# Patient Record
Sex: MALE | Race: WHITE | Employment: UNEMPLOYED | URBAN - METROPOLITAN AREA
[De-identification: names, ages, dates, MRNs, and addresses within clinical notes are randomized per-mention and may not be internally consistent; named-entity substitution may affect disease eponyms.]

---

## 2017-02-06 ENCOUNTER — HOSPITAL ENCOUNTER (OUTPATIENT)
Dept: MRI IMAGING | Age: 47
Discharge: HOME | End: 2017-02-06
Payer: COMMERCIAL

## 2017-02-06 DIAGNOSIS — M54.9 BACK PAIN: ICD-10-CM

## 2017-02-06 PROCEDURE — 72148 MRI LUMBAR SPINE W/O DYE: CPT

## 2017-06-22 ENCOUNTER — APPOINTMENT (OUTPATIENT)
Dept: GENERAL RADIOLOGY | Age: 47
DRG: 308 | End: 2017-06-22
Attending: EMERGENCY MEDICINE
Payer: COMMERCIAL

## 2017-06-22 ENCOUNTER — HOSPITAL ENCOUNTER (INPATIENT)
Age: 47
LOS: 6 days | Discharge: HOME | DRG: 308 | End: 2017-06-28
Attending: EMERGENCY MEDICINE | Admitting: INTERNAL MEDICINE
Payer: COMMERCIAL

## 2017-06-22 DIAGNOSIS — R06.02 SOB (SHORTNESS OF BREATH): ICD-10-CM

## 2017-06-22 DIAGNOSIS — I48.91 NEW ONSET ATRIAL FIBRILLATION (HCC): Primary | ICD-10-CM

## 2017-06-22 PROBLEM — Z72.0 TOBACCO ABUSE: Status: ACTIVE | Noted: 2017-06-22

## 2017-06-22 PROBLEM — E66.9 OBESITY: Status: ACTIVE | Noted: 2017-06-22

## 2017-06-22 PROBLEM — M54.9 CHRONIC BACK PAIN: Status: ACTIVE | Noted: 2017-06-22

## 2017-06-22 PROBLEM — G47.33 OSA (OBSTRUCTIVE SLEEP APNEA): Status: ACTIVE | Noted: 2017-06-22

## 2017-06-22 PROBLEM — R06.09 EXERTIONAL DYSPNEA: Status: ACTIVE | Noted: 2017-06-22

## 2017-06-22 PROBLEM — R03.0 ELEVATED BP WITHOUT DIAGNOSIS OF HYPERTENSION: Status: ACTIVE | Noted: 2017-06-22

## 2017-06-22 PROBLEM — G89.29 CHRONIC BACK PAIN: Status: ACTIVE | Noted: 2017-06-22

## 2017-06-22 PROBLEM — N28.9 RENAL INSUFFICIENCY: Status: ACTIVE | Noted: 2017-06-22

## 2017-06-22 PROBLEM — J45.909 ASTHMA: Status: ACTIVE | Noted: 2017-06-22

## 2017-06-22 LAB
ALBUMIN SERPL BCP-MCNC: 3.8 G/DL (ref 3.5–5)
ALBUMIN/GLOB SERPL: 1.3 {RATIO} (ref 1.1–2.2)
ALP SERPL-CCNC: 76 U/L (ref 45–117)
ALT SERPL-CCNC: 39 U/L (ref 12–78)
AMPHET UR QL SCN: NEGATIVE
ANION GAP BLD CALC-SCNC: 8 MMOL/L (ref 5–15)
APTT PPP: 28 SEC (ref 22.1–32.5)
AST SERPL W P-5'-P-CCNC: 21 U/L (ref 15–37)
ATRIAL RATE: 197 BPM
BARBITURATES UR QL SCN: NEGATIVE
BASOPHILS # BLD AUTO: 0 K/UL (ref 0–0.1)
BASOPHILS # BLD: 0 % (ref 0–1)
BENZODIAZ UR QL: NEGATIVE
BILIRUB SERPL-MCNC: 0.7 MG/DL (ref 0.2–1)
BNP SERPL-MCNC: 1731 PG/ML (ref 0–125)
BUN SERPL-MCNC: 11 MG/DL (ref 6–20)
BUN/CREAT SERPL: 8 (ref 12–20)
CALCIUM SERPL-MCNC: 9 MG/DL (ref 8.5–10.1)
CALCULATED R AXIS, ECG10: 87 DEGREES
CALCULATED T AXIS, ECG11: -23 DEGREES
CANNABINOIDS UR QL SCN: POSITIVE
CHLORIDE SERPL-SCNC: 105 MMOL/L (ref 97–108)
CO2 SERPL-SCNC: 31 MMOL/L (ref 21–32)
COCAINE UR QL SCN: NEGATIVE
CREAT SERPL-MCNC: 1.31 MG/DL (ref 0.7–1.3)
DIAGNOSIS, 93000: NORMAL
DRUG SCRN COMMENT,DRGCM: ABNORMAL
EOSINOPHIL # BLD: 0.2 K/UL (ref 0–0.4)
EOSINOPHIL NFR BLD: 2 % (ref 0–7)
ERYTHROCYTE [DISTWIDTH] IN BLOOD BY AUTOMATED COUNT: 14.7 % (ref 11.5–14.5)
GLOBULIN SER CALC-MCNC: 3 G/DL (ref 2–4)
GLUCOSE SERPL-MCNC: 105 MG/DL (ref 65–100)
HCT VFR BLD AUTO: 43.3 % (ref 36.6–50.3)
HGB BLD-MCNC: 14 G/DL (ref 12.1–17)
INR PPP: 1.1 (ref 0.9–1.1)
LYMPHOCYTES # BLD AUTO: 18 % (ref 12–49)
LYMPHOCYTES # BLD: 1.7 K/UL (ref 0.8–3.5)
MAGNESIUM SERPL-MCNC: 2.1 MG/DL (ref 1.6–2.4)
MCH RBC QN AUTO: 30.5 PG (ref 26–34)
MCHC RBC AUTO-ENTMCNC: 32.3 G/DL (ref 30–36.5)
MCV RBC AUTO: 94.3 FL (ref 80–99)
METHADONE UR QL: NEGATIVE
MONOCYTES # BLD: 1.1 K/UL (ref 0–1)
MONOCYTES NFR BLD AUTO: 12 % (ref 5–13)
NEUTS SEG # BLD: 6.8 K/UL (ref 1.8–8)
NEUTS SEG NFR BLD AUTO: 68 % (ref 32–75)
OPIATES UR QL: NEGATIVE
PCP UR QL: NEGATIVE
PLATELET # BLD AUTO: 234 K/UL (ref 150–400)
POTASSIUM SERPL-SCNC: 4.3 MMOL/L (ref 3.5–5.1)
PROT SERPL-MCNC: 6.8 G/DL (ref 6.4–8.2)
PROTHROMBIN TIME: 11.4 SEC (ref 9–11.1)
Q-T INTERVAL, ECG07: 292 MS
QRS DURATION, ECG06: 98 MS
QTC CALCULATION (BEZET), ECG08: 416 MS
RBC # BLD AUTO: 4.59 M/UL (ref 4.1–5.7)
SODIUM SERPL-SCNC: 144 MMOL/L (ref 136–145)
THERAPEUTIC RANGE,PTTT: NORMAL SECS (ref 58–77)
TROPONIN I SERPL-MCNC: <0.04 NG/ML
TSH SERPL DL<=0.05 MIU/L-ACNC: 3.32 UIU/ML (ref 0.36–3.74)
VENTRICULAR RATE, ECG03: 122 BPM
WBC # BLD AUTO: 9.8 K/UL (ref 4.1–11.1)

## 2017-06-22 PROCEDURE — 74011000258 HC RX REV CODE- 258: Performed by: EMERGENCY MEDICINE

## 2017-06-22 PROCEDURE — 71010 XR CHEST PORT: CPT

## 2017-06-22 PROCEDURE — 93005 ELECTROCARDIOGRAM TRACING: CPT

## 2017-06-22 PROCEDURE — 96366 THER/PROPH/DIAG IV INF ADDON: CPT

## 2017-06-22 PROCEDURE — 85730 THROMBOPLASTIN TIME PARTIAL: CPT | Performed by: EMERGENCY MEDICINE

## 2017-06-22 PROCEDURE — 99285 EMERGENCY DEPT VISIT HI MDM: CPT

## 2017-06-22 PROCEDURE — 80307 DRUG TEST PRSMV CHEM ANLYZR: CPT | Performed by: EMERGENCY MEDICINE

## 2017-06-22 PROCEDURE — 74011250636 HC RX REV CODE- 250/636: Performed by: INTERNAL MEDICINE

## 2017-06-22 PROCEDURE — 96365 THER/PROPH/DIAG IV INF INIT: CPT

## 2017-06-22 PROCEDURE — 83880 ASSAY OF NATRIURETIC PEPTIDE: CPT | Performed by: EMERGENCY MEDICINE

## 2017-06-22 PROCEDURE — 65660000000 HC RM CCU STEPDOWN

## 2017-06-22 PROCEDURE — 74011000250 HC RX REV CODE- 250: Performed by: EMERGENCY MEDICINE

## 2017-06-22 PROCEDURE — 74011250637 HC RX REV CODE- 250/637: Performed by: INTERNAL MEDICINE

## 2017-06-22 PROCEDURE — 74011000250 HC RX REV CODE- 250: Performed by: INTERNAL MEDICINE

## 2017-06-22 PROCEDURE — 36415 COLL VENOUS BLD VENIPUNCTURE: CPT | Performed by: EMERGENCY MEDICINE

## 2017-06-22 PROCEDURE — 83735 ASSAY OF MAGNESIUM: CPT | Performed by: EMERGENCY MEDICINE

## 2017-06-22 PROCEDURE — 93306 TTE W/DOPPLER COMPLETE: CPT

## 2017-06-22 PROCEDURE — 74011250636 HC RX REV CODE- 250/636: Performed by: EMERGENCY MEDICINE

## 2017-06-22 PROCEDURE — 85025 COMPLETE CBC W/AUTO DIFF WBC: CPT | Performed by: EMERGENCY MEDICINE

## 2017-06-22 PROCEDURE — 80053 COMPREHEN METABOLIC PANEL: CPT | Performed by: EMERGENCY MEDICINE

## 2017-06-22 PROCEDURE — 85610 PROTHROMBIN TIME: CPT | Performed by: EMERGENCY MEDICINE

## 2017-06-22 PROCEDURE — 84443 ASSAY THYROID STIM HORMONE: CPT | Performed by: INTERNAL MEDICINE

## 2017-06-22 PROCEDURE — 84484 ASSAY OF TROPONIN QUANT: CPT | Performed by: EMERGENCY MEDICINE

## 2017-06-22 PROCEDURE — 96372 THER/PROPH/DIAG INJ SC/IM: CPT

## 2017-06-22 RX ORDER — GUAIFENESIN 600 MG/1
600 TABLET, EXTENDED RELEASE ORAL 2 TIMES DAILY
Status: DISCONTINUED | OUTPATIENT
Start: 2017-06-22 | End: 2017-06-28 | Stop reason: HOSPADM

## 2017-06-22 RX ORDER — ENOXAPARIN SODIUM 100 MG/ML
1 INJECTION SUBCUTANEOUS
Status: COMPLETED | OUTPATIENT
Start: 2017-06-22 | End: 2017-06-22

## 2017-06-22 RX ORDER — LISINOPRIL 5 MG/1
10 TABLET ORAL DAILY
Status: DISCONTINUED | OUTPATIENT
Start: 2017-06-23 | End: 2017-06-23

## 2017-06-22 RX ORDER — SODIUM CHLORIDE 0.9 % (FLUSH) 0.9 %
5-10 SYRINGE (ML) INJECTION EVERY 8 HOURS
Status: DISCONTINUED | OUTPATIENT
Start: 2017-06-22 | End: 2017-06-28 | Stop reason: HOSPADM

## 2017-06-22 RX ORDER — SODIUM CHLORIDE 0.9 % (FLUSH) 0.9 %
5-10 SYRINGE (ML) INJECTION EVERY 8 HOURS
Status: DISCONTINUED | OUTPATIENT
Start: 2017-06-22 | End: 2017-06-24 | Stop reason: SDUPTHER

## 2017-06-22 RX ORDER — DIPHENHYDRAMINE HCL 25 MG
25 TABLET ORAL
COMMUNITY
End: 2017-06-28

## 2017-06-22 RX ORDER — IBUPROFEN 200 MG
400 TABLET ORAL
COMMUNITY
End: 2017-06-28

## 2017-06-22 RX ORDER — CEFAZOLIN SODIUM IN 0.9 % NACL 2 G/50 ML
2 INTRAVENOUS SOLUTION, PIGGYBACK (ML) INTRAVENOUS EVERY 8 HOURS
Status: DISCONTINUED | OUTPATIENT
Start: 2017-06-22 | End: 2017-06-27

## 2017-06-22 RX ORDER — DIPHENHYDRAMINE HCL 25 MG
25 CAPSULE ORAL
Status: DISCONTINUED | OUTPATIENT
Start: 2017-06-22 | End: 2017-06-28 | Stop reason: HOSPADM

## 2017-06-22 RX ORDER — SODIUM CHLORIDE 0.9 % (FLUSH) 0.9 %
5-10 SYRINGE (ML) INJECTION AS NEEDED
Status: DISCONTINUED | OUTPATIENT
Start: 2017-06-22 | End: 2017-06-28 | Stop reason: HOSPADM

## 2017-06-22 RX ORDER — POLYETHYLENE GLYCOL 3350 17 G/17G
17 POWDER, FOR SOLUTION ORAL DAILY
COMMUNITY
End: 2018-04-23

## 2017-06-22 RX ORDER — HEPARIN SODIUM 5000 [USP'U]/ML
5000 INJECTION, SOLUTION INTRAVENOUS; SUBCUTANEOUS EVERY 8 HOURS
Status: DISCONTINUED | OUTPATIENT
Start: 2017-06-22 | End: 2017-06-23

## 2017-06-22 RX ORDER — GUAIFENESIN 600 MG/1
600 TABLET, EXTENDED RELEASE ORAL 2 TIMES DAILY
COMMUNITY
End: 2017-07-18

## 2017-06-22 RX ORDER — SODIUM CHLORIDE 0.9 % (FLUSH) 0.9 %
5-10 SYRINGE (ML) INJECTION AS NEEDED
Status: DISCONTINUED | OUTPATIENT
Start: 2017-06-22 | End: 2017-06-24

## 2017-06-22 RX ORDER — POLYETHYLENE GLYCOL 3350 17 G/17G
17 POWDER, FOR SOLUTION ORAL DAILY
Status: DISCONTINUED | OUTPATIENT
Start: 2017-06-23 | End: 2017-06-25

## 2017-06-22 RX ORDER — BUMETANIDE 0.25 MG/ML
1 INJECTION INTRAMUSCULAR; INTRAVENOUS EVERY 12 HOURS
Status: DISCONTINUED | OUTPATIENT
Start: 2017-06-22 | End: 2017-06-23

## 2017-06-22 RX ORDER — CARVEDILOL 6.25 MG/1
6.25 TABLET ORAL 2 TIMES DAILY WITH MEALS
Status: DISCONTINUED | OUTPATIENT
Start: 2017-06-22 | End: 2017-06-23

## 2017-06-22 RX ADMIN — ENOXAPARIN SODIUM 160 MG: 80 INJECTION SUBCUTANEOUS at 13:38

## 2017-06-22 RX ADMIN — CEFAZOLIN 2 G: 1 INJECTION, POWDER, FOR SOLUTION INTRAMUSCULAR; INTRAVENOUS; PARENTERAL at 16:09

## 2017-06-22 RX ADMIN — DILTIAZEM HYDROCHLORIDE 10 MG/HR: 5 INJECTION INTRAVENOUS at 16:42

## 2017-06-22 RX ADMIN — BUMETANIDE 1 MG: 0.25 INJECTION INTRAMUSCULAR; INTRAVENOUS at 16:08

## 2017-06-22 RX ADMIN — Medication 10 ML: at 23:14

## 2017-06-22 RX ADMIN — CARVEDILOL 6.25 MG: 6.25 TABLET, FILM COATED ORAL at 16:08

## 2017-06-22 RX ADMIN — DILTIAZEM HYDROCHLORIDE 2.5 MG/HR: 5 INJECTION INTRAVENOUS at 13:08

## 2017-06-22 RX ADMIN — HEPARIN SODIUM 5000 UNITS: 5000 INJECTION, SOLUTION INTRAVENOUS; SUBCUTANEOUS at 23:12

## 2017-06-22 RX ADMIN — GUAIFENESIN 600 MG: 600 TABLET, EXTENDED RELEASE ORAL at 19:55

## 2017-06-22 RX ADMIN — HEPARIN SODIUM 5000 UNITS: 5000 INJECTION, SOLUTION INTRAVENOUS; SUBCUTANEOUS at 16:14

## 2017-06-22 RX ADMIN — CEFAZOLIN 2 G: 1 INJECTION, POWDER, FOR SOLUTION INTRAMUSCULAR; INTRAVENOUS; PARENTERAL at 23:18

## 2017-06-22 NOTE — IP AVS SNAPSHOT
Layton Loredo 
 
 
 [address] [facility] 
[phone] Patient: Bonny Deleon MRN: [id number] :1970 You are allergic to the following Allergen Reactions Penicillins Hives Recent Documentation Height Weight BMI Smoking Status 1.676 m 156.4 kg 55.65 kg/m2 Current Every Day Smoker Emergency Contacts Name Discharge Info Relation Home Work [facility] DISCHARGE CAREGIVER [3] Mother [14] [phone] Jose Ramon [place]  Father [15] [phone] About your hospitalization You were admitted on:  2017 You last received care in the:  [facility] 3 PRO CARE TELE 2 You were discharged on:  2017 Unit phone number:  [phone] Why you were hospitalized Your primary diagnosis was:  Not on File Your diagnoses also included:  Atrial Fibrillation With Rvr (Hcc), Renal Insufficiency, Adalberto (Obstructive Sleep Apnea), Obesity, Tobacco Use, Chronic Back Pain, Asthma, Elevated Bp Without Diagnosis Of Hypertension, Exertional Dyspnea Providers Seen During Your Hospitalizations Provider Role Specialty Primary office phone Milton Soliman MD Attending Provider Emergency Medicine [phone] Mindy Brady MD Attending Provider [facility] [phone] Your Primary Care Physician (PCP) Primary Care Physician Office Phone Office Fax Cris Caputo [phone] Follow-up Information Follow up With Details Comments Contact Info Ban Mcdonald MD On 2017 9 am  [address] [facility] [address] [facility] 
[phone] Vamshi Landrum, [address] [place] [address] 
[phone] Your Appointments 2017  9:00 AM EDT HOSPITAL DISCHARGE with Ban Mcdonald MD  
[facility] (Josh Nayak) [address]  
[phone] Current Discharge Medication List  
  
START taking these medications Dose & Instructions Dispensing Information Comments Morning Noon Evening Bedtime  
 bumetanide 1 mg tablet Commonly known as:  Arun Haq Your last dose was: Your next dose is:    
   
   
 Dose:  1 mg Take 1 Tab by mouth daily. Quantity:  30 Tab Refills:  0  
     
   
   
   
  
 cephALEXin 500 mg capsule Commonly known as:  Rony Bowling Your last dose was: Your next dose is:    
   
   
 Dose:  500 mg Take 1 Cap by mouth four (4) times daily. Quantity:  28 Cap Refills:  0  
     
   
   
   
  
 dilTIAZem  mg ER capsule Commonly known as:  CARDIZEM CD Your last dose was: Your next dose is:    
   
   
 Dose:  300 mg Take 1 Cap by mouth daily. Quantity:  30 Cap Refills:  0  
     
   
   
   
  
 nicotine 21 mg/24 hr  
Commonly known as:  Curt Oiler Your last dose was: Your next dose is:    
   
   
 Dose:  1 Patch 1 Patch by TransDERmal route daily for 30 days. Quantity:  30 Patch Refills:  0  
     
   
   
   
  
 warfarin 5 mg tablet Commonly known as:  COUMADIN Your last dose was: Your next dose is:    
   
   
 Dose:  5 mg Take 1 Tab by mouth daily. Indications: PREVENT THROMBOEMBOLISM IN CHRONIC ATRIAL FIBRILLATION Quantity:  30 Tab Refills:  0 CONTINUE these medications which have NOT CHANGED Dose & Instructions Dispensing Information Comments Morning Noon Evening Bedtime MIRALAX 17 gram/dose powder Generic drug:  polyethylene glycol Your last dose was: Your next dose is:    
   
   
 Dose:  17 g Take 17 g by mouth daily. Refills:  0 MUCINEX 600 mg ER tablet Generic drug:  guaiFENesin ER Your last dose was: Your next dose is:    
   
   
 Dose:  600 mg Take 600 mg by mouth two (2) times a day. Refills:  0 STOP taking these medications ADVIL 200 mg tablet Generic drug:  ibuprofen diphenhydrAMINE 25 mg tablet Commonly known as:  BENADRYL Where to Get Your Medications Information on where to get these meds will be given to you by the nurse or doctor. ! Ask your nurse or doctor about these medications  
  bumetanide 1 mg tablet  
 cephALEXin 500 mg capsule  
 dilTIAZem  mg ER capsule  
 nicotine 21 mg/24 hr  
 warfarin 5 mg tablet Discharge Instructions Heart Failure: Care Instructions Your Care Instructions Heart failure occurs when your heart does not pump as much blood as the body needs. Failure does not mean that the heart has stopped pumping but rather that it is not pumping as well as it should. Over time, this causes fluid buildup in your lungs and other parts of your body. Fluid buildup can cause shortness of breath, fatigue, swollen ankles, and other problems. By taking medicines regularly, reducing sodium (salt) in your diet, checking your weight every day, and making lifestyle changes, you can feel better and live longer. Follow-up care is a key part of your treatment and safety. Be sure to make and go to all appointments, and call your doctor if you are having problems. It's also a good idea to know your test results and keep a list of the medicines you take. How can you care for yourself at home? Medicines · Be safe with medicines. Take your medicines exactly as prescribed. Call your doctor if you think you are having a problem with your medicine. · Do not take any vitamins, over-the-counter medicine, or herbal products without talking to your doctor first. Susie Camiloas not take ibuprofen (Advil or Motrin) and naproxen (Aleve) without talking to your doctor first. They could make your heart failure worse. · You may be taking some of the following medicine. ¨ Beta-blockers can slow heart rate, decrease blood pressure, and improve your condition. Taking a beta-blocker may lower your chance of needing to be hospitalized. ¨ Angiotensin-converting enzyme inhibitors (ACEIs) reduce the heart's workload, lower blood pressure, and reduce swelling. Taking an ACEI may lower your chance of needing to be hospitalized again. ¨ Angiotensin II receptor blockers (ARBs) work like ACEIs. Your doctor may prescribe them instead of ACEIs. ¨ Diuretics, also called water pills, reduce swelling. ¨ Potassium supplements replace this important mineral, which is sometimes lost with diuretics. ¨ Aspirin and other blood thinners prevent blood clots, which can cause a stroke or heart attack. You will get more details on the specific medicines your doctor prescribes. Diet · Your doctor may suggest that you limit sodium to 1,500 milligrams (mg) a day. That is less than 1 teaspoon of salt a day, including all the salt you eat in cooking or in packaged foods. People get most of their sodium from processed foods. Fast food and restaurant meals also tend to be very high in sodium. · Ask your doctor how much liquid you can drink each day. You may have to limit liquids. Weight · Weigh yourself without clothing at the same time each day. Record your weight. Call your doctor if you gain more than 3 pounds in 24 hours or 5 pounds in one week. A sudden weight gain may mean that your heart failure is getting worse. Activity level · Start light exercise (if your doctor says it is okay). Even if you can only do a small amount, exercise will help you get stronger, have more energy, and manage your weight and your stress. Walking is an easy way to get exercise. Start out by walking a little more than you did before. Bit by bit, increase the amount you walk. · When you exercise, watch for signs that your heart is working too hard. You are pushing yourself too hard if you cannot talk while you are exercising. If you become short of breath or dizzy or have chest pain, stop, sit down, and rest. 
· If you feel \"wiped out\" the day after you exercise, walk slower or for a shorter distance until you can work up to a better pace. · Get enough rest at night. Sleeping with 1 or 2 pillows under your upper body and head may help you breathe easier. Lifestyle changes · Do not smoke. Smoking can make a heart condition worse. If you need help quitting, talk to your doctor about stop-smoking programs and medicines. These can increase your chances of quitting for good. Quitting smoking may be the most important step you can take to protect your heart. · Limit alcohol to 2 drinks a day for men and 1 drink a day for women. Too much alcohol can cause health problems. · Avoid getting sick from colds and the flu. Get a pneumococcal vaccine shot. If you have had one before, ask your doctor whether you need another dose. Get a flu shot each year. If you must be around people with colds or the flu, wash your hands often. When should you call for help? Call 911 if you have symptoms of sudden heart failure such as: 
· You have severe trouble breathing. · You cough up pink, foamy mucus. · You have a new irregular or rapid heartbeat. Call your doctor now or seek immediate medical care if: 
· You have new or increased shortness of breath. · You are dizzy or lightheaded, or you feel like you may faint. · You have sudden weight gain, such as 3 pounds in 24 hours, or 5 pounds in one week. · You have increased swelling in your legs, ankles, or feet. · You are suddenly so tired or weak that you cannot do your usual activities. Watch closely for changes in your health, and be sure to contact your doctor if: 
· You develop new symptoms. Where can you learn more? Go to DealExplorer.be Enter G871 in the search box to learn more about \"Heart Failure: Care Instructions. \"  
© 8010-5076 Healthwise, Incorporated. Care instructions adapted under license by [employer]ok [employer] (which disclaims liability or warranty for this information). This care instruction is for use with your licensed healthcare professional. If you have questions about a medical condition or this instruction, always ask your healthcare professional. [address] any warranty or liability for your use of this information. Content Version: 52.4.587651; Current as of: February 20, 2015 (modified 9/26/16). Atrial Fibrillation: Care Instructions Your Care Instructions Atrial fibrillation is an irregular and often fast heartbeat. Treating this condition is important for several reasons. It can cause blood clots, which can travel from your heart to your brain and cause a stroke. If you have a fast heartbeat, you may feel lightheaded, dizzy, and weak. An irregular heartbeat can also increase your risk for heart failure. Atrial fibrillation is often the result of another heart condition, such as high blood pressure or coronary artery disease. Making changes to improve your heart condition will help you stay healthy and active. Follow-up care is a key part of your treatment and safety. Be sure to make and go to all appointments, and call your doctor if you are having problems. It's also a good idea to know your test results and keep a list of the medicines you take. How can you care for yourself at home? Medicines · Take your medicines exactly as prescribed. Call your doctor if you think you are having a problem with your medicine. You will get more details on the specific medicines your doctor prescribes. · If your doctor has given you a blood thinner to prevent a stroke, be sure you get instructions about how to take your medicine safely. Blood thinners can cause serious bleeding problems. · Do not take any vitamins, over-the-counter drugs, or herbal products without talking to your doctor first. 
Lifestyle changes · Do not smoke. Smoking can increase your chance of a stroke and heart attack. If you need help quitting, talk to your doctor about stop-smoking programs and medicines. These can increase your chances of quitting for good. · Eat a heart-healthy diet. · Stay at a healthy weight. Lose weight if you need to. · Limit alcohol to 2 drinks a day for men and 1 drink a day for women. Too much alcohol can cause health problems. · Avoid colds and flu. Get a pneumococcal vaccine shot. If you have had one before, ask your doctor whether you need another dose. Get a flu shot every year. If you must be around people with colds or flu, wash your hands often. Activity · If your doctor recommends it, get more exercise. Walking is a good choice. Bit by bit, increase the amount you walk every day. Try for at least 30 minutes on most days of the week. You also may want to swim, bike, or do other activities. Your doctor may suggest that you join a cardiac rehabilitation program so that you can have help increasing your physical activity safely. · Start light exercise if your doctor says it is okay. Even a small amount will help you get stronger, have more energy, and manage stress. Walking is an easy way to get exercise. Start out by walking a little more than you did in the hospital. Gradually increase the amount you walk. · When you exercise, watch for signs that your heart is working too hard. You are pushing too hard if you cannot talk while you are exercising. If you become short of breath or dizzy or have chest pain, sit down and rest immediately. · Check your pulse regularly. Place two fingers on the artery at the palm side of your wrist, in line with your thumb. If your heartbeat seems uneven or fast, talk to your doctor. When should you call for help? Call 911 anytime you think you may need emergency care. For example, call if: 
· You have symptoms of a heart attack. These may include: ¨ Chest pain or pressure, or a strange feeling in the chest. 
¨ Sweating. ¨ Shortness of breath. ¨ Nausea or vomiting. ¨ Pain, pressure, or a strange feeling in the back, neck, jaw, or upper belly or in one or both shoulders or arms. ¨ Lightheadedness or sudden weakness. ¨ A fast or irregular heartbeat. After you call 911, the  may tell you to chew 1 adult-strength or 2 to 4 low-dose aspirin. Wait for an ambulance. Do not try to drive yourself. · You have symptoms of a stroke. These may include: 
¨ Sudden numbness, tingling, weakness, or loss of movement in your face, arm, or leg, especially on only one side of your body. ¨ Sudden vision changes. ¨ Sudden trouble speaking. ¨ Sudden confusion or trouble understanding simple statements. ¨ Sudden problems with walking or balance. ¨ A sudden, severe headache that is different from past headaches. · You passed out (lost consciousness). Call your doctor now or seek immediate medical care if: 
· You have new or increased shortness of breath. · You feel dizzy or lightheaded, or you feel like you may faint. · Your heart rate becomes irregular. · You can feel your heart flutter in your chest or skip heartbeats. Tell your doctor if these symptoms are new or worse. Watch closely for changes in your health, and be sure to contact your doctor if you have any problems. Where can you learn more? Go to http://cleo-elton.info/. Enter U020 in the search box to learn more about \"Atrial Fibrillation: Care Instructions. \" Current as of: September 21, 2016 Content Version: 11.3 © 7043-8521 [employer]. Care instructions adapted under license by [employer] (which disclaims liability or warranty for this information).  If you have questions about a medical condition or this instruction, always ask your healthcare professional. [address] any warranty or liability for your use of this information. Stopping Smoking: Care Instructions Your Care Instructions Cigarette smokers crave the nicotine in cigarettes. Giving it up is much harder than simply changing a habit. Your body has to stop craving the nicotine. It is hard to quit, but you can do it. There are many tools that people use to quit smoking. You may find that combining tools works best for you. There are several steps to quitting. First you get ready to quit. Then you get support to help you. After that, you learn new skills and behaviors to become a nonsmoker. For many people, a necessary step is getting and using medicine. Your doctor will help you set up the plan that best meets your needs. You may want to attend a smoking cessation program to help you quit smoking. When you choose a program, look for one that has proven success. Ask your doctor for ideas. You will greatly increase your chances of success if you take medicine as well as get counseling or join a cessation program. 
Some of the changes you feel when you first quit tobacco are uncomfortable. Your body will miss the nicotine at first, and you may feel short-tempered and grumpy. You may have trouble sleeping or concentrating. Medicine can help you deal with these symptoms. You may struggle with changing your smoking habits and rituals. The last step is the tricky one: Be prepared for the smoking urge to continue for a time. This is a lot to deal with, but keep at it. You will feel better. Follow-up care is a key part of your treatment and safety. Be sure to make and go to all appointments, and call your doctor if you are having problems. Its also a good idea to know your test results and keep a list of the medicines you take. How can you care for yourself at home? · Ask your family, friends, and coworkers for support. You have a better chance of quitting if you have help and support. · Join a support group, such as Nicotine Anonymous, for people who are trying to quit smoking. · Consider signing up for a smoking cessation program, such as the American Lung Association's Freedom from Smoking program. 
· Set a quit date. Pick your date carefully so that it is not right in the middle of a big deadline or stressful time. Once you quit, do not even take a puff. Get rid of all ashtrays and lighters after your last cigarette. Clean your house and your clothes so that they do not smell of smoke. · Learn how to be a nonsmoker. Think about ways you can avoid those things that make you reach for a cigarette. ¨ Avoid situations that put you at greatest risk for smoking. For some people, it is hard to have a drink with friends without smoking. For others, they might skip a coffee break with coworkers who smoke. ¨ Change your daily routine. Take a different route to work or eat a meal in a different place. · Cut down on stress. Calm yourself or release tension by doing an activity you enjoy, such as reading a book, taking a hot bath, or gardening. · Talk to your doctor or pharmacist about nicotine replacement therapy, which replaces the nicotine in your body. You still get nicotine but you do not use tobacco. Nicotine replacement products help you slowly reduce the amount of nicotine you need. These products come in several forms, many of them available over-the-counter: ¨ Nicotine patches ¨ Nicotine gum and lozenges ¨ Nicotine inhaler · Ask your doctor about bupropion (Wellbutrin) or varenicline (Chantix), which are prescription medicines. They do not contain nicotine. They help you by reducing withdrawal symptoms, such as stress and anxiety. · Some people find hypnosis, acupuncture, and massage helpful for ending the smoking habit. · Eat a healthy diet and get regular exercise. Having healthy habits will help your body move past its craving for nicotine. · Be prepared to keep trying. Most people are not successful the first few times they try to quit. Do not get mad at yourself if you smoke again. Make a list of things you learned and think about when you want to try again, such as next week, next month, or next year. Where can you learn more? Go to http://cleo-elton.info/. Enter C041 in the search box to learn more about \"Stopping Smoking: Care Instructions. \" Current as of: 2017 Content Version: 11.3 © 0411-7761 [employer]. Care instructions adapted under license by [employer] (which disclaims liability or warranty for this information). If you have questions about a medical condition or this instruction, always ask your healthcare professional. [place] [address] any warranty or liability for your use of this information. Patient Discharge Instructions Katelyn Castillo / [id number] : 1970 Admitted 2017 Discharged: 2017 8:27 AM  
 
ACUTE DIAGNOSES: 
Atrial fibrillation with RVR ([facility] [address] Atrial fibrillation with RVR ([facility] [address] CHRONIC MEDICAL DIAGNOSES: 
Problem List as of 2017  Date Reviewed: 2017 Codes Class Noted - Resolved Atrial fibrillation with RVR (HCC) ICD-10-CM: I48.91 
ICD-9-CM: 427.31  2017 - Present Renal insufficiency ICD-10-CM: N28.9 ICD-9-CM: 593.9  2017 - Present PEPE (obstructive sleep apnea) ICD-10-CM: U15.87 
ICD-9-CM: 327.23  2017 - Present Obesity ICD-10-CM: E66.9 ICD-9-CM: 278.00  2017 - Present Tobacco use ICD-10-CM: Z72.0 ICD-9-CM: 305.1  2017 - Present Chronic back pain ICD-10-CM: M54.9, G89.29 ICD-9-CM: 724.5, 338.29  2017 - Present Asthma ICD-10-CM: U56.712 ICD-9-CM: 493.90  2017 - Present Elevated BP without diagnosis of hypertension ICD-10-CM: R03.0 ICD-9-CM: 796.2  6/22/2017 - Present Exertional dyspnea ICD-10-CM: R06.09 
ICD-9-CM: 786.09  6/22/2017 - Present DISCHARGE MEDICATIONS:  
 
 
 
· It is important that you take the medication exactly as they are prescribed. · Keep your medication in the bottles provided by the pharmacist and keep a list of the medication names, dosages, and times to be taken in your wallet. · Do not take other medications without consulting your doctor. DIET:  Low fat, Low cholesterol ACTIVITY: Activity as tolerated ADDITIONAL INFORMATION: If you experience any of the following symptoms then please call your primary care physician or return to the emergency room if you cannot get hold of your doctor: Fever, chills, nausea, vomiting, diarrhea, change in mentation, falling, bleeding, shortness of breath. FOLLOW UP CARE: 
Dr. Tamika Myrick, PA  you are to call and set up an appointment to see them with in 1 week. Follow-up with specialists at directed by them Information obtained by : 
I understand that if any problems occur once I am at home I am to contact my physician. I understand and acknowledge receipt of the instructions indicated above. Physician's or R.N.'s Signature                                                                  Date/Time Patient or Representative Signature                                                          Date/Time Discharge Instructions Attachments/References MEFS - BUMETANIDE (BUMEX) - (BY MOUTH) (ENGLISH) MEFS - WARFARIN (COUMADIN, JANTOVEN) - (BY MOUTH) (ENGLISH) MEFS - DILTIAZEM (CARDIZEM, CARDIZEM CD, CARDIZEM LA, CARDIZEM SR) - (BY MOUTH) (ENGLISH) MEFS - NICOTINE (NICODERM CQ, NICODERM CQ CLEAR, LEADER NICOTINE TRANSDERMAL SYSTEM, NICOTROL) - (ABSORBED THROUGH THE SKIN) (ENGLISH) Discharge Orders None [email or web address] Announcement We are excited to announce that we are making your provider's discharge notes available to you in Xero. You will see these notes when they are completed and signed by the physician that discharged you from your recent hospital stay. If you have any questions or concerns about any information you see in Xero, please call the Health Information Department where you were seen or reach out to your Primary Care Provider for more information about your plan of care. Introducing [facility] SERVICES! Gracie Wylie introduces Xero patient portal. Now you can access parts of your medical record, email your doctor's office, and request medication refills online. 1. In your internet browser, go to [email or web address] 2. Click on the First Time User? Click Here link in the Sign In box. You will see the New Member Sign Up page. 3. Enter your Xero Access Code exactly as it appears below. You will not need to use this code after youve completed the sign-up process. If you do not sign up before the expiration date, you must request a new code. · Xero Access Code: [email or web address] Expires: 9/24/2017  9:21 AM 
 
4. Enter the last four digits of your Social Security Number (xxxx) and Date of Birth (mm/dd/yyyy) as indicated and click Submit. You will be taken to the next sign-up page. 5. Create a Xero ID. This will be your Xero login ID and cannot be changed, so think of one that is secure and easy to remember. 6. Create a Xero password. You can change your password at any time. 7. Enter your Password Reset Question and Answer. This can be used at a later time if you forget your password. 8. Enter your e-mail address. You will receive e-mail notification when new information is available in [address] 9. Click Sign Up. You can now view and download portions of your medical record. 10. Click the Download Summary menu link to download a portable copy of your medical information. If you have questions, please visit the Frequently Asked Questions section of the [facility] website. Remember, [facility] is NOT to be used for urgent needs. For medical emergencies, dial 911. Now available from your iPhone and Android! General Information Please provide this summary of care documentation to your next provider. Patient Signature:  ____________________________________________________________ Date:  ____________________________________________________________  
  
Grace Medley Provider Signature:  ____________________________________________________________ Date:  ____________________________________________________________ More Information Bumetanide (Bumex) - (By mouth) Why this medicine is used:  
Treats edema (fluid retention) and high blood pressure. Contact a nurse or doctor right away if you have: · Blistering, peeling, red skin rash · Lightheadedness, fainting · Dry mouth, increased thirst, problems urinating · Nausea or vomiting · Hearing loss, ringing in the ears Common side effects: · Muscle cramps © 2017 [address] Information is for End User's use only and may not be sold, redistributed or otherwise used for commercial purposes. Warfarin (Coumadin, Jantoven) - (By mouth) Why this medicine is used:  
Prevents and treats blood clots. Contact a nurse or doctor right away if you have: 
· Sudden or severe headache · Red or dark brown urine, or red or black, tarry stools · Bloody vomit or vomit that looks like coffee grounds · Bleeding that does not stop or bruises that do not heal 
 · Purplish red, net-like, blotchy spots on the skin Common side effects: · Minor bleeding or bruising © 2017 [address] [facility] Information is for End User's use only and may not be sold, redistributed or otherwise used for commercial purposes. Diltiazem (Cardizem, Cardizem CD, Cardizem LA, Cardizem SR) - (By mouth) Why this medicine is used:  
Treats high blood pressure and angina (chest pain). Contact a nurse or doctor right away if you have: 
· Fast, slow, uneven, or pounding heartbeat · Rapid weight gain; swelling in your hands, ankles, or feet · Dark urine or pale stools · Nausea, vomiting, loss of appetite, stomach pain, · Yellow skin or eyes Common side effects: 
· Dizziness · Tiredness © 2017 [address] [facility] Information is for End User's use only and may not be sold, redistributed or otherwise used for commercial purposes. Nicotine (Nicoderm CQ, Nicoderm CQ Clear, Leader Nicotine Transdermal System, Nicotrol) - (Absorbed through the skin) Why this medicine is used:  
Helps you quit smoking. Contact a nurse or doctor right away if you have: 
· Fast, slow, pounding, or uneven heartbeat Common side effects: · Vivid dreams, trouble sleeping · Mild skin redness, itching, burning, tingling where you wear the patch 
· Headache © 2017 [address] [facility] Information is for End User's use only and may not be sold, redistributed or otherwise used for commercial purposes.

## 2017-06-22 NOTE — ED NOTES
TRANSFER - OUT REPORT:    Verbal report given to Augustin Cartwright RN(name) on Sixto Baltazar  being transferred to CCU(unit) for routine progression of care       Report consisted of patients Situation, Background, Assessment and   Recommendations(SBAR). Information from the following report(s) SBAR, Kardex, ED Summary, Procedure Summary, Intake/Output, Recent Results and Cardiac Rhythm AFIB was reviewed with the receiving nurse. Lines:   Peripheral IV 06/22/17 Left Antecubital (Active)   Site Assessment Clean, dry, & intact 6/22/2017  5:48 PM   Phlebitis Assessment 0 6/22/2017  5:48 PM   Infiltration Assessment 0 6/22/2017  5:48 PM   Dressing Status Clean, dry, & intact 6/22/2017  5:48 PM   Dressing Type Transparent 6/22/2017  5:48 PM   Hub Color/Line Status Green 6/22/2017  5:48 PM       Peripheral IV 06/22/17 Right Forearm (Active)   Site Assessment Clean, dry, & intact 6/22/2017  5:48 PM   Phlebitis Assessment 0 6/22/2017  5:48 PM   Infiltration Assessment 0 6/22/2017  5:48 PM   Dressing Status Clean, dry, & intact 6/22/2017  5:48 PM   Dressing Type Transparent 6/22/2017  5:48 PM   Hub Color/Line Status Pink 6/22/2017  5:48 PM        Opportunity for questions and clarification was provided.       Patient transported with:   Monitor  Registered Nurse

## 2017-06-22 NOTE — ED PROVIDER NOTES
HPI Comments: 49 y.o. male with no significant past medical history who presents with chief complaint of SOB. For the past 2-3 weeks, patient has developed worsening SOB that is exacerbated upon exertion -- \"I can hardly move now. \"  Patient has been using his CPAP, but without marked improvement. Patient was initially seen at [facility]" earlier today, whereupon he was then referred to the ED regarding new onset of a-fib. Patient denies any cardiac hx or prior dx of arrhythmia. Patient denies being on any ASA or other anticoagulants. Patient mentions h/o chronic leg swelling, which has recently increased -- \"it was dormant for a while, but started back up again. \"  Patient additionally mentions chronic back pain, and has been seeing Physical Therapy for the past 15-16 weeks -- \"I had an MRI and surgery done. \"  Patient notes that back pain today remains unchanged, which he currently manages with Advil BID. Patient otherwise denies being on any medications regularly. Patient also complains of feeling \"constipated,\" and has been taking Miralax for the past 6 days. Patient reports passing multiple \"small\" bowel movements this morning. Patient denies any CP, palpitations, vomiting, or fever. There are no other acute medical concerns at this time. Social hx: +tobacco smoker (1 pack/day), +marijuana (occasional)    Note written by Kike Barrios, as dictated by Milton Soliman MD 12:25 PM            The history is provided by the patient. No  was used. No past medical history on file. No past surgical history on file. No family history on file. Social History     Social History    Marital status: UNKNOWN     Spouse name: N/A    Number of children: N/A    Years of education: N/A     Occupational History    Not on file.      Social History Main Topics    Smoking status: Not on file    Smokeless tobacco: Not on file    Alcohol use Not on file    Drug use: Not on file    Sexual activity: Not on file     Other Topics Concern    Not on file     Social History Narrative         ALLERGIES: Penicillins    Review of Systems   Constitutional: Negative. Negative for appetite change, fever and unexpected weight change. HENT: Negative. Negative for ear pain, hearing loss, nosebleeds, rhinorrhea, sore throat and trouble swallowing. Respiratory: Positive for shortness of breath. Negative for cough and chest tightness. Cardiovascular: Positive for palpitations and leg swelling. Negative for chest pain. Gastrointestinal: Positive for constipation. Negative for abdominal distention, abdominal pain, blood in stool and vomiting. Endocrine: Negative. Genitourinary: Negative for dysuria and hematuria. Musculoskeletal: Positive for back pain. Negative for myalgias. Skin: Negative. Negative for rash. Allergic/Immunologic: Negative. Neurological: Negative. Negative for dizziness, syncope, weakness and numbness. Hematological: Negative. Psychiatric/Behavioral: Negative. All other systems reviewed and are negative. Vitals:    06/22/17 1345 06/22/17 1400 06/22/17 1415 06/22/17 1430   BP: (!) 137/104 (!) 139/101 (!) 158/122 (!) 154/133   Pulse: (!) 111 93 (!) 115 (!) 115   Resp:       Temp:       SpO2: 97% 96% 96% 97%   Weight:       Height:                Physical Exam   Constitutional: He is oriented to person, place, and time. Non-toxic appearance. No distress. Obese. HENT:   Head: Normocephalic and atraumatic. Right Ear: External ear normal.   Left Ear: External ear normal.   Nose: Nose normal.   Mouth/Throat: Oropharynx is clear and moist.   Eyes: Conjunctivae and EOM are normal. Pupils are equal, round, and reactive to light. Neck: Normal range of motion. Neck supple. No JVD present. No thyromegaly present. Cardiovascular: Normal heart sounds and intact distal pulses. An irregular rhythm present. Tachycardia present.     No murmur heard. Pulmonary/Chest: No accessory muscle usage. He is in respiratory distress (mild). He has no wheezes. He has rales (faint, bibasilar, R > L). No significant accessory muscle usage. Abdominal: Soft. Bowel sounds are normal. He exhibits no distension. There is no tenderness. Musculoskeletal: Normal range of motion. He exhibits edema. No focal tenderness. 2+ bipedal edema. Neurological: He is alert and oriented to person, place, and time. No cranial nerve deficit. Skin: Skin is warm and dry. No rash noted. Psychiatric: He has a normal mood and affect. His behavior is normal. Thought content normal.   Nursing note and vitals reviewed. Note written by Kike Bailey, as dictated by Ted Pearson MD 12:25 PM    MDM  Number of Diagnoses or Management Options  New onset atrial fibrillation [facility]):   SOB (shortness of breath):   Critical Care  Total time providing critical care: 30-74 minutes    Total critical care time spend exclusive of procedures:  35 minutes. ED Course       Procedures      ED EKG interpretation:  Rhythm: atrial fib with RVR and occasional PVC's. Rate (approx.): 122; Axis: normal; ST/T wave: non-specific changes. Note written by Kike Bailey, as dictated by Ted Pearson MD 12:28 PM      2:30 PM  Patient on Cardizem drip, HR is in low 100's. Chemistry unremarkable within the exception of a slightly elevated creatinine at 1.3./  CBC and coags normal.  Pro-BNP 1731. Negative troponin. CXR unremarkable. In light of new onset of A-Fib, with increasing leg edema and SOB, will admit to Hospitalist service for further management and evaluation of possible new onset of CHF. CONSULT NOTE:  2:35 PM Ted Pearson MD spoke with Dr. Naresh Collins, Consult for Hospitalist.  Discussed available diagnostic tests and clinical findings. Dr. Naresh Collins will admit the patient.

## 2017-06-22 NOTE — H&P
[place] [address]  [phone]    Admission History and Physical      NAME:  Katelyn Castillo   :   1970   MRN:  [id number]     PCP:  MENA Contreras     Date/Time:  2017         Subjective:     CHIEF COMPLAINT: exertional dyspnea for 2 weeks     HISTORY OF PRESENT ILLNESS:     Mr. Isidro Dockery is a 49 y.o.  male who is admitted with atrial fibrillation with RVR. Mr. Isidro Dockery with PMH of chronic back pain, tobacco abuse, obesity presented to ER c/o exertional dyspnea and leg edema for 2 weeks. Dyspnea got worse progressively. Now has difficulty getting upstairs without taking a rest. Has orthopnea and PND. Denies chest pain or cough. Has PEPE and recently starts using his CPAP. In ER, since his stay his BP has been high. Has abdominal wall hyperemia, which is getting progressively worse. Past Medical History:   Diagnosis Date    Asthma     Back pain     Sleep apnea     Spinal stenosis     Staph infection         Past Surgical History:   Procedure Laterality Date    HX TYMPANOSTOMY         Social History   Substance Use Topics    Smoking status: Current Every Day Smoker     Packs/day: 1.00    Smokeless tobacco: Not on file    Alcohol use Yes      Comment: \"occasional drink, drank heavy 5-6 years ago. \"        Family History   Problem Relation Age of Onset   [facility] COPD Mother     Hypertension Mother     Diabetes Mother         Allergies   Allergen Reactions    Penicillins Hives        Prior to Admission medications    Not on File         Review of Systems:  (bold if positive, if negative)    Gen:  Eyes:  ENT:  CVS:  Pulm:   dyspnea,GI:    :    MS:  Skin:  Psych:  Endo:    Hem:  Renal:    Neuro:            Objective:      VITALS:    Vital signs reviewed; most recent are:    Visit Vitals    BP (!) 154/133    Pulse (!) 115    Temp 97.8 °F (36.6 °C)    Resp 22    Ht 5' 6\" (1.676 m)    Wt 156.5 kg (345 lb)    SpO2 97%  BMI 55.68 kg/m2     SpO2 Readings from Last 6 Encounters:   06/22/17 97%    O2 Flow Rate (L/min): 4 l/min   No intake or output data in the 24 hours ending 06/22/17 1506         Exam:     Physical Exam:    Gen:  obese, in no acute distress  HEENT:  Pink conjunctivae, PERRL, hearing intact to voice, moist mucous membranes  Neck:  Supple, without masses, thyroid non-tender  Resp:  No accessory muscle use, BL rales at the bases. Card:  No murmurs, normal S1, S2 without thrills, bruits. ++ peripheral edema  Abd:  Soft, non-tender, non-distended, normoactive bowel sounds are present, no palpable organomegaly and no detectable hernias  Lymph:  No cervical or inguinal adenopathy  Musc:  No cyanosis or clubbing  Skin:  Hyperemia on the abdominal wall. Neuro:  Cranial nerves are grossly intact, no focal motor weakness, follows commands appropriately  Psych:  Good insight, oriented to person, place and time, alert       Labs:    Recent Labs      06/22/17   1254   WBC  9.8   HGB  14.0   HCT  43.3   PLT  234     Recent Labs      06/22/17   1254   NA  144   K  4.3   CL  105   CO2  31   GLU  105*   BUN  11   CREA  1.31*   CA  9.0   MG  2.1   ALB  3.8   TBILI  0.7   SGOT  21   ALT  39     No results found for: GLUCPOC  No results for input(s): PH, PCO2, PO2, HCO3, FIO2 in the last 72 hours. Recent Labs      06/22/17   1254   INR  1.1       Telemetry reviewed:   AFIB       Assessment/Plan:    1. Atrial fibrillation with RVR ([address] (6/22/2017). Admit to stepdown. Continue cardizem drip, which is already started in ER. Check echocardiogram, TSH and consult cardiology. 2.  Exertional dyspnea (6/22/2017)/ BL leg edema/ elevated pro BNP. Most likely CHF. Check echocardiogram. Started on bumex. Check I/O, daily weight. Low salt diet. 3.  PEPE (obstructive sleep apnea) (6/22/2017). Has CPAP at home and will use hospital's until family brings his own. 4.  Elevated BP without diagnosis of hypertension (6/22/2017).  Since come in ER his BP has been high. I am wondering if he has HTN and not on meds. Will start lisinopril and coreg. 5.  Renal insufficiency (6/22/2017). Watch renal function in diuretics. 6.  Obesity (6/22/2017). Advised on weight loss. Check lipid panel. 7.  Tobacco use (6/22/2017). Advised on quitting. 8.  Chronic back pain (6/22/2017). Continue home pain meds     9. Asthma (6/22/2017). Stable. Not wheezing. 10.  Abdominal wall cellulitis. Start ancef and monitor clinically. Check MRSA screening. Hx of staph skin infection.        Signed out to Dr Jamarcus Newman at 15:30       Previous medical records reviewed     Risk of deterioration: high      Total time spent with patient: 79 [address] [facility] discussed with: Patient, Family, Nursing Staff and >50% of time spent in counseling and coordination of care    Discussed:  Care Plan    Prophylaxis:  Hep SQ    Probable Disposition:  Home w/Family           ___________________________________________________    Attending Physician: Lalito Gastelum MD

## 2017-06-22 NOTE — PROGRESS NOTES
[facility]: MED RECONCILIATION    Comments/Recommendations:      Patient was alert and oriented   Verified allergies with patient   Patient has been using Advil for back pain, denies use of other OTC pain meds   Patient was using Mucinex because of chest discomfort, which he thought may be alleviated by Mucinex, but saw no improvement   Patient has been having difficulty sleeping the past week so he has been using Diphenhydramine    Medications added:     · Advil 200 mg  · Miralax Powder OTC  · ZZZquil (Diphenhydramine)  · Mucinex 600 mg    Medications removed:    · none    Medications adjusted:    · none    Information obtained from: Patient    Allergies: Penicillins    Prior to Admission Medications:     Medication Documentation Review Audit       Reviewed by Lissette Read (Pharmacy Student) on 06/22/17 at 1547         Medication Sig Documenting Provider Last Dose Status Taking? diphenhydrAMINE (BENADRYL) 25 mg tablet Take 25 mg by mouth nightly. Indications: INSOMNIA Historical Provider 6/21/2017 PM Active Yes    guaiFENesin ER (MUCINEX) 600 mg ER tablet Take 600 mg by mouth two (2) times a day. Historical Provider 6/21/2017 PM Active Yes    ibuprofen (ADVIL) 200 mg tablet Take 400 mg by mouth every six (6) hours as needed for Pain. Historical Provider 6/21/2017 PM Active Yes    polyethylene glycol (MIRALAX) 17 gram/dose powder Take 17 g by mouth daily.  Historical Provider 6/21/2017 AM Active Yes                        [address]: [phone]

## 2017-06-22 NOTE — ROUTINE PROCESS
TRANSFER - IN REPORT:    Verbal report received from [address] (name) on Sixto Baltazar  being received from ER(unit) for routine progression of care      Report consisted of patients Situation, Background, Assessment and   Recommendations(SBAR). Information from the following report(s) SBAR, Kardex, ED Summary, Intake/Output, MAR, Recent Results and Cardiac Rhythm afib was reviewed with the receiving nurse. Opportunity for questions and clarification was provided. Assessment completed upon patients arrival to unit and care assumed.

## 2017-06-23 LAB
BACTERIA SPEC CULT: NORMAL
BACTERIA SPEC CULT: NORMAL
CHOLEST SERPL-MCNC: 119 MG/DL
HDLC SERPL-MCNC: 23 MG/DL
HDLC SERPL: 5.2 {RATIO} (ref 0–5)
LDLC SERPL CALC-MCNC: 75 MG/DL (ref 0–100)
LIPID PROFILE,FLP: ABNORMAL
SERVICE CMNT-IMP: NORMAL
TRIGL SERPL-MCNC: 105 MG/DL (ref ?–150)
VLDLC SERPL CALC-MCNC: 21 MG/DL

## 2017-06-23 PROCEDURE — 36415 COLL VENOUS BLD VENIPUNCTURE: CPT | Performed by: INTERNAL MEDICINE

## 2017-06-23 PROCEDURE — 74011000250 HC RX REV CODE- 250: Performed by: EMERGENCY MEDICINE

## 2017-06-23 PROCEDURE — 74011250636 HC RX REV CODE- 250/636: Performed by: FAMILY MEDICINE

## 2017-06-23 PROCEDURE — 74011250637 HC RX REV CODE- 250/637: Performed by: INTERNAL MEDICINE

## 2017-06-23 PROCEDURE — 74011250636 HC RX REV CODE- 250/636: Performed by: INTERNAL MEDICINE

## 2017-06-23 PROCEDURE — 74011000258 HC RX REV CODE- 258: Performed by: EMERGENCY MEDICINE

## 2017-06-23 PROCEDURE — 94660 CPAP INITIATION&MGMT: CPT

## 2017-06-23 PROCEDURE — 80061 LIPID PANEL: CPT | Performed by: INTERNAL MEDICINE

## 2017-06-23 PROCEDURE — 74011000250 HC RX REV CODE- 250: Performed by: INTERNAL MEDICINE

## 2017-06-23 PROCEDURE — 65660000000 HC RM CCU STEPDOWN

## 2017-06-23 PROCEDURE — 74011250637 HC RX REV CODE- 250/637: Performed by: FAMILY MEDICINE

## 2017-06-23 PROCEDURE — 77010033678 HC OXYGEN DAILY

## 2017-06-23 RX ORDER — DILTIAZEM HYDROCHLORIDE 30 MG/1
60 TABLET, FILM COATED ORAL
Status: DISCONTINUED | OUTPATIENT
Start: 2017-06-23 | End: 2017-06-25

## 2017-06-23 RX ORDER — IBUPROFEN 200 MG
1 TABLET ORAL DAILY
Status: DISCONTINUED | OUTPATIENT
Start: 2017-06-24 | End: 2017-06-23

## 2017-06-23 RX ORDER — ENOXAPARIN SODIUM 100 MG/ML
1 INJECTION SUBCUTANEOUS EVERY 12 HOURS
Status: DISCONTINUED | OUTPATIENT
Start: 2017-06-23 | End: 2017-06-28 | Stop reason: HOSPADM

## 2017-06-23 RX ORDER — IBUPROFEN 200 MG
1 TABLET ORAL DAILY
Status: DISCONTINUED | OUTPATIENT
Start: 2017-06-23 | End: 2017-06-28 | Stop reason: HOSPADM

## 2017-06-23 RX ORDER — BUMETANIDE 0.25 MG/ML
1 INJECTION INTRAMUSCULAR; INTRAVENOUS DAILY
Status: DISCONTINUED | OUTPATIENT
Start: 2017-06-24 | End: 2017-06-24

## 2017-06-23 RX ADMIN — DILTIAZEM HYDROCHLORIDE 60 MG: 60 TABLET, FILM COATED ORAL at 17:24

## 2017-06-23 RX ADMIN — DILTIAZEM HYDROCHLORIDE 60 MG: 60 TABLET, FILM COATED ORAL at 14:14

## 2017-06-23 RX ADMIN — CARVEDILOL 6.25 MG: 6.25 TABLET, FILM COATED ORAL at 07:38

## 2017-06-23 RX ADMIN — Medication 10 ML: at 14:14

## 2017-06-23 RX ADMIN — GUAIFENESIN 600 MG: 600 TABLET, EXTENDED RELEASE ORAL at 08:32

## 2017-06-23 RX ADMIN — HEPARIN SODIUM 5000 UNITS: 5000 INJECTION, SOLUTION INTRAVENOUS; SUBCUTANEOUS at 07:37

## 2017-06-23 RX ADMIN — Medication 10 ML: at 21:28

## 2017-06-23 RX ADMIN — CEFAZOLIN 2 G: 1 INJECTION, POWDER, FOR SOLUTION INTRAMUSCULAR; INTRAVENOUS; PARENTERAL at 16:25

## 2017-06-23 RX ADMIN — DILTIAZEM HYDROCHLORIDE 5 MG/HR: 5 INJECTION INTRAVENOUS at 04:43

## 2017-06-23 RX ADMIN — Medication 10 ML: at 21:29

## 2017-06-23 RX ADMIN — SODIUM CHLORIDE 250 ML: 900 INJECTION, SOLUTION INTRAVENOUS at 22:34

## 2017-06-23 RX ADMIN — CEFAZOLIN 2 G: 1 INJECTION, POWDER, FOR SOLUTION INTRAMUSCULAR; INTRAVENOUS; PARENTERAL at 08:31

## 2017-06-23 RX ADMIN — POLYETHYLENE GLYCOL (3350) 17 G: 17 POWDER, FOR SOLUTION ORAL at 08:33

## 2017-06-23 RX ADMIN — GUAIFENESIN 600 MG: 600 TABLET, EXTENDED RELEASE ORAL at 17:24

## 2017-06-23 RX ADMIN — DIPHENHYDRAMINE HYDROCHLORIDE 25 MG: 25 CAPSULE ORAL at 21:28

## 2017-06-23 RX ADMIN — Medication 10 ML: at 04:44

## 2017-06-23 RX ADMIN — BUMETANIDE 1 MG: 0.25 INJECTION INTRAMUSCULAR; INTRAVENOUS at 08:32

## 2017-06-23 NOTE — PROGRESS NOTES
80 - patient on unit from ED. Alert, oriented, ambulating. FENTON. 4L NC. 2 piv. cardizem gtt at 10. No complaints at this time. 0700 - Bedside and Verbal shift change report given to karen posey (oncoming nurse) by Yarelis Mckeon (offgoing nurse). Report included the following information SBAR, Kardex, Intake/Output, MAR, Recent Results and Cardiac Rhythm afib.

## 2017-06-23 NOTE — CONSULTS
Name: COLE GARCIA [facility] CENTER: [address]   : 1970 Admit Date: 2017   Phone: [phone]  Room: 3003/01   PCP: MENA Jennings  MRN: [id number]   Date: 2017  Code: Full Code        HPI:    Chart and notes reviewed. Data reviewed. I review the patient's current medications in the medical record at each encounter. I have evaluated and examined the patient. 9:04 AM       History was obtained from patient. I was asked by Ke Means MD to see Bonny Deleon in consultation for a chief complaint of sleep apnea. Mr. Rossy Chery is a pleasant 49year old male who presented to the [address] [place] [address] ED and was admitted with atrial fibrillation with RVR. He is morbidly obese and had history of sleep apnea with noncompliance with CPAP. States he developed increasing SOB when lying flat to sleep at night and would wake up gasping. He recently tried to start using his PAP again, but his SOB persisted. Notes he had nightly O2 along with his CPAP as some point, but when he lost his job and insurance several years ago, his O2 was taken back by the DME company. He is a 1PPD smoker. Has history of chronic back pain and spinal stenosis which can limit his mobility. SOB improved this morning. Denies CP. Denies fever, chills, or cough. Reports history of asthma but does not use home inhalers at baseline. CXR is personally visualized. The lungs and pleural spaces are clear.     WBC 9.8  Hgb 14  Bicarb 31  Creat 1.31 - up after diuresis  Trop < 0.04  proBNP 1731  UDS + for THC  TSH 3.32      Past Medical History:   Diagnosis Date    Asthma     Back pain     Sleep apnea     Spinal stenosis     Staph infection        Past Surgical History:   Procedure Laterality Date    HX TYMPANOSTOMY         Family History   Problem Relation Age of Onset    COPD Mother     Hypertension Mother     Diabetes Mother        Social History   Substance Use Topics    Smoking status: Current Every Day Smoker     Packs/day: 1.00    Smokeless tobacco: Not on file    Alcohol use Yes      Comment: \"occasional drink, drank heavy 5-6 years ago. \"       Allergies   Allergen Reactions    Penicillins Hives       Current Facility-Administered Medications   Medication Dose Route Frequency    sodium chloride (NS) flush 5-10 mL  5-10 mL IntraVENous Q8H    sodium chloride (NS) flush 5-10 mL  5-10 mL IntraVENous PRN    dilTIAZem (CARDIZEM) 125 mg in dextrose 5% 125 mL infusion  0-15 mg/hr IntraVENous TITRATE    sodium chloride (NS) flush 5-10 mL  5-10 mL IntraVENous Q8H    sodium chloride (NS) flush 5-10 mL  5-10 mL IntraVENous PRN    heparin (porcine) injection 5,000 Units  5,000 Units SubCUTAneous Q8H    bumetanide (BUMEX) injection 1 mg  1 mg IntraVENous Q12H    ceFAZolin in 0.9% NS (ANCEF) IVPB soln 2 g  2 g IntraVENous Q8H    diphenhydrAMINE (BENADRYL) capsule 25 mg  25 mg Oral QHS    guaiFENesin ER (MUCINEX) tablet 600 mg  600 mg Oral BID    polyethylene glycol (MIRALAX) packet 17 g  17 g Oral DAILY         REVIEW OF SYSTEMS   Negative except as stated in the HPI. Physical Exam:   Visit Vitals    BP (!) 111/97 (BP 1 Location: Right arm, BP Patient Position: At rest)    Pulse 86    Temp 97.8 °F (36.6 °C)    Resp 15    Ht 5' 6\" (1.676 m)    Wt 156.3 kg (344 lb 9.6 oz)    SpO2 92%    BMI 55.62 kg/m2       General:  Alert, cooperative, no distress, appears stated age. Head:  Normocephalic, without obvious abnormality, atraumatic. Eyes:  Conjunctivae/corneas clear. Nose: Nares normal. Septum midline. Mucosa normal.    Throat: Lips, mucosa, and tongue normal. Class 4 airway. Neck: Thick, supple, symmetrical, trachea midline, no adenopathy. Lungs:   Clear to auscultation bilaterally. Chest wall:  No tenderness or deformity. Heart:  Regular rate and rhythm, S1, S2 normal, no murmur, click, rub or gallop. Abdomen: Morbidly obese, soft, non-tender.  Bowel sounds normal. No masses,  No organomegaly. Skin with erythema/hyperemia. Extremities: Extremities normal, atraumatic, no cyanosis, 1+ LE edema. Pulses: 2+ and symmetric all extremities. Skin: Skin color, texture, turgor normal. No rashes or lesions   Lymph nodes: Cervical, supraclavicular nodes normal.   Neurologic: Grossly nonfocal       Lab Results   Component Value Date/Time    Sodium 144 06/22/2017 12:54 PM    Potassium 4.3 06/22/2017 12:54 PM    Chloride 105 06/22/2017 12:54 PM    CO2 31 06/22/2017 12:54 PM    BUN 11 06/22/2017 12:54 PM    Creatinine 1.31 06/22/2017 12:54 PM    Glucose 105 06/22/2017 12:54 PM    Calcium 9.0 06/22/2017 12:54 PM    Magnesium 2.1 06/22/2017 12:54 PM       Lab Results   Component Value Date/Time    WBC 9.8 06/22/2017 12:54 PM    HGB 14.0 06/22/2017 12:54 PM    PLATELET 584 68/70/6165 12:54 PM    MCV 94.3 06/22/2017 12:54 PM       Lab Results   Component Value Date/Time    INR 1.1 06/22/2017 12:54 PM    aPTT 28.0 06/22/2017 12:54 PM    AST (SGOT) 21 06/22/2017 12:54 PM    Alk.  phosphatase 76 06/22/2017 12:54 PM    Protein, total 6.8 06/22/2017 12:54 PM    Albumin 3.8 06/22/2017 12:54 PM    Globulin 3.0 06/22/2017 12:54 PM       No results found for: IRON, FE, TIBC, IBCT, PSAT, FERR    Lab Results   Component Value Date/Time    TSH 3.32 06/22/2017 12:54 PM        No results found for: PH, PHI, PCO2, PCO2I, PO2, PO2I, HCO3, HCO3I, FIO2, FIO2I    Lab Results   Component Value Date/Time    Troponin-I, Qt. <0.04 06/22/2017 12:54 PM        No results found for: CULT    No results found for: TOXA1, RPR, HBCM, HBSAG, HAAB, HCAB1, HAAT, G6PD, CRYAC, HIVGT, HIVR, HIV1, HIV12, HIVPC, HIVRPI    No results found for: VANCT, CPK    No results found for: COLOR, APPRN, SPGRU, AIDA, PROTU, GLUCU, KETU, BILU, BLDU, UROU, TRIXIE, LEUKU, WBCU, RBCU, UEPI, BACTU, CASTS, UCRY    IMPRESSION  · Atrial fibrillation with RVR  · Dyspnea  · PEPE: not compliant with CPAP at baseline  · Renal insufficiency  · Chronic back pain  · Hx of asthma: not wheezing  · Tobacco use/THC use     PLAN  · Rate control with IV diltiazem; cardiology consult pending  · F/U ECHO  · Empiric Ancef per primary for team for potential abdominal wall cellulitis; f/u cultures  · Continue diuresis with Bumex; watch creat  · Continue CPAP nightly; patient is now agreeable to reinitiating PAP therapy. He will need outpatient sleep eval and likely repeat sleep study. · Discussed the importance of compete tobacco and THC cessation. Patient declines nicotine patch. · Patient would benefit from outpatient pulmonary follow up with complete PFTs  · GI prophylaxis: not indicated  · DVT prophylaxis: Lovenox    Thank you for allowing us to participate in the care of this patient. We will be happy to follow along in his progress with you.     Dakota Gonzalez

## 2017-06-23 NOTE — PROGRESS NOTES
7117 Bedside and Verbal shift change report received from PM, RN. Report was done using SBAR, Kardex, Procedure Summary, Intake/Output, MAR and Recent Results. 0800 Pt assessed, repositioned for comfort. No S/S of pain at this moment. Pt is on a NC at 2 L, alert x 4, A-fib on the monitor. Cardizem drip running per Dr's orders. Bam NETTLES at bedside assessing patient. Doctor was updated on patients condition, MD aware of pt's latest VS, labs  and overall condition. 1200 MD ordered to wean Caredizem drip and start PO.    1330 Cardiac RN at bedside. 1400 Pt clean and dry. 1500 Cardizem drip stopped per Md's orders. 1730 Pt clean and dry. Room organized and pt alert x 4.    1830 Patient is clean, dry and room was organized. Patient alert and no s/s of pain noted. Urinal emptied. 1900 Bedside and Verbal shift change report given to Ramakrishna Dillon RN (oncoming nurse) by Giovanna Peguero RN (offgoing nurse).  Report included the following information SBAR, Kardex, OR Summary, Procedure Summary, Intake/Output, MAR, Recent Results, Med Rec Status and Cardiac Rhythm A-fib

## 2017-06-23 NOTE — CONSULTS
Alban Lennon MD    [address] [place], [address] [facility]    Office [phone],LVI [phone]  Pager [phone]    Date of  Admission: 6/22/2017 12:17 PM  PCP- MENA Deras    Geraldine Covarrubias is a 49 y.o. male admitted for Atrial fibrillation with RVR ([address] Atrial fibrillation with*. Consult requested by Daysi Menendez MD    Assessment/Plan    Atrial fibrillation with RVR-rate controlled on Cardizem GTT. Obstructive sleep apnea   Morbid obesity   Smoker   Congestive heart failure-acute        Plan:  Rate controlled on Cardizem GTT. Transition to p.o. Cardizem. May not tolerate beta-blocker secondary to his pulmonary status. On Bumex 1 mg IV twice daily. Discussed with patient, family (mother/girlfriend) about risks of thromboembolism with atrial fibrillation and risks/benefits of being on anticoagulation. Recommended starting anticoagulant addition. Patient agrees to proceed. We will start Lovenox IV twice daily and transition to p.o. NOAC if no invasive procedures are planned. Echo is pending. If LVEF is low, patient may need invasive ischemia workup versus stress testing given multiple risk factors. I appreciate the opportunity to be involved in . See below note for details. Please do not hesitate to contact us with questions or concerns. Alban Lennon MD    Cardiac Testing/ Procedures: A. Cardiac Cath/PCI:    B.ECHO/EVITA:    C.StressNuclear/Stress ECHO/Stress test:    D.Vascular:    E. EP:    F. Miscellaneous:    Care Plan discussed with: Patient and Nursing Staff    Subjective:  Patient is a morbidly obese 18-year-old  male who has been having palpitations, progressive dyspnea on exertion, swelling lower extremities for the past few weeks. Patient has obstructive sleep apnea and recently started using his CPAP. He states that he is not very good about taking medications. No prior history of CAD.   Denies chest pain. No prior history of arrhythmia. Denies dizziness, nausea, vomiting, cough, fever, chills. Has chronic back pain and gets physical therapy. Past Medical History:   Diagnosis Date    Asthma     Back pain     Sleep apnea     Spinal stenosis     Staph infection       Past Surgical History:   Procedure Laterality Date    HX TYMPANOSTOMY       Allergies   Allergen Reactions    Penicillins Hives     Family History   Problem Relation Age of Onset    COPD Mother     Hypertension Mother     Diabetes Mother       Social History   Substance Use Topics    Smoking status: Current Every Day Smoker     Packs/day: 1.00    Smokeless tobacco: None    Alcohol use Yes      Comment: \"occasional drink, drank heavy 5-6 years ago. \"          Medications:  Prescriptions Prior to Admission   Medication Sig    ibuprofen (ADVIL) 200 mg tablet Take 400 mg by mouth every six (6) hours as needed for Pain.  polyethylene glycol (MIRALAX) 17 gram/dose powder Take 17 g by mouth daily.  diphenhydrAMINE (BENADRYL) 25 mg tablet Take 25 mg by mouth nightly. Indications: INSOMNIA    guaiFENesin ER (MUCINEX) 600 mg ER tablet Take 600 mg by mouth two (2) times a day.      Current Facility-Administered Medications   Medication Dose Route Frequency    sodium chloride (NS) flush 5-10 mL  5-10 mL IntraVENous Q8H    sodium chloride (NS) flush 5-10 mL  5-10 mL IntraVENous PRN    dilTIAZem (CARDIZEM) 125 mg in dextrose 5% 125 mL infusion  0-15 mg/hr IntraVENous TITRATE    sodium chloride (NS) flush 5-10 mL  5-10 mL IntraVENous Q8H    sodium chloride (NS) flush 5-10 mL  5-10 mL IntraVENous PRN    heparin (porcine) injection 5,000 Units  5,000 Units SubCUTAneous Q8H    bumetanide (BUMEX) injection 1 mg  1 mg IntraVENous Q12H    ceFAZolin in 0.9% NS (ANCEF) IVPB soln 2 g  2 g IntraVENous Q8H    diphenhydrAMINE (BENADRYL) capsule 25 mg  25 mg Oral QHS    guaiFENesin ER (MUCINEX) tablet 600 mg  600 mg Oral BID    polyethylene glycol (MIRALAX) packet 17 g  17 g Oral DAILY         Review of Systems:  (bold if positive, if negative)    As in HPI - rest of ROS noncontributory      Physical Exam:  Visit Vitals    BP (!) 114/91    Pulse (!) 114    Temp 97.8 °F (36.6 °C)    Resp 23    Ht 5' 6\" (1.676 m)    Wt 344 lb 9.6 oz (156.3 kg)    SpO2 96%    BMI 55.62 kg/m2         Telemetry:     Gen: Well-developed, well-nourished, in no acute distress, morbid obesity  HEENT:  Pink conjunctivae, hearing intact to voice, moist mucous membranes  Neck: Supple difficult to appreciate JVD  Resp: No accessory muscle use, decreased air entry bilaterally, no rales or rhonchi  Card: Irregular Rate,Rythm,Normal S1, S2, 2/6 systolic murmur present, no rubs or gallop. No thrills. Abd:  Soft, morbidly obese, striae present. Erythema present  MSK: No cyanosis  Skin: No rashes   Neuro:  moving all four extremities, no focal deficit, follows commands appropriately  Psych:  Good insight, oriented to person, place and time, alert, Nml Affect  LE: 1+ edema  Vascular:Radial Pulses 2+ and symmetric        EKG:  Afib with RVR/NSTT/PVC      Cxray:    LABS:        Lab Results   Component Value Date/Time    WBC 9.8 06/22/2017 12:54 PM    HGB 14.0 06/22/2017 12:54 PM    HCT 43.3 06/22/2017 12:54 PM    PLATELET 517 05/52/1470 12:54 PM    MCV 94.3 06/22/2017 12:54 PM     Lab Results   Component Value Date/Time    Sodium 144 06/22/2017 12:54 PM    Potassium 4.3 06/22/2017 12:54 PM    Chloride 105 06/22/2017 12:54 PM    CO2 31 06/22/2017 12:54 PM    Anion gap 8 06/22/2017 12:54 PM    Glucose 105 06/22/2017 12:54 PM    BUN 11 06/22/2017 12:54 PM    Creatinine 1.31 06/22/2017 12:54 PM    BUN/Creatinine ratio 8 06/22/2017 12:54 PM    GFR est AA >60 06/22/2017 12:54 PM    GFR est non-AA 58 06/22/2017 12:54 PM    Calcium 9.0 06/22/2017 12:54 PM     Lab Results   Component Value Date/Time    Troponin-I, Qt. <0.04 06/22/2017 12:54 PM     Lab Results   Component Value Date/Time    aPTT 28.0 06/22/2017 12:54 PM     Lab Results   Component Value Date/Time    INR 1.1 06/22/2017 12:54 PM    Prothrombin time 11.4 06/22/2017 12:54 PM     No results found for: BNP, BNPP, Matteo Youssef MD

## 2017-06-23 NOTE — PROGRESS NOTES
Daily Progress Note: 6/23/2017  MENA Galaviz    Assessment/Plan:   1. Atrial fibrillation with RVR ([facility] (6/22/2017). Admit to stepdown.   -----Continue cardizem drip, which is already started in ER.   -----Check echocardiogram, TSH   -----Consult cardiology.      2. Exertional dyspnea (6/22/2017)/ BL leg edema/ elevated pro BNP. Most likely CHF.   -----Check echocardiogram.   -----Started on bumex.   -----Check I/O, daily weight.   -----Low salt diet.      3. PEPE (obstructive sleep apnea) (6/22/2017). Has CPAP at home and will use hospital's until family brings his own. This machine is pretty old so will ask Pulm to see and to get outpatient follow up     4.  Elevated BP without diagnosis of hypertension (6/22/2017). Since come in ER his BP has been high.    -----Will start lisinopril and coreg. Will hold for now as his BP is low     5. Renal insufficiency (6/22/2017). Watch renal function in diuretics.      6.  Obesity (6/22/2017).   -----Advised on weight loss.   -----Check lipid panel.      7. Tobacco use (6/22/2017). Advised on quitting.      8. Chronic back pain (6/22/2017). Continue home pain meds      9. Asthma (6/22/2017). Stable. Not wheezing.      10. Abdominal wall cellulitis.   -----Start ancef and monitor clinically. -----Check MRSA screening. Hx of staph skin infection.        Problem List:  Problem List as of 6/23/2017  Date Reviewed: 6/22/2017          Codes Class Noted - Resolved    Atrial fibrillation with RVR ([facility] [address] ICD-10-CM: I48.91  ICD-9-CM: 427.31  6/22/2017 - Present        Renal insufficiency ICD-10-CM: N28.9  ICD-9-CM: 593.9  6/22/2017 - Present        PEPE (obstructive sleep apnea) ICD-10-CM: G47.33  ICD-9-CM: 327.23  6/22/2017 - Present        Obesity ICD-10-CM: E66.9  ICD-9-CM: 278.00  6/22/2017 - Present        Tobacco use ICD-10-CM: Z72.0  ICD-9-CM: 305.1  6/22/2017 - Present        Chronic back pain ICD-10-CM: M54.9, G89.29  ICD-9-CM: 724.5, 338.29 2017 - Present        Asthma ICD-10-CM: J45.909  ICD-9-CM: 493.90  2017 - Present        Elevated BP without diagnosis of hypertension ICD-10-CM: R03.0  ICD-9-CM: 796.2  2017 - Present        Exertional dyspnea ICD-10-CM: R06.09  ICD-9-CM: 786.09  2017 - Present              HPI:    Mr. John Sweet is a 49 y.o.  male who is admitted with atrial fibrillation with RVR. Mr. oJhn Sweet with PMH of chronic back pain, tobacco abuse, obesity presented to ER c/o exertional dyspnea and leg edema for 2 weeks. Dyspnea got worse progressively. Now has difficulty getting upstairs without taking a rest. Has orthopnea and PND. Denies chest pain or cough. Has PEPE and recently starts using his CPAP. In ER, since his stay his BP has been high. Has abdominal wall hyperemia, which is getting progressively worse. (Dr Amber Landeros)    :HIs BP is down with the Card drip so will stop Lisinopril and Coreg until evaluated by Makayla Andrea pending. Long history of PEPE so will ask Pulm to see as he will need outpatient follow up. Less SOB this am. Abd still erythematous. Has chronic back pain and is applying for disability as he is no longer able to work. Review of Systems:   A comprehensive review of systems was negative except for that written in the HPI. Objective:   Physical Exam:     Visit Vitals    /89 (BP 1 Location: Right arm, BP Patient Position: At rest)    Pulse (!) 104    Temp 98.2 °F (36.8 °C)    Resp 24    Ht 5' 6\" (1.676 m)    Wt 344 lb 9.6 oz (156.3 kg)    SpO2 92%    BMI 55.62 kg/m2    O2 Flow Rate (L/min): 2 l/min O2 Device: Nasal cannula    Temp (24hrs), Av.9 °F (36.6 °C), Min:97.5 °F (36.4 °C), Max:98.2 °F (36.8 °C)         190 -  0700  In: 395.4 [P.O.:180; I.V.:215.4]  Out: 690 [Urine:690]    General:  Alert, cooperative, no distress, appears stated age. Morbidly obese. Head:  Normocephalic, without obvious abnormality, atraumatic.    Eyes:  Conjunctivae/corneas clear.    Nose: Nares normal. Septum midline. Mucosa normal. No drainage or sinus tenderness. Throat: Lips, mucosa, and tongue normal. Teeth and gums normal.   Neck: Supple, symmetrical, trachea midline, no adenopathy, thyroid: no enlargement/tenderness/nodules, no carotid bruit and no JVD. Lungs:   Clear to auscultation bilaterally. Diminished breath sounds throughout   Heart:  Irregular rate and rhythm, S1, S2 normal, no murmur, click, rub or gallop. Abdomen:   Soft. Bowel sounds normal. No masses,  No organomegaly. Skin with erythema and warm to touch. Extremities: Extremities  atraumatic, no cyanosis. 1+ edema. No calf tenderness or cords. Pulses: 2+ and symmetric all extremities. Skin: Skin color, texture, turgor normal. No rashes or lesions   Neurologic: CNII-XII intact. Alert and oriented X 3. Fine motor of hands and fingers normal.   equal.  No cogwheeling or rigidity. Gait not tested at this time. Sensation grossly normal to touch.   Gross motor of extremities normal.       Data Review:       Recent Days:  Recent Labs      06/22/17   1254   WBC  9.8   HGB  14.0   HCT  43.3   PLT  234     Recent Labs      06/22/17   1254   NA  144   K  4.3   CL  105   CO2  31   GLU  105*   BUN  11   CREA  1.31*   CA  9.0   MG  2.1   ALB  3.8   TBILI  0.7   SGOT  21   ALT  39   INR  1.1     24 Hour Results:  Recent Results (from the past 24 hour(s))   EKG, 12 LEAD, INITIAL    Collection Time: 06/22/17 12:28 PM   Result Value Ref Range    Ventricular Rate 122 BPM    Atrial Rate 197 BPM    QRS Duration 98 ms    Q-T Interval 292 ms    QTC Calculation (Bezet) 416 ms    Calculated R Axis 87 degrees    Calculated T Axis -23 degrees    Diagnosis       Atrial fibrillation with rapid ventricular response with premature   ventricular or aberrantly conducted complexes  Low voltage QRS  Nonspecific T wave abnormality  Abnormal ECG  No previous ECGs available  Confirmed by Billy Duarte MD., Anand Riggins ([id number]) on 6/22/2017 7:57:56 PM     METABOLIC PANEL, COMPREHENSIVE    Collection Time: 06/22/17 12:54 PM   Result Value Ref Range    Sodium 144 136 - 145 mmol/L    Potassium 4.3 3.5 - 5.1 mmol/L    Chloride 105 97 - 108 mmol/L    CO2 31 21 - 32 mmol/L    Anion gap 8 5 - 15 mmol/L    Glucose 105 (H) 65 - 100 mg/dL    BUN 11 6 - 20 MG/DL    Creatinine 1.31 (H) 0.70 - 1.30 MG/DL    BUN/Creatinine ratio 8 (L) 12 - 20      GFR est AA >60 >60 ml/min/1.73m2    GFR est non-AA 58 (L) >60 ml/min/1.73m2    Calcium 9.0 8.5 - 10.1 MG/DL    Bilirubin, total 0.7 0.2 - 1.0 MG/DL    ALT (SGPT) 39 12 - 78 U/L    AST (SGOT) 21 15 - 37 U/L    Alk. phosphatase 76 45 - 117 U/L    Protein, total 6.8 6.4 - 8.2 g/dL    Albumin 3.8 3.5 - 5.0 g/dL    Globulin 3.0 2.0 - 4.0 g/dL    A-G Ratio 1.3 1.1 - 2.2     CBC WITH AUTOMATED DIFF    Collection Time: 06/22/17 12:54 PM   Result Value Ref Range    WBC 9.8 4.1 - 11.1 K/uL    RBC 4.59 4. 10 - 5.70 M/uL    HGB 14.0 12.1 - 17.0 g/dL    HCT 43.3 36.6 - 50.3 %    MCV 94.3 80.0 - 99.0 FL    MCH 30.5 26.0 - 34.0 PG    MCHC 32.3 30.0 - 36.5 g/dL    RDW 14.7 (H) 11.5 - 14.5 %    PLATELET 305 430 - 870 K/uL    NEUTROPHILS 68 32 - 75 %    LYMPHOCYTES 18 12 - 49 %    MONOCYTES 12 5 - 13 %    EOSINOPHILS 2 0 - 7 %    BASOPHILS 0 0 - 1 %    ABS. NEUTROPHILS 6.8 1.8 - 8.0 K/UL    ABS. LYMPHOCYTES 1.7 0.8 - 3.5 K/UL    ABS. MONOCYTES 1.1 (H) 0.0 - 1.0 K/UL    ABS. EOSINOPHILS 0.2 0.0 - 0.4 K/UL    ABS.  BASOPHILS 0.0 0.0 - 0.1 K/UL   PRO-BNP    Collection Time: 06/22/17 12:54 PM   Result Value Ref Range    NT pro-BNP 1731 (H) 0 - 125 PG/ML   PROTHROMBIN TIME + INR    Collection Time: 06/22/17 12:54 PM   Result Value Ref Range    INR 1.1 0.9 - 1.1      Prothrombin time 11.4 (H) 9.0 - 11.1 sec   PTT    Collection Time: 06/22/17 12:54 PM   Result Value Ref Range    aPTT 28.0 22.1 - 32.5 sec    aPTT, therapeutic range     58.0 - 77.0 SECS   TROPONIN I    Collection Time: 06/22/17 12:54 PM   Result Value Ref Range    Troponin-I, Qt. <0.04 <0.05 ng/mL   MAGNESIUM    Collection Time: 06/22/17 12:54 PM   Result Value Ref Range    Magnesium 2.1 1.6 - 2.4 mg/dL   TSH 3RD GENERATION    Collection Time: 06/22/17 12:54 PM   Result Value Ref Range    TSH 3.32 0.36 - 3.74 uIU/mL   DRUG SCREEN, URINE    Collection Time: 06/22/17  5:44 PM   Result Value Ref Range    AMPHETAMINE NEGATIVE  NEG      BARBITURATES NEGATIVE  NEG      BENZODIAZEPINE NEGATIVE  NEG      COCAINE NEGATIVE  NEG      METHADONE NEGATIVE  NEG      OPIATES NEGATIVE  NEG      PCP(PHENCYCLIDINE) NEGATIVE  NEG      THC (TH-CANNABINOL) POSITIVE (A) NEG      Drug screen comment (NOTE)        Medications reviewed  Current Facility-Administered Medications   Medication Dose Route Frequency    sodium chloride (NS) flush 5-10 mL  5-10 mL IntraVENous Q8H    sodium chloride (NS) flush 5-10 mL  5-10 mL IntraVENous PRN    dilTIAZem (CARDIZEM) 125 mg in dextrose 5% 125 mL infusion  0-15 mg/hr IntraVENous TITRATE    sodium chloride (NS) flush 5-10 mL  5-10 mL IntraVENous Q8H    sodium chloride (NS) flush 5-10 mL  5-10 mL IntraVENous PRN    heparin (porcine) injection 5,000 Units  5,000 Units SubCUTAneous Q8H    bumetanide (BUMEX) injection 1 mg  1 mg IntraVENous Q12H    carvedilol (COREG) tablet 6.25 mg  6.25 mg Oral BID WITH MEALS    lisinopril (PRINIVIL, ZESTRIL) tablet 10 mg  10 mg Oral DAILY    ceFAZolin in 0.9% NS (ANCEF) IVPB soln 2 g  2 g IntraVENous Q8H    diphenhydrAMINE (BENADRYL) capsule 25 mg  25 mg Oral QHS    guaiFENesin ER (MUCINEX) tablet 600 mg  600 mg Oral BID    polyethylene glycol (MIRALAX) packet 17 g  17 g Oral DAILY       Care Plan discussed with: Patient/Family and Consultant Cardiology and Pulm    Total time spent with patient and review of records: 30 minutes.     Hany Bone MD

## 2017-06-23 NOTE — CARDIO/PULMONARY
Cardiac Rehab: Received consult for cardiac teaching on new onset AFib. 50 yo male admitted with SOB & AFib/RVR. LVEF pending. AFib booklet given to Nico Powell. His mother & significant other Babita Ayers) were also present. Permission given to discuss health issues with visitors present. Educated using teach back method. Discussed diagnosis definition and assessed patient's understanding. Reviewed purpose of diltiazem gtt. Discussed possible AFib triggers. Pt reported he drinks \"2 pots\" of regular coffee per day, plus 24 oz regular Coke. Cautioned pt about excess caffeine intake. Explained increased risk of CVA/PE and expected need for anticoagulation as outpt. Pt reported hx pulmonary HTN & chronic edema. Mother asked about congestive heart failure, which was mentioned to her in ED, plus several questions about AFib tx options. Explained cardiologist will address AFib tx options & determine if pt has CHF. Smoking history assessed. Pt is a current smoker. Sig other indicated pt will no longer be smoking. 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 \" Resides in [place], with mother & stepfather, who pay pt's health insurance. Nico Powell will likely benefit from reinforcement on all topics discussed. He had difficulty answering teach back questions accurately.

## 2017-06-23 NOTE — PROGRESS NOTES
6/23/2017   CARE MANAGEMENT NOTE:  CM is following pt for initial discharge planning. EMR reviewed. CM met with pt, his mother and fiance at bedside to obtain hx for this needs assessment. Reportedly, pt resides with his mother, Shaina Henderson ([phone]. [phone]) in a two story home with bedroom on the second floor. There are 5-6 steps to the entrance and 14 stairs between floors. PTA, pt was ambulatory, and indepn with ADLs although he reports SOB. He provides his own transportation. Pt is not employed but his parents pay for his insurance coverage. [facility]. DME in the home includes a four pronged cane, and an older Cpap. PCP is [facility] Rueda. Plan is to return home when medically stable.    Roni

## 2017-06-24 ENCOUNTER — APPOINTMENT (OUTPATIENT)
Dept: GENERAL RADIOLOGY | Age: 47
DRG: 308 | End: 2017-06-24
Attending: FAMILY MEDICINE
Payer: COMMERCIAL

## 2017-06-24 LAB
ALBUMIN SERPL BCP-MCNC: 3.3 G/DL (ref 3.5–5)
ALBUMIN/GLOB SERPL: 1.4 {RATIO} (ref 1.1–2.2)
ALP SERPL-CCNC: 64 U/L (ref 45–117)
ALT SERPL-CCNC: 29 U/L (ref 12–78)
ANION GAP BLD CALC-SCNC: 6 MMOL/L (ref 5–15)
AST SERPL W P-5'-P-CCNC: 20 U/L (ref 15–37)
BASOPHILS # BLD AUTO: 0 K/UL (ref 0–0.1)
BASOPHILS # BLD: 0 % (ref 0–1)
BILIRUB SERPL-MCNC: 0.5 MG/DL (ref 0.2–1)
BUN SERPL-MCNC: 18 MG/DL (ref 6–20)
BUN/CREAT SERPL: 13 (ref 12–20)
CALCIUM SERPL-MCNC: 8.1 MG/DL (ref 8.5–10.1)
CHLORIDE SERPL-SCNC: 102 MMOL/L (ref 97–108)
CO2 SERPL-SCNC: 33 MMOL/L (ref 21–32)
CREAT SERPL-MCNC: 1.38 MG/DL (ref 0.7–1.3)
EOSINOPHIL # BLD: 0.2 K/UL (ref 0–0.4)
EOSINOPHIL NFR BLD: 1 % (ref 0–7)
ERYTHROCYTE [DISTWIDTH] IN BLOOD BY AUTOMATED COUNT: 14.4 % (ref 11.5–14.5)
GLOBULIN SER CALC-MCNC: 2.4 G/DL (ref 2–4)
GLUCOSE SERPL-MCNC: 124 MG/DL (ref 65–100)
HCT VFR BLD AUTO: 41.9 % (ref 36.6–50.3)
HGB BLD-MCNC: 13.4 G/DL (ref 12.1–17)
INR PPP: 1.2 (ref 0.9–1.1)
LYMPHOCYTES # BLD AUTO: 17 % (ref 12–49)
LYMPHOCYTES # BLD: 1.8 K/UL (ref 0.8–3.5)
MAGNESIUM SERPL-MCNC: 2 MG/DL (ref 1.6–2.4)
MCH RBC QN AUTO: 30.2 PG (ref 26–34)
MCHC RBC AUTO-ENTMCNC: 32 G/DL (ref 30–36.5)
MCV RBC AUTO: 94.6 FL (ref 80–99)
MONOCYTES # BLD: 0.9 K/UL (ref 0–1)
MONOCYTES NFR BLD AUTO: 8 % (ref 5–13)
NEUTS SEG # BLD: 7.7 K/UL (ref 1.8–8)
NEUTS SEG NFR BLD AUTO: 74 % (ref 32–75)
PLATELET # BLD AUTO: 217 K/UL (ref 150–400)
POTASSIUM SERPL-SCNC: 4 MMOL/L (ref 3.5–5.1)
PROT SERPL-MCNC: 5.7 G/DL (ref 6.4–8.2)
PROTHROMBIN TIME: 11.8 SEC (ref 9–11.1)
RBC # BLD AUTO: 4.43 M/UL (ref 4.1–5.7)
SODIUM SERPL-SCNC: 141 MMOL/L (ref 136–145)
WBC # BLD AUTO: 10.5 K/UL (ref 4.1–11.1)

## 2017-06-24 PROCEDURE — 80053 COMPREHEN METABOLIC PANEL: CPT | Performed by: FAMILY MEDICINE

## 2017-06-24 PROCEDURE — 74011250637 HC RX REV CODE- 250/637: Performed by: INTERNAL MEDICINE

## 2017-06-24 PROCEDURE — 74011250637 HC RX REV CODE- 250/637: Performed by: FAMILY MEDICINE

## 2017-06-24 PROCEDURE — 74011250636 HC RX REV CODE- 250/636: Performed by: INTERNAL MEDICINE

## 2017-06-24 PROCEDURE — 74011250637 HC RX REV CODE- 250/637: Performed by: SPECIALIST

## 2017-06-24 PROCEDURE — 83735 ASSAY OF MAGNESIUM: CPT | Performed by: FAMILY MEDICINE

## 2017-06-24 PROCEDURE — 77030018846 HC SOL IRR STRL H20 ICUM -A

## 2017-06-24 PROCEDURE — 74000 XR ABD (KUB): CPT

## 2017-06-24 PROCEDURE — 77010033678 HC OXYGEN DAILY

## 2017-06-24 PROCEDURE — 36415 COLL VENOUS BLD VENIPUNCTURE: CPT | Performed by: FAMILY MEDICINE

## 2017-06-24 PROCEDURE — 85025 COMPLETE CBC W/AUTO DIFF WBC: CPT | Performed by: FAMILY MEDICINE

## 2017-06-24 PROCEDURE — 85610 PROTHROMBIN TIME: CPT | Performed by: SPECIALIST

## 2017-06-24 PROCEDURE — 65660000000 HC RM CCU STEPDOWN

## 2017-06-24 RX ORDER — FACIAL-BODY WIPES
10 EACH TOPICAL ONCE
Status: COMPLETED | OUTPATIENT
Start: 2017-06-24 | End: 2017-06-24

## 2017-06-24 RX ORDER — WARFARIN SODIUM 5 MG/1
5 TABLET ORAL ONCE
Status: DISCONTINUED | OUTPATIENT
Start: 2017-06-24 | End: 2017-06-24

## 2017-06-24 RX ADMIN — DILTIAZEM HYDROCHLORIDE 60 MG: 60 TABLET, FILM COATED ORAL at 17:16

## 2017-06-24 RX ADMIN — CEFAZOLIN 2 G: 1 INJECTION, POWDER, FOR SOLUTION INTRAMUSCULAR; INTRAVENOUS; PARENTERAL at 08:13

## 2017-06-24 RX ADMIN — ENOXAPARIN SODIUM 160 MG: 80 INJECTION SUBCUTANEOUS at 00:59

## 2017-06-24 RX ADMIN — CEFAZOLIN 2 G: 1 INJECTION, POWDER, FOR SOLUTION INTRAMUSCULAR; INTRAVENOUS; PARENTERAL at 23:40

## 2017-06-24 RX ADMIN — Medication 10 ML: at 06:00

## 2017-06-24 RX ADMIN — DILTIAZEM HYDROCHLORIDE 60 MG: 60 TABLET, FILM COATED ORAL at 08:12

## 2017-06-24 RX ADMIN — Medication 10 ML: at 14:10

## 2017-06-24 RX ADMIN — GUAIFENESIN 600 MG: 600 TABLET, EXTENDED RELEASE ORAL at 17:16

## 2017-06-24 RX ADMIN — ENOXAPARIN SODIUM 160 MG: 80 INJECTION SUBCUTANEOUS at 14:09

## 2017-06-24 RX ADMIN — Medication 10 ML: at 22:10

## 2017-06-24 RX ADMIN — CEFAZOLIN 2 G: 1 INJECTION, POWDER, FOR SOLUTION INTRAMUSCULAR; INTRAVENOUS; PARENTERAL at 00:57

## 2017-06-24 RX ADMIN — GUAIFENESIN 600 MG: 600 TABLET, EXTENDED RELEASE ORAL at 08:12

## 2017-06-24 RX ADMIN — DIPHENHYDRAMINE HYDROCHLORIDE 25 MG: 25 CAPSULE ORAL at 22:07

## 2017-06-24 RX ADMIN — POLYETHYLENE GLYCOL (3350) 17 G: 17 POWDER, FOR SOLUTION ORAL at 08:12

## 2017-06-24 RX ADMIN — WARFARIN SODIUM 7.5 MG: 2.5 TABLET ORAL at 17:16

## 2017-06-24 RX ADMIN — CEFAZOLIN 2 G: 1 INJECTION, POWDER, FOR SOLUTION INTRAMUSCULAR; INTRAVENOUS; PARENTERAL at 17:20

## 2017-06-24 RX ADMIN — BISACODYL 10 MG: 10 SUPPOSITORY RECTAL at 10:36

## 2017-06-24 RX ADMIN — DILTIAZEM HYDROCHLORIDE 60 MG: 60 TABLET, FILM COATED ORAL at 11:57

## 2017-06-24 NOTE — PROGRESS NOTES
Bedside and Verbal shift change report given to Daphney Sahu RN (oncoming nurse) by Pavel Urias RN (offgoing nurse). Report included the following information SBAR, Kardex, Intake/Output, MAR, Recent Results and Cardiac Rhythm Afib. Shift Summary:    Received orders from Dr. Bneito Wagner that the patient could be transferred to telemetry status. At the same time it was reported that his blood pressures were a little soft, and received orders to hold the AM Bumex. Dr. Benito Wagner was called, and it was reported that the patient was constipated, and expressed concern about this. KUB Xray was obtained. The patient ambulated to toilet, and had one larger bowel movement, and the patient stated that he felt much better. Bedside and Verbal shift change report given to Rupal Maynard (oncoming nurse) by Daphney Sahu RN (offgoing nurse). Report included the following information SBAR, Kardex, Intake/Output, MAR, Recent Results and Cardiac Rhythm Afib.

## 2017-06-24 NOTE — CARDIO/PULMONARY
Cardiac Rehab: 48 yo male admitted with SOB, AFib/RVR & suspected HF. LVEF 45%. Patient & family received cardiac teaching on new onset AFib (6/23). Per Dr Hue Reed, dx includes: Acute diastolic CHF & NSVT. Comorbidities: morbid obesity (BMI 52), smoking & untreated PEPE. New Cardiac Meds: warfarin & diltiazem. Lasix discontinued due to low BP, per Dr Hue Reed. Met with patient on IVCU to reinforce previous AFib teaching and initiate CHF teaching. Heart Failure education folder given to Jasmin Saldaña. Permission given to discuss health issues with mother & stepfather present. Pt indicated heart doctor told him that he has \"heart failure. \" Educated using teach back method. Discussed diagnosis definition and assessed patient's understanding. Explained diastolic dysfunction. Reviewed importance of daily weight monitoring and low sodium diet (less than 1500 mg daily). Pt reported he does not have scale, but he is able to obtain one. Also reported he has been trying to follow low salt diet since he had a problem with fluid retention several years ago. Reviewed diltiazem & Coumadin, including purpose of medications, and potential side effects. Reviewed need for consistent vitamin K diet when taking Coumadin. Pt reported he loves greens and will often eat large amounts of kale. Explained need for consistent, moderate intake of green leafy vegetables. Pt reported he used to drink a fair amount and wanted to know what would happen if he started drinking again, while on Coumadin. Cautioned pt to avoid alcohol & discuss his question further with MD.     Jasmin Saldaña could benefit from further education on the following HF topics:   Purpose & potential side effects of medications. Daily weights and when to call MD for fluid wt gain.

## 2017-06-24 NOTE — PROGRESS NOTES
Lamar Lovett MD [address] [place] [address] Laxmi Foster   Office [phone]  Mobile [phone]            NAME:  Katelyn Castillo   :   1970   MRN:   [id number]     Assessment/Plan:   1. New onset with RVR, duration unknown  2. NSVT  3. Mild LV dysfunction (EF 45%)  4. Acute diastolic HF  5. Severe PEPE, untreated  6. Morbid obesity (marked abdominal obesity)  7. Chronic low back pain due to lumbar spinal stenosis  8. Smoker/intermittent marijuana use  9. Disability  10. Likely depression       Complex case with co morbid conditions. His presentation stems from complications of morbid obesity, smoking and untreated PEPE. Stop lasix due to low BP/likely intravascular volume depletion  Utilize oral diltiazem for rate control for now  Long term anticoagulation - coumadin seems best given his weight  Would not pursue cath at this juncture    [place] is poor if we cannot impact his obesity. .. Subjective:   Cardiac ROS: Hx reviewed. Breathing better with diuresis and consistent use of CPAP while in hospital. Patient denies any exertional chest pain, palpitations, syncope. No hx of stroke. Activity largely limited by significant low back pain    Review of Systems: No nausea, indigestion, vomiting, pain, cough, sputum. No bleeding. Taking po. Appetite fair      Objective:     Visit Vitals    BP (!) 113/101    Pulse 97    Temp 98.6 °F (37 °C)    Resp 21    Ht 5' 6\" (1.676 m)    Wt 321 lb 10.4 oz (145.9 kg)    SpO2 92%    BMI 51.92 kg/m2    O2 Flow Rate (L/min): 2 l/min O2 Device: CPAP mask    Temp (24hrs), Av.1 °F (36.7 °C), Min:97.5 °F (36.4 °C), Max:98.7 °F (37.1 °C)            1901 -  0700  In: 520.5 [P.O.:180; I.V.:340.5]  Out: 1050 [Urine:1050]    TELE: AF 90s, one 8 beat run of VT noted    General: AAOx3 cooperative, no acute distress. HEENT: Atraumatic. Pink and moist.  Anicteric sclerae. Neck : Supple, no thyromegaly. Lungs: CTA bilaterally.  No wheezing/rhonchi/rales. Heart: Irregularrhythm, no murmur, no rubs, no gallops. No JVD. No carotid bruits. Abdomen: marked abdominal obesity. non-tender. + Bowel sounds. No bruits. Extremities: 1-2+ LE edema, no clubbing, no cyanosis. No calf tenderness  Neurologic: Grossly intact. Alert and oriented X 3. No acute neurological distress. Psych: Good insight. Not anxious nor agitated. Additional comments: None    Care Plan discussed with:    Comments   Patient x    Family      RN x    Care Manager                    Consultant:  x        Data Review:     EKG    Echo  EF 45%, TDS    No results for input(s): TNIPOC in the last 72 hours.     No lab exists for component: ITNL   Recent Labs      06/22/17   1254   TROIQ  <0.04     Recent Labs      06/22/17   1254   NA  144   K  4.3   CL  105   CO2  31   BUN  11   CREA  1.31*   GLU  105*   MG  2.1   ALB  3.8   WBC  9.8   HGB  14.0   HCT  43.3   PLT  234     Recent Labs      06/22/17   1254   INR  1.1   PTP  11.4*   APTT  28.0       Medications reviewed  Current Facility-Administered Medications   Medication Dose Route Frequency    bisacodyl (DULCOLAX) suppository 10 mg  10 mg Rectal ONCE    dilTIAZem (CARDIZEM) IR tablet 60 mg  60 mg Oral TIDAC    enoxaparin (LOVENOX) injection 160 mg  1 mg/kg SubCUTAneous Q12H    nicotine (NICODERM CQ) 21 mg/24 hr patch 1 Patch  1 Patch TransDERmal DAILY    sodium chloride (NS) flush 5-10 mL  5-10 mL IntraVENous Q8H    sodium chloride (NS) flush 5-10 mL  5-10 mL IntraVENous PRN    dilTIAZem (CARDIZEM) 125 mg in dextrose 5% 125 mL infusion  0-15 mg/hr IntraVENous TITRATE    sodium chloride (NS) flush 5-10 mL  5-10 mL IntraVENous Q8H    sodium chloride (NS) flush 5-10 mL  5-10 mL IntraVENous PRN    ceFAZolin in 0.9% NS (ANCEF) IVPB soln 2 g  2 g IntraVENous Q8H    diphenhydrAMINE (BENADRYL) capsule 25 mg  25 mg Oral QHS    guaiFENesin ER (MUCINEX) tablet 600 mg  600 mg Oral BID    polyethylene glycol (MIRALAX) packet 17 g  17 g Oral DAILY         Kavita Rose MD

## 2017-06-24 NOTE — PROGRESS NOTES
Daily Progress Note: 6/24/2017  MENA Barfield    Assessment/Plan:   1. Atrial fibrillation with RVR ([facility] [address] (6/22/2017). Admit to stepdown.   -----Has been transitioned to po Cardizem. -----echocardiogram with EF 45%, TSH normal  -----Consult cardiology.      2. Exertional dyspnea (6/22/2017)/ BL leg edema/ elevated pro BNP. Most likely CHF.   -----echocardiogram.   -----Stop bumex as he is hypotensive  -----I/O, daily weight.   -----Low salt diet.      3. PEPE (obstructive sleep apnea) (6/22/2017). Has CPAP at home and will use hospital's until family brings his own. This machine is pretty old so will ask Pulm to see and to get outpatient follow up     4.  Elevated BP without diagnosis of hypertension (6/22/2017). Bp now low  ----- stopped Coreg and Lisinopril     5. Renal insufficiency (6/22/2017). Watch renal function in diuretics.      6.  Obesity (6/22/2017).   -----Advised on weight loss.   -----Check lipid panel.      7. Tobacco use (6/22/2017). Advised on quitting.      8. Chronic back pain (6/22/2017). Continue home pain meds      9. Asthma (6/22/2017). Stable. Not wheezing.      10. Abdominal wall cellulitis.   -----ancef and monitor clinically. ----- MRSA screening. Hx of staph skin infection. 11.  Vtach- 14 beat run  -----Check electrolytes this am  -----May need further cardiac work up    12.   Constipation  -----Supp today  -----Continue Miralax       Problem List:  Problem List as of 6/24/2017  Date Reviewed: 6/22/2017          Codes Class Noted - Resolved    Atrial fibrillation with RVR ([facility] ICD-10-CM: I48.91  ICD-9-CM: 427.31  6/22/2017 - Present        Renal insufficiency ICD-10-CM: N28.9  ICD-9-CM: 593.9  6/22/2017 - Present        PEPE (obstructive sleep apnea) ICD-10-CM: G47.33  ICD-9-CM: 327.23  6/22/2017 - Present        Obesity ICD-10-CM: E66.9  ICD-9-CM: 278.00  6/22/2017 - Present        Tobacco use ICD-10-CM: Z72.0  ICD-9-CM: 305.1  6/22/2017 - Present Chronic back pain ICD-10-CM: M54.9, G89.29  ICD-9-CM: 724.5, 338.29  2017 - Present        Asthma ICD-10-CM: J45.909  ICD-9-CM: 493.90  2017 - Present        Elevated BP without diagnosis of hypertension ICD-10-CM: R03.0  ICD-9-CM: 796.2  2017 - Present        Exertional dyspnea ICD-10-CM: R06.09  ICD-9-CM: 786.09  2017 - Present              HPI:    Mr. Izaiah Rodgers is a 49 y.o.  male who is admitted with atrial fibrillation with RVR. Mr. zIaiah Rodgers with PMH of chronic back pain, tobacco abuse, obesity presented to ER c/o exertional dyspnea and leg edema for 2 weeks. Dyspnea got worse progressively. Now has difficulty getting upstairs without taking a rest. Has orthopnea and PND. Denies chest pain or cough. Has PEPE and recently starts using his CPAP. In ER, since his stay his BP has been high. Has abdominal wall hyperemia, which is getting progressively worse. (Dr Ashwin Webb)    :HIs BP is down with the Card drip so will stop Lisinopril and Coreg until evaluated by Yoly Smith pending. Long history of PEPE so will ask Pulm to see as he will need outpatient follow up. Less SOB this am. Abd still erythematous. Has chronic back pain and is applying for disability as he is no longer able to work. : BP low requiring bolus last evening. Holding Bumex. Stopped Coreg and Lisinopril. ECHO with EF 45%. ABd wall with less erythema. No edema this am. He is c/o constipation. On Miralax so will add supp. Had a 14 beat run of Vtach. Labs pending. Review of Systems:   A comprehensive review of systems was negative except for that written in the HPI.     Objective:   Physical Exam:     Visit Vitals    BP (!) 113/101    Pulse 97    Temp 98.6 °F (37 °C)    Resp 21    Ht 5' 6\" (1.676 m)    Wt 145.9 kg (321 lb 10.4 oz)    SpO2 92%    BMI 51.92 kg/m2    O2 Flow Rate (L/min): 2 l/min O2 Device: CPAP mask    Temp (24hrs), Av.1 °F (36.7 °C), Min:97.5 °F (36.4 °C), Max:98.7 °F (37.1 °C) 06/22 1901 - 06/24 0700  In: 520.5 [P.O.:180; I.V.:340.5]  Out: 1050 [Urine:1050]    General:  Alert, cooperative, no distress, appears stated age. Morbidly obese. Head:  Normocephalic, without obvious abnormality, atraumatic. Eyes:  Conjunctivae/corneas clear. Nose: Nares normal. Septum midline. Mucosa normal. No drainage or sinus tenderness. Throat: Lips, mucosa, and tongue normal. Teeth and gums normal.   Neck: Supple, symmetrical, trachea midline, no adenopathy, thyroid: no enlargement/tenderness/nodules, no carotid bruit and no JVD. Lungs:   Clear to auscultation bilaterally. Diminished breath sounds throughout   Heart:  Irregular rate and rhythm, S1, S2 normal, no murmur, click, rub or gallop. Abdomen:   Soft. Bowel sounds normal. No masses,  No organomegaly. Skin with erythema and warm to touch but less than yesterday. Extremities: Extremities  atraumatic, no cyanosis. No edema. No calf tenderness or cords. Pulses: 2+ and symmetric all extremities. Skin: Skin color, texture, turgor normal. No rashes or lesions   Neurologic: CNII-XII intact. Alert and oriented X 3. Fine motor of hands and fingers normal.   equal.  No cogwheeling or rigidity. Gait not tested at this time. Sensation grossly normal to touch. Gross motor of extremities normal.       Data Review:       Recent Days:  Recent Labs      06/22/17   1254   WBC  9.8   HGB  14.0   HCT  43.3   PLT  234     Recent Labs      06/22/17   1254   NA  144   K  4.3   CL  105   CO2  31   GLU  105*   BUN  11   CREA  1.31*   CA  9.0   MG  2.1   ALB  3.8   TBILI  0.7   SGOT  21   ALT  39   INR  1.1     24 Hour Results:  No results found for this or any previous visit (from the past 24 hour(s)).     Medications reviewed  Current Facility-Administered Medications   Medication Dose Route Frequency    dilTIAZem (CARDIZEM) IR tablet 60 mg  60 mg Oral TIDAC    enoxaparin (LOVENOX) injection 160 mg  1 mg/kg SubCUTAneous Q12H    bumetanide (BUMEX) injection 1 mg  1 mg IntraVENous DAILY    nicotine (NICODERM CQ) 21 mg/24 hr patch 1 Patch  1 Patch TransDERmal DAILY    sodium chloride (NS) flush 5-10 mL  5-10 mL IntraVENous Q8H    sodium chloride (NS) flush 5-10 mL  5-10 mL IntraVENous PRN    dilTIAZem (CARDIZEM) 125 mg in dextrose 5% 125 mL infusion  0-15 mg/hr IntraVENous TITRATE    sodium chloride (NS) flush 5-10 mL  5-10 mL IntraVENous Q8H    sodium chloride (NS) flush 5-10 mL  5-10 mL IntraVENous PRN    ceFAZolin in 0.9% NS (ANCEF) IVPB soln 2 g  2 g IntraVENous Q8H    diphenhydrAMINE (BENADRYL) capsule 25 mg  25 mg Oral QHS    guaiFENesin ER (MUCINEX) tablet 600 mg  600 mg Oral BID    polyethylene glycol (MIRALAX) packet 17 g  17 g Oral DAILY       Care Plan discussed with: Patient/Family and Consultant Cardiology     Total time spent with patient and review of records: 30 minutes.     Israel Cullen MD

## 2017-06-24 NOTE — PROGRESS NOTES
1910: Bedside shift change report given to Warren Sibley RN (oncoming nurse) by Ghulam Ford RN (offgoing nurse). Report included the following information SBAR, Kardex, ED Summary, Intake/Output, Accordion, Recent Results, Med Rec Status and Cardiac Rhythm A-fib.     1929: Called Dr. Brijesh Barber with [facility] practice to ask about nicotine patch for patient and asked about BID bumex order. Patient's BP's a little lower systolically. Told to change/modify bumex order to once a day at 0900 and hold tonight's dose. 2225: Called Dr. Brijesh Barber with [facility] because patient's BP trending down. BP 73 systolically. Will give 250ml bolus per MD order. Will continue to monitor. 0000: Reassessment and vital signs completed. Vital signs stable. BP has come up to low 551'F systolically. Will continue to monitor. 0200: Patient resting in bed. Vital signs stable. 0345: Reassessment and vital signs completed. Vital signs stable. BP's have increased and remain above 661 systolically. Will continue to monitor. 0710: Bedside shift change report given to Juno Burgess RN (oncoming nurse) by Warren Sibley RN (offgoing nurse). Report included the following information SBAR, Kardex, ED Summary, Intake/Output, MAR and Cardiac Rhythm A-fib.

## 2017-06-25 LAB
ALBUMIN SERPL BCP-MCNC: 3.5 G/DL (ref 3.5–5)
ALBUMIN/GLOB SERPL: 1.2 {RATIO} (ref 1.1–2.2)
ALP SERPL-CCNC: 65 U/L (ref 45–117)
ALT SERPL-CCNC: 26 U/L (ref 12–78)
ANION GAP BLD CALC-SCNC: 5 MMOL/L (ref 5–15)
AST SERPL W P-5'-P-CCNC: 22 U/L (ref 15–37)
BASOPHILS # BLD AUTO: 0 K/UL (ref 0–0.1)
BASOPHILS # BLD: 0 % (ref 0–1)
BILIRUB SERPL-MCNC: 0.5 MG/DL (ref 0.2–1)
BUN SERPL-MCNC: 17 MG/DL (ref 6–20)
BUN/CREAT SERPL: 14 (ref 12–20)
CALCIUM SERPL-MCNC: 8.4 MG/DL (ref 8.5–10.1)
CHLORIDE SERPL-SCNC: 102 MMOL/L (ref 97–108)
CO2 SERPL-SCNC: 33 MMOL/L (ref 21–32)
CREAT SERPL-MCNC: 1.25 MG/DL (ref 0.7–1.3)
EOSINOPHIL # BLD: 0.2 K/UL (ref 0–0.4)
EOSINOPHIL NFR BLD: 2 % (ref 0–7)
ERYTHROCYTE [DISTWIDTH] IN BLOOD BY AUTOMATED COUNT: 14.6 % (ref 11.5–14.5)
GLOBULIN SER CALC-MCNC: 2.9 G/DL (ref 2–4)
GLUCOSE SERPL-MCNC: 97 MG/DL (ref 65–100)
HCT VFR BLD AUTO: 43.3 % (ref 36.6–50.3)
HGB BLD-MCNC: 13.4 G/DL (ref 12.1–17)
INR PPP: 1.3 (ref 0.9–1.1)
LYMPHOCYTES # BLD AUTO: 22 % (ref 12–49)
LYMPHOCYTES # BLD: 2.2 K/UL (ref 0.8–3.5)
MCH RBC QN AUTO: 29.5 PG (ref 26–34)
MCHC RBC AUTO-ENTMCNC: 30.9 G/DL (ref 30–36.5)
MCV RBC AUTO: 95.2 FL (ref 80–99)
MONOCYTES # BLD: 1.1 K/UL (ref 0–1)
MONOCYTES NFR BLD AUTO: 11 % (ref 5–13)
NEUTS SEG # BLD: 6.6 K/UL (ref 1.8–8)
NEUTS SEG NFR BLD AUTO: 65 % (ref 32–75)
PLATELET # BLD AUTO: 212 K/UL (ref 150–400)
POTASSIUM SERPL-SCNC: 4.3 MMOL/L (ref 3.5–5.1)
PROT SERPL-MCNC: 6.4 G/DL (ref 6.4–8.2)
PROTHROMBIN TIME: 13.2 SEC (ref 9–11.1)
RBC # BLD AUTO: 4.55 M/UL (ref 4.1–5.7)
SODIUM SERPL-SCNC: 140 MMOL/L (ref 136–145)
WBC # BLD AUTO: 10 K/UL (ref 4.1–11.1)

## 2017-06-25 PROCEDURE — 85025 COMPLETE CBC W/AUTO DIFF WBC: CPT | Performed by: FAMILY MEDICINE

## 2017-06-25 PROCEDURE — 74011250637 HC RX REV CODE- 250/637: Performed by: INTERNAL MEDICINE

## 2017-06-25 PROCEDURE — 74011250636 HC RX REV CODE- 250/636: Performed by: INTERNAL MEDICINE

## 2017-06-25 PROCEDURE — 85610 PROTHROMBIN TIME: CPT | Performed by: SPECIALIST

## 2017-06-25 PROCEDURE — 65660000000 HC RM CCU STEPDOWN

## 2017-06-25 PROCEDURE — 74011250637 HC RX REV CODE- 250/637: Performed by: FAMILY MEDICINE

## 2017-06-25 PROCEDURE — 77010033678 HC OXYGEN DAILY

## 2017-06-25 PROCEDURE — 74011250637 HC RX REV CODE- 250/637: Performed by: SPECIALIST

## 2017-06-25 PROCEDURE — 36415 COLL VENOUS BLD VENIPUNCTURE: CPT | Performed by: FAMILY MEDICINE

## 2017-06-25 PROCEDURE — 80053 COMPREHEN METABOLIC PANEL: CPT | Performed by: FAMILY MEDICINE

## 2017-06-25 RX ORDER — POLYETHYLENE GLYCOL 3350 17 G/17G
17 POWDER, FOR SOLUTION ORAL
Status: DISCONTINUED | OUTPATIENT
Start: 2017-06-25 | End: 2017-06-28 | Stop reason: HOSPADM

## 2017-06-25 RX ORDER — DILTIAZEM HYDROCHLORIDE 240 MG/1
240 CAPSULE, COATED, EXTENDED RELEASE ORAL DAILY
Status: DISCONTINUED | OUTPATIENT
Start: 2017-06-25 | End: 2017-06-27

## 2017-06-25 RX ADMIN — ENOXAPARIN SODIUM 160 MG: 80 INJECTION SUBCUTANEOUS at 13:07

## 2017-06-25 RX ADMIN — DILTIAZEM HYDROCHLORIDE 240 MG: 240 CAPSULE, COATED, EXTENDED RELEASE ORAL at 15:20

## 2017-06-25 RX ADMIN — WARFARIN SODIUM 7.5 MG: 2.5 TABLET ORAL at 17:20

## 2017-06-25 RX ADMIN — POLYETHYLENE GLYCOL 3350 17 G: 17 POWDER, FOR SOLUTION ORAL at 21:36

## 2017-06-25 RX ADMIN — GUAIFENESIN 600 MG: 600 TABLET, EXTENDED RELEASE ORAL at 17:19

## 2017-06-25 RX ADMIN — ENOXAPARIN SODIUM 160 MG: 80 INJECTION SUBCUTANEOUS at 02:25

## 2017-06-25 RX ADMIN — CEFAZOLIN 2 G: 1 INJECTION, POWDER, FOR SOLUTION INTRAMUSCULAR; INTRAVENOUS; PARENTERAL at 07:39

## 2017-06-25 RX ADMIN — Medication 10 ML: at 13:09

## 2017-06-25 RX ADMIN — DILTIAZEM HYDROCHLORIDE 60 MG: 60 TABLET, FILM COATED ORAL at 07:39

## 2017-06-25 RX ADMIN — CEFAZOLIN 2 G: 1 INJECTION, POWDER, FOR SOLUTION INTRAMUSCULAR; INTRAVENOUS; PARENTERAL at 16:30

## 2017-06-25 RX ADMIN — POLYETHYLENE GLYCOL 3350 17 G: 17 POWDER, FOR SOLUTION ORAL at 10:10

## 2017-06-25 RX ADMIN — GUAIFENESIN 600 MG: 600 TABLET, EXTENDED RELEASE ORAL at 10:10

## 2017-06-25 RX ADMIN — DIPHENHYDRAMINE HYDROCHLORIDE 25 MG: 25 CAPSULE ORAL at 21:36

## 2017-06-25 RX ADMIN — DILTIAZEM HYDROCHLORIDE 60 MG: 60 TABLET, FILM COATED ORAL at 11:00

## 2017-06-25 NOTE — PROGRESS NOTES
Daily Progress Note: 6/25/2017  MENA Johnson    Assessment/Plan:   1. Atrial fibrillation with RVR ([facility] (6/22/2017). Admit to stepdown.   -----Has been transitioned to po Cardizem. -----echocardiogram with EF 45%, TSH normal  -----Consult cardiology. -----Pharmacy for Coumadin initiation     2. Exertional dyspnea (6/22/2017)/ BL leg edema/ elevated pro BNP. Most likely CHF.   -----echocardiogram.   -----Stop bumex as he is hypotensive  -----I/O, daily weight.   -----Low salt diet.      3. PEPE (obstructive sleep apnea) (6/22/2017). Has CPAP at home and will use hospital's until family brings his own. This machine is pretty old so will ask Pulm to see and to get outpatient follow up     4.  Elevated BP without diagnosis of hypertension (6/22/2017).   ----- stopped Coreg and Lisinopril     5. Renal insufficiency (6/22/2017). Watch renal function in diuretics.      6.  Obesity (6/22/2017).   -----Advised on weight loss.   -----Check lipid panel.      7. Tobacco use (6/22/2017). Advised on quitting.      8. Chronic back pain (6/22/2017). Continue home pain meds      9. Asthma (6/22/2017). Stable. Not wheezing.      10. Abdominal wall cellulitis.   -----ancef and monitor clinically. ----- MRSA screening. Hx of staph skin infection. 11.  Vtach- 14 beat run  -----Check electrolytes this am  -----May need further cardiac work up    12.   Constipation  -----Supp today  -----Continue Miralax and increase to BID       Problem List:  Problem List as of 6/25/2017  Date Reviewed: 6/22/2017          Codes Class Noted - Resolved    Atrial fibrillation with RVR ([facility] ICD-10-CM: I48.91  ICD-9-CM: 427.31  6/22/2017 - Present        Renal insufficiency ICD-10-CM: N28.9  ICD-9-CM: 593.9  6/22/2017 - Present        PEPE (obstructive sleep apnea) ICD-10-CM: G47.33  ICD-9-CM: 327.23  6/22/2017 - Present        Obesity ICD-10-CM: E66.9  ICD-9-CM: 278.00  6/22/2017 - Present        Tobacco use ICD-10-CM: Z72.0  ICD-9-CM: 305.1  6/22/2017 - Present        Chronic back pain ICD-10-CM: M54.9, G89.29  ICD-9-CM: 724.5, 338.29  6/22/2017 - Present        Asthma ICD-10-CM: J45.909  ICD-9-CM: 493.90  6/22/2017 - Present        Elevated BP without diagnosis of hypertension ICD-10-CM: R03.0  ICD-9-CM: 796.2  6/22/2017 - Present        Exertional dyspnea ICD-10-CM: R06.09  ICD-9-CM: 786.09  6/22/2017 - Present              HPI:    Mr. Klever Peguero is a 49 y.o.  male who is admitted with atrial fibrillation with RVR. Mr. Klever Peguero with PMH of chronic back pain, tobacco abuse, obesity presented to ER c/o exertional dyspnea and leg edema for 2 weeks. Dyspnea got worse progressively. Now has difficulty getting upstairs without taking a rest. Has orthopnea and PND. Denies chest pain or cough. Has PEPE and recently starts using his CPAP. In ER, since his stay his BP has been high. Has abdominal wall hyperemia, which is getting progressively worse. (Dr Satya Amaya)    6/23:HIs BP is down with the Card drip so will stop Lisinopril and Coreg until evaluated by Mike Hough pending. Long history of PEPE so will ask Pulm to see as he will need outpatient follow up. Less SOB this am. Abd still erythematous. Has chronic back pain and is applying for disability as he is no longer able to work. 6/24: BP low requiring bolus last evening. Holding Bumex. Stopped Coreg and Lisinopril. ECHO with EF 45%. ABd wall with less erythema. No edema this am. He is c/o constipation. On Miralax so will add supp. Had a 14 beat run of Vtach. Labs pending.     6/25: BP is up. HR is in the 100s. Sats in the high 80s at this time. He has had a BM and is feeling much better. Will continue Miralax and increase to BID. Was started on Coumadin yesterday. Review of Systems:   A comprehensive review of systems was negative except for that written in the HPI.     Objective:   Physical Exam:     Visit Vitals    BP (!) 158/96    Pulse (!) 115    Temp 97.9 °F (36.6 °C)    Resp 21    Ht 5' 6\" (1.676 m)    Wt 325 lb 13.4 oz (147.8 kg)    SpO2 97%    BMI 52.59 kg/m2    O2 Flow Rate (L/min): 4 l/min O2 Device: Nasal cannula    Temp (24hrs), Av.5 °F (36.4 °C), Min:96.6 °F (35.9 °C), Max:98 °F (36.7 °C)        1901 -  0700  In: 100 [I.V.:100]  Out: 1550 [Urine:1550]    General:  Alert, cooperative, no distress, appears stated age. Morbidly obese. Head:  Normocephalic, without obvious abnormality, atraumatic. Eyes:  Conjunctivae/corneas clear. Nose: Nares normal. Septum midline. Mucosa normal. No drainage or sinus tenderness. Throat: Lips, mucosa, and tongue normal. Teeth and gums normal.   Neck: Supple, symmetrical, trachea midline, no adenopathy, thyroid: no enlargement/tenderness/nodules, no carotid bruit and no JVD. Lungs:   Clear to auscultation bilaterally. Diminished breath sounds throughout   Heart:  Irregular rate and rhythm, S1, S2 normal, no murmur, click, rub or gallop. Abdomen:   Soft. Bowel sounds normal. No masses,  No organomegaly. Skin with erythema and warm to touch but less than yesterday. Extremities: Extremities  atraumatic, no cyanosis. No edema. No calf tenderness or cords. Pulses: 2+ and symmetric all extremities. Skin: Skin color, texture, turgor normal. No rashes or lesions   Neurologic: CNII-XII intact. Alert and oriented X 3. Fine motor of hands and fingers normal.   equal.  No cogwheeling or rigidity. Gait not tested at this time. Sensation grossly normal to touch.   Gross motor of extremities normal.       Data Review:       Recent Days:  Recent Labs      17   0537  17   1050  17   1254   WBC  10.0  10.5  9.8   HGB  13.4  13.4  14.0   HCT  43.3  41.9  43.3   PLT  212  217  234     Recent Labs      17   0537  17   1050  17   1254   NA  140  141  144   K  4.3  4.0  4.3   CL  102  102  105   CO2  33*  33*  31   GLU  97  124*  105*   BUN  17  18  11   CREA  1.25  1.38* 1.31*   CA  8.4*  8.1*  9.0   MG   --   2.0  2.1   ALB  3.5  3.3*  3.8   TBILI  0.5  0.5  0.7   SGOT  22  20  21   ALT  26  29  39   INR  1.3*  1.2*  1.1     24 Hour Results:  Recent Results (from the past 24 hour(s))   METABOLIC PANEL, COMPREHENSIVE    Collection Time: 06/24/17 10:50 AM   Result Value Ref Range    Sodium 141 136 - 145 mmol/L    Potassium 4.0 3.5 - 5.1 mmol/L    Chloride 102 97 - 108 mmol/L    CO2 33 (H) 21 - 32 mmol/L    Anion gap 6 5 - 15 mmol/L    Glucose 124 (H) 65 - 100 mg/dL    BUN 18 6 - 20 MG/DL    Creatinine 1.38 (H) 0.70 - 1.30 MG/DL    BUN/Creatinine ratio 13 12 - 20      GFR est AA >60 >60 ml/min/1.73m2    GFR est non-AA 55 (L) >60 ml/min/1.73m2    Calcium 8.1 (L) 8.5 - 10.1 MG/DL    Bilirubin, total 0.5 0.2 - 1.0 MG/DL    ALT (SGPT) 29 12 - 78 U/L    AST (SGOT) 20 15 - 37 U/L    Alk. phosphatase 64 45 - 117 U/L    Protein, total 5.7 (L) 6.4 - 8.2 g/dL    Albumin 3.3 (L) 3.5 - 5.0 g/dL    Globulin 2.4 2.0 - 4.0 g/dL    A-G Ratio 1.4 1.1 - 2.2     MAGNESIUM    Collection Time: 06/24/17 10:50 AM   Result Value Ref Range    Magnesium 2.0 1.6 - 2.4 mg/dL   CBC WITH AUTOMATED DIFF    Collection Time: 06/24/17 10:50 AM   Result Value Ref Range    WBC 10.5 4.1 - 11.1 K/uL    RBC 4.43 4.10 - 5.70 M/uL    HGB 13.4 12.1 - 17.0 g/dL    HCT 41.9 36.6 - 50.3 %    MCV 94.6 80.0 - 99.0 FL    MCH 30.2 26.0 - 34.0 PG    MCHC 32.0 30.0 - 36.5 g/dL    RDW 14.4 11.5 - 14.5 %    PLATELET 401 597 - 472 K/uL    NEUTROPHILS 74 32 - 75 %    LYMPHOCYTES 17 12 - 49 %    MONOCYTES 8 5 - 13 %    EOSINOPHILS 1 0 - 7 %    BASOPHILS 0 0 - 1 %    ABS. NEUTROPHILS 7.7 1.8 - 8.0 K/UL    ABS. LYMPHOCYTES 1.8 0.8 - 3.5 K/UL    ABS. MONOCYTES 0.9 0.0 - 1.0 K/UL    ABS. EOSINOPHILS 0.2 0.0 - 0.4 K/UL    ABS.  BASOPHILS 0.0 0.0 - 0.1 K/UL   PROTHROMBIN TIME + INR    Collection Time: 06/24/17 10:50 AM   Result Value Ref Range    INR 1.2 (H) 0.9 - 1.1      Prothrombin time 11.8 (H) 9.0 - 00.6 sec   METABOLIC PANEL, COMPREHENSIVE Collection Time: 06/25/17  5:37 AM   Result Value Ref Range    Sodium 140 136 - 145 mmol/L    Potassium 4.3 3.5 - 5.1 mmol/L    Chloride 102 97 - 108 mmol/L    CO2 33 (H) 21 - 32 mmol/L    Anion gap 5 5 - 15 mmol/L    Glucose 97 65 - 100 mg/dL    BUN 17 6 - 20 MG/DL    Creatinine 1.25 0.70 - 1.30 MG/DL    BUN/Creatinine ratio 14 12 - 20      GFR est AA >60 >60 ml/min/1.73m2    GFR est non-AA >60 >60 ml/min/1.73m2    Calcium 8.4 (L) 8.5 - 10.1 MG/DL    Bilirubin, total 0.5 0.2 - 1.0 MG/DL    ALT (SGPT) 26 12 - 78 U/L    AST (SGOT) 22 15 - 37 U/L    Alk. phosphatase 65 45 - 117 U/L    Protein, total 6.4 6.4 - 8.2 g/dL    Albumin 3.5 3.5 - 5.0 g/dL    Globulin 2.9 2.0 - 4.0 g/dL    A-G Ratio 1.2 1.1 - 2.2     CBC WITH AUTOMATED DIFF    Collection Time: 06/25/17  5:37 AM   Result Value Ref Range    WBC 10.0 4.1 - 11.1 K/uL    RBC 4.55 4.10 - 5.70 M/uL    HGB 13.4 12.1 - 17.0 g/dL    HCT 43.3 36.6 - 50.3 %    MCV 95.2 80.0 - 99.0 FL    MCH 29.5 26.0 - 34.0 PG    MCHC 30.9 30.0 - 36.5 g/dL    RDW 14.6 (H) 11.5 - 14.5 %    PLATELET 770 761 - 149 K/uL    NEUTROPHILS 65 32 - 75 %    LYMPHOCYTES 22 12 - 49 %    MONOCYTES 11 5 - 13 %    EOSINOPHILS 2 0 - 7 %    BASOPHILS 0 0 - 1 %    ABS. NEUTROPHILS 6.6 1.8 - 8.0 K/UL    ABS. LYMPHOCYTES 2.2 0.8 - 3.5 K/UL    ABS. MONOCYTES 1.1 (H) 0.0 - 1.0 K/UL    ABS. EOSINOPHILS 0.2 0.0 - 0.4 K/UL    ABS.  BASOPHILS 0.0 0.0 - 0.1 K/UL   PROTHROMBIN TIME + INR    Collection Time: 06/25/17  5:37 AM   Result Value Ref Range    INR 1.3 (H) 0.9 - 1.1      Prothrombin time 13.2 (H) 9.0 - 11.1 sec       Medications reviewed  Current Facility-Administered Medications   Medication Dose Route Frequency    Warfarin- Pharmacy Dosing   Other Rx Dosing/Monitoring    dilTIAZem (CARDIZEM) IR tablet 60 mg  60 mg Oral TIDAC    enoxaparin (LOVENOX) injection 160 mg  1 mg/kg SubCUTAneous Q12H    nicotine (NICODERM CQ) 21 mg/24 hr patch 1 Patch  1 Patch TransDERmal DAILY    sodium chloride (NS) flush 5-10 mL  5-10 mL IntraVENous Q8H    sodium chloride (NS) flush 5-10 mL  5-10 mL IntraVENous PRN    ceFAZolin in 0.9% NS (ANCEF) IVPB soln 2 g  2 g IntraVENous Q8H    diphenhydrAMINE (BENADRYL) capsule 25 mg  25 mg Oral QHS    guaiFENesin ER (MUCINEX) tablet 600 mg  600 mg Oral BID    polyethylene glycol (MIRALAX) packet 17 g  17 g Oral DAILY       Care Plan discussed with: patient    Total time spent with patient and review of records: 30 minutes.     Stephanie Thomas MD

## 2017-06-25 NOTE — PROGRESS NOTES
0700: Bedside and Verbal shift change report given to [employer] (oncoming nurse) by Hamlet Sommer (offgoing nurse). Report included the following information SBAR, Kardex, Intake/Output, MAR and Cardiac Rhythm Afib. 1050: TRANSFER - OUT REPORT:    Verbal report given to Juliet HERNANDEZ (name) on Jasmin Saldaña  being transferred to [facility] (unit) for routine progression of care       Report consisted of patients Situation, Background, Assessment and   Recommendations(SBAR). Information from the following report(s) SBAR, Kardex, ED Summary, Procedure Summary, Intake/Output, MAR and Cardiac Rhythm Afib was reviewed with the receiving nurse. Lines:   Peripheral IV 06/22/17 Left Antecubital (Active)   Site Assessment Clean, dry, & intact 6/25/2017  8:07 AM   Phlebitis Assessment 0 6/25/2017  8:07 AM   Infiltration Assessment 0 6/25/2017  8:07 AM   Dressing Status Clean, dry, & intact 6/25/2017  8:07 AM   Dressing Type Transparent 6/25/2017  8:07 AM   Hub Color/Line Status Green 6/25/2017  8:07 AM   Action Taken Open ports on tubing capped 6/24/2017  3:45 AM   Alcohol Cap Used Yes 6/25/2017  8:07 AM        Opportunity for questions and clarification was provided.       Patient transported with:   Monitor  Registered Nurse  Tech

## 2017-06-25 NOTE — PROGRESS NOTES
Nicholes Phoenix, MD [address] [place] [address] Laxmi Lernerd   Office [phone]  Mobile [phone]            NAME:  Geraldine Covarrubias   :   1970   MRN:   [id number]     Assessment/Plan:   1. New onset with RVR, duration unknown - rate controlled  2. NSVT without recurrence  3. Mild LV dysfunction (EF 45%)  4. Acute diastolic HF, recompensated  5. Severe PEPE, untreated  6. Morbid obesity (marked abdominal obesity)  7. Chronic low back pain due to lumbar spinal stenosis  8. Smoker/intermittent marijuana use  9. Disability  10. Likely depression  11. XU, better       Complex case with co morbid conditions. His presentation stems from complications of morbid obesity, smoking and untreated PEPE. Continue oral diltiazem for rate control for now - change to extended release  Long term anticoagulation - coumadin seems best given his weight  Would not pursue cath at this juncture    Chester is poor if we cannot impact his obesity. .. Subjective:   Cardiac ROS: Hx reviewed. Breathing better with consistent use of CPAP while in hospital. Patient denies any exertional chest pain, palpitations, syncope. No hx of stroke. Activity largely limited by significant low back pain    Review of Systems: No nausea, indigestion, vomiting, pain, cough, sputum. No bleeding. Taking po. Appetite fair      Objective:     Visit Vitals    BP (!) 142/98 (BP 1 Location: Right arm, BP Patient Position: At rest)    Pulse 87    Temp 98 °F (36.7 °C)    Resp 20    Ht 5' 6\" (1.676 m)    Wt 325 lb 13.4 oz (147.8 kg)    SpO2 98%    BMI 52.59 kg/m2    O2 Flow Rate (L/min): 2 l/min O2 Device: Nasal cannula    Temp (24hrs), Av.6 °F (36.4 °C), Min:96.6 °F (35.9 °C), Max:98 °F (36.7 °C)           1901 -  0700  In: 100 [I.V.:100]  Out: 1550 [Urine:1550]    TELE: AF 90s,     General: AAOx3 cooperative, no acute distress. HEENT: Atraumatic. Pink and moist.  Anicteric sclerae. Neck : Supple, no thyromegaly. Lungs: CTA bilaterally. No wheezing/rhonchi/rales. Heart: Irregular rhythm, no murmur, no rubs, no gallops. No JVD. No carotid bruits. Abdomen: marked abdominal obesity. non-tender. + Bowel sounds. No bruits. Extremities: 1-2+ LE edema, no clubbing, no cyanosis. No calf tenderness  Neurologic: Grossly intact. Alert and oriented X 3. No acute neurological distress. Psych: Good insight. Not anxious nor agitated. Additional comments: None    Care Plan discussed with:    Comments   Patient x    Family      RN x    Care Manager                    Consultant:  x        Data Review:     EKG    Echo  EF 45%, TDS    No results for input(s): TNIPOC in the last 72 hours. No lab exists for component: ITNL   No results for input(s): CPK, CKMB, TROIQ in the last 72 hours.   Recent Labs      06/25/17   0537  06/24/17   1050   NA  140  141   K  4.3  4.0   CL  102  102   CO2  33*  33*   BUN  17  18   CREA  1.25  1.38*   GLU  97  124*   MG   --   2.0   ALB  3.5  3.3*   WBC  10.0  10.5   HGB  13.4  13.4   HCT  43.3  41.9   PLT  212  217     Recent Labs      06/25/17   0537  06/24/17   1050   INR  1.3*  1.2*   PTP  13.2*  11.8*       Medications reviewed  Current Facility-Administered Medications   Medication Dose Route Frequency    polyethylene glycol (MIRALAX) packet 17 g  17 g Oral ACB/HS    warfarin (COUMADIN) tablet 7.5 mg  7.5 mg Oral QPM    Warfarin- Pharmacy Dosing   Other Rx Dosing/Monitoring    dilTIAZem (CARDIZEM) IR tablet 60 mg  60 mg Oral TIDAC    enoxaparin (LOVENOX) injection 160 mg  1 mg/kg SubCUTAneous Q12H    nicotine (NICODERM CQ) 21 mg/24 hr patch 1 Patch  1 Patch TransDERmal DAILY    sodium chloride (NS) flush 5-10 mL  5-10 mL IntraVENous Q8H    sodium chloride (NS) flush 5-10 mL  5-10 mL IntraVENous PRN    ceFAZolin in 0.9% NS (ANCEF) IVPB soln 2 g  2 g IntraVENous Q8H    diphenhydrAMINE (BENADRYL) capsule 25 mg  25 mg Oral QHS    guaiFENesin ER (MUCINEX) tablet 600 mg  600 mg Oral BID Cecy Dietrich MD

## 2017-06-25 NOTE — PROGRESS NOTES
Bedside and Verbal shift change report given to [employer] (oncoming nurse) by Ji Lo RN  (offgoing nurse). Report included the following information SBAR, Kardex and MAR.

## 2017-06-25 NOTE — PROGRESS NOTES
[facility] Dosing Services: 6/25/17  Consult for Warfarin Dosing by Pharmacy by Dr. Ephraim Durán provided for this 49 y.o. male for indication of Atrial Fibrillation. Day of therapy: 2 (new start)  Dose to achieve an INR goal of 2-3    Order entered for  Warfarin  7.5 (mg) ordered to be given today at 18:00. Significant drug interactions: Lovenox bridge, morbid obesity  Previous dose given 7.5 mg   PT/INR Lab Results   Component Value Date/Time    INR 1.3 06/25/2017 05:37 AM      Platelets Lab Results   Component Value Date/Time    PLATELET 804 19/86/0572 05:37 AM      H/H Lab Results   Component Value Date/Time    HGB 13.4 06/25/2017 05:37 AM        Pharmacy to follow daily and will provide subsequent Warfarin dosing based on clinical status.   Anahi Oconnor)  Contact information [phone]

## 2017-06-25 NOTE — PROGRESS NOTES
1130-TRANSFER - IN REPORT:    Verbal report received from Candice MCHUGH RN(name) on Precious Olivia  being received from [facility] (unit) for routine progression of care      Report consisted of patients Situation, Background, Assessment and   Recommendations(SBAR). Information from the following report(s) SBAR, Kardex and Recent Results was reviewed with the receiving nurse. Opportunity for questions and clarification was provided. Assessment completed upon patients arrival to unit and care assumed. 1200- Admission assessment done. . VSS. Melissa Lopes any pain. . Family @ bedside. .    1200- Primary Nurse Esdras Mayo RN and SUZAN RN performed a dual skin assessment on this patient No impairment noted  Channing score is 23. .. 1900-Bedside and Verbal shift change report given to [employer] (oncoming nurse) by Tamiko Almazan RN (offgoing nurse). Report included the following information SBAR, Kardex and Recent Results.

## 2017-06-26 LAB
ALBUMIN SERPL BCP-MCNC: 3.6 G/DL (ref 3.5–5)
ALBUMIN/GLOB SERPL: 1.2 {RATIO} (ref 1.1–2.2)
ALP SERPL-CCNC: 69 U/L (ref 45–117)
ALT SERPL-CCNC: 28 U/L (ref 12–78)
ANION GAP BLD CALC-SCNC: 6 MMOL/L (ref 5–15)
AST SERPL W P-5'-P-CCNC: 30 U/L (ref 15–37)
BASOPHILS # BLD AUTO: 0 K/UL (ref 0–0.1)
BASOPHILS # BLD: 0 % (ref 0–1)
BILIRUB SERPL-MCNC: 0.5 MG/DL (ref 0.2–1)
BUN SERPL-MCNC: 14 MG/DL (ref 6–20)
BUN/CREAT SERPL: 13 (ref 12–20)
CALCIUM SERPL-MCNC: 8.2 MG/DL (ref 8.5–10.1)
CHLORIDE SERPL-SCNC: 102 MMOL/L (ref 97–108)
CO2 SERPL-SCNC: 32 MMOL/L (ref 21–32)
CREAT SERPL-MCNC: 1.11 MG/DL (ref 0.7–1.3)
EOSINOPHIL # BLD: 0.2 K/UL (ref 0–0.4)
EOSINOPHIL NFR BLD: 2 % (ref 0–7)
ERYTHROCYTE [DISTWIDTH] IN BLOOD BY AUTOMATED COUNT: 14.5 % (ref 11.5–14.5)
GLOBULIN SER CALC-MCNC: 3 G/DL (ref 2–4)
GLUCOSE SERPL-MCNC: 109 MG/DL (ref 65–100)
HCT VFR BLD AUTO: 41.9 % (ref 36.6–50.3)
HGB BLD-MCNC: 13.4 G/DL (ref 12.1–17)
INR PPP: 1.5 (ref 0.9–1.1)
LYMPHOCYTES # BLD AUTO: 20 % (ref 12–49)
LYMPHOCYTES # BLD: 1.7 K/UL (ref 0.8–3.5)
MCH RBC QN AUTO: 29.9 PG (ref 26–34)
MCHC RBC AUTO-ENTMCNC: 32 G/DL (ref 30–36.5)
MCV RBC AUTO: 93.5 FL (ref 80–99)
MONOCYTES # BLD: 1 K/UL (ref 0–1)
MONOCYTES NFR BLD AUTO: 11 % (ref 5–13)
NEUTS SEG # BLD: 5.9 K/UL (ref 1.8–8)
NEUTS SEG NFR BLD AUTO: 67 % (ref 32–75)
PLATELET # BLD AUTO: 234 K/UL (ref 150–400)
POTASSIUM SERPL-SCNC: 4.5 MMOL/L (ref 3.5–5.1)
PROT SERPL-MCNC: 6.6 G/DL (ref 6.4–8.2)
PROTHROMBIN TIME: 15.4 SEC (ref 9–11.1)
RBC # BLD AUTO: 4.48 M/UL (ref 4.1–5.7)
SODIUM SERPL-SCNC: 140 MMOL/L (ref 136–145)
WBC # BLD AUTO: 8.8 K/UL (ref 4.1–11.1)

## 2017-06-26 PROCEDURE — 74011250636 HC RX REV CODE- 250/636: Performed by: INTERNAL MEDICINE

## 2017-06-26 PROCEDURE — 94660 CPAP INITIATION&MGMT: CPT

## 2017-06-26 PROCEDURE — 85610 PROTHROMBIN TIME: CPT | Performed by: SPECIALIST

## 2017-06-26 PROCEDURE — 74011250637 HC RX REV CODE- 250/637: Performed by: FAMILY MEDICINE

## 2017-06-26 PROCEDURE — 77010033678 HC OXYGEN DAILY

## 2017-06-26 PROCEDURE — 85025 COMPLETE CBC W/AUTO DIFF WBC: CPT | Performed by: FAMILY MEDICINE

## 2017-06-26 PROCEDURE — 65660000000 HC RM CCU STEPDOWN

## 2017-06-26 PROCEDURE — 74011250637 HC RX REV CODE- 250/637: Performed by: NURSE PRACTITIONER

## 2017-06-26 PROCEDURE — 74011250637 HC RX REV CODE- 250/637: Performed by: SPECIALIST

## 2017-06-26 PROCEDURE — 80053 COMPREHEN METABOLIC PANEL: CPT | Performed by: FAMILY MEDICINE

## 2017-06-26 PROCEDURE — 36415 COLL VENOUS BLD VENIPUNCTURE: CPT | Performed by: SPECIALIST

## 2017-06-26 PROCEDURE — 74011250637 HC RX REV CODE- 250/637: Performed by: INTERNAL MEDICINE

## 2017-06-26 RX ORDER — MAGNESIUM CITRATE
296 SOLUTION, ORAL ORAL
Status: COMPLETED | OUTPATIENT
Start: 2017-06-26 | End: 2017-06-26

## 2017-06-26 RX ORDER — WARFARIN SODIUM 5 MG/1
10 TABLET ORAL ONCE
Status: COMPLETED | OUTPATIENT
Start: 2017-06-26 | End: 2017-06-26

## 2017-06-26 RX ORDER — BUMETANIDE 1 MG/1
1 TABLET ORAL DAILY
Status: DISCONTINUED | OUTPATIENT
Start: 2017-06-26 | End: 2017-06-28 | Stop reason: HOSPADM

## 2017-06-26 RX ADMIN — WARFARIN SODIUM 10 MG: 5 TABLET ORAL at 17:35

## 2017-06-26 RX ADMIN — Medication 10 ML: at 21:41

## 2017-06-26 RX ADMIN — MAGESIUM CITRATE 296 ML: 1.75 LIQUID ORAL at 08:39

## 2017-06-26 RX ADMIN — CEFAZOLIN 2 G: 1 INJECTION, POWDER, FOR SOLUTION INTRAMUSCULAR; INTRAVENOUS; PARENTERAL at 02:30

## 2017-06-26 RX ADMIN — ENOXAPARIN SODIUM 160 MG: 80 INJECTION SUBCUTANEOUS at 02:30

## 2017-06-26 RX ADMIN — CEFAZOLIN 2 G: 1 INJECTION, POWDER, FOR SOLUTION INTRAMUSCULAR; INTRAVENOUS; PARENTERAL at 10:04

## 2017-06-26 RX ADMIN — GUAIFENESIN 600 MG: 600 TABLET, EXTENDED RELEASE ORAL at 08:40

## 2017-06-26 RX ADMIN — Medication 10 ML: at 13:09

## 2017-06-26 RX ADMIN — POLYETHYLENE GLYCOL 3350 17 G: 17 POWDER, FOR SOLUTION ORAL at 21:40

## 2017-06-26 RX ADMIN — CEFAZOLIN 2 G: 1 INJECTION, POWDER, FOR SOLUTION INTRAMUSCULAR; INTRAVENOUS; PARENTERAL at 18:12

## 2017-06-26 RX ADMIN — BUMETANIDE 1 MG: 1 TABLET ORAL at 10:04

## 2017-06-26 RX ADMIN — DIPHENHYDRAMINE HYDROCHLORIDE 25 MG: 25 CAPSULE ORAL at 21:40

## 2017-06-26 RX ADMIN — ENOXAPARIN SODIUM 160 MG: 80 INJECTION SUBCUTANEOUS at 13:08

## 2017-06-26 RX ADMIN — POLYETHYLENE GLYCOL 3350 17 G: 17 POWDER, FOR SOLUTION ORAL at 08:40

## 2017-06-26 RX ADMIN — DILTIAZEM HYDROCHLORIDE 240 MG: 240 CAPSULE, COATED, EXTENDED RELEASE ORAL at 08:40

## 2017-06-26 RX ADMIN — GUAIFENESIN 600 MG: 600 TABLET, EXTENDED RELEASE ORAL at 17:35

## 2017-06-26 NOTE — PROGRESS NOTES
Name: COLE GACRIA [facility] CENTER: [address] [place] [address] Dr ARREOLA: 1970 Admit Date: 2017   Phone: [phone]  Room: [facility]/01   PCP: MENA Talley  MRN: [id number]   Date: 2017  Code: Full Code          Chart and notes reviewed. Data reviewed. I review the patient's current medications in the medical record at each encounter. I have evaluated and examined the patient. Overnight events  Afebrile  Sats 99% on RA  HR 100s  INR 1.5  ECHO: EF 45%    ROS: Feeling better this morning. Denies SOB or CP. Denies fever, chills, or cough. Denies abd pain. States LE swelling seems improved. States his home CPAP machine does not seem to be working correctly. Current Facility-Administered Medications   Medication Dose Route Frequency    bumetanide (BUMEX) tablet 1 mg  1 mg Oral DAILY    polyethylene glycol (MIRALAX) packet 17 g  17 g Oral ACB/HS    dilTIAZem CD (CARDIZEM CD) capsule 240 mg  240 mg Oral DAILY    Warfarin- Pharmacy Dosing   Other Rx Dosing/Monitoring    enoxaparin (LOVENOX) injection 160 mg  1 mg/kg SubCUTAneous Q12H    nicotine (NICODERM CQ) 21 mg/24 hr patch 1 Patch  1 Patch TransDERmal DAILY    sodium chloride (NS) flush 5-10 mL  5-10 mL IntraVENous Q8H    sodium chloride (NS) flush 5-10 mL  5-10 mL IntraVENous PRN    ceFAZolin in 0.9% NS (ANCEF) IVPB soln 2 g  2 g IntraVENous Q8H    diphenhydrAMINE (BENADRYL) capsule 25 mg  25 mg Oral QHS    guaiFENesin ER (MUCINEX) tablet 600 mg  600 mg Oral BID         REVIEW OF SYSTEMS   Negative except as stated in the HPI. Physical Exam:   Visit Vitals    /77 (BP 1 Location: Left arm, BP Patient Position: Sitting)    Pulse 97    Temp 98.2 °F (36.8 °C)    Resp 20    Ht 5' 6\" (1.676 m)    Wt 156.8 kg (345 lb 11.2 oz)    SpO2 99%    BMI 55.8 kg/m2       General:  Alert, cooperative, no distress, appears stated age. Head:  Normocephalic, without obvious abnormality, atraumatic. Eyes:  Conjunctivae/corneas clear. Nose: Nares normal. Septum midline. Mucosa normal.    Throat: Lips, mucosa, and tongue normal. Class 4 airway. Neck: Thick, supple, symmetrical, trachea midline, no adenopathy. Lungs:   Clear to auscultation bilaterally. Chest wall:  No tenderness or deformity. Heart:  Regular rate and rhythm, S1, S2 normal, no murmur, click, rub or gallop. Abdomen: Morbidly obese, soft, non-tender. Bowel sounds normal. No masses,  No organomegaly. Skin with erythema/hyperemia. Extremities: Extremities normal, atraumatic, no cyanosis, trace - 1+ LE edema. Pulses: 2+ and symmetric all extremities. Skin: Skin color, texture, turgor normal. No rashes or lesions   Lymph nodes: Cervical, supraclavicular nodes normal.   Neurologic: Grossly nonfocal       Lab Results   Component Value Date/Time    Sodium 140 06/26/2017 05:20 AM    Potassium 4.5 06/26/2017 05:20 AM    Chloride 102 06/26/2017 05:20 AM    CO2 32 06/26/2017 05:20 AM    BUN 14 06/26/2017 05:20 AM    Creatinine 1.11 06/26/2017 05:20 AM    Glucose 109 06/26/2017 05:20 AM    Calcium 8.2 06/26/2017 05:20 AM    Magnesium 2.0 06/24/2017 10:50 AM       Lab Results   Component Value Date/Time    WBC 8.8 06/26/2017 05:20 AM    HGB 13.4 06/26/2017 05:20 AM    PLATELET 103 04/10/5160 05:20 AM    MCV 93.5 06/26/2017 05:20 AM       Lab Results   Component Value Date/Time    INR 1.5 06/26/2017 05:20 AM    aPTT 28.0 06/22/2017 12:54 PM    AST (SGOT) 30 06/26/2017 05:20 AM    Alk.  phosphatase 69 06/26/2017 05:20 AM    Protein, total 6.6 06/26/2017 05:20 AM    Albumin 3.6 06/26/2017 05:20 AM    Globulin 3.0 06/26/2017 05:20 AM       No results found for: IRON, FE, TIBC, IBCT, PSAT, FERR    Lab Results   Component Value Date/Time    TSH 3.32 06/22/2017 12:54 PM        No results found for: PH, PHI, PCO2, PCO2I, PO2, PO2I, HCO3, HCO3I, FIO2, FIO2I    Lab Results   Component Value Date/Time    Troponin-I, Qt. <0.04 06/22/2017 12:54 PM        Lab Results   Component Value Date/Time    Culture result: MRSA NOT PRESENT 06/22/2017 05:44 PM    Culture result:  06/22/2017 05:44 PM         Screening of patient nares for MRSA is for surveillance purposes and, if positive, to facilitate isolation considerations in high risk settings. It is not intended for automatic decolonization interventions per se as regimens are not sufficiently effective to warrant routine use. No results found for: TOXA1, RPR, HBCM, HBSAG, HAAB, HCAB1, HAAT, G6PD, CRYAC, HIVGT, HIVR, HIV1, HIV12, HIVPC, HIVRPI    No results found for: VANCT, CPK    No results found for: COLOR, APPRN, SPGRU, AIDA, PROTU, GLUCU, KETU, BILU, BLDU, UROU, TRIXIE, LEUKU, WBCU, RBCU, UEPI, BACTU, CASTS, UCRY      Images: personally visualized    IMPRESSION  · Atrial fibrillation with RVR  · Dyspnea  · PEPE: not compliant with CPAP at baseline  · Renal insufficiency  · Chronic back pain  · Hx of asthma: not wheezing  · Tobacco use/THC use     PLAN  · Rate control po diltiazem; cardiology following  · Empiric Ancef per primary for team for potential abdominal wall cellulitis  · Continue diuresis with Bumex; watch creat  · Continue CPAP nightly; patient is now agreeable to reinitiating PAP therapy. He will need outpatient sleep eval and likely repeat sleep study. Will have RT take a look at his machine while he is here. · Discussed the importance of compete tobacco and THC cessation. Nicotine patch in place  · Patient would benefit from outpatient pulmonary follow up with complete PFTs  · GI prophylaxis: not indicated  · DVT prophylaxis: Lovenox and Coumadin    Patient is stable from a pulmonary standpoint. We will sign off and arrange for outpatient follow up as above. Please call with questions.     Dakota Luis

## 2017-06-26 NOTE — ADVANCED PRACTICE NURSE
Met with mother , updated on cardiology plan of care. Deferred other medical issue ?'s to Dr Pratik Galeas.

## 2017-06-26 NOTE — CONSULTS
Xu Dai. Ashley Murphy MD  [phone] office  [phone] voicemail   Gastroenterology Consultation Note      Admit Date: 6/22/2017  Consult Date: 6/26/2017   I greatly appreciate your asking me to see Jessica Ruiz, thank you very much for the opportunity to participate in his care. Narrative Assessment and Plan   · Abdominal distension - improved with BM related to mag citrate but still feels a little constipated  · Constipation as evidenced on imaging  · Morbid obesity. Plan is for another dose of magnesium citrate tomorrow AM   He is agreeable    Subjective:     Chief Complaint: Abdominal distension    History of Present Illness: Jessica Ruiz is a(n) 49 y.o. male who complains of abdominal pain. The pain is located in the diffusely area without radiation. Pain is described as fullness and constipated and measures 5/10 in intensity. Onset of pain was several days ago. Since onset the pain is improved after a few BM today. Aggravating factors include none. Alleviating factors include bowel movements. Associated symptoms include none. PCP:  MENA Patterson    Past Medical History:   Diagnosis Date    Asthma     Back pain     Sleep apnea     Spinal stenosis     Staph infection         Past Surgical History:   Procedure Laterality Date    HX TYMPANOSTOMY         Social History   Substance Use Topics    Smoking status: Current Every Day Smoker     Packs/day: 1.00    Smokeless tobacco: Not on file    Alcohol use Yes      Comment: \"occasional drink, drank heavy 5-6 years ago. \"        Family History   Problem Relation Age of Onset    COPD Mother     Hypertension Mother     Diabetes Mother         Allergies   Allergen Reactions    Penicillins Hives            Home Medications:  Prior to Admission Medications   Prescriptions Last Dose Informant Patient Reported? Taking?    diphenhydrAMINE (BENADRYL) 25 mg tablet 6/21/2017 at PM Self Yes Yes   Sig: Take 25 mg by mouth nightly. Indications: INSOMNIA   guaiFENesin ER (MUCINEX) 600 mg ER tablet 6/21/2017 at PM Self Yes Yes   Sig: Take 600 mg by mouth two (2) times a day. ibuprofen (ADVIL) 200 mg tablet 6/21/2017 at PM Self Yes Yes   Sig: Take 400 mg by mouth every six (6) hours as needed for Pain.   polyethylene glycol (MIRALAX) 17 gram/dose powder 6/21/2017 at AM Self Yes Yes   Sig: Take 17 g by mouth daily.       Facility-Administered Medications: None       Hospital Medications:  Current Facility-Administered Medications   Medication Dose Route Frequency    bumetanide (BUMEX) tablet 1 mg  1 mg Oral DAILY    polyethylene glycol (MIRALAX) packet 17 g  17 g Oral ACB/HS    dilTIAZem CD (CARDIZEM CD) capsule 240 mg  240 mg Oral DAILY    Warfarin- Pharmacy Dosing   Other Rx Dosing/Monitoring    enoxaparin (LOVENOX) injection 160 mg  1 mg/kg SubCUTAneous Q12H    nicotine (NICODERM CQ) 21 mg/24 hr patch 1 Patch  1 Patch TransDERmal DAILY    sodium chloride (NS) flush 5-10 mL  5-10 mL IntraVENous Q8H    sodium chloride (NS) flush 5-10 mL  5-10 mL IntraVENous PRN    ceFAZolin in 0.9% NS (ANCEF) IVPB soln 2 g  2 g IntraVENous Q8H    diphenhydrAMINE (BENADRYL) capsule 25 mg  25 mg Oral QHS    guaiFENesin ER (MUCINEX) tablet 600 mg  600 mg Oral BID       Review of Systems: Admission ROS by Emiliana Yoo MD from 6/22/2017 were reviewed with the patient and changes (other than per HPI) include: none      Objective:     Physical Exam:  Visit Vitals    /89 (BP 1 Location: Right arm)    Pulse 83    Temp 97.6 °F (36.4 °C)    Resp 22    Ht 5' 6\" (1.676 m)    Wt 156.8 kg (345 lb 11.2 oz)    SpO2 95%    BMI 55.8 kg/m2     SpO2 Readings from Last 6 Encounters:   06/26/17 95%    O2 Flow Rate (L/min): 2 l/min     Intake/Output Summary (Last 24 hours) at 06/26/17 9568  Last data filed at 06/26/17 0222   Gross per 24 hour   Intake              200 ml   Output              725 ml   Net             -525 ml      General: no distress, comfortable  Skin:  No rash or palpable dermatologic mass lesions  HEENT: Pupils equal, sclera anicteric, oropharynx with no gross lesions  Cardiovascular: No abnormal audible heart sounds, well perfused, no edema  Respiratory:  No abnormal audible breath sounds, normal respiratory effort, no throacic deformity  GI:   Abdomen distended but not clear that this is not just intraabdominal fat  Musculoskeletal:  No skeletal deformity nor acute arthritis noted. Neurological:  Motor and sensory function intact in upper extremeties  Psychiatric:  Normal affect, memory intact, appears to have insight into current illness  Lymphatic:  No cervical, supraclavicular, or periumbilic lymphadenopathy    Laboratory:    Recent Results (from the past 24 hour(s))   PROTHROMBIN TIME + INR    Collection Time: 06/26/17  5:20 AM   Result Value Ref Range    INR 1.5 (H) 0.9 - 1.1      Prothrombin time 15.4 (H) 9.0 - 11.1 sec   CBC WITH AUTOMATED DIFF    Collection Time: 06/26/17  5:20 AM   Result Value Ref Range    WBC 8.8 4.1 - 11.1 K/uL    RBC 4.48 4.10 - 5.70 M/uL    HGB 13.4 12.1 - 17.0 g/dL    HCT 41.9 36.6 - 50.3 %    MCV 93.5 80.0 - 99.0 FL    MCH 29.9 26.0 - 34.0 PG    MCHC 32.0 30.0 - 36.5 g/dL    RDW 14.5 11.5 - 14.5 %    PLATELET 925 680 - 144 K/uL    NEUTROPHILS 67 32 - 75 %    LYMPHOCYTES 20 12 - 49 %    MONOCYTES 11 5 - 13 %    EOSINOPHILS 2 0 - 7 %    BASOPHILS 0 0 - 1 %    ABS. NEUTROPHILS 5.9 1.8 - 8.0 K/UL    ABS. LYMPHOCYTES 1.7 0.8 - 3.5 K/UL    ABS. MONOCYTES 1.0 0.0 - 1.0 K/UL    ABS. EOSINOPHILS 0.2 0.0 - 0.4 K/UL    ABS.  BASOPHILS 0.0 0.0 - 0.1 K/UL   METABOLIC PANEL, COMPREHENSIVE    Collection Time: 06/26/17  5:20 AM   Result Value Ref Range    Sodium 140 136 - 145 mmol/L    Potassium 4.5 3.5 - 5.1 mmol/L    Chloride 102 97 - 108 mmol/L    CO2 32 21 - 32 mmol/L    Anion gap 6 5 - 15 mmol/L    Glucose 109 (H) 65 - 100 mg/dL    BUN 14 6 - 20 MG/DL    Creatinine 1.11 0.70 - 1.30 MG/DL    BUN/Creatinine ratio 13 12 - 20      GFR est AA >60 >60 ml/min/1.73m2    GFR est non-AA >60 >60 ml/min/1.73m2    Calcium 8.2 (L) 8.5 - 10.1 MG/DL    Bilirubin, total 0.5 0.2 - 1.0 MG/DL    ALT (SGPT) 28 12 - 78 U/L    AST (SGOT) 30 15 - 37 U/L    Alk. phosphatase 69 45 - 117 U/L    Protein, total 6.6 6.4 - 8.2 g/dL    Albumin 3.6 3.5 - 5.0 g/dL    Globulin 3.0 2.0 - 4.0 g/dL    A-G Ratio 1.2 1.1 - 2.2           Assessment/Plan:     Active Problems:    Atrial fibrillation with RVR (HCC) (6/22/2017)      Renal insufficiency (6/22/2017)      PEPE (obstructive sleep apnea) (6/22/2017)      Obesity (6/22/2017)      Tobacco use (6/22/2017)      Chronic back pain (6/22/2017)      Asthma (6/22/2017)      Elevated BP without diagnosis of hypertension (6/22/2017)      Exertional dyspnea (6/22/2017)         See above narrative for full detail.

## 2017-06-26 NOTE — PROGRESS NOTES
1130: RT checked patient's home BiPAP/CPAP machine to see if it is in working condition. Machine running in CPAP mode, +8 cmH20. Explained to patient that RT cannot change or adjust settings on home machines--they are typically programmed with patient's MD ordered pressures. Patient okay to try wearing it tonight. [employer] called to inspect machine. 1520: [employer] checked and approved patient's home machine.

## 2017-06-26 NOTE — PROGRESS NOTES
Cardiology Progress Note [facility]            NAME:  Flip Weiss   :   1970   MRN:   [id number]     Assessment/Plan:   1. New onset with RVR, duration unknown - rate controlled/ Lovenox/coumadin. Cont cardizem CD. 2. NSVT without recurrence  3. Mild LV dysfunction (EF 45%): would not puruse cath at this point until infection resolves, resume bumex. 4. Acute diastolic HF, recompensated  5. Severe PEPE, untreated: will need OP sleep study   6. Morbid obesity (marked abdominal obesity): [place] is poor if we cannot impact his obesity. ..   7. Chronic low back pain due to lumbar spinal stenosis  8. Smoker/intermittent marijuana use  9. Disability  10. Likely depression  11. XU, better  12. Constipation: moderate by KUB. Dose mg citrate today. Has Op follow up with Dr Destiny Bahena in a few weeks. Cardiology attending:  Pt seen and examined. AF rate controlled. Low EF due to untreated PEPE most likely. Should improve with consistent CPAP and weight loss. Pharmacy adjusting coumadin. Will be happy to see in hospital as needed. Rosi Calderon MD      Subjective:   Cardiac ROS: Breathing better with consistent use of CPAP while in hospital. Patient denies any exertional chest pain, palpitations, syncope. No hx of stroke. Activity largely limited by significant low back pain    Review of Systems: + constipation, taking po.  Appetite fair, up to chair      Objective:     Visit Vitals    /77 (BP 1 Location: Left arm, BP Patient Position: Sitting)    Pulse 97    Temp 98.2 °F (36.8 °C)    Resp 20    Ht 5' 6\" (1.676 m)    Wt 345 lb 11.2 oz (156.8 kg)    SpO2 99%    BMI 55.8 kg/m2    O2 Flow Rate (L/min): 2 l/min O2 Device: Nasal cannula    Temp (24hrs), Av.3 °F (36.8 °C), Min:97.9 °F (36.6 °C), Max:98.7 °F (37.1 °C)           1 -  0700  In: 700 [P.O.:700]  Out: 3225 [Urine:1825]    TELE: AF 90s    General: AAOx3 cooperative, no acute distress. HEENT: Atraumatic. Pink and moist.  Anicteric sclerae. Neck : Supple, no thyromegaly. Lungs: CTA bilaterally. No wheezing/rhonchi/rales. SPo2 99% 2 liters   Heart: Irregular rhythm, no murmur, no rubs, no gallops. Unable to appreciated JVD. No carotid bruits. Abdomen: marked abdominal obesity, erythema. .non-tender. + Bowel sounds. No bruits. Extremities: 1-2+ LE edema, no clubbing, no cyanosis. No calf tenderness  Neurologic: Grossly intact. Alert and oriented X 3. No acute neurological distress. Psych: Good insight. Not anxious or agitated. Care Plan discussed with:    Comments   Patient x    Family      RN x    Care Manager                    Consultant:  x        Data Review:     EKG    Echo  EF 45%, TDS    No results for input(s): TNIPOC in the last 72 hours. No lab exists for component: ITNL   No results for input(s): CPK, CKMB, TROIQ in the last 72 hours.   Recent Labs      06/26/17   0520  06/25/17   0537  06/24/17   1050   NA  140  140  141   K  4.5  4.3  4.0   CL  102  102  102   CO2  32  33*  33*   BUN  14  17  18   CREA  1.11  1.25  1.38*   GLU  109*  97  124*   MG   --    --   2.0   ALB  3.6  3.5  3.3*   WBC  8.8  10.0  10.5   HGB  13.4  13.4  13.4   HCT  41.9  43.3  41.9   PLT  234  212  217     Recent Labs      06/26/17   0520  06/25/17   0537  06/24/17   1050   INR  1.5*  1.3*  1.2*   PTP  15.4*  13.2*  11.8*       Medications reviewed  Current Facility-Administered Medications   Medication Dose Route Frequency    magnesium citrate solution 296 mL  296 mL Oral NOW    polyethylene glycol (MIRALAX) packet 17 g  17 g Oral ACB/HS    dilTIAZem CD (CARDIZEM CD) capsule 240 mg  240 mg Oral DAILY    Warfarin- Pharmacy Dosing   Other Rx Dosing/Monitoring    enoxaparin (LOVENOX) injection 160 mg  1 mg/kg SubCUTAneous Q12H    nicotine (NICODERM CQ) 21 mg/24 hr patch 1 Patch  1 Patch TransDERmal DAILY    sodium chloride (NS) flush 5-10 mL  5-10 mL IntraVENous Q8H    sodium chloride (NS) flush 5-10 mL  5-10 mL IntraVENous PRN    ceFAZolin in 0.9% NS (ANCEF) IVPB soln 2 g  2 g IntraVENous Q8H    diphenhydrAMINE (BENADRYL) capsule 25 mg  25 mg Oral QHS    guaiFENesin ER (MUCINEX) tablet 600 mg  600 mg Oral BID         Cresencio Heredia, NP

## 2017-06-26 NOTE — PROGRESS NOTES
[facility] Dosing Services: 6/26/17    Consult for Warfarin Dosing by Pharmacy by Dr. Dawson Soulier provided for this 49 y.o. male for indication of Atrial Fibrillation. Day of therapy: 3 (new start)  Dose to achieve an INR goal of 2-3    Order entered for  Warfarin  10 (mg) ordered to be given today at 18:00. Significant drug interactions: Lovenox bridge, morbid obesity  Previous dose given 7.5mg    PT/INR Lab Results   Component Value Date/Time    INR 1.5 06/26/2017 05:20 AM      Platelets Lab Results   Component Value Date/Time    PLATELET 720 48/27/8066 05:20 AM      H/H Lab Results   Component Value Date/Time    HGB 13.4 06/26/2017 05:20 AM        Pharmacy to follow daily and will provide subsequent Warfarin dosing based on clinical status.   Nino Malone, [facility])  Contact information [phone]

## 2017-06-26 NOTE — PROGRESS NOTES
Daily Progress Note: 6/26/2017  MENA Peterson    Assessment/Plan:   1. Atrial fibrillation with RVR ([facility] [address] (6/22/2017).  -----transitioned to po Cardizem. -----echocardiogram with EF 45%, TSH normal  -----Consulted cardiology. -----Pharmacy for Coumadin initiation     2. Exertional dyspnea (6/22/2017)/ BL leg edema/ elevated pro BNP. Most likely CHF.   -----echocardiogram.   -----diuresis per Card  -----I/O, daily weight.   -----Low salt diet.      3. PEPE (obstructive sleep apnea) (6/22/2017). - cont CPAP and adjust as needed     4.  Elevated BP without diagnosis of hypertension (6/22/2017).   ----- stopped Coreg and Lisinopril     5. Renal insufficiency (6/22/2017). Watch renal function in diuretics.      6.  Obesity (6/22/2017) Supra-morbid. -----Advised on weight loss.   -----Check lipid panel.      7. Tobacco use (6/22/2017). Advised on quitting.      8. Chronic back pain (6/22/2017). Continue home pain meds      9. Asthma (6/22/2017). Stable. Not wheezing.      10. Abdominal wall cellulitis.   -----ancef and monitor clinically. ----- MRSA screening. Hx of staph skin infection. 11.  Vtach- 14 beat run  -----Check electrolytes this am  -----May need further cardiac work up    12.   Constipation  -----Supp today  -----Continue Miralax and increase to BID       Problem List:  Problem List as of 6/26/2017  Date Reviewed: 6/22/2017          Codes Class Noted - Resolved    Atrial fibrillation with RVR ([facility] [address] ICD-10-CM: I48.91  ICD-9-CM: 427.31  6/22/2017 - Present        Renal insufficiency ICD-10-CM: N28.9  ICD-9-CM: 593.9  6/22/2017 - Present        PEPE (obstructive sleep apnea) ICD-10-CM: G47.33  ICD-9-CM: 327.23  6/22/2017 - Present        Obesity ICD-10-CM: E66.9  ICD-9-CM: 278.00  6/22/2017 - Present        Tobacco use ICD-10-CM: Z72.0  ICD-9-CM: 305.1  6/22/2017 - Present        Chronic back pain ICD-10-CM: M54.9, G89.29  ICD-9-CM: 724.5, 338.29  6/22/2017 - Present Asthma ICD-10-CM: J45.909  ICD-9-CM: 493.90  6/22/2017 - Present        Elevated BP without diagnosis of hypertension ICD-10-CM: R03.0  ICD-9-CM: 796.2  6/22/2017 - Present        Exertional dyspnea ICD-10-CM: R06.09  ICD-9-CM: 786.09  6/22/2017 - Present              HPI:    Mr. Lola El is a 49 y.o.  male who is admitted with atrial fibrillation with RVR. Mr. Lola El with PMH of chronic back pain, tobacco abuse, obesity presented to ER c/o exertional dyspnea and leg edema for 2 weeks. Dyspnea got worse progressively. Now has difficulty getting upstairs without taking a rest. Has orthopnea and PND. Denies chest pain or cough. Has PEPE and recently starts using his CPAP. In ER, since his stay his BP has been high. Has abdominal wall hyperemia, which is getting progressively worse. (Dr Nathan Lezama)    6/23:HIs BP is down with the Card drip so will stop Lisinopril and Coreg until evaluated by Vaishali Garcia pending. Long history of PEPE so will ask Pulm to see as he will need outpatient follow up. Less SOB this am. Abd still erythematous. Has chronic back pain and is applying for disability as he is no longer able to work. 6/24: BP low requiring bolus last evening. Holding Bumex. Stopped Coreg and Lisinopril. ECHO with EF 45%. ABd wall with less erythema. No edema this am. He is c/o constipation. On Miralax so will add supp. Had a 14 beat run of Vtach. Labs pending.     6/25: BP is up. HR is in the 100s. Sats in the high 80s at this time. He has had a BM and is feeling much better. Will continue Miralax and increase to BID. Was started on Coumadin yesterday. 6/26:  C/O increased constipation again today - will be getting more Miralax. Sats better. Cards restarted Bumex - Diuresing. Did not sleep well due to the CPAP. Again discussed his morbid obesity as a major contrib to his poor health.      Review of Systems:   A comprehensive review of systems was negative except for that written in the HPI.    Objective:   Physical Exam:     Visit Vitals    /77 (BP 1 Location: Left arm, BP Patient Position: Sitting)    Pulse 97    Temp 98.2 °F (36.8 °C)    Resp 20    Ht 5' 6\" (1.676 m)    Wt 156.8 kg (345 lb 11.2 oz)    SpO2 99%    BMI 55.8 kg/m2    O2 Flow Rate (L/min): 2 l/min O2 Device: Nasal cannula    Temp (24hrs), Av.3 °F (36.8 °C), Min:97.9 °F (36.6 °C), Max:98.7 °F (37.1 °C)        1901 -  0700  In: 700 [P.O.:700]  Out: 6564 [Urine:1825]    General:  Alert, cooperative, no distress, appears stated age. Morbidly obese. Head:  Normocephalic, without obvious abnormality, atraumatic. Eyes:  Conjunctivae/corneas clear. Throat: Lips, mucosa, and tongue normal. Teeth and gums normal.   Neck: Supple, symmetrical, trachea midline, no adenopathy, thyroid: no enlargement/tenderness/nodules, no carotid bruit and no JVD. Lungs:   Clear to auscultation bilaterally. Diminished breath sounds throughout   Heart:  Irregular rate and rhythm, S1, S2 normal, no murmur, click, rub or gallop. Abdomen:   Soft. Bowel sounds normal. No masses,  No organomegaly. Skin with erythema and warm to touch but continues to improve. Extremities: Extremities  atraumatic, no cyanosis. 1+ - 2+ LE edema. No calf tenderness or cords. Pulses: 2+ and symmetric all extremities. Skin:  turgor normal. No rashes or lesions   Neurologic: CNII-XII intact. Alert and oriented X 3. Fine motor of hands and fingers normal.   equal.  No cogwheeling or rigidity. Gait not tested at this time. Sensation grossly normal to touch.   Gross motor of extremities normal.       Data Review:       Recent Days:  Recent Labs      17   0520  17   0537  17   1050   WBC  8.8  10.0  10.5   HGB  13.4  13.4  13.4   HCT  41.9  43.3  41.9   PLT  234  212  217     Recent Labs      17   0520  17   0537  17   1050   NA  140  140  141   K  4.5  4.3  4.0   CL  102  102  102   CO2  32  33*  33* GLU  109*  97  124*   BUN  14  17  18   CREA  1.11  1.25  1.38*   CA  8.2*  8.4*  8.1*   MG   --    --   2.0   ALB  3.6  3.5  3.3*   TBILI  0.5  0.5  0.5   SGOT  30  22  20   ALT  28  26  29   INR  1.5*  1.3*  1.2*     24 Hour Results:  Recent Results (from the past 24 hour(s))   PROTHROMBIN TIME + INR    Collection Time: 06/26/17  5:20 AM   Result Value Ref Range    INR 1.5 (H) 0.9 - 1.1      Prothrombin time 15.4 (H) 9.0 - 11.1 sec   CBC WITH AUTOMATED DIFF    Collection Time: 06/26/17  5:20 AM   Result Value Ref Range    WBC 8.8 4.1 - 11.1 K/uL    RBC 4.48 4.10 - 5.70 M/uL    HGB 13.4 12.1 - 17.0 g/dL    HCT 41.9 36.6 - 50.3 %    MCV 93.5 80.0 - 99.0 FL    MCH 29.9 26.0 - 34.0 PG    MCHC 32.0 30.0 - 36.5 g/dL    RDW 14.5 11.5 - 14.5 %    PLATELET 019 039 - 888 K/uL    NEUTROPHILS 67 32 - 75 %    LYMPHOCYTES 20 12 - 49 %    MONOCYTES 11 5 - 13 %    EOSINOPHILS 2 0 - 7 %    BASOPHILS 0 0 - 1 %    ABS. NEUTROPHILS 5.9 1.8 - 8.0 K/UL    ABS. LYMPHOCYTES 1.7 0.8 - 3.5 K/UL    ABS. MONOCYTES 1.0 0.0 - 1.0 K/UL    ABS. EOSINOPHILS 0.2 0.0 - 0.4 K/UL    ABS. BASOPHILS 0.0 0.0 - 0.1 K/UL   METABOLIC PANEL, COMPREHENSIVE    Collection Time: 06/26/17  5:20 AM   Result Value Ref Range    Sodium 140 136 - 145 mmol/L    Potassium 4.5 3.5 - 5.1 mmol/L    Chloride 102 97 - 108 mmol/L    CO2 32 21 - 32 mmol/L    Anion gap 6 5 - 15 mmol/L    Glucose 109 (H) 65 - 100 mg/dL    BUN 14 6 - 20 MG/DL    Creatinine 1.11 0.70 - 1.30 MG/DL    BUN/Creatinine ratio 13 12 - 20      GFR est AA >60 >60 ml/min/1.73m2    GFR est non-AA >60 >60 ml/min/1.73m2    Calcium 8.2 (L) 8.5 - 10.1 MG/DL    Bilirubin, total 0.5 0.2 - 1.0 MG/DL    ALT (SGPT) 28 12 - 78 U/L    AST (SGOT) 30 15 - 37 U/L    Alk.  phosphatase 69 45 - 117 U/L    Protein, total 6.6 6.4 - 8.2 g/dL    Albumin 3.6 3.5 - 5.0 g/dL    Globulin 3.0 2.0 - 4.0 g/dL    A-G Ratio 1.2 1.1 - 2.2         Medications reviewed  Current Facility-Administered Medications   Medication Dose Route Frequency    bumetanide (BUMEX) tablet 1 mg  1 mg Oral DAILY    polyethylene glycol (MIRALAX) packet 17 g  17 g Oral ACB/HS    dilTIAZem CD (CARDIZEM CD) capsule 240 mg  240 mg Oral DAILY    Warfarin- Pharmacy Dosing   Other Rx Dosing/Monitoring    enoxaparin (LOVENOX) injection 160 mg  1 mg/kg SubCUTAneous Q12H    nicotine (NICODERM CQ) 21 mg/24 hr patch 1 Patch  1 Patch TransDERmal DAILY    sodium chloride (NS) flush 5-10 mL  5-10 mL IntraVENous Q8H    sodium chloride (NS) flush 5-10 mL  5-10 mL IntraVENous PRN    ceFAZolin in 0.9% NS (ANCEF) IVPB soln 2 g  2 g IntraVENous Q8H    diphenhydrAMINE (BENADRYL) capsule 25 mg  25 mg Oral QHS    guaiFENesin ER (MUCINEX) tablet 600 mg  600 mg Oral BID       Care Plan discussed with: patient    Total time spent with patient and review of records: 30 minutes.     Jodie Chen MD

## 2017-06-26 NOTE — PROGRESS NOTES
Bedside and Verbal shift change report given to Ameya Miller RN (oncoming nurse) by Sofie Stewart RN  (offgoing nurse). Report included the following information SBAR, Kardex and MAR.

## 2017-06-27 LAB
ALBUMIN SERPL BCP-MCNC: 3.5 G/DL (ref 3.5–5)
ALBUMIN/GLOB SERPL: 1.2 {RATIO} (ref 1.1–2.2)
ALP SERPL-CCNC: 67 U/L (ref 45–117)
ALT SERPL-CCNC: 30 U/L (ref 12–78)
ANION GAP BLD CALC-SCNC: 2 MMOL/L (ref 5–15)
AST SERPL W P-5'-P-CCNC: 24 U/L (ref 15–37)
BASOPHILS # BLD AUTO: 0 K/UL (ref 0–0.1)
BASOPHILS # BLD: 1 % (ref 0–1)
BILIRUB SERPL-MCNC: 0.4 MG/DL (ref 0.2–1)
BUN SERPL-MCNC: 11 MG/DL (ref 6–20)
BUN/CREAT SERPL: 10 (ref 12–20)
CALCIUM SERPL-MCNC: 8.3 MG/DL (ref 8.5–10.1)
CHLORIDE SERPL-SCNC: 102 MMOL/L (ref 97–108)
CO2 SERPL-SCNC: 34 MMOL/L (ref 21–32)
CREAT SERPL-MCNC: 1.09 MG/DL (ref 0.7–1.3)
EOSINOPHIL # BLD: 0.2 K/UL (ref 0–0.4)
EOSINOPHIL NFR BLD: 3 % (ref 0–7)
ERYTHROCYTE [DISTWIDTH] IN BLOOD BY AUTOMATED COUNT: 14.6 % (ref 11.5–14.5)
GLOBULIN SER CALC-MCNC: 2.9 G/DL (ref 2–4)
GLUCOSE SERPL-MCNC: 115 MG/DL (ref 65–100)
HCT VFR BLD AUTO: 41.1 % (ref 36.6–50.3)
HGB BLD-MCNC: 13 G/DL (ref 12.1–17)
INR PPP: 2.1 (ref 0.9–1.1)
LYMPHOCYTES # BLD AUTO: 24 % (ref 12–49)
LYMPHOCYTES # BLD: 2 K/UL (ref 0.8–3.5)
MAGNESIUM SERPL-MCNC: 2.2 MG/DL (ref 1.6–2.4)
MCH RBC QN AUTO: 30.1 PG (ref 26–34)
MCHC RBC AUTO-ENTMCNC: 31.6 G/DL (ref 30–36.5)
MCV RBC AUTO: 95.1 FL (ref 80–99)
MONOCYTES # BLD: 1.2 K/UL (ref 0–1)
MONOCYTES NFR BLD AUTO: 15 % (ref 5–13)
NEUTS SEG # BLD: 5 K/UL (ref 1.8–8)
NEUTS SEG NFR BLD AUTO: 57 % (ref 32–75)
PLATELET # BLD AUTO: 199 K/UL (ref 150–400)
POTASSIUM SERPL-SCNC: 3.8 MMOL/L (ref 3.5–5.1)
PROT SERPL-MCNC: 6.4 G/DL (ref 6.4–8.2)
PROTHROMBIN TIME: 21.8 SEC (ref 9–11.1)
RBC # BLD AUTO: 4.32 M/UL (ref 4.1–5.7)
SODIUM SERPL-SCNC: 138 MMOL/L (ref 136–145)
WBC # BLD AUTO: 8.5 K/UL (ref 4.1–11.1)

## 2017-06-27 PROCEDURE — 94660 CPAP INITIATION&MGMT: CPT

## 2017-06-27 PROCEDURE — 83735 ASSAY OF MAGNESIUM: CPT | Performed by: FAMILY MEDICINE

## 2017-06-27 PROCEDURE — 85610 PROTHROMBIN TIME: CPT | Performed by: SPECIALIST

## 2017-06-27 PROCEDURE — 65660000000 HC RM CCU STEPDOWN

## 2017-06-27 PROCEDURE — 74011250637 HC RX REV CODE- 250/637: Performed by: NURSE PRACTITIONER

## 2017-06-27 PROCEDURE — 77010033678 HC OXYGEN DAILY

## 2017-06-27 PROCEDURE — 80053 COMPREHEN METABOLIC PANEL: CPT | Performed by: FAMILY MEDICINE

## 2017-06-27 PROCEDURE — 74011250637 HC RX REV CODE- 250/637: Performed by: INTERNAL MEDICINE

## 2017-06-27 PROCEDURE — 85025 COMPLETE CBC W/AUTO DIFF WBC: CPT | Performed by: FAMILY MEDICINE

## 2017-06-27 PROCEDURE — 36415 COLL VENOUS BLD VENIPUNCTURE: CPT | Performed by: SPECIALIST

## 2017-06-27 PROCEDURE — 74011250637 HC RX REV CODE- 250/637: Performed by: FAMILY MEDICINE

## 2017-06-27 PROCEDURE — 74011250636 HC RX REV CODE- 250/636: Performed by: INTERNAL MEDICINE

## 2017-06-27 RX ORDER — CEPHALEXIN 250 MG/1
500 CAPSULE ORAL 4 TIMES DAILY
Status: DISCONTINUED | OUTPATIENT
Start: 2017-06-27 | End: 2017-06-28 | Stop reason: HOSPADM

## 2017-06-27 RX ORDER — ACETAMINOPHEN 325 MG/1
650 TABLET ORAL
Status: DISCONTINUED | OUTPATIENT
Start: 2017-06-27 | End: 2017-06-28 | Stop reason: HOSPADM

## 2017-06-27 RX ADMIN — GUAIFENESIN 600 MG: 600 TABLET, EXTENDED RELEASE ORAL at 09:31

## 2017-06-27 RX ADMIN — CEFAZOLIN 2 G: 1 INJECTION, POWDER, FOR SOLUTION INTRAMUSCULAR; INTRAVENOUS; PARENTERAL at 09:57

## 2017-06-27 RX ADMIN — DILTIAZEM HYDROCHLORIDE 300 MG: 180 CAPSULE, COATED, EXTENDED RELEASE ORAL at 09:29

## 2017-06-27 RX ADMIN — GUAIFENESIN 600 MG: 600 TABLET, EXTENDED RELEASE ORAL at 17:20

## 2017-06-27 RX ADMIN — POLYETHYLENE GLYCOL 3350 17 G: 17 POWDER, FOR SOLUTION ORAL at 22:14

## 2017-06-27 RX ADMIN — ACETAMINOPHEN 650 MG: 325 TABLET ORAL at 09:29

## 2017-06-27 RX ADMIN — ENOXAPARIN SODIUM 160 MG: 80 INJECTION SUBCUTANEOUS at 00:06

## 2017-06-27 RX ADMIN — Medication 10 ML: at 05:58

## 2017-06-27 RX ADMIN — Medication 10 ML: at 13:14

## 2017-06-27 RX ADMIN — CEPHALEXIN 500 MG: 250 CAPSULE ORAL at 13:14

## 2017-06-27 RX ADMIN — WARFARIN SODIUM 7.5 MG: 2.5 TABLET ORAL at 17:20

## 2017-06-27 RX ADMIN — ENOXAPARIN SODIUM 160 MG: 80 INJECTION SUBCUTANEOUS at 13:13

## 2017-06-27 RX ADMIN — CEPHALEXIN 500 MG: 250 CAPSULE ORAL at 22:14

## 2017-06-27 RX ADMIN — CEFAZOLIN 2 G: 1 INJECTION, POWDER, FOR SOLUTION INTRAMUSCULAR; INTRAVENOUS; PARENTERAL at 00:06

## 2017-06-27 RX ADMIN — DIPHENHYDRAMINE HYDROCHLORIDE 25 MG: 25 CAPSULE ORAL at 22:14

## 2017-06-27 RX ADMIN — Medication 10 ML: at 22:15

## 2017-06-27 RX ADMIN — CEPHALEXIN 500 MG: 250 CAPSULE ORAL at 17:20

## 2017-06-27 RX ADMIN — POLYETHYLENE GLYCOL 3350 17 G: 17 POWDER, FOR SOLUTION ORAL at 09:31

## 2017-06-27 RX ADMIN — BUMETANIDE 1 MG: 1 TABLET ORAL at 09:29

## 2017-06-27 NOTE — PROGRESS NOTES
Shift Summary  1900  Bedside and Verbal shift change report given to [address] [place] [address] Kleber (oncoming nurse) Won Gallegos RN (offgoing nurse). Report included the following information SBAR, Kardex, MAR, Recent Results and Cardiac rhythm afib/aflutter. No complaints at this time. 2300  Bedside and Verbal shift change report given to Louis Drummond RN (oncoming nurse) by Fco Rebolledo RN (offgoing nurse). Report included the following information SBAR, Kardex, MAR, Recent Results and Cardiac Rhythm afib 100s-120s.

## 2017-06-27 NOTE — PROGRESS NOTES
Bedside and Verbal shift change report given to Kateryna Rios RN (oncoming nurse) by Morris Ferguson RN (offgoing nurse). Report included the following information SBAR, Kardex, MAR and Recent Results. 07:50  Bedside shift change report given to [address] [place] [address] (oncoming nurse) by Vinita Orourke RN (offgoing nurse). Report included the following information SBAR, Kardex, ED Summary, [facility] and Recent Results.

## 2017-06-27 NOTE — PROGRESS NOTES
3260 report received pt lying in bed denies any pain at this time call bell within reach    0839 pt given stat bottle of mag citrate for constipation. Pt asked to inform nurse when he has results    0840 pt given meds without difficulty call bell within reach    1004 pt had positive results from mag citrate     1208 pt sitting in chair stated he had another bowel movement \"feels a lot better\"    1308 pt up in chair given med without difficulty. Voiced no needs at this time call bell within reach     1735 pt requested ice water mother in room voiced no needs     1812 pt siting in chair eating dinner tray. Given IV antibiotics. Voiced no needs call bell within reach     1936 Bedside and Verbal shift change report given to [address] (oncoming nurse) by Dolly Ellis  (offgoing nurse). Report included the following information SBAR, Kardex, ED Summary, Procedure Summary, Intake/Output, MAR, Recent Results and Cardiac Rhythm A Flutter .

## 2017-06-27 NOTE — CDMP QUERY
1.    Do you concur with the diagnosis of acute diastolic heart failure as rendered by cardiology        =>Acute diastolic heart failure in the setting of atrial fibrillation being treated with cardiology consult, echocardiogram amd IV Bumex  =>Other Explanation of clinical findings  =>Unable to Determine (no explanation of clinical findings)    The medical record reflects the following clinical findings, treatment, and risk factors:    Risk Factors: obesity, smoker    Clinical Indicators: 48 y/o male sent by PCP for new onset afib. 6/23 and 6/24 cardiology renders diagnosis of acute diastolic heart failure, now recompensated. Echocardiogram of  6/22 shows EF of 45 % with no regional wall abnormalities. Treatment: IV Bumex 1 mg q 12 h, now transitioned to 1 mg po daily    Please clarify and document your clinical opinion in the progress notes and discharge summary including the definitive and/or presumptive diagnosis, (suspected or probable), related to the above clinical findings. Please include clinical findings supporting your diagnosis. Thanks for your time.     Jayne Parikh RN, BSN  [phone]

## 2017-06-27 NOTE — NURSE NAVIGATOR
Chart reviewed by Heart Failure Nurse Navigator. Heart Failure database completed. Current Echo shows EF 77% with systolic function mildly reduced, no regional wall motion abnormalities, mild TR. ACEi/ARB: not indicated. BB: not indicated. Cardizem  mg daily. CRT not indicated. NYHA Functional Class III symptoms in setting of atrial fib with RVR. Heart Failure Teach Back in Patient Education. Heart Failure Avoiding Triggers on Discharge Instructions. Patient admitted for new onset atrial fib with RVR and diastolic heart failure. Patient has Cardiology f/u with Dr Tamika Ferrer on 7/18 at 0900.

## 2017-06-27 NOTE — PROGRESS NOTES
[facility] Deepika Garibay MD  [phone] office  [phone] [facility]     Gastroenterology Progress Note    June 27, 2017  Admit Date: 6/22/2017         Narrative Assessment and Plan   · Abdominal bloating/pain  · Constipation    He reports resolution of abdominal symptoms with several BM over last 2 days no bleeding. Would continue miralax twice daily as ordered. I will sign off but call if needed again. Subjective:   · I'm better    Location:  3 BM since last visit  Duration: day3  Timing: intermittently  Radiation: toilet   Associated Symptoms: abdominal complaints resolved  Aggravating/Alleviating factors:     ROS:  The previous review of systems on initial consultation / H&P is noted and reviewed. Specific changes noted above in HPI. Current Medications:     Current Facility-Administered Medications   Medication Dose Route Frequency    acetaminophen (TYLENOL) tablet 650 mg  650 mg Oral Q4H PRN    dilTIAZem CD (CARDIZEM CD) capsule 300 mg  300 mg Oral DAILY    bumetanide (BUMEX) tablet 1 mg  1 mg Oral DAILY    polyethylene glycol (MIRALAX) packet 17 g  17 g Oral ACB/HS    Warfarin- Pharmacy Dosing   Other Rx Dosing/Monitoring    enoxaparin (LOVENOX) injection 160 mg  1 mg/kg SubCUTAneous Q12H    nicotine (NICODERM CQ) 21 mg/24 hr patch 1 Patch  1 Patch TransDERmal DAILY    sodium chloride (NS) flush 5-10 mL  5-10 mL IntraVENous Q8H    sodium chloride (NS) flush 5-10 mL  5-10 mL IntraVENous PRN    ceFAZolin in 0.9% NS (ANCEF) IVPB soln 2 g  2 g IntraVENous Q8H    diphenhydrAMINE (BENADRYL) capsule 25 mg  25 mg Oral QHS    guaiFENesin ER (MUCINEX) tablet 600 mg  600 mg Oral BID       Objective:     VITALS:   Last 24hrs VS reviewed since prior progress note.  Most recent are:  Visit Vitals    /57 (BP 1 Location: Left arm, BP Patient Position: Sitting)    Pulse (!) 112    Temp 97.8 °F (36.6 °C)    Resp 20    Ht 5' 6\" (1.676 m)    Wt 156.9 kg (346 lb)    SpO2 94%  BMI 55.85 kg/m2     Temp (24hrs), Av.2 °F (36.8 °C), Min:97.6 °F (36.4 °C), Max:98.7 °F (37.1 °C)      Intake/Output Summary (Last 24 hours) at 17 1051  Last data filed at 17 0606   Gross per 24 hour   Intake              320 ml   Output                0 ml   Net              320 ml       EXAM:  General:  Comfortable, no distress    HEENT:  Atraumatic skull, pupils equal  Lungs:   No abnormal audible breath sounds. Speaking in complete sentences      Lab Data Reviewed:   Recent Labs      17   0537   WBC  8.5  8.8  10.0   HGB  13.0  13.4  13.4   HCT  41.1  41.9  43.3   PLT  199  234  212     Recent Labs      17   0520  17   0537   NA  138  140  140   K  3.8  4.5  4.3   CL  102  102  102   CO2  34*  32  33*   GLU  115*  109*  97   BUN  11  14  17   CREA  1.09  1.11  1.25   CA  8.3*  8.2*  8.4*   MG  2.2   --    --    ALB  3.5  3.6  3.5   TBILI  0.4  0.5  0.5   SGOT  24  30  22   ALT  30  28  26   INR  2.1*  1.5*  1.3*     No results found for: GLUCPOC  No results for input(s): PH, PCO2, PO2, HCO3, FIO2 in the last 72 hours.   Recent Labs      17   0537   INR  2.1*  1.5*  1.3*           Assessment:   (See above)  Active Problems:    Atrial fibrillation with RVR ([address] (2017)      Renal insufficiency (2017)      PEPE (obstructive sleep apnea) (2017)      Obesity (2017)      Tobacco use (2017)      Chronic back pain (2017)      Asthma (2017)      Elevated BP without diagnosis of hypertension (2017)      Exertional dyspnea (2017)        Plan:   (See above)      Signed By: Merlene Zarco MD     2017  10:51 AM

## 2017-06-27 NOTE — PROGRESS NOTES
EMR discharge plan remains the same - return home to live with his mother. I will continue to follow and assist with dc planning.  Thanks JANET Curiel
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
## 2017-06-27 NOTE — PROGRESS NOTES
Cardiology Progress Note [facility]            NAME:  Jessica Ruiz   :   1970   MRN:   [id number]     Assessment/Plan:   1. New onset with RVR, rate up inc dilt to 300 m g every day - anticoagulated with coumadin. INR 2.1   2. NSVT : keep K > 4, Mg+ > 2  3. Mild LV dysfunction (EF 45%): would not puruse cath/stress at this point until infection resolves, cont bumex. Can readdress as OP in a few weeks. 4. Acute diastolic HF, recompensated  5. Severe PEPE, untreated: will need OP sleep study   6. Morbid obesity (marked abdominal obesity): [place] is poor if we cannot impact his obesity. ..   7. Chronic low back pain due to lumbar spinal stenosis  8. Smoker/intermittent marijuana use  9. Disability  10. Likely depression  11. XU, better  12. Constipation: moderate by KUB. Dose mg citrate today. Has Op follow up with Dr Yenny Rinaldi in a few weeks. Will see prn pls call if needed      Cardiology attending:  Pt seen and examined. Above noted. Agree with dose escalation of diltiazem. Estrella Joaquin MD      Subjective:   Cardiac ROS: Patient denies any exertional chest pain, palpitations, syncope. No hx of stroke. Activity largely limited by significant low back pain    Review of Systems: taking po. Appetite fair, up to chair. Several BM's 17. Objective:     Visit Vitals    /57 (BP 1 Location: Left arm, BP Patient Position: Sitting)    Pulse (!) 112    Temp 97.8 °F (36.6 °C)    Resp 20    Ht 5' 6\" (1.676 m)    Wt 346 lb (156.9 kg)    SpO2 94%    BMI 55.85 kg/m2    O2 Flow Rate (L/min): 4.5 l/min O2 Device: Room air    Temp (24hrs), Av.2 °F (36.8 °C), Min:97.6 °F (36.4 °C), Max:98.7 °F (37.1 °C)            1901 -  0700  In: 520 [P.O.:320; I.V.:200]  Out: 65 [Urine:725]    TELE: 's     General: AAOx3 cooperative, no acute distress. HEENT: Atraumatic. Pink and moist.  Anicteric sclerae. Neck : Supple, no thyromegaly.    Lungs: CTA bilaterally. No wheezing/rhonchi/rales. SPo2 94% room air  Heart: Irregular rhythm, no murmur, no rubs, no gallops. Unable to appreciated JVD. No carotid bruits. Abdomen: marked abdominal obesity, erythema. .non-tender. + Bowel sounds. Extremities: 1-2+ LE edema, no clubbing, no cyanosis. No calf tenderness  Neurologic: Grossly intact. Alert and oriented X 3. No acute neurological distress. Psych: Good insight. Not anxious or agitated. Care Plan discussed with:    Comments   Patient x    Family  x mother   RN x    Care Manager                    Consultant:  x attending       Data Review:     EKG    Echo  EF 45%, TDS    No results for input(s): TNIPOC in the last 72 hours. No lab exists for component: ITNL   No results for input(s): CPK, CKMB, TROIQ in the last 72 hours.   Recent Labs      06/27/17 0205 06/26/17   0520  06/25/17   0537  06/24/17   1050   NA  138  140  140  141   K  3.8  4.5  4.3  4.0   CL  102  102  102  102   CO2  34*  32  33*  33*   BUN  11  14  17  18   CREA  1.09  1.11  1.25  1.38*   GLU  115*  109*  97  124*   MG  2.2   --    --   2.0   ALB  3.5  3.6  3.5  3.3*   WBC  8.5  8.8  10.0  10.5   HGB  13.0  13.4  13.4  13.4   HCT  41.1  41.9  43.3  41.9   PLT  199  234  212  217     Recent Labs      06/27/17 0205 06/26/17 0520  06/25/17   0537   INR  2.1*  1.5*  1.3*   PTP  21.8*  15.4*  13.2*       Medications reviewed  Current Facility-Administered Medications   Medication Dose Route Frequency    acetaminophen (TYLENOL) tablet 650 mg  650 mg Oral Q4H PRN    dilTIAZem CD (CARDIZEM CD) capsule 300 mg  300 mg Oral DAILY    bumetanide (BUMEX) tablet 1 mg  1 mg Oral DAILY    polyethylene glycol (MIRALAX) packet 17 g  17 g Oral ACB/HS    Warfarin- Pharmacy Dosing   Other Rx Dosing/Monitoring    enoxaparin (LOVENOX) injection 160 mg  1 mg/kg SubCUTAneous Q12H    nicotine (NICODERM CQ) 21 mg/24 hr patch 1 Patch  1 Patch TransDERmal DAILY    sodium chloride (NS) flush 5-10 mL  5-10 mL IntraVENous Q8H    sodium chloride (NS) flush 5-10 mL  5-10 mL IntraVENous PRN    ceFAZolin in 0.9% NS (ANCEF) IVPB soln 2 g  2 g IntraVENous Q8H    diphenhydrAMINE (BENADRYL) capsule 25 mg  25 mg Oral QHS    guaiFENesin ER (MUCINEX) tablet 600 mg  600 mg Oral BID         Adriana Espino, YONIS

## 2017-06-27 NOTE — PROGRESS NOTES
[facility] Pharmacy Dosing Services: 6/27/17    Consult for Warfarin Dosing by Pharmacy by Dr. Saralyn Babinski provided for this 49 y.o. male for indication of Atrial Fibrillation. Day of therapy: 4 (new start)  Dose to achieve an INR goal of 2-3    Order entered for  Warfarin  7.5 (mg) ordered to be given today at 18:00. Significant drug interactions: Lovenox bridge, morbid obesity  Previous dose given  10 mg    PT/INR Lab Results   Component Value Date/Time    INR 2.1 06/27/2017 02:05 AM      Platelets Lab Results   Component Value Date/Time    PLATELET 774 92/44/4946 02:05 AM      H/H Lab Results   Component Value Date/Time    HGB 13.0 06/27/2017 02:05 AM        Pharmacy to follow daily and will provide subsequent Warfarin dosing based on clinical status.   Cristela Dallas  Contact information [phone]

## 2017-06-27 NOTE — PROGRESS NOTES
Daily Progress Note: 6/27/2017  MENA Mcdonnell    Assessment/Plan:   1. Atrial fibrillation with RVR ([facility] [address] (6/22/2017).  -----transitioned to po Cardizem. -----echocardiogram with EF 45%, TSH normal  -----Consulted cardiology. -----Pharmacy for Coumadin initiation     2. Exertional dyspnea (6/22/2017)/ BL leg edema/ elevated pro BNP. Most likely CHF.   -----echocardiogram.   -----diuresis per Card  -----I/O, daily weight.   -----Low salt diet.      3. PEPE (obstructive sleep apnea) (6/22/2017). - cont CPAP and adjust as needed - per pulmonary as to retest/adjustment of CPAP     4.  Elevated BP without diagnosis of hypertension (6/22/2017).   ----- stopped Coreg and Lisinopril for now     5. Renal insufficiency (6/22/2017). Watch renal function in diuretics.      6.  Obesity (6/22/2017) Supra-morbid. -----Advised on weight loss.   -----Check lipid panel.      7. Tobacco use (6/22/2017). Advised on quitting.      8. Chronic back pain (6/22/2017). Continue home pain meds      9. Asthma (6/22/2017). Stable. Not wheezing.      10. Abdominal wall cellulitis.   -----ancef initially and switched to Keflex po. 11.  Vtach- 14 beat run  ----- per Card    12.   Constipation  -----Supp as needed  -----Continue Miralax as needed       Problem List:  Problem List as of 6/27/2017  Date Reviewed: 6/22/2017          Codes Class Noted - Resolved    Atrial fibrillation with RVR ([facility] [address] ICD-10-CM: I48.91  ICD-9-CM: 427.31  6/22/2017 - Present        Renal insufficiency ICD-10-CM: N28.9  ICD-9-CM: 593.9  6/22/2017 - Present        PEPE (obstructive sleep apnea) ICD-10-CM: G47.33  ICD-9-CM: 327.23  6/22/2017 - Present        Obesity ICD-10-CM: E66.9  ICD-9-CM: 278.00  6/22/2017 - Present        Tobacco use ICD-10-CM: Z72.0  ICD-9-CM: 305.1  6/22/2017 - Present        Chronic back pain ICD-10-CM: M54.9, G89.29  ICD-9-CM: 724.5, 338.29  6/22/2017 - Present        Asthma ICD-10-CM: J45.909  ICD-9-CM: 493.90 6/22/2017 - Present        Elevated BP without diagnosis of hypertension ICD-10-CM: R03.0  ICD-9-CM: 796.2  6/22/2017 - Present        Exertional dyspnea ICD-10-CM: R06.09  ICD-9-CM: 786.09  6/22/2017 - Present              HPI:    Mr. Luis Ding is a 49 y.o.  male who is admitted with atrial fibrillation with RVR. Mr. Luis Ding with PMH of chronic back pain, tobacco abuse, obesity presented to ER c/o exertional dyspnea and leg edema for 2 weeks. Dyspnea got worse progressively. Now has difficulty getting upstairs without taking a rest. Has orthopnea and PND. Denies chest pain or cough. Has PEPE and recently starts using his CPAP. In ER, since his stay his BP has been high. Has abdominal wall hyperemia, which is getting progressively worse. (Dr Nevaeh Paz)    6/23:HIs BP is down with the Card drip so will stop Lisinopril and Coreg until evaluated by Vinh Mckee pending. Long history of PEPE so will ask Pulm to see as he will need outpatient follow up. Less SOB this am. Abd still erythematous. Has chronic back pain and is applying for disability as he is no longer able to work. 6/24: BP low requiring bolus last evening. Holding Bumex. Stopped Coreg and Lisinopril. ECHO with EF 45%. ABd wall with less erythema. No edema this am. He is c/o constipation. On Miralax so will add supp. Had a 14 beat run of Vtach. Labs pending.     6/25: BP is up. HR is in the 100s. Sats in the high 80s at this time. He has had a BM and is feeling much better. Will continue Miralax and increase to BID. Was started on Coumadin yesterday. 6/26:  C/O increased constipation again today - will be getting more Miralax. Sats better. Cards restarted Bumex - Diuresing. Did not sleep well due to the CPAP. Again discussed his morbid obesity as a major contrib to his poor health.     6/27:  He reports he is starting to feel better. Dilt po increased by Card to 300mg/day. INR 2.1 today.       Review of Systems:   A comprehensive review of systems was negative except for that written in the HPI. Objective:   Physical Exam:     Visit Vitals    /57 (BP 1 Location: Left arm, BP Patient Position: Sitting)    Pulse (!) 101    Temp 97.8 °F (36.6 °C)    Resp 20    Ht 5' 6\" (1.676 m)    Wt 156.9 kg (346 lb)    SpO2 94%    BMI 55.85 kg/m2    O2 Flow Rate (L/min): 4.5 l/min O2 Device: Room air    Temp (24hrs), Av.2 °F (36.8 °C), Min:97.6 °F (36.4 °C), Max:98.7 °F (37.1 °C)    1901 -  0700  In: 120 [P.O.:120]  Out: -     07 -  1900  In: 900 [P.O.:700; I.V.:200]  Out: 1125 [Urine:1125]    General:  Alert, cooperative, no distress, appears stated age. Morbidly obese. Head:  Normocephalic, without obvious abnormality, atraumatic. Eyes:  Conjunctivae/corneas clear. Throat: Lips, mucosa, and tongue normal. Teeth and gums normal.   Neck: Supple, symmetrical, trachea midline, no adenopathy, thyroid: no enlargement/tenderness/nodules, no carotid bruit and no JVD. Lungs:   Clear to auscultation bilaterally. Diminished breath sounds throughout   Heart:  Irregular rate and rhythm, no murmur, click, rub or gallop. Abdomen:   Soft. Bowel sounds normal. No masses,  No organomegaly. Skin with less erythema and less warm to touch - continues to improve. Extremities: Extremities  atraumatic, no cyanosis. Less edema - 1+ LE edema. No calf tenderness or cords. Pulses: 2+ and symmetric all extremities. Skin:  turgor normal. No rashes or lesions   Neurologic: CNII-XII intact. Alert and oriented X 3. Fine motor of hands and fingers normal.   equal.  No cogwheeling or rigidity. Gait not tested at this time. Sensation grossly normal to touch.   Gross motor of extremities normal.       Data Review:       Recent Days:  Recent Labs      17   0205  17   0520  17   0537   WBC  8.5  8.8  10.0   HGB  13.0  13.4  13.4   HCT  41.1  41.9  43.3   PLT  199  234  212     Recent Labs      17   0200 06/26/17   0520  06/25/17   0537  06/24/17   1050   NA  138  140  140  141   K  3.8  4.5  4.3  4.0   CL  102  102  102  102   CO2  34*  32  33*  33*   GLU  115*  109*  97  124*   BUN  11  14  17  18   CREA  1.09  1.11  1.25  1.38*   CA  8.3*  8.2*  8.4*  8.1*   MG  2.2   --    --   2.0   ALB  3.5  3.6  3.5  3.3*   TBILI  0.4  0.5  0.5  0.5   SGOT  24  30  22  20   ALT  30  28  26  29   INR  2.1*  1.5*  1.3*  1.2*     24 Hour Results:  Recent Results (from the past 24 hour(s))   PROTHROMBIN TIME + INR    Collection Time: 06/27/17  2:05 AM   Result Value Ref Range    INR 2.1 (H) 0.9 - 1.1      Prothrombin time 21.8 (H) 9.0 - 11.1 sec   CBC WITH AUTOMATED DIFF    Collection Time: 06/27/17  2:05 AM   Result Value Ref Range    WBC 8.5 4.1 - 11.1 K/uL    RBC 4.32 4.10 - 5.70 M/uL    HGB 13.0 12.1 - 17.0 g/dL    HCT 41.1 36.6 - 50.3 %    MCV 95.1 80.0 - 99.0 FL    MCH 30.1 26.0 - 34.0 PG    MCHC 31.6 30.0 - 36.5 g/dL    RDW 14.6 (H) 11.5 - 14.5 %    PLATELET 277 646 - 105 K/uL    NEUTROPHILS 57 32 - 75 %    LYMPHOCYTES 24 12 - 49 %    MONOCYTES 15 (H) 5 - 13 %    EOSINOPHILS 3 0 - 7 %    BASOPHILS 1 0 - 1 %    ABS. NEUTROPHILS 5.0 1.8 - 8.0 K/UL    ABS. LYMPHOCYTES 2.0 0.8 - 3.5 K/UL    ABS. MONOCYTES 1.2 (H) 0.0 - 1.0 K/UL    ABS. EOSINOPHILS 0.2 0.0 - 0.4 K/UL    ABS. BASOPHILS 0.0 0.0 - 0.1 K/UL   METABOLIC PANEL, COMPREHENSIVE    Collection Time: 06/27/17  2:05 AM   Result Value Ref Range    Sodium 138 136 - 145 mmol/L    Potassium 3.8 3.5 - 5.1 mmol/L    Chloride 102 97 - 108 mmol/L    CO2 34 (H) 21 - 32 mmol/L    Anion gap 2 (L) 5 - 15 mmol/L    Glucose 115 (H) 65 - 100 mg/dL    BUN 11 6 - 20 MG/DL    Creatinine 1.09 0.70 - 1.30 MG/DL    BUN/Creatinine ratio 10 (L) 12 - 20      GFR est AA >60 >60 ml/min/1.73m2    GFR est non-AA >60 >60 ml/min/1.73m2    Calcium 8.3 (L) 8.5 - 10.1 MG/DL    Bilirubin, total 0.4 0.2 - 1.0 MG/DL    ALT (SGPT) 30 12 - 78 U/L    AST (SGOT) 24 15 - 37 U/L    Alk.  phosphatase 67 45 - 117 U/L Protein, total 6.4 6.4 - 8.2 g/dL    Albumin 3.5 3.5 - 5.0 g/dL    Globulin 2.9 2.0 - 4.0 g/dL    A-G Ratio 1.2 1.1 - 2.2     MAGNESIUM    Collection Time: 06/27/17  2:05 AM   Result Value Ref Range    Magnesium 2.2 1.6 - 2.4 mg/dL       Medications reviewed  Current Facility-Administered Medications   Medication Dose Route Frequency    bumetanide (BUMEX) tablet 1 mg  1 mg Oral DAILY    polyethylene glycol (MIRALAX) packet 17 g  17 g Oral ACB/HS    dilTIAZem CD (CARDIZEM CD) capsule 240 mg  240 mg Oral DAILY    Warfarin- Pharmacy Dosing   Other Rx Dosing/Monitoring    enoxaparin (LOVENOX) injection 160 mg  1 mg/kg SubCUTAneous Q12H    nicotine (NICODERM CQ) 21 mg/24 hr patch 1 Patch  1 Patch TransDERmal DAILY    sodium chloride (NS) flush 5-10 mL  5-10 mL IntraVENous Q8H    sodium chloride (NS) flush 5-10 mL  5-10 mL IntraVENous PRN    ceFAZolin in 0.9% NS (ANCEF) IVPB soln 2 g  2 g IntraVENous Q8H    diphenhydrAMINE (BENADRYL) capsule 25 mg  25 mg Oral QHS    guaiFENesin ER (MUCINEX) tablet 600 mg  600 mg Oral BID       Care Plan discussed with: patient    Total time spent with patient and review of records: 30 minutes.     Mindy Brady MD

## 2017-06-27 NOTE — PROGRESS NOTES
Responded to call bell. Two telemetry leads off. Replaced same. Patient has no other needs at this time. Rounding complete.

## 2017-06-28 VITALS
HEIGHT: 66 IN | TEMPERATURE: 98.2 F | OXYGEN SATURATION: 94 % | HEART RATE: 98 BPM | BODY MASS INDEX: 50.62 KG/M2 | RESPIRATION RATE: 20 BRPM | SYSTOLIC BLOOD PRESSURE: 112 MMHG | WEIGHT: 315 LBS | DIASTOLIC BLOOD PRESSURE: 68 MMHG

## 2017-06-28 LAB
ALBUMIN SERPL BCP-MCNC: 3.5 G/DL (ref 3.5–5)
ALBUMIN/GLOB SERPL: 1.3 {RATIO} (ref 1.1–2.2)
ALP SERPL-CCNC: 69 U/L (ref 45–117)
ALT SERPL-CCNC: 34 U/L (ref 12–78)
ANION GAP BLD CALC-SCNC: 5 MMOL/L (ref 5–15)
AST SERPL W P-5'-P-CCNC: 26 U/L (ref 15–37)
BASOPHILS # BLD AUTO: 0 K/UL (ref 0–0.1)
BASOPHILS # BLD: 0 % (ref 0–1)
BILIRUB SERPL-MCNC: 0.5 MG/DL (ref 0.2–1)
BUN SERPL-MCNC: 10 MG/DL (ref 6–20)
BUN/CREAT SERPL: 10 (ref 12–20)
CALCIUM SERPL-MCNC: 8.4 MG/DL (ref 8.5–10.1)
CHLORIDE SERPL-SCNC: 102 MMOL/L (ref 97–108)
CO2 SERPL-SCNC: 32 MMOL/L (ref 21–32)
CREAT SERPL-MCNC: 0.98 MG/DL (ref 0.7–1.3)
EOSINOPHIL # BLD: 0.2 K/UL (ref 0–0.4)
EOSINOPHIL NFR BLD: 3 % (ref 0–7)
ERYTHROCYTE [DISTWIDTH] IN BLOOD BY AUTOMATED COUNT: 14.3 % (ref 11.5–14.5)
GLOBULIN SER CALC-MCNC: 2.8 G/DL (ref 2–4)
GLUCOSE SERPL-MCNC: 117 MG/DL (ref 65–100)
HCT VFR BLD AUTO: 39.9 % (ref 36.6–50.3)
HGB BLD-MCNC: 12.8 G/DL (ref 12.1–17)
INR PPP: 2.5 (ref 0.9–1.1)
LYMPHOCYTES # BLD AUTO: 27 % (ref 12–49)
LYMPHOCYTES # BLD: 1.8 K/UL (ref 0.8–3.5)
MCH RBC QN AUTO: 29.8 PG (ref 26–34)
MCHC RBC AUTO-ENTMCNC: 32.1 G/DL (ref 30–36.5)
MCV RBC AUTO: 92.8 FL (ref 80–99)
MONOCYTES # BLD: 0.7 K/UL (ref 0–1)
MONOCYTES NFR BLD AUTO: 11 % (ref 5–13)
NEUTS SEG # BLD: 4.1 K/UL (ref 1.8–8)
NEUTS SEG NFR BLD AUTO: 59 % (ref 32–75)
PLATELET # BLD AUTO: 203 K/UL (ref 150–400)
POTASSIUM SERPL-SCNC: 3.7 MMOL/L (ref 3.5–5.1)
PROT SERPL-MCNC: 6.3 G/DL (ref 6.4–8.2)
PROTHROMBIN TIME: 26.1 SEC (ref 9–11.1)
RBC # BLD AUTO: 4.3 M/UL (ref 4.1–5.7)
SODIUM SERPL-SCNC: 139 MMOL/L (ref 136–145)
WBC # BLD AUTO: 6.8 K/UL (ref 4.1–11.1)

## 2017-06-28 PROCEDURE — 36415 COLL VENOUS BLD VENIPUNCTURE: CPT | Performed by: SPECIALIST

## 2017-06-28 PROCEDURE — 80053 COMPREHEN METABOLIC PANEL: CPT | Performed by: FAMILY MEDICINE

## 2017-06-28 PROCEDURE — 85610 PROTHROMBIN TIME: CPT | Performed by: SPECIALIST

## 2017-06-28 PROCEDURE — 74011250637 HC RX REV CODE- 250/637: Performed by: NURSE PRACTITIONER

## 2017-06-28 PROCEDURE — 74011250637 HC RX REV CODE- 250/637: Performed by: FAMILY MEDICINE

## 2017-06-28 PROCEDURE — 74011250636 HC RX REV CODE- 250/636: Performed by: INTERNAL MEDICINE

## 2017-06-28 PROCEDURE — 85025 COMPLETE CBC W/AUTO DIFF WBC: CPT | Performed by: FAMILY MEDICINE

## 2017-06-28 PROCEDURE — 74011250637 HC RX REV CODE- 250/637: Performed by: INTERNAL MEDICINE

## 2017-06-28 RX ORDER — WARFARIN SODIUM 5 MG/1
5 TABLET ORAL DAILY
Qty: 30 TAB | Refills: 0 | Status: SHIPPED | OUTPATIENT
Start: 2017-06-28 | End: 2017-10-13 | Stop reason: DRUGHIGH

## 2017-06-28 RX ORDER — BUMETANIDE 1 MG/1
1 TABLET ORAL DAILY
Qty: 30 TAB | Refills: 0 | Status: SHIPPED | OUTPATIENT
Start: 2017-06-28 | End: 2017-07-18

## 2017-06-28 RX ORDER — CLONIDINE HYDROCHLORIDE 0.1 MG/1
0.1 TABLET ORAL
Status: DISCONTINUED | OUTPATIENT
Start: 2017-06-28 | End: 2017-06-28 | Stop reason: HOSPADM

## 2017-06-28 RX ORDER — CEPHALEXIN 500 MG/1
500 CAPSULE ORAL 4 TIMES DAILY
Qty: 28 CAP | Refills: 0 | Status: SHIPPED | OUTPATIENT
Start: 2017-06-28 | End: 2017-07-18

## 2017-06-28 RX ORDER — DILTIAZEM HYDROCHLORIDE 300 MG/1
300 CAPSULE, COATED, EXTENDED RELEASE ORAL DAILY
Qty: 30 CAP | Refills: 0 | Status: SHIPPED | OUTPATIENT
Start: 2017-06-28 | End: 2018-01-25 | Stop reason: SDUPTHER

## 2017-06-28 RX ORDER — IBUPROFEN 200 MG
1 TABLET ORAL DAILY
Qty: 30 PATCH | Refills: 0 | Status: SHIPPED | OUTPATIENT
Start: 2017-06-28 | End: 2017-07-28

## 2017-06-28 RX ADMIN — GUAIFENESIN 600 MG: 600 TABLET, EXTENDED RELEASE ORAL at 09:04

## 2017-06-28 RX ADMIN — POLYETHYLENE GLYCOL 3350 17 G: 17 POWDER, FOR SOLUTION ORAL at 09:03

## 2017-06-28 RX ADMIN — BUMETANIDE 1 MG: 1 TABLET ORAL at 09:04

## 2017-06-28 RX ADMIN — DILTIAZEM HYDROCHLORIDE 300 MG: 180 CAPSULE, COATED, EXTENDED RELEASE ORAL at 09:04

## 2017-06-28 RX ADMIN — CEPHALEXIN 500 MG: 250 CAPSULE ORAL at 09:03

## 2017-06-28 RX ADMIN — CLONIDINE HYDROCHLORIDE 0.1 MG: 0.1 TABLET ORAL at 02:19

## 2017-06-28 RX ADMIN — ENOXAPARIN SODIUM 160 MG: 80 INJECTION SUBCUTANEOUS at 01:22

## 2017-06-28 RX ADMIN — Medication 10 ML: at 04:41

## 2017-06-28 NOTE — DISCHARGE INSTRUCTIONS
Heart Failure: Care Instructions  Your Care Instructions     Heart failure occurs when your heart does not pump as much blood as the body needs. Failure does not mean that the heart has stopped pumping but rather that it is not pumping as well as it should. Over time, this causes fluid buildup in your lungs and other parts of your body. Fluid buildup can cause shortness of breath, fatigue, swollen ankles, and other problems. By taking medicines regularly, reducing sodium (salt) in your diet, checking your weight every day, and making lifestyle changes, you can feel better and live longer. Follow-up care is a key part of your treatment and safety. Be sure to make and go to all appointments, and call your doctor if you are having problems. It's also a good idea to know your test results and keep a list of the medicines you take. How can you care for yourself at home? Medicines  · Be safe with medicines. Take your medicines exactly as prescribed. Call your doctor if you think you are having a problem with your medicine. · Do not take any vitamins, over-the-counter medicine, or herbal products without talking to your doctor first. Shlomo Stout not take ibuprofen (Advil or Motrin) and naproxen (Aleve) without talking to your doctor first. They could make your heart failure worse. · You may be taking some of the following medicine. ¨ Beta-blockers can slow heart rate, decrease blood pressure, and improve your condition. Taking a beta-blocker may lower your chance of needing to be hospitalized. ¨ Angiotensin-converting enzyme inhibitors (ACEIs) reduce the heart's workload, lower blood pressure, and reduce swelling. Taking an ACEI may lower your chance of needing to be hospitalized again. ¨ Angiotensin II receptor blockers (ARBs) work like ACEIs. Your doctor may prescribe them instead of ACEIs. ¨ Diuretics, also called water pills, reduce swelling.   ¨ Potassium supplements replace this important mineral, which is sometimes lost with diuretics. ¨ Aspirin and other blood thinners prevent blood clots, which can cause a stroke or heart attack. You will get more details on the specific medicines your doctor prescribes. Diet  · Your doctor may suggest that you limit sodium to 1,500 milligrams (mg) a day. That is less than 1 teaspoon of salt a day, including all the salt you eat in cooking or in packaged foods. People get most of their sodium from processed foods. Fast food and restaurant meals also tend to be very high in sodium. · Ask your doctor how much liquid you can drink each day. You may have to limit liquids. Weight  · Weigh yourself without clothing at the same time each day. Record your weight. Call your doctor if you gain more than 3 pounds in 24 hours or 5 pounds in one week. A sudden weight gain may mean that your heart failure is getting worse. Activity level  · Start light exercise (if your doctor says it is okay). Even if you can only do a small amount, exercise will help you get stronger, have more energy, and manage your weight and your stress. Walking is an easy way to get exercise. Start out by walking a little more than you did before. Bit by bit, increase the amount you walk. · When you exercise, watch for signs that your heart is working too hard. You are pushing yourself too hard if you cannot talk while you are exercising. If you become short of breath or dizzy or have chest pain, stop, sit down, and rest.  · If you feel \"wiped out\" the day after you exercise, walk slower or for a shorter distance until you can work up to a better pace. · Get enough rest at night. Sleeping with 1 or 2 pillows under your upper body and head may help you breathe easier. Lifestyle changes  · Do not smoke. Smoking can make a heart condition worse. If you need help quitting, talk to your doctor about stop-smoking programs and medicines. These can increase your chances of quitting for good.  Quitting smoking may be the most important step you can take to protect your heart. · Limit alcohol to 2 drinks a day for men and 1 drink a day for women. Too much alcohol can cause health problems. · Avoid getting sick from colds and the flu. Get a pneumococcal vaccine shot. If you have had one before, ask your doctor whether you need another dose. Get a flu shot each year. If you must be around people with colds or the flu, wash your hands often. When should you call for help? Call 911 if you have symptoms of sudden heart failure such as:  · You have severe trouble breathing. · You cough up pink, foamy mucus. · You have a new irregular or rapid heartbeat. Call your doctor now or seek immediate medical care if:  · You have new or increased shortness of breath. · You are dizzy or lightheaded, or you feel like you may faint. · You have sudden weight gain, such as 3 pounds in 24 hours, or 5 pounds in one week. · You have increased swelling in your legs, ankles, or feet. · You are suddenly so tired or weak that you cannot do your usual activities. Watch closely for changes in your health, and be sure to contact your doctor if:  · You develop new symptoms. Where can you learn more? Go to [employer]  Enter F459 in the search box to learn more about \"Heart Failure: Care Instructions. \"   © 5881-2996 [employer]. Care instructions adapted under license by Rosalind Hidalgo (which disclaims liability or warranty for this information). This care instruction is for use with your licensed healthcare professional. If you have questions about a medical condition or this instruction, always ask your healthcare professional. [place] [address] any warranty or liability for your use of this information. Content Version: 63.9.416377; Current as of: February 20, 2015 (modified 9/26/16).        Atrial Fibrillation: Care Instructions  Your Care Instructions    Atrial fibrillation is an irregular and often fast heartbeat. Treating this condition is important for several reasons. It can cause blood clots, which can travel from your heart to your brain and cause a stroke. If you have a fast heartbeat, you may feel lightheaded, dizzy, and weak. An irregular heartbeat can also increase your risk for heart failure. Atrial fibrillation is often the result of another heart condition, such as high blood pressure or coronary artery disease. Making changes to improve your heart condition will help you stay healthy and active. Follow-up care is a key part of your treatment and safety. Be sure to make and go to all appointments, and call your doctor if you are having problems. It's also a good idea to know your test results and keep a list of the medicines you take. How can you care for yourself at home? Medicines  · Take your medicines exactly as prescribed. Call your doctor if you think you are having a problem with your medicine. You will get more details on the specific medicines your doctor prescribes. · If your doctor has given you a blood thinner to prevent a stroke, be sure you get instructions about how to take your medicine safely. Blood thinners can cause serious bleeding problems. · Do not take any vitamins, over-the-counter drugs, or herbal products without talking to your doctor first.  Lifestyle changes  · Do not smoke. Smoking can increase your chance of a stroke and heart attack. If you need help quitting, talk to your doctor about stop-smoking programs and medicines. These can increase your chances of quitting for good. · Eat a heart-healthy diet. · Stay at a healthy weight. Lose weight if you need to. · Limit alcohol to 2 drinks a day for men and 1 drink a day for women. Too much alcohol can cause health problems. · Avoid colds and flu. Get a pneumococcal vaccine shot. If you have had one before, ask your doctor whether you need another dose. Get a flu shot every year.  If you must be around people with colds or flu, wash your hands often. Activity  · If your doctor recommends it, get more exercise. Walking is a good choice. Bit by bit, increase the amount you walk every day. Try for at least 30 minutes on most days of the week. You also may want to swim, bike, or do other activities. Your doctor may suggest that you join a cardiac rehabilitation program so that you can have help increasing your physical activity safely. · Start light exercise if your doctor says it is okay. Even a small amount will help you get stronger, have more energy, and manage stress. Walking is an easy way to get exercise. Start out by walking a little more than you did in the hospital. Gradually increase the amount you walk. · When you exercise, watch for signs that your heart is working too hard. You are pushing too hard if you cannot talk while you are exercising. If you become short of breath or dizzy or have chest pain, sit down and rest immediately. · Check your pulse regularly. Place two fingers on the artery at the palm side of your wrist, in line with your thumb. If your heartbeat seems uneven or fast, talk to your doctor. When should you call for help? Call 911 anytime you think you may need emergency care. For example, call if:  · You have symptoms of a heart attack. These may include:  ¨ Chest pain or pressure, or a strange feeling in the chest.  ¨ Sweating. ¨ Shortness of breath. ¨ Nausea or vomiting. ¨ Pain, pressure, or a strange feeling in the back, neck, jaw, or upper belly or in one or both shoulders or arms. ¨ Lightheadedness or sudden weakness. ¨ A fast or irregular heartbeat. After you call 911, the  may tell you to chew 1 adult-strength or 2 to 4 low-dose aspirin. Wait for an ambulance. Do not try to drive yourself. · You have symptoms of a stroke.  These may include:  ¨ Sudden numbness, tingling, weakness, or loss of movement in your face, arm, or leg, especially on only one side of your body. ¨ Sudden vision changes. ¨ Sudden trouble speaking. ¨ Sudden confusion or trouble understanding simple statements. ¨ Sudden problems with walking or balance. ¨ A sudden, severe headache that is different from past headaches. · You passed out (lost consciousness). Call your doctor now or seek immediate medical care if:  · You have new or increased shortness of breath. · You feel dizzy or lightheaded, or you feel like you may faint. · Your heart rate becomes irregular. · You can feel your heart flutter in your chest or skip heartbeats. Tell your doctor if these symptoms are new or worse. Watch closely for changes in your health, and be sure to contact your doctor if you have any problems. Where can you learn more? Go to http://cleo-elton.info/. Enter U020 in the search box to learn more about \"Atrial Fibrillation: Care Instructions. \"  Current as of: September 21, 2016  Content Version: 11.3  © 0575-8814 [employer]. Care instructions adapted under license by [employer] (which disclaims liability or warranty for this information). If you have questions about a medical condition or this instruction, always ask your healthcare professional. [place] [address] any warranty or liability for your use of this information. Stopping Smoking: Care Instructions  Your Care Instructions  Cigarette smokers crave the nicotine in cigarettes. Giving it up is much harder than simply changing a habit. Your body has to stop craving the nicotine. It is hard to quit, but you can do it. There are many tools that people use to quit smoking. You may find that combining tools works best for you. There are several steps to quitting. First you get ready to quit. Then you get support to help you. After that, you learn new skills and behaviors to become a nonsmoker. For many people, a necessary step is getting and using medicine.   Your doctor will help you set up the plan that best meets your needs. You may want to attend a smoking cessation program to help you quit smoking. When you choose a program, look for one that has proven success. Ask your doctor for ideas. You will greatly increase your chances of success if you take medicine as well as get counseling or join a cessation program.  Some of the changes you feel when you first quit tobacco are uncomfortable. Your body will miss the nicotine at first, and you may feel short-tempered and grumpy. You may have trouble sleeping or concentrating. Medicine can help you deal with these symptoms. You may struggle with changing your smoking habits and rituals. The last step is the tricky one: Be prepared for the smoking urge to continue for a time. This is a lot to deal with, but keep at it. You will feel better. Follow-up care is a key part of your treatment and safety. Be sure to make and go to all appointments, and call your doctor if you are having problems. Its also a good idea to know your test results and keep a list of the medicines you take. How can you care for yourself at home? · Ask your family, friends, and coworkers for support. You have a better chance of quitting if you have help and support. · Join a support group, such as Nicotine Anonymous, for people who are trying to quit smoking. · Consider signing up for a smoking cessation program, such as the American Lung Association's Freedom from Smoking program.  · Set a quit date. Pick your date carefully so that it is not right in the middle of a big deadline or stressful time. Once you quit, do not even take a puff. Get rid of all ashtrays and lighters after your last cigarette. Clean your house and your clothes so that they do not smell of smoke. · Learn how to be a nonsmoker. Think about ways you can avoid those things that make you reach for a cigarette. ¨ Avoid situations that put you at greatest risk for smoking.  For some people, it is hard to have a drink with friends without smoking. For others, they might skip a coffee break with coworkers who smoke. ¨ Change your daily routine. Take a different route to work or eat a meal in a different place. · Cut down on stress. Calm yourself or release tension by doing an activity you enjoy, such as reading a book, taking a hot bath, or gardening. · Talk to your doctor or pharmacist about nicotine replacement therapy, which replaces the nicotine in your body. You still get nicotine but you do not use tobacco. Nicotine replacement products help you slowly reduce the amount of nicotine you need. These products come in several forms, many of them available over-the-counter:  ¨ Nicotine patches  ¨ Nicotine gum and lozenges  ¨ Nicotine inhaler  · Ask your doctor about bupropion (Wellbutrin) or varenicline (Chantix), which are prescription medicines. They do not contain nicotine. They help you by reducing withdrawal symptoms, such as stress and anxiety. · Some people find hypnosis, acupuncture, and massage helpful for ending the smoking habit. · Eat a healthy diet and get regular exercise. Having healthy habits will help your body move past its craving for nicotine. · Be prepared to keep trying. Most people are not successful the first few times they try to quit. Do not get mad at yourself if you smoke again. Make a list of things you learned and think about when you want to try again, such as next week, next month, or next year. Where can you learn more? Go to http://cleo-elton.info/. Enter D136 in the search box to learn more about \"Stopping Smoking: Care Instructions. \"  Current as of: March 20, 2017  Content Version: 11.3  © 5881-0315 [employer]. Care instructions adapted under license by [employer] (which disclaims liability or warranty for this information).  If you have questions about a medical condition or this instruction, always ask your healthcare professional. [address] any warranty or liability for your use of this information. Patient Discharge Instructions    Macrina Moore / [id number] : 1970    Admitted 2017 Discharged: 2017 8:27 AM     ACUTE DIAGNOSES:  Atrial fibrillation with RVR ([address]  Atrial fibrillation with RVR ([facility] [address]    CHRONIC MEDICAL DIAGNOSES:  Problem List as of 2017  Date Reviewed: 2017          Codes Class Noted - Resolved    Atrial fibrillation with RVR ([facility] [address] ICD-10-CM: I48.91  ICD-9-CM: 427.31  2017 - Present        Renal insufficiency ICD-10-CM: N28.9  ICD-9-CM: 593.9  2017 - Present        PEPE (obstructive sleep apnea) ICD-10-CM: G47.33  ICD-9-CM: 327.23  2017 - Present        Obesity ICD-10-CM: E66.9  ICD-9-CM: 278.00  2017 - Present        Tobacco use ICD-10-CM: Z72.0  ICD-9-CM: 305.1  2017 - Present        Chronic back pain ICD-10-CM: M54.9, G89.29  ICD-9-CM: 724.5, 338.29  2017 - Present        Asthma ICD-10-CM: J45.909  ICD-9-CM: 493.90  2017 - Present        Elevated BP without diagnosis of hypertension ICD-10-CM: R03.0  ICD-9-CM: 796.2  2017 - Present        Exertional dyspnea ICD-10-CM: R06.09  ICD-9-CM: 786.09  2017 - Present              DISCHARGE MEDICATIONS:         · It is important that you take the medication exactly as they are prescribed. · Keep your medication in the bottles provided by the pharmacist and keep a list of the medication names, dosages, and times to be taken in your wallet. · Do not take other medications without consulting your doctor. DIET:  Low fat, Low cholesterol    ACTIVITY: Activity as tolerated    ADDITIONAL INFORMATION: If you experience any of the following symptoms then please call your primary care physician or return to the emergency room if you cannot get hold of your doctor: Fever, chills, nausea, vomiting, diarrhea, change in mentation, falling, bleeding, shortness of breath.     FOLLOW UP CARE:  Dr. Augustin May PA  you are to call and set up an appointment to see them with in 1 week. Follow-up with specialists at directed by them      Information obtained by :  I understand that if any problems occur once I am at home I am to contact my physician. I understand and acknowledge receipt of the instructions indicated above.                                                                                                                                            Physician's or R.N.'s Signature                                                                  Date/Time                                                                                                                                              Patient or Representative Signature                                                          Date/Time

## 2017-06-28 NOTE — PROGRESS NOTES
Discharge instructions, including information on new medications, were reviewed with patient. All questions were answered and he received a copy of his paperwork and prescriptions. IV and heart monitor were removed. Patient will be discharged home with his family.

## 2017-06-28 NOTE — PROGRESS NOTES
Pharmacist Discharge Medication Reconciliation    Discharge Provider:  Sarai Covarrubias       Discharge Medications:      Current Discharge Medication List        START taking these medications         Dose & Instructions Dispensing Information Comments   Morning Noon Evening Bedtime      bumetanide 1 mg tablet   Commonly known as:  Ning Alba       Your last dose was: Your next dose is:              Dose:  1 mg   Take 1 Tab by mouth daily. Quantity:  30 Tab   Refills:  0                         cephALEXin 500 mg capsule   Commonly known as:  KEFLEX       Your last dose was: Your next dose is:              Dose:  500 mg   Take 1 Cap by mouth four (4) times daily. Quantity:  28 Cap   Refills:  0                         dilTIAZem  mg ER capsule   Commonly known as:  CARDIZEM CD       Your last dose was: Your next dose is:              Dose:  300 mg   Take 1 Cap by mouth daily. Quantity:  30 Cap   Refills:  0                         nicotine 21 mg/24 hr   Commonly known as:  Gordon Rothman       Your last dose was: Your next dose is:              Dose:  1 Patch   1 Patch by TransDERmal route daily for 30 days. Quantity:  30 Patch   Refills:  0                         warfarin 5 mg tablet   Commonly known as:  COUMADIN       Your last dose was: Your next dose is:              Dose:  5 mg   Take 1 Tab by mouth daily. Indications: PREVENT THROMBOEMBOLISM IN CHRONIC ATRIAL FIBRILLATION    Quantity:  30 Tab   Refills:  0                               CONTINUE these medications which have NOT CHANGED         Dose & Instructions Dispensing Information Comments   Morning Noon Evening Bedtime      MIRALAX 17 gram/dose powder   Generic drug:  polyethylene glycol       Your last dose was: Your next dose is:              Dose:  17 g   Take 17 g by mouth daily.     Refills:  0                         MUCINEX 600 mg ER tablet   Generic drug:  guaiFENesin ER       Your last dose was: Your next dose is:              Dose:  600 mg   Take 600 mg by mouth two (2) times a day. Refills:  0                               STOP taking these medications              ADVIL 200 mg tablet   Generic drug:  ibuprofen           diphenhydrAMINE 25 mg tablet   Commonly known as:  BENADRYL                    Where to Get Your Medications        Information on where to get these meds will be given to you by the nurse or doctor. !  Ask your nurse or doctor about these medications     bumetanide 1 mg tablet    cephALEXin 500 mg capsule    dilTIAZem  mg ER capsule    nicotine 21 mg/24 hr    warfarin 5 mg tablet                      The patient's chart, MAR, and AVS were reviewed by   Usha Zaragoza,   Contact: [phone]

## 2017-06-28 NOTE — DISCHARGE SUMMARY
Physician Discharge Summary     Patient ID:    Terrence Ruffin  [id number]  5 y.o.  1970  MENA Santiago    Admit date: 6/22/2017  Discharge date and time: 6/28/2017  Admission Diagnoses: Atrial fibrillation with RVR ([facility] Atrial fibrillation with*  Chronic Diagnoses:    Problem List as of 6/28/2017  Date Reviewed: 6/22/2017          Codes Class Noted - Resolved    Atrial fibrillation with RVR ([facility] ICD-10-CM: I48.91  ICD-9-CM: 427.31  6/22/2017 - Present        Renal insufficiency ICD-10-CM: N28.9  ICD-9-CM: 593.9  6/22/2017 - Present        PEPE (obstructive sleep apnea) ICD-10-CM: G47.33  ICD-9-CM: 327.23  6/22/2017 - Present        Obesity ICD-10-CM: E66.9  ICD-9-CM: 278.00  6/22/2017 - Present        Tobacco use ICD-10-CM: Z72.0  ICD-9-CM: 305.1  6/22/2017 - Present        Chronic back pain ICD-10-CM: M54.9, G89.29  ICD-9-CM: 724.5, 338.29  6/22/2017 - Present        Asthma ICD-10-CM: J45.909  ICD-9-CM: 493.90  6/22/2017 - Present        Elevated BP without diagnosis of hypertension ICD-10-CM: R03.0  ICD-9-CM: 796.2  6/22/2017 - Present        Exertional dyspnea ICD-10-CM: R06.09  ICD-9-CM: 786.09  6/22/2017 - Present            Discharge Medications:   Current Discharge Medication List      START taking these medications    Details   bumetanide (BUMEX) 1 mg tablet Take 1 Tab by mouth daily. Qty: 30 Tab, Refills: 0      cephALEXin (KEFLEX) 500 mg capsule Take 1 Cap by mouth four (4) times daily. Qty: 28 Cap, Refills: 0      dilTIAZem CD (CARDIZEM CD) 300 mg ER capsule Take 1 Cap by mouth daily. Qty: 30 Cap, Refills: 0      nicotine (NICODERM CQ) 21 mg/24 hr 1 Patch by TransDERmal route daily for 30 days. Qty: 30 Patch, Refills: 0      Coumadin 5mg po daily or as directed by PCP #30.  0 RF. CONTINUE these medications which have NOT CHANGED    Details   polyethylene glycol (MIRALAX) 17 gram/dose powder Take 17 g by mouth daily.       guaiFENesin ER (MUCINEX) 600 mg ER tablet Take 600 mg by mouth two (2) times a day. STOP taking these medications       ibuprofen (ADVIL) 200 mg tablet Comments:   Reason for Stopping:         diphenhydrAMINE (BENADRYL) 25 mg tablet Comments:   Reason for Stopping: Follow up Care:    1. MENA Fuentes with in 1 weeks  2.  specialists as directed. Diet:  Low fat, Low cholesterol    Disposition:  Home. Advanced Directive:    Discharge Exam:  [See today's progress note.]    CONSULTATIONS: Cardiology and Pulmonary/Intensive care    Significant Diagnostic Studies:   Recent Labs      06/28/17 0202 06/27/17 0205   WBC  6.8  8.5   HGB  12.8  13.0   HCT  39.9  41.1   PLT  203  199     Recent Labs      06/28/17 0202 06/27/17 0205  06/26/17   0520   NA  139  138  140   K  3.7  3.8  4.5   CL  102  102  102   CO2  32  34*  32   BUN  10  11  14   CREA  0.98  1.09  1.11   GLU  117*  115*  109*   CA  8.4*  8.3*  8.2*   MG   --   2.2   --      Recent Labs      06/28/17 0202 06/27/17 0205  06/26/17   0520   SGOT  26  24  30   ALT  34  30  28   AP  69  67  69   TBILI  0.5  0.4  0.5   TP  6.3*  6.4  6.6   ALB  3.5  3.5  3.6   GLOB  2.8  2.9  3.0     Recent Labs      06/28/17 0202 06/27/17 0205  06/26/17   0520   INR  2.5*  2.1*  1.5*   PTP  26.1*  21.8*  15.4*      Lab Results   Component Value Date/Time    TSH 3.32 06/22/2017 12:54 PM       HOSPITAL COURSE & TREATMENT RENDERED:   1. See today's progress note:    Daily Progress Note and Discharge Note: 6/28/2017  MENA Varghese         Assessment/Plan:   1.  Atrial fibrillation with RVR ([facility] (6/22/2017); Acute diastolic heart failure in the setting of atrial fibrillation being treated with cardiology consult, echocardiogram amd IV Bumex.  -----transitioned to po Cardizem. -----echocardiogram with EF 45%, TSH normal  -----follow up with cardiology as directed.   -----Cont Coumadin      2.  Exertional dyspnea (6/22/2017)/ BL leg edema/ elevated pro BNP.  CHF. -----echocardiogram with EF 45%. [place] -----Low salt diet. Pulmonary and Cardiology follow up      3.  PEPE (obstructive sleep apnea) (6/22/2017). - cont CPAP and adjust as needed - per pulmonary as to retest/adjustment of CPAP      4.  Elevated BP without diagnosis of hypertension (6/22/2017).   ----- stopped Coreg and Lisinopril for now - monitor outpt      5.  Renal insufficiency (6/22/2017). Watch renal function in diuretics.       6.  Obesity (6/22/2017) Supra-morbid. -----Advised on weight loss.       7.  Tobacco use (6/22/2017). Advised on quitting.       8.  Chronic back pain (6/22/2017). Continue home pain meds       9.   Asthma (6/22/2017). Stable. Not wheezing.       10.  Abdominal wall cellulitis.   -----ancef initially and switched to Keflex po.      11. Vtach- 14 beat run  ----- follow up with Card     12. Constipation  -----Supp as needed  -----Continue Miralax as needed      Problem List:  Problem List as of 6/28/2017  Date Reviewed: 6/22/2017             Codes Class Noted - Resolved     Atrial fibrillation with RVR ([facility] ICD-10-CM: I48.91  ICD-9-CM: 427.31   6/22/2017 - Present           Renal insufficiency ICD-10-CM: N28.9  ICD-9-CM: 593.9   6/22/2017 - Present           PEPE (obstructive sleep apnea) ICD-10-CM: G47.33  ICD-9-CM: 327.23   6/22/2017 - Present           Obesity ICD-10-CM: T64.5  ICD-9-CM: 278.00   6/22/2017 - Present           Tobacco use ICD-10-CM: Z72.0  ICD-9-CM: 305. 1   6/22/2017 - Present           Chronic back pain ICD-10-CM: M54.9, G89.29  ICD-9-CM: 724.5, 338.29   6/22/2017 - Present           Asthma ICD-10-CM: J45.909  ICD-9-CM: 493.90   6/22/2017 - Present           Elevated BP without diagnosis of hypertension ICD-10-CM: R03.0  ICD-9-CM: 796.2   6/22/2017 - Present           Exertional dyspnea ICD-10-CM: R06.09  ICD-9-CM: 786.09   6/22/2017 - Present                  HPI:    Mr. Jordan is a 49 y.o.   male who is admitted with atrial fibrillation with RVR.  Mr. Jordan with PMH of chronic back pain, tobacco abuse, obesity presented to ER c/o exertional dyspnea and leg edema for 2 weeks. Dyspnea got worse progressively. Now has difficulty getting upstairs without taking a rest. Has orthopnea and PND. Denies chest pain or cough. Has PEPE and recently starts using his CPAP. In ER, since his stay his BP has been high.    Has abdominal wall hyperemia, which is getting progressively worse. (Dr Mortimer Koller)     6/23:HIs BP is down with the Card drip so will stop Lisinopril and Coreg until evaluated by Jeyson Tripp pending. Long history of PEPE so will ask Pulm to see as he will need outpatient follow up. Less SOB this am. Abd still erythematous. Has chronic back pain and is applying for disability as he is no longer able to work.      6/24: BP low requiring bolus last evening. Holding Bumex. Stopped Coreg and Lisinopril. ECHO with EF 45%. ABd wall with less erythema. No edema this am. He is c/o constipation. On Miralax so will add supp. Had a 14 beat run of Vtach. Labs pending.      6/25: BP is up. HR is in the 100s. Sats in the high 80s at this time. He has had a BM and is feeling much better. Will continue Miralax and increase to BID. Was started on Coumadin yesterday.      6/26:  C/O increased constipation again today - will be getting more Miralax. Sats better. Cards restarted Bumex - Diuresing. Did not sleep well due to the CPAP. Again discussed his morbid obesity as a major contrib to his poor health.      6/27:  He reports he is starting to feel better. Dilt po increased by Card to 300mg/day. INR 2.1 today.     6/28:  Continues to improve and wants to go home. Cardiology has signed off. He has been up and about in room with his baseline amt of [place]. Stressed the importance of wt loss, pulmonary follow up, PCP follow up later this wk for recheck of INR, medication compliance and absolute need to get weight down. Cont Keflex for 7 more days.   Follow up with PCP w/i one wk.       Review of Systems:   A comprehensive review of systems was negative except for that written in the HPI.     Objective:   Physical Exam:           Visit Vitals    /79    Pulse 95    Temp 97.9 °F (36.6 °C)    Resp 16    Ht 5' 6\" (1.676 m)    Wt 156.4 kg (344 lb 12.8 oz)    SpO2 91%    BMI 55.65 kg/m2    O2 Flow Rate (L/min): 2 l/min O2 Device: Room air     Temp (24hrs), Av °F (36.7 °C), Min:97.7 °F (36.5 °C), Max:98.3 °F (36.8 °C)    701 -  190  In: -   Out: 300 [Urine:300]   1901 -  07  In: 4926 [P.O.:1580; I.V.:70]  Out: 2625 [Urine:2625]     General:  Alert, cooperative, no distress, appears stated age. Morbidly obese. Head:  Normocephalic, without obvious abnormality, atraumatic. Eyes:  Conjunctivae/corneas clear. Throat: Lips, mucosa, and tongue normal. Teeth and gums normal.   Neck: Supple, symmetrical, trachea midline, no adenopathy, thyroid: no enlargement/tenderness/nodules, no carotid bruit and no JVD. Lungs:   Clear to auscultation bilaterally. Diminished breath sounds throughout   Heart:  Irregular rate and rhythm, no murmur, click, rub or gallop. Abdomen:   Soft. Bowel sounds normal. No masses,  No organomegaly. Skin with less erythema and less warm to touch - continues to improve. Extremities: Extremities  atraumatic, no cyanosis. Less edema - 1+ LE edema. No calf tenderness or cords. Pulses: 2+ and symmetric all extremities. Skin:  turgor normal. No rashes or lesions   Neurologic: CNII-XII intact. Alert and oriented X 3. Fine motor of hands and fingers normal.   equal.  No cogwheeling or rigidity. Gait not tested at this time. Sensation grossly normal to touch.   Gross motor of extremities normal.          Signed:  Alethea Ortiz MD  2017  8:28 AM

## 2017-06-28 NOTE — PROGRESS NOTES
Daily Progress Note and Discharge Note: 6/28/2017  MENA Rae    Assessment/Plan:   1. Atrial fibrillation with RVR ([facility] (6/22/2017); Acute diastolic heart failure in the setting of atrial fibrillation being treated with cardiology consult, echocardiogram amd IV Bumex.  -----transitioned to po Cardizem. -----echocardiogram with EF 45%, TSH normal  -----follow up with cardiology as directed.   -----Cont Coumadin     2. Exertional dyspnea (6/22/2017)/ BL leg edema/ elevated pro BNP.  CHF.   -----echocardiogram with EF 45%. Vernida Chard -----Low salt diet. Pulmonary and Cardiology follow up     3. PEPE (obstructive sleep apnea) (6/22/2017). - cont CPAP and adjust as needed - per pulmonary as to retest/adjustment of CPAP     4.  Elevated BP without diagnosis of hypertension (6/22/2017).   ----- stopped Coreg and Lisinopril for now - monitor outpt     5. Renal insufficiency (6/22/2017). Watch renal function in diuretics.      6.  Obesity (6/22/2017) Supra-morbid. -----Advised on weight loss.      7. Tobacco use (6/22/2017). Advised on quitting.      8. Chronic back pain (6/22/2017). Continue home pain meds      9. Asthma (6/22/2017). Stable. Not wheezing.      10. Abdominal wall cellulitis.   -----ancef initially and switched to Keflex po. 11.  Vtach- 14 beat run  ----- follow up with Card    12.   Constipation  -----Supp as needed  -----Continue Miralax as needed       Problem List:  Problem List as of 6/28/2017  Date Reviewed: 6/22/2017          Codes Class Noted - Resolved    Atrial fibrillation with RVR ([facility] [address] ICD-10-CM: I48.91  ICD-9-CM: 427.31  6/22/2017 - Present        Renal insufficiency ICD-10-CM: N28.9  ICD-9-CM: 593.9  6/22/2017 - Present        PEPE (obstructive sleep apnea) ICD-10-CM: G47.33  ICD-9-CM: 327.23  6/22/2017 - Present        Obesity ICD-10-CM: E66.9  ICD-9-CM: 278.00  6/22/2017 - Present        Tobacco use ICD-10-CM: Z72.0  ICD-9-CM: 305.1  6/22/2017 - Present Chronic back pain ICD-10-CM: M54.9, G89.29  ICD-9-CM: 724.5, 338.29  6/22/2017 - Present        Asthma ICD-10-CM: J45.909  ICD-9-CM: 493.90  6/22/2017 - Present        Elevated BP without diagnosis of hypertension ICD-10-CM: R03.0  ICD-9-CM: 796.2  6/22/2017 - Present        Exertional dyspnea ICD-10-CM: R06.09  ICD-9-CM: 786.09  6/22/2017 - Present              HPI:    Mr. Esthela Hamilton is a 49 y.o.  male who is admitted with atrial fibrillation with RVR. Mr. Esthela Hamilton with PMH of chronic back pain, tobacco abuse, obesity presented to ER c/o exertional dyspnea and leg edema for 2 weeks. Dyspnea got worse progressively. Now has difficulty getting upstairs without taking a rest. Has orthopnea and PND. Denies chest pain or cough. Has PEPE and recently starts using his CPAP. In ER, since his stay his BP has been high. Has abdominal wall hyperemia, which is getting progressively worse. (Dr Jerson Alvarado)    6/23:HIs BP is down with the Card drip so will stop Lisinopril and Coreg until evaluated by Axel Garrett pending. Long history of PEPE so will ask Pulm to see as he will need outpatient follow up. Less SOB this am. Abd still erythematous. Has chronic back pain and is applying for disability as he is no longer able to work. 6/24: BP low requiring bolus last evening. Holding Bumex. Stopped Coreg and Lisinopril. ECHO with EF 45%. ABd wall with less erythema. No edema this am. He is c/o constipation. On Miralax so will add supp. Had a 14 beat run of Vtach. Labs pending.     6/25: BP is up. HR is in the 100s. Sats in the high 80s at this time. He has had a BM and is feeling much better. Will continue Miralax and increase to BID. Was started on Coumadin yesterday. 6/26:  C/O increased constipation again today - will be getting more Miralax. Sats better. Cards restarted Bumex - Diuresing. Did not sleep well due to the CPAP.   Again discussed his morbid obesity as a major contrib to his poor health.     6/27:  He reports he is starting to feel better. Dilt po increased by Card to 300mg/day. INR 2.1 today. :  Continues to improve and wants to go home. Cardiology has signed off. He has been up and about in room with his baseline amt of [place]. Stressed the importance of wt loss, pulmonary follow up, PCP follow up later this week for INR check, medication compliance and absolute need to get weight down. Cont Keflex for 7 more days. Follow up with PCP w/i one wk. Review of Systems:   A comprehensive review of systems was negative except for that written in the HPI. Objective:   Physical Exam:     Visit Vitals    /79    Pulse 95    Temp 97.9 °F (36.6 °C)    Resp 16    Ht 5' 6\" (1.676 m)    Wt 156.4 kg (344 lb 12.8 oz)    SpO2 91%    BMI 55.65 kg/m2    O2 Flow Rate (L/min): 2 l/min O2 Device: Room air    Temp (24hrs), Av °F (36.7 °C), Min:97.7 °F (36.5 °C), Max:98.3 °F (36.8 °C)    701 - 1900  In: -   Out: 816 [GTPRV:103]   1901 -  0700  In: 6349 [P.O.:1580; I.V.:70]  Out: 2625 [Urine:2625]    General:  Alert, cooperative, no distress, appears stated age. Morbidly obese. Head:  Normocephalic, without obvious abnormality, atraumatic. Eyes:  Conjunctivae/corneas clear. Throat: Lips, mucosa, and tongue normal. Teeth and gums normal.   Neck: Supple, symmetrical, trachea midline, no adenopathy, thyroid: no enlargement/tenderness/nodules, no carotid bruit and no JVD. Lungs:   Clear to auscultation bilaterally. Diminished breath sounds throughout   Heart:  Irregular rate and rhythm, no murmur, click, rub or gallop. Abdomen:   Soft. Bowel sounds normal. No masses,  No organomegaly. Skin with less erythema and less warm to touch - continues to improve. Extremities: Extremities  atraumatic, no cyanosis. Less edema - 1+ LE edema. No calf tenderness or cords. Pulses: 2+ and symmetric all extremities.    Skin:  turgor normal. No rashes or lesions   Neurologic: CNII-XII intact. Alert and oriented X 3. Fine motor of hands and fingers normal.   equal.  No cogwheeling or rigidity. Gait not tested at this time. Sensation grossly normal to touch. Gross motor of extremities normal.       Data Review:       Recent Days:  Recent Labs      06/28/17 0202  06/27/17 0205  06/26/17   0520   WBC  6.8  8.5  8.8   HGB  12.8  13.0  13.4   HCT  39.9  41.1  41.9   PLT  203  199  234     Recent Labs      06/28/17 0202  06/27/17 0205  06/26/17   0520   NA  139  138  140   K  3.7  3.8  4.5   CL  102  102  102   CO2  32  34*  32   GLU  117*  115*  109*   BUN  10  11  14   CREA  0.98  1.09  1.11   CA  8.4*  8.3*  8.2*   MG   --   2.2   --    ALB  3.5  3.5  3.6   TBILI  0.5  0.4  0.5   SGOT  26  24  30   ALT  34  30  28   INR  2.5*  2.1*  1.5*     24 Hour Results:  Recent Results (from the past 24 hour(s))   PROTHROMBIN TIME + INR    Collection Time: 06/28/17  2:02 AM   Result Value Ref Range    INR 2.5 (H) 0.9 - 1.1      Prothrombin time 26.1 (H) 9.0 - 11.1 sec   CBC WITH AUTOMATED DIFF    Collection Time: 06/28/17  2:02 AM   Result Value Ref Range    WBC 6.8 4.1 - 11.1 K/uL    RBC 4.30 4. 10 - 5.70 M/uL    HGB 12.8 12.1 - 17.0 g/dL    HCT 39.9 36.6 - 50.3 %    MCV 92.8 80.0 - 99.0 FL    MCH 29.8 26.0 - 34.0 PG    MCHC 32.1 30.0 - 36.5 g/dL    RDW 14.3 11.5 - 14.5 %    PLATELET 709 842 - 882 K/uL    NEUTROPHILS 59 32 - 75 %    LYMPHOCYTES 27 12 - 49 %    MONOCYTES 11 5 - 13 %    EOSINOPHILS 3 0 - 7 %    BASOPHILS 0 0 - 1 %    ABS. NEUTROPHILS 4.1 1.8 - 8.0 K/UL    ABS. LYMPHOCYTES 1.8 0.8 - 3.5 K/UL    ABS. MONOCYTES 0.7 0.0 - 1.0 K/UL    ABS. EOSINOPHILS 0.2 0.0 - 0.4 K/UL    ABS.  BASOPHILS 0.0 0.0 - 0.1 K/UL   METABOLIC PANEL, COMPREHENSIVE    Collection Time: 06/28/17  2:02 AM   Result Value Ref Range    Sodium 139 136 - 145 mmol/L    Potassium 3.7 3.5 - 5.1 mmol/L    Chloride 102 97 - 108 mmol/L    CO2 32 21 - 32 mmol/L    Anion gap 5 5 - 15 mmol/L    Glucose 117 (H) 65 - 100 mg/dL BUN 10 6 - 20 MG/DL    Creatinine 0.98 0.70 - 1.30 MG/DL    BUN/Creatinine ratio 10 (L) 12 - 20      GFR est AA >60 >60 ml/min/1.73m2    GFR est non-AA >60 >60 ml/min/1.73m2    Calcium 8.4 (L) 8.5 - 10.1 MG/DL    Bilirubin, total 0.5 0.2 - 1.0 MG/DL    ALT (SGPT) 34 12 - 78 U/L    AST (SGOT) 26 15 - 37 U/L    Alk. phosphatase 69 45 - 117 U/L    Protein, total 6.3 (L) 6.4 - 8.2 g/dL    Albumin 3.5 3.5 - 5.0 g/dL    Globulin 2.8 2.0 - 4.0 g/dL    A-G Ratio 1.3 1.1 - 2.2         Medications reviewed  Current Facility-Administered Medications   Medication Dose Route Frequency    cloNIDine HCl (CATAPRES) tablet 0.1 mg  0.1 mg Oral Q4H PRN    acetaminophen (TYLENOL) tablet 650 mg  650 mg Oral Q4H PRN    dilTIAZem CD (CARDIZEM CD) capsule 300 mg  300 mg Oral DAILY    cephALEXin (KEFLEX) capsule 500 mg  500 mg Oral QID    bumetanide (BUMEX) tablet 1 mg  1 mg Oral DAILY    polyethylene glycol (MIRALAX) packet 17 g  17 g Oral ACB/HS    Warfarin- Pharmacy Dosing   Other Rx Dosing/Monitoring    enoxaparin (LOVENOX) injection 160 mg  1 mg/kg SubCUTAneous Q12H    nicotine (NICODERM CQ) 21 mg/24 hr patch 1 Patch  1 Patch TransDERmal DAILY    sodium chloride (NS) flush 5-10 mL  5-10 mL IntraVENous Q8H    sodium chloride (NS) flush 5-10 mL  5-10 mL IntraVENous PRN    diphenhydrAMINE (BENADRYL) capsule 25 mg  25 mg Oral QHS    guaiFENesin ER (MUCINEX) tablet 600 mg  600 mg Oral BID       Care Plan discussed with: patient  Total time spent with patient and review of records: 40 minutes.     Isaac Zendejas MD

## 2017-06-28 NOTE — PROGRESS NOTES
06/28/17  0220: Blood pressures rising over shift. Upon review of chart, BP rising over course of day. Attending on call Dr. Shanae Rodgers notified, put in orders for clonidine 0.1mg q4 for SBP greater than 160. Will continue to monitor. 0500- BP markedly improved. Will continue to monitor    Patient Vitals for the past 24 hrs:   Temp Pulse Resp BP SpO2   06/28/17 0442 97.9 °F (36.6 °C) 95 16 137/79 91 %   06/28/17 0125 - - - 194/88 -   06/28/17 0119 97.7 °F (36.5 °C) (!) 108 18 (!) 178/101 94 %   06/27/17 2342 - 92 - - -   06/27/17 1958 98.3 °F (36.8 °C) 89 17 (!) 156/91 96 %   06/27/17 1650 - - - - 97 %   06/27/17 1521 97.7 °F (36.5 °C) 95 16 142/90 94 %   06/27/17 1500 - (!) 108 - - -   06/27/17 1300 - - - - 97 %   06/27/17 1106 97.9 °F (36.6 °C) (!) 120 16 128/71 97 %   06/27/17 0814 98.2 °F (36.8 °C) (!) 102 16 116/72 98 %   06/27/17 0700 - (!) 112 - - -       0730: Bedside and Verbal shift change report given to MARY Henry (oncoming nurse) by Lynn Ely RN (offgoing nurse). Report included the following information SBAR, Kardex, Procedure Summary, Intake/Output, MAR, Accordion, Recent Results and Cardiac Rhythm NSR, Sinus Tach.

## 2017-06-30 ENCOUNTER — PATIENT OUTREACH (OUTPATIENT)
Dept: CARDIOLOGY CLINIC | Age: 47
End: 2017-06-30

## 2017-06-30 NOTE — Clinical Note
New onset a fib/ RVR/ CHF/ sleep apnea/ INR with pcp - sleep center referral; pulmonary referral done 6/30; Prab 7/18 - ok seen by multiple providers aforehand.  ttk

## 2017-06-30 NOTE — PROGRESS NOTES
This note will not be viewable in [address] Nurse navigator note - cardiology  he had been to pulmonary office this morning Sena May NP for Miguel Angel Finder-  - he took his c-pap machine (which he said is old); They made him several appts for sleep evaluation/ and new equipment - He goes July 3rd for appt -   He recounts not sleeping well for over 8 years -     He has appt with pcp Clif Ferrer - on July 5 and will have INR there -  He is noting his abdominal cellulitis to be marginal improvement - less abdominal swelling, but continued redness - close watch - on keflex qid for that - call pcp if worsening/ or not making progress/ home health would be option. He has a scale and plans to get bp cuff and pulse oximeter today - advised to consult with pharmacist to help him ; Find correct devices - affordable and functional.  We discussed diuresis - possibility of feeling weaker, drained/ encouraged walking and intermittent rest -   Cautions with the heat - on new therapies. Interestingly - pt reports he did not have a lot of urination response until this morning - on bumex 1 mg bid -   Constipation is resolving. Review of clinical notes -     Conditions noted in chart review:   Post back surgery with PT - sedentary   Abdominal wound - potential cellulitis with tx initiated   A fib/ RVR - new   Chronic and worsening leg edema   Tobacco use/ abuse   Obesity with BMI of 55.65 - critical    Plan: Daily weights-  Pt has scale - buying bp cuff and pulse oximeter   Medication changes and addition of : Bumex 1 mg, Keflex 500 mg qid - for 7 days, Diltiazem                   mg every    Day; Nicoderm CQ patch; Coumadin 5 mg every day   Is PT/ HH involved - could/ should we add nursing for H2H/ med rec and CHF education   Monitoring of abdominal wound- with pcp as resource - appt July 5th.      Smoking cessation- using patch/ committed to cessation   Follow up with pulmonary re: PFT outpatient/ use of c-pap- appt today 6/30 -    Sleep study appt set up for July 3 - evaluate c-pap    Follow up cardiology - Tuesday, July 18 at [facility] post diuresis   INR at pcp office per pt  ______________________________________________________________________________________  Emergency room physician note -  HPI Comments: 49 y.o. male with no significant past medical history who presents with chief complaint of SOB. For the past 2-3 weeks, patient has developed worsening SOB that is exacerbated upon exertion -- \"I can hardly move now. \"  Patient has been using his CPAP, but without marked improvement. Patient was initially seen at [facility]" earlier today, whereupon he was then referred to the ED regarding new onset of a-fib. Patient denies any cardiac hx or prior dx of arrhythmia. Patient denies being on any ASA or other anticoagulants. Patient mentions h/o chronic leg swelling, which has recently increased -- \"it was dormant for a while, but started back up again. \"  Patient additionally mentions chronic back pain, and has been seeing Physical Therapy for the past 15-16 weeks -- \"I had an MRI and surgery done. \"  Patient notes that back pain today remains unchanged, which he currently manages with Advil BID. Patient otherwise denies being on any medications regularly. Patient also complains of feeling \"constipated,\" and has been taking Miralax for the past 6 days.   Patient reports passing multiple \"small\" bowel movements this morning.    _______________________________________________________________________________________  Pulmonary consultation -   IMPRESSION  · Atrial fibrillation with RVR  · Dyspnea  · PEPE: not compliant with CPAP at baseline  · Renal insufficiency  · Chronic back pain  · Hx of asthma: not wheezing  · Tobacco use/THC use      PLAN  · Rate control with IV diltiazem; cardiology consult pending  · F/U ECHO  · Empiric Ancef per primary for team for potential abdominal wall cellulitis; f/u cultures  · Continue diuresis with Bumex; watch creat  · Continue CPAP nightly; patient is now agreeable to reinitiating PAP therapy. He will need outpatient sleep eval and likely repeat sleep study. · Discussed the importance of compete tobacco and THC cessation. Patient declines nicotine patch. · Patient would benefit from outpatient pulmonary follow up with complete PFTs  · GI prophylaxis: not indicated  · DVT prophylaxis: Lovenox     Thank you for allowing us to participate in the care of this patient. We will be happy to follow along in his progress with you.     MENA Woo  ___________________________________________________________________________________________  Cardiology pre discharge note  Assessment/Plan:   1. New onset with RVR, rate up inc dilt to 300 m g every day - anticoagulated with coumadin. INR 2.1   2. NSVT : keep K > 4, Mg+ > 2  3. Mild LV dysfunction (EF 45%): would not puruse cath/stress at this point until infection resolves, cont bumex. Can readdress as OP in a few weeks. 4. Acute diastolic HF, recompensated  5. Severe PEPE, untreated: will need OP sleep study   6. Morbid obesity (marked abdominal obesity): [place] is poor if we cannot impact his obesity. ..   7. Chronic low back pain due to lumbar spinal stenosis  8. Smoker/intermittent marijuana use  9. Disability  10. Likely depression  11. XU, better  12. Constipation: moderate by KUB. Dose mg citrate today.         Has Op follow up with Dr Lew Shaver in a few weeks. Will see prn pls call if needed      Discharge note - Dr. Alvarez Rolling  Assessment/Plan:   1.  Atrial fibrillation with RVR ([address] (6/22/2017); Acute diastolic heart failure in the setting of atrial fibrillation being treated with cardiology consult, echocardiogram amd IV Bumex.  -----transitioned to po Cardizem.    -----echocardiogram with EF 45%, TSH normal  -----follow up with cardiology as directed.   -----Cont Coumadin      2.  Exertional dyspnea (6/22/2017)/ BL leg edema/ elevated pro BNP.  CHF.   -----echocardiogram with EF 45%. Lova Mean -----Low salt diet.  Pulmonary and Cardiology follow up      3.  PEPE (obstructive sleep apnea) (6/22/2017).  - cont CPAP and adjust as needed - per pulmonary as to retest/adjustment of CPAP      4.  Elevated BP without diagnosis of hypertension (6/22/2017).   ----- stopped Coreg and Lisinopril for now - monitor outpt      5.  Renal insufficiency (6/22/2017). Watch renal function in diuretics.       6.  Obesity (6/22/2017) Supra-morbid. -----Advised on weight loss.       7.  Tobacco use (6/22/2017). Advised on quitting.       8.  Chronic back pain (6/22/2017). Continue home pain meds       9.   Asthma (6/22/2017). Stable.  Not wheezing.       10.  Abdominal wall cellulitis.   -----ancef initially and switched to Keflex po.       11.  Vtach- 14 beat run  ----- follow up with Card      12.  Constipation  -----Supp as needed  -----Continue Miralax as needed     Talat Ricardo, RN , [address], [address] [address] [address]  [phone]

## 2017-07-12 ENCOUNTER — PATIENT OUTREACH (OUTPATIENT)
Dept: CARDIOLOGY CLINIC | Age: 47
End: 2017-07-12

## 2017-07-12 NOTE — PROGRESS NOTES
This note will not be viewable in [address] Nurse navigator note - cardiology    Incoming call from pt's mom - Bernard Randhawa - (they live in same house - he upstairs);  Gisell Zuluaga has appt with Dr. Vamshi Ge or PA tomorrow for INR and for leg check for increased swelling-    His mom said his legs are very swollen, and reddish in color. Nn talked to Gisell Zuluaga who reports weights were 348 - then 353, now 355 - He is taking Bumex 1 mg every day - and had voiding in the am - but it tapers later. His legs are very swollen and he still has cellulitis - continues antibiotic therapy. Plan: Notification to MD - to address s/s -    Pt to elevate legs - above body -    Stay out of the heat   Card appt 7/18 - unless sooner -   Follow up with pcp tomorrow 7/13 for INR and visit. Request to provider and office nurse to call pt with recommendations.     Meda Mcburney, RN , Asif [place], [facility]   [address]  [phone]

## 2017-07-18 ENCOUNTER — TELEPHONE (OUTPATIENT)
Dept: CARDIOLOGY CLINIC | Age: 47
End: 2017-07-18

## 2017-07-18 ENCOUNTER — OFFICE VISIT (OUTPATIENT)
Dept: CARDIOLOGY CLINIC | Age: 47
End: 2017-07-18

## 2017-07-18 VITALS
BODY MASS INDEX: 50.62 KG/M2 | RESPIRATION RATE: 20 BRPM | DIASTOLIC BLOOD PRESSURE: 78 MMHG | WEIGHT: 315 LBS | HEART RATE: 80 BPM | OXYGEN SATURATION: 97 % | HEIGHT: 66 IN | SYSTOLIC BLOOD PRESSURE: 114 MMHG

## 2017-07-18 DIAGNOSIS — I48.19 PERSISTENT ATRIAL FIBRILLATION (HCC): Primary | ICD-10-CM

## 2017-07-18 DIAGNOSIS — G47.33 OSA (OBSTRUCTIVE SLEEP APNEA): ICD-10-CM

## 2017-07-18 DIAGNOSIS — R06.09 EXERTIONAL DYSPNEA: ICD-10-CM

## 2017-07-18 DIAGNOSIS — I50.23 ACUTE ON CHRONIC SYSTOLIC CONGESTIVE HEART FAILURE (HCC): ICD-10-CM

## 2017-07-18 DIAGNOSIS — E66.01 MORBID OBESITY DUE TO EXCESS CALORIES (HCC): ICD-10-CM

## 2017-07-18 PROBLEM — I48.91 ATRIAL FIBRILLATION WITH RVR (HCC): Status: RESOLVED | Noted: 2017-06-22 | Resolved: 2017-07-18

## 2017-07-18 PROBLEM — E66.9 OBESITY: Status: RESOLVED | Noted: 2017-06-22 | Resolved: 2017-07-18

## 2017-07-18 RX ORDER — METOLAZONE 2.5 MG/1
TABLET ORAL DAILY
COMMUNITY
End: 2017-07-18 | Stop reason: SDUPTHER

## 2017-07-18 RX ORDER — SULFAMETHOXAZOLE AND TRIMETHOPRIM 800; 160 MG/1; MG/1
1 TABLET ORAL 2 TIMES DAILY
COMMUNITY
End: 2017-07-24 | Stop reason: ALTCHOICE

## 2017-07-18 RX ORDER — METOLAZONE 5 MG/1
2.5 TABLET ORAL DAILY
COMMUNITY
End: 2017-07-18 | Stop reason: DRUGHIGH

## 2017-07-18 RX ORDER — FUROSEMIDE 20 MG/1
40 TABLET ORAL 2 TIMES DAILY
COMMUNITY
End: 2017-07-31 | Stop reason: DRUGHIGH

## 2017-07-18 RX ORDER — METOLAZONE 2.5 MG/1
2.5 TABLET ORAL DAILY
Qty: 30 TAB | Refills: 1 | Status: SHIPPED | OUTPATIENT
Start: 2017-07-18 | End: 2018-04-23

## 2017-07-18 NOTE — TELEPHONE ENCOUNTER
Patient said he went to his pharmacy and the medication was not ready to be picked up.  Please call in to [facility]

## 2017-07-18 NOTE — MR AVS SNAPSHOT
Visit Information Date & Time Provider Department Dept. Phone Encounter #  
 7/18/2017  9:00 AM Mayco Teran MD [facility] James Capps [phone] [id number] Upcoming Health Maintenance Date Due Pneumococcal 19-64 Medium Risk (1 of 1 - PPSV23) 3/6/1988 DTaP/Tdap/Td series (1 - Tdap) 3/6/1990 INFLUENZA AGE 9 TO ADULT 8/1/2017 Allergies as of 7/18/2017  Review Complete On: 7/18/2017 By: Kan Hallman Severity Noted Reaction Type Reactions Penicillins Medium 06/22/2017   Systemic Hives Current Immunizations  Never Reviewed No immunizations on file. Not reviewed this visit You Were Diagnosed With   
  
 Codes Comments Atrial fibrillation with RVR ([facility] [address]    -  Primary ICD-10-CM: I48.91 
ICD-9-CM: 427.31 Vitals BP Pulse Resp Height(growth percentile) Weight(growth percentile) SpO2  
 114/78 (BP 1 Location: Right arm) 80 20 5' 6\" (1.676 m) (!) 357 lb (161.9 kg) 97% BMI Smoking Status 57.62 kg/m2 Former Smoker Vitals History BMI and BSA Data Body Mass Index Body Surface Area  
 57.62 kg/m 2 2.75 m 2 Your Updated Medication List  
  
   
This list is accurate as of: 7/18/17  9:29 AM.  Always use your most recent med list.  
  
  
  
  
 BACTRIM -800 mg per tablet Generic drug:  trimethoprim-sulfamethoxazole Take 1 Tab by mouth two (2) times a day. For 10 days. dilTIAZem  mg ER capsule Commonly known as:  CARDIZEM CD Take 1 Cap by mouth daily. LASIX 20 mg tablet Generic drug:  furosemide Take 40 mg by mouth two (2) times a day. MIRALAX 17 gram/dose powder Generic drug:  polyethylene glycol Take 17 g by mouth daily. nicotine 21 mg/24 hr  
Commonly known as:  NICODERM CQ  
1 Patch by TransDERmal route daily for 30 days. warfarin 5 mg tablet Commonly known as:  COUMADIN  
 Take 1 Tab by mouth daily. Indications: PREVENT THROMBOEMBOLISM IN CHRONIC ATRIAL FIBRILLATION We Performed the Following AMB POC EKG ROUTINE W/ 12 LEADS, INTER & REP [80034 CPT(R)] To-Do List   
 07/20/2017 10:00 AM  
(Arrive by 9:45 AM) Appointment with [address] at [address] ([phone]) NON-CONTRAST STUDY: 1. The patient should not eat solid food four hours before the appointment but should be encouraged to drink clear liquids. 2. Bring any non [facility] facility films/images pertaining to the area of interest with you on the day of appointment. 3. Check in at registration at least 30 minutes before appt time unless you were instructed to do otherwise. 4. If you have to drink oral contrast please pick it up any weekday prior to your appointment, if you cannot please check in 2 hrs  before appt time. Please arrive 15 minutes prior to appointment to register Introducing [facility]! [facility] introduces [employer] patient portal. Now you can access parts of your medical record, email your doctor's office, and request medication refills online. 1. In your internet browser, go to [email or web address] 2. Click on the First Time User? Click Here link in the Sign In box. You will see the New Member Sign Up page. 3. Enter your [employer] Access Code exactly as it appears below. You will not need to use this code after youve completed the sign-up process. If you do not sign up before the expiration date, you must request a new code. · [employer] Access Code: [email or web address] Expires: 9/24/2017  9:21 AM 
 
4. Enter the last four digits of your Social Security Number (xxxx) and Date of Birth (mm/dd/yyyy) as indicated and click Submit. You will be taken to the next sign-up page. 5. Create a [employer] ID. This will be your [employer] login ID and cannot be changed, so think of one that is secure and easy to remember. 6. Create a Coresonic password. You can change your password at any time. 7. Enter your Password Reset Question and Answer. This can be used at a later time if you forget your password. 8. Enter your e-mail address. You will receive e-mail notification when new information is available in [address] 9. Click Sign Up. You can now view and download portions of your medical record. 10. Click the Download Summary menu link to download a portable copy of your medical information. If you have questions, please visit the Frequently Asked Questions section of the C[employer]onic website. Remember, C[employer]onic is NOT to be used for urgent needs. For medical emergencies, dial 911. Now available from your iPhone and Android! Please provide this summary of care documentation to your next provider. Your primary care clinician is listed as Apollo Miguel. If you have any questions after today's visit, please call [phone].

## 2017-07-19 RX ORDER — METOLAZONE 5 MG/1
2.5 TABLET ORAL DAILY
Qty: 15 TAB | Refills: 4 | Status: SHIPPED | OUTPATIENT
Start: 2017-07-19 | End: 2017-07-24 | Stop reason: SDUPTHER

## 2017-07-19 NOTE — PROGRESS NOTES
Zoë Acharya MD    [address] [facility] [place], [address] [facility]    Office [phone],[place] [phone]  Pager [phone]    Ricardo Funk. is a 49 y.o. male is here for f/u visit. Primary care physician:  MENA Brown    Patient Active Problem List   Diagnosis Code    Atrial fibrillation with RVR ([address] I48.91    Renal insufficiency N28.9    PEPE (obstructive sleep apnea) G47.33    Obesity E66.9    Tobacco use Z72.0    Chronic back pain M54.9, G89.29    Asthma J45.909    Elevated BP without diagnosis of hypertension R03.0    Exertional dyspnea R06.09       Dear Mr. Adwoa Barry had the pleasure of seeing Mr. Ricardo Mcmahon in the office today. Chief complaint:  Chief Complaint   Patient presents with   [address] [facility] Follow Up       Assessment:  Acute on Chronic SHF - EF 45%. A fib with CVR   Chronic anticoagulation - INR 2.5  Morbid obesity   Cellulitis  Smoker         Plan:   Increase lasix from 40 mg daily to BID. Also, start Zaroxlyn 5 mg daily 1/2 hour prior to taking lasix    Monitor weight. If weight does not decrease in the next 48 hours, or if his dyspnea worsens he will come to the  ED  Will need BMP. 6/28/17 Cr 0.9/INR 2.5  Follow up in one week          Patient understands the plan. All questions were answered to the patient's satisfaction. Medication Side Effects and Warnings were discussed with patient: yes  Patient Labs were reviewed and or requested:  yes  Patient Past Records were reviewed and or requested: yes    I appreciate the opportunity to be involved in . See note below for details. Please do not hesitate to contact us with questions or concerns. Zoë Acharya MD    Cardiac Testing/ Procedures: A. Cardiac Cath/PCI:    B.ECHO/EVIAT: 6/22/2017 Left ventricle: Systolic function was mildly reduced. Ejection fraction was estimated to be 45 %. There were no regional wall motion abnormalities. Tricuspid valve:  There was mild regurgitation. C.StressNuclear/Stress ECHO/Stress test:    D.Vascular:    E. EP:    F. Miscellaneous:    Subjective:  Blanca Bartholomew. is a 49 y.o. male who returns for follow up visit. Mr. Hever Yancey was admitted to [facility] from 6/22/2017 to 6/28/2017 for A fib with RVR, CHF, abd wall cellulitis. He was rate controlled and diuresed. His EF was 45%. Patient after being discharged was briefly doing well/losing weight. On DC he was sent home on Bumex 1 mg bid. He started having erythema of his legs and abd wall and has been on two different antibiotics. In the interim he started gaining weight and has gained approximately ten pounds. For some reason, he is on a decreased dose of diyoretic - lasix 40 mg daily. He is also getting progressively dyspneic. No CP. H/o orthopnea. Denies fever. ROS:  (bold if positive, if negative)             Medications before admission:    Current Outpatient Prescriptions   Medication Sig Dispense    furosemide (LASIX) 20 mg tablet Take 40 mg by mouth two (2) times a day.  trimethoprim-sulfamethoxazole (BACTRIM DS) 160-800 mg per tablet Take 1 Tab by mouth two (2) times a day. For 10 days.  dilTIAZem CD (CARDIZEM CD) 300 mg ER capsule Take 1 Cap by mouth daily. 30 Cap    nicotine (NICODERM CQ) 21 mg/24 hr 1 Patch by TransDERmal route daily for 30 days. 30 Patch    warfarin (COUMADIN) 5 mg tablet Take 1 Tab by mouth daily. Indications: PREVENT THROMBOEMBOLISM IN CHRONIC ATRIAL FIBRILLATION 30 Tab    polyethylene glycol (MIRALAX) 17 gram/dose powder Take 17 g by mouth daily.  metOLazone (ZAROXOLYN) 2.5 mg tablet Take 1 Tab by mouth daily. 30 Tab     No current facility-administered medications for this visit.         Family History of CAD:    Yes    Social History:  Current  Smoker  No    Physical Exam:  Visit Vitals    /78 (BP 1 Location: Right arm)    Pulse 80    Resp 20    Ht 5' 6\" (1.676 m)    Wt (!) 357 lb (161.9 kg)    SpO2 97%    BMI 57.62 kg/m2 Gen: Well-developed, well-nourished, morbid obesity  Neck: Supple,thick neck, unable to appreciate JVD  Resp: No accessory muscle use, Clear breath sounds, No rales or rhonchi  Card: Irregular Rate,Rythm,Normal S1, S2, No murmurs  Abd:  Soft,morbidly obese, Abd wall - indurated/erythema+  MSK: No cyanosis  Neuro: moving all four extremities , follows commands appropriately  Psych:  Good insight, oriented to person, place , alert, Nml Affect  LE: 3+ edema;  Erythema - both LE    EKG: Atrial fibrillation with CVR, Nml axis, Septal MI - au       LABS:        Lab Results   Component Value Date/Time    WBC 6.8 06/28/2017 02:02 AM    HGB 12.8 06/28/2017 02:02 AM    HCT 39.9 06/28/2017 02:02 AM    PLATELET 985 93/99/9554 02:02 AM     Lab Results   Component Value Date/Time    Sodium 139 06/28/2017 02:02 AM    Potassium 3.7 06/28/2017 02:02 AM    Chloride 102 06/28/2017 02:02 AM    CO2 32 06/28/2017 02:02 AM    Anion gap 5 06/28/2017 02:02 AM    Glucose 117 06/28/2017 02:02 AM    BUN 10 06/28/2017 02:02 AM    Creatinine 0.98 06/28/2017 02:02 AM    BUN/Creatinine ratio 10 06/28/2017 02:02 AM    GFR est AA >60 06/28/2017 02:02 AM    GFR est non-AA >60 06/28/2017 02:02 AM    Calcium 8.4 06/28/2017 02:02 AM       Lab Results   Component Value Date/Time    aPTT 28.0 06/22/2017 12:54 PM     Lab Results   Component Value Date/Time    INR 2.5 06/28/2017 02:02 AM    INR 2.1 06/27/2017 02:05 AM    INR 1.5 06/26/2017 05:20 AM    Prothrombin time 26.1 06/28/2017 02:02 AM    Prothrombin time 21.8 06/27/2017 02:05 AM    Prothrombin time 15.4 06/26/2017 05:20 AM     No components found for: Hannah Gore MD

## 2017-07-21 ENCOUNTER — HOSPITAL ENCOUNTER (OUTPATIENT)
Dept: CT IMAGING | Age: 47
Discharge: HOME | End: 2017-07-21
Attending: PHYSICIAN ASSISTANT
Payer: COMMERCIAL

## 2017-07-21 DIAGNOSIS — R19.03 RIGHT LOWER QUADRANT ABDOMINAL MASS: ICD-10-CM

## 2017-07-21 DIAGNOSIS — R19.03 ABDOMINAL OR PELVIC SWELLING, MASS, OR LUMP, RIGHT LOWER QUADRANT: ICD-10-CM

## 2017-07-21 PROCEDURE — 74177 CT ABD & PELVIS W/CONTRAST: CPT

## 2017-07-21 PROCEDURE — 74011636320 HC RX REV CODE- 636/320

## 2017-07-21 RX ADMIN — IOPAMIDOL 100 ML: 755 INJECTION, SOLUTION INTRAVENOUS at 15:45

## 2017-07-24 ENCOUNTER — OFFICE VISIT (OUTPATIENT)
Dept: CARDIOLOGY CLINIC | Age: 47
End: 2017-07-24

## 2017-07-24 VITALS
DIASTOLIC BLOOD PRESSURE: 64 MMHG | HEIGHT: 66 IN | WEIGHT: 315 LBS | HEART RATE: 100 BPM | BODY MASS INDEX: 50.62 KG/M2 | SYSTOLIC BLOOD PRESSURE: 120 MMHG

## 2017-07-24 DIAGNOSIS — Z72.0 TOBACCO USE: ICD-10-CM

## 2017-07-24 DIAGNOSIS — I48.19 PERSISTENT ATRIAL FIBRILLATION (HCC): Primary | ICD-10-CM

## 2017-07-24 DIAGNOSIS — G47.33 OSA (OBSTRUCTIVE SLEEP APNEA): ICD-10-CM

## 2017-07-24 DIAGNOSIS — N28.9 RENAL INSUFFICIENCY: ICD-10-CM

## 2017-07-24 DIAGNOSIS — E66.01 MORBID OBESITY DUE TO EXCESS CALORIES (HCC): ICD-10-CM

## 2017-07-24 NOTE — MR AVS SNAPSHOT
Visit Information Date & Time Provider Department Dept. Phone Encounter #  
 7/24/2017 10:40 AM Efrain Metz MD [facility] Sheryle Pesa [phone] [id number] Upcoming Health Maintenance Date Due Pneumococcal 19-64 Medium Risk (1 of 1 - PPSV23) 3/6/1988 DTaP/Tdap/Td series (1 - Tdap) 3/6/1990 INFLUENZA AGE 9 TO ADULT 8/1/2017 Allergies as of 7/24/2017  Review Complete On: 7/24/2017 By: Mellisa Porter RN Severity Noted Reaction Type Reactions Penicillins Medium 06/22/2017   Systemic Hives Current Immunizations  Never Reviewed No immunizations on file. Not reviewed this visit You Were Diagnosed With   
  
 Codes Comments Persistent atrial fibrillation (HCC)    -  Primary ICD-10-CM: I48.1 ICD-9-CM: 427.31 Vitals BP Pulse Height(growth percentile) Weight(growth percentile) BMI Smoking Status 120/64 100 5' 6\" (1.676 m) 337 lb (152.9 kg) 54.39 kg/m2 Former Smoker Vitals History BMI and BSA Data Body Mass Index Body Surface Area 54.39 kg/m 2 2.67 m 2 Preferred Pharmacy Pharmacy Name Phone [facility] [address] [place] [address] [phone] Your Updated Medication List  
  
   
This list is accurate as of: 7/24/17 11:16 AM.  Always use your most recent med list.  
  
  
  
  
 dilTIAZem  mg ER capsule Commonly known as:  CARDIZEM CD Take 1 Cap by mouth daily. LASIX 20 mg tablet Generic drug:  furosemide Take 40 mg by mouth two (2) times a day. metOLazone 2.5 mg tablet Commonly known as:  Lenette [place] Take 1 Tab by mouth daily. MIRALAX 17 gram/dose powder Generic drug:  polyethylene glycol Take 17 g by mouth daily. MULTIPLE VITAMIN PO Take  by mouth. nicotine 21 mg/24 hr  
Commonly known as:  NICODERM CQ  
1 Patch by TransDERmal route daily for 30 days. warfarin 5 mg tablet Commonly known as:  COUMADIN  
 Take 1 Tab by mouth daily. Indications: PREVENT THROMBOEMBOLISM IN CHRONIC ATRIAL FIBRILLATION We Performed the Following MAGNESIUM [id number] CPT(R)] METABOLIC PANEL, BASIC [56404 CPT(R)] Introducing [facility]! [facility] introduces [employer] patient portal. Now you can access parts of your medical record, email your doctor's office, and request medication refills online. 1. In your internet browser, go to [email or web address] 2. Click on the First Time User? Click Here link in the Sign In box. You will see the New Member Sign Up page. 3. Enter your [employer] Access Code exactly as it appears below. You will not need to use this code after youve completed the sign-up process. If you do not sign up before the expiration date, you must request a new code. · [employer] Access Code: [email or web address] Expires: 9/24/2017  9:21 AM 
 
4. Enter the last four digits of your Social Security Number (xxxx) and Date of Birth (mm/dd/yyyy) as indicated and click Submit. You will be taken to the next sign-up page. 5. Create a [employer]Bill ID. This will be your [employer] login ID and cannot be changed, so think of one that is secure and easy to remember. 6. Create a [employer] password. You can change your password at any time. 7. Enter your Password Reset Question and Answer. This can be used at a later time if you forget your password. 8. Enter your e-mail address. You will receive e-mail notification when new information is available in [address] 9. Click Sign Up. You can now view and download portions of your medical record. 10. Click the Download Summary menu link to download a portable copy of your medical information. If you have questions, please visit the Frequently Asked Questions section of the [employer] website. Remember, [employer]Bill is NOT to be used for urgent needs. For medical emergencies, dial 911. Now available from your iPhone and Android! Please provide this summary of care documentation to your next provider. Your primary care clinician is listed as Srikanth Morales. If you have any questions after today's visit, please call [phone].

## 2017-07-24 NOTE — PROGRESS NOTES
Rica Costa MD    [address] [facility] [place], [address] [facility]    Office [phone],RVS [phone]  Pager [phone]    Bianka Crane. is a 49 y.o. male is here for f/u visit. Primary care physician:  MENA Mcpherson    Patient Active Problem List   Diagnosis Code    Renal insufficiency N28.9    PEPE (obstructive sleep apnea) G47.33    Tobacco use Z72.0    Chronic back pain M54.9, G89.29    Asthma J45.909    Elevated BP without diagnosis of hypertension R03.0    Exertional dyspnea R06.09    Persistent atrial fibrillation (HCC) I48.1    Morbid obesity due to excess calories [facility]) E66.01       Dear Mr. Pily Navarro had the pleasure of seeing Mr. Bianka Crane. in the office today. Chief complaint:  Chief Complaint   Patient presents with    Hypertension    Irregular Heart Beat    Medication Evaluation     one week f/u       Assessment:  Acute on Chronic SHF - EF 45%. A fib with CVR   Chronic anticoagulation - INR 2.5  Morbid obesity   Cellulitis  Smoker         Plan:   Continue Zaroxolyn 5 mg daily and furosemide 40 mg twice daily  Monitor weight. If symptoms worsen advised to continue ED. BMP, LFT, proBNP today. 6/28/17 Cr 0.9/INR 2.5  Patient has stopped smoking and is wearing nicotine patch. Follow up in one week          Patient understands the plan. All questions were answered to the patient's satisfaction. Medication Side Effects and Warnings were discussed with patient: yes  Patient Labs were reviewed and or requested:  yes  Patient Past Records were reviewed and or requested: yes    I appreciate the opportunity to be involved in . See note below for details. Please do not hesitate to contact us with questions or concerns. Rica Costa MD    Cardiac Testing/ Procedures: A. Cardiac Cath/PCI:    B.ECHO/EVITA: 6/22/2017 Left ventricle: Systolic function was mildly reduced. Ejection fraction was estimated to be 45 %.  There were no regional wall motion abnormalities. Tricuspid valve: There was mild regurgitation. C.StressNuclear/Stress ECHO/Stress test:    D.Vascular:    E. EP:    F. Miscellaneous:    Subjective:  Dada Sexton is a 49 y.o. male who returns for follow up visit. Mr. Benjamin Stephens was admitted to [facility] from 6/22/2017 to 6/28/2017 for A fib with RVR, CHF, abd wall cellulitis. He was rate controlled and diuresed. His EF was 45%. Patient after being discharged was briefly doing well/losing weight. On DC he was sent home on Bumex 1 mg bid. He started having erythema of his legs and abd wall and has been on two different antibiotics. In the interim he started gaining weight and has gained approximately ten pounds. Patient was seen last week and was started on metoprolol and also was advised to take furosemide 40 mg twice daily. Patient has lost roughly 20 pounds since his visit. He is able to breathe better the swelling in the lower extremities have decreased. He is able to put on his shoes which he was unable to do before. Continues to have dyspnea and edema. ROS:  (bold if positive, if negative)             Medications before admission:    Current Outpatient Prescriptions   Medication Sig Dispense    MULTIVITAMIN (MULTIPLE VITAMIN PO) Take  by mouth.  furosemide (LASIX) 20 mg tablet Take 40 mg by mouth two (2) times a day.  metOLazone (ZAROXOLYN) 2.5 mg tablet Take 1 Tab by mouth daily. 30 Tab    dilTIAZem CD (CARDIZEM CD) 300 mg ER capsule Take 1 Cap by mouth daily. 30 Cap    nicotine (NICODERM CQ) 21 mg/24 hr 1 Patch by TransDERmal route daily for 30 days. 30 Patch    warfarin (COUMADIN) 5 mg tablet Take 1 Tab by mouth daily. Indications: PREVENT THROMBOEMBOLISM IN CHRONIC ATRIAL FIBRILLATION 30 Tab    polyethylene glycol (MIRALAX) 17 gram/dose powder Take 17 g by mouth daily. No current facility-administered medications for this visit.         Family History of CAD:    Yes    Social History:  Current Smoker  No    Physical Exam:  Visit Vitals    /64    Pulse 100    Ht 5' 6\" (1.676 m)    Wt 337 lb (152.9 kg)  Comment: down 20 pounds    BMI 54.39 kg/m2          Gen: Well-developed, well-nourished, morbid obesity  Neck: Supple,thick neck, unable to appreciate JVD  Resp: No accessory muscle use, Clear breath sounds, No rales or rhonchi  Card: Irregular Rate,Rythm,Normal S1, S2, No murmurs  Abd:  Soft,morbidly obese, Abd wall - indurated/erythema+  MSK: No cyanosis  Neuro: moving all four extremities , follows commands appropriately  Psych:  Good insight, oriented to person, place , alert, Nml Affect  LE: 2+ edema;  Erythema - both LE    EKG:       LABS:        Lab Results   Component Value Date/Time    WBC 6.8 06/28/2017 02:02 AM    HGB 12.8 06/28/2017 02:02 AM    HCT 39.9 06/28/2017 02:02 AM    PLATELET 239 44/60/7368 02:02 AM     Lab Results   Component Value Date/Time    Sodium 139 06/28/2017 02:02 AM    Potassium 3.7 06/28/2017 02:02 AM    Chloride 102 06/28/2017 02:02 AM    CO2 32 06/28/2017 02:02 AM    Anion gap 5 06/28/2017 02:02 AM    Glucose 117 06/28/2017 02:02 AM    BUN 10 06/28/2017 02:02 AM    Creatinine 0.98 06/28/2017 02:02 AM    BUN/Creatinine ratio 10 06/28/2017 02:02 AM    GFR est AA >60 06/28/2017 02:02 AM    GFR est non-AA >60 06/28/2017 02:02 AM    Calcium 8.4 06/28/2017 02:02 AM       Lab Results   Component Value Date/Time    aPTT 28.0 06/22/2017 12:54 PM     Lab Results   Component Value Date/Time    INR 2.5 06/28/2017 02:02 AM    INR 2.1 06/27/2017 02:05 AM    INR 1.5 06/26/2017 05:20 AM    Prothrombin time 26.1 06/28/2017 02:02 AM    Prothrombin time 21.8 06/27/2017 02:05 AM    Prothrombin time 15.4 06/26/2017 05:20 AM     No components found for: Yue Camacho MD

## 2017-07-25 LAB
BUN SERPL-MCNC: 11 MG/DL (ref 6–24)
BUN/CREAT SERPL: 9 (ref 9–20)
CALCIUM SERPL-MCNC: 9.5 MG/DL (ref 8.7–10.2)
CHLORIDE SERPL-SCNC: 85 MMOL/L (ref 96–106)
CO2 SERPL-SCNC: 30 MMOL/L (ref 18–29)
CREAT SERPL-MCNC: 1.23 MG/DL (ref 0.76–1.27)
GLUCOSE SERPL-MCNC: 94 MG/DL (ref 65–99)
MAGNESIUM SERPL-MCNC: 2.1 MG/DL (ref 1.6–2.3)
POTASSIUM SERPL-SCNC: 3.4 MMOL/L (ref 3.5–5.2)
SODIUM SERPL-SCNC: 137 MMOL/L (ref 134–144)

## 2017-07-31 RX ORDER — FUROSEMIDE 40 MG/1
40 TABLET ORAL 2 TIMES DAILY
COMMUNITY
End: 2017-07-31 | Stop reason: SDUPTHER

## 2017-07-31 RX ORDER — FUROSEMIDE 40 MG/1
40 TABLET ORAL 2 TIMES DAILY
Qty: 60 TAB | Refills: 3 | Status: SHIPPED | OUTPATIENT
Start: 2017-07-31 | End: 2018-01-25 | Stop reason: SDUPTHER

## 2017-08-03 ENCOUNTER — OFFICE VISIT (OUTPATIENT)
Dept: CARDIOLOGY CLINIC | Age: 47
End: 2017-08-03

## 2017-08-03 VITALS
RESPIRATION RATE: 22 BRPM | SYSTOLIC BLOOD PRESSURE: 126 MMHG | HEIGHT: 66 IN | OXYGEN SATURATION: 97 % | HEART RATE: 88 BPM | WEIGHT: 310 LBS | BODY MASS INDEX: 49.82 KG/M2 | DIASTOLIC BLOOD PRESSURE: 60 MMHG

## 2017-08-03 DIAGNOSIS — G47.33 OSA (OBSTRUCTIVE SLEEP APNEA): ICD-10-CM

## 2017-08-03 DIAGNOSIS — R60.9 EDEMA, UNSPECIFIED TYPE: ICD-10-CM

## 2017-08-03 DIAGNOSIS — N28.9 RENAL INSUFFICIENCY: ICD-10-CM

## 2017-08-03 DIAGNOSIS — E66.01 MORBID OBESITY DUE TO EXCESS CALORIES (HCC): ICD-10-CM

## 2017-08-03 DIAGNOSIS — I50.22 CHRONIC SYSTOLIC HEART FAILURE (HCC): Primary | ICD-10-CM

## 2017-08-03 DIAGNOSIS — I48.19 PERSISTENT ATRIAL FIBRILLATION (HCC): ICD-10-CM

## 2017-08-03 RX ORDER — POTASSIUM CHLORIDE 750 MG/1
10 TABLET, EXTENDED RELEASE ORAL DAILY
Qty: 30 TAB | Refills: 1 | Status: SHIPPED | OUTPATIENT
Start: 2017-08-03 | End: 2017-08-30 | Stop reason: DRUGHIGH

## 2017-08-03 NOTE — PROGRESS NOTES
Allegra Renee MD    [address] [facility] [place], [address] [facility]    Office [phone],STG [phone]  Pager [phone]    Julio C Huynh is a 49 y.o. male is here for f/u visit. Primary care physician:  Elder Space, PA    Patient Active Problem List   Diagnosis Code    Renal insufficiency N28.9    PEPE (obstructive sleep apnea) G47.33    Tobacco use Z72.0    Chronic back pain M54.9, G89.29    Asthma J45.909    Elevated BP without diagnosis of hypertension R03.0    Exertional dyspnea R06.09    Persistent atrial fibrillation (HCC) I48.1    Morbid obesity due to excess calories [facility]) E66.01       Dear Mr. Russell had the pleasure of seeing Mr. Julio C Huynh in the office today. Chief complaint:  Chief Complaint   Patient presents with    Irregular Heart Beat     1 week f/u still with c/o fatigue    Hypertension    CHF       Assessment:   Chronic SHF - EF 45%. A fib with CVR   Chronic anticoagulation - INR 2.5  Morbid obesity   Smoker         Plan:   Continue Zaroxolyn 5 mg daily and furosemide 40 mg twice daily. Add K-Dur 10 meq daily. Reviewed recent lab work. Potassium 3.4.  7/24/17-creatinine 1.23  Patient has stopped smoking and is wearing nicotine patch. Follow up in 3-4 weeks. BMP prior to visit         Patient understands the plan. All questions were answered to the patient's satisfaction. Medication Side Effects and Warnings were discussed with patient: yes  Patient Labs were reviewed and or requested:  yes  Patient Past Records were reviewed and or requested: yes    I appreciate the opportunity to be involved in . See note below for details. Please do not hesitate to contact us with questions or concerns. Allegra Renee MD    Cardiac Testing/ Procedures: A. Cardiac Cath/PCI:    B.ECHO/EVITA: 6/22/2017 Left ventricle: Systolic function was mildly reduced. Ejection fraction was estimated to be 45 %.  There were no regional wall motion abnormalities. Tricuspid valve: There was mild regurgitation. C.StressNuclear/Stress ECHO/Stress test:    D.Vascular:    E. EP:    F. Miscellaneous:    Subjective:  Kim Hayes is a 49 y.o. male who returns for follow up visit. Patient continues to lose weight. He has been compliant with her medications. His dyspnea is improved. No chest pain. The erythema in the lower legs is also improved. ROS:  (bold if positive, if negative)             Medications before admission:    Current Outpatient Prescriptions   Medication Sig Dispense    furosemide (LASIX) 40 mg tablet Take 1 Tab by mouth two (2) times a day. 60 Tab    MULTIVITAMIN (MULTIPLE VITAMIN PO) Take  by mouth.  metOLazone (ZAROXOLYN) 2.5 mg tablet Take 1 Tab by mouth daily. 30 Tab    dilTIAZem CD (CARDIZEM CD) 300 mg ER capsule Take 1 Cap by mouth daily. 30 Cap    warfarin (COUMADIN) 5 mg tablet Take 1 Tab by mouth daily. Indications: PREVENT THROMBOEMBOLISM IN CHRONIC ATRIAL FIBRILLATION 30 Tab    polyethylene glycol (MIRALAX) 17 gram/dose powder Take 17 g by mouth daily. No current facility-administered medications for this visit. Family History of CAD:    Yes    Social History:  Current  Smoker  No    Physical Exam:  Visit Vitals    /60 (BP 1 Location: Left arm, BP Patient Position: Sitting)    Pulse 88    Resp 22    Ht 5' 6\" (1.676 m)    Wt 310 lb (140.6 kg)    SpO2 97%    BMI 50.04 kg/m2          Gen: Well-developed, well-nourished, morbid obesity  Neck: Supple,thick neck, unable to appreciate JVD  Resp: No accessory muscle use, Clear breath sounds, No rales or rhonchi  Card: Irregular Rate,Rythm,Normal S1, S2, No murmurs  Abd:  Soft,morbidly obese, Abd wall - indurated/erythema+  MSK: No cyanosis  Neuro: moving all four extremities , follows commands appropriately  Psych:  Good insight, oriented to person, place , alert, Nml Affect  LE: 2+ edema;  Erythema - both LE  ( improved)    EKG:       LABS:        Lab Results   Component Value Date/Time    WBC 6.8 06/28/2017 02:02 AM    HGB 12.8 06/28/2017 02:02 AM    HCT 39.9 06/28/2017 02:02 AM    PLATELET 623 92/14/9884 02:02 AM     Lab Results   Component Value Date/Time    Sodium 137 07/24/2017 11:30 AM    Potassium 3.4 07/24/2017 11:30 AM    Chloride 85 07/24/2017 11:30 AM    CO2 30 07/24/2017 11:30 AM    Anion gap 5 06/28/2017 02:02 AM    Glucose 94 07/24/2017 11:30 AM    BUN 11 07/24/2017 11:30 AM    Creatinine 1.23 07/24/2017 11:30 AM    BUN/Creatinine ratio 9 07/24/2017 11:30 AM    GFR est AA 80 07/24/2017 11:30 AM    GFR est non-AA 69 07/24/2017 11:30 AM    Calcium 9.5 07/24/2017 11:30 AM       Lab Results   Component Value Date/Time    aPTT 28.0 06/22/2017 12:54 PM     Lab Results   Component Value Date/Time    INR 2.5 06/28/2017 02:02 AM    INR 2.1 06/27/2017 02:05 AM    INR 1.5 06/26/2017 05:20 AM    Prothrombin time 26.1 06/28/2017 02:02 AM    Prothrombin time 21.8 06/27/2017 02:05 AM    Prothrombin time 15.4 06/26/2017 05:20 AM     No components found for: Angie Patterson MD

## 2017-08-03 NOTE — MR AVS SNAPSHOT
Visit Information Date & Time Provider Department Dept. Phone Encounter #  
 8/3/2017  1:00 PM Miguel Parish MD [facility] Nabil Zayas [phone] [id number] Upcoming Health Maintenance Date Due Pneumococcal 19-64 Medium Risk (1 of 1 - PPSV23) 3/6/1988 DTaP/Tdap/Td series (1 - Tdap) 3/6/1990 INFLUENZA AGE 9 TO ADULT 8/1/2017 Allergies as of 8/3/2017  Review Complete On: 8/3/2017 By: Ruth Vang LPN Severity Noted Reaction Type Reactions Penicillins Medium 06/22/2017   Systemic Hives Current Immunizations  Never Reviewed No immunizations on file. Not reviewed this visit You Were Diagnosed With   
  
 Codes Comments Edema, unspecified type    -  Primary ICD-10-CM: R60.9 ICD-9-CM: 477. 3 Vitals BP Pulse Resp Height(growth percentile) Weight(growth percentile) SpO2  
 126/60 (BP 1 Location: Left arm, BP Patient Position: Sitting) 88 22 5' 6\" (1.676 m) 310 lb (140.6 kg) 97% BMI Smoking Status 50.04 kg/m2 Former Smoker Vitals History BMI and BSA Data Body Mass Index Body Surface Area 50.04 kg/m 2 2.56 m 2 Preferred Pharmacy Pharmacy Name Phone [facility] [address] [place] [address] [phone] Your Updated Medication List  
  
   
This list is accurate as of: 8/3/17  1:39 PM.  Always use your most recent med list.  
  
  
  
  
 dilTIAZem  mg ER capsule Commonly known as:  CARDIZEM CD Take 1 Cap by mouth daily. furosemide 40 mg tablet Commonly known as:  LASIX Take 1 Tab by mouth two (2) times a day. metOLazone 2.5 mg tablet Commonly known as:  Stoney Colder Take 1 Tab by mouth daily. MIRALAX 17 gram/dose powder Generic drug:  polyethylene glycol Take 17 g by mouth daily. MULTIPLE VITAMIN PO Take  by mouth.  
  
 warfarin 5 mg tablet Commonly known as:  COUMADIN  
 Take 1 Tab by mouth daily. Indications: PREVENT THROMBOEMBOLISM IN CHRONIC ATRIAL FIBRILLATION We Performed the Following METABOLIC PANEL, BASIC [57306 CPT(R)] Introducing \[facility] [place] [facility]\"" & HEALTH SERVICES! [facility] introduces [employer] patient portal. Now you can access parts of your medical record, email your doctor's office, and request medication refills online. 1. In your internet browser, go to [email or web address] 2. Click on the First Time User? Click Here link in the Sign In box. You will see the New Member Sign Up page. 3. Enter your [employer] Access Code exactly as it appears below. You will not need to use this code after youve completed the sign-up process. If you do not sign up before the expiration date, you must request a new code. · [employer] Access Code: [email or web address] Expires: 9/24/2017  9:21 AM 
 
4. Enter the last four digits of your Social Security Number (xxxx) and Date of Birth (mm/dd/yyyy) as indicated and click Submit. You will be taken to the next sign-up page. 5. Create a [employer] ID. This will be your [employer] login ID and cannot be changed, so think of one that is secure and easy to remember. 6. Create a [employer] password. You can change your password at any time. 7. Enter your Password Reset Question and Answer. This can be used at a later time if you forget your password. 8. Enter your e-mail address. You will receive e-mail notification when new information is available in [address] 9. Click Sign Up. You can now view and download portions of your medical record. 10. Click the Download Summary menu link to download a portable copy of your medical information. If you have questions, please visit the Frequently Asked Questions section of the [employer] website. Remember, [employer] is NOT to be used for urgent needs. For medical emergencies, dial 911. Now available from your iPhone and Android! Please provide this summary of care documentation to your next provider. Your primary care clinician is listed as Matilda Nguyen. If you have any questions after today's visit, please call [phone].

## 2017-08-25 LAB
BUN SERPL-MCNC: 26 MG/DL (ref 6–24)
BUN/CREAT SERPL: 16 (ref 9–20)
CALCIUM SERPL-MCNC: 9.8 MG/DL (ref 8.7–10.2)
CHLORIDE SERPL-SCNC: 88 MMOL/L (ref 96–106)
CO2 SERPL-SCNC: 32 MMOL/L (ref 18–29)
CREAT SERPL-MCNC: 1.58 MG/DL (ref 0.76–1.27)
GLUCOSE SERPL-MCNC: 96 MG/DL (ref 65–99)
INTERPRETATION: NORMAL
POTASSIUM SERPL-SCNC: 2.7 MMOL/L (ref 3.5–5.2)
SODIUM SERPL-SCNC: 139 MMOL/L (ref 134–144)

## 2017-08-29 ENCOUNTER — OFFICE VISIT (OUTPATIENT)
Dept: CARDIOLOGY CLINIC | Age: 47
End: 2017-08-29

## 2017-08-29 VITALS
OXYGEN SATURATION: 97 % | WEIGHT: 302 LBS | SYSTOLIC BLOOD PRESSURE: 122 MMHG | HEART RATE: 50 BPM | HEIGHT: 66 IN | DIASTOLIC BLOOD PRESSURE: 68 MMHG | BODY MASS INDEX: 48.53 KG/M2 | RESPIRATION RATE: 22 BRPM

## 2017-08-29 DIAGNOSIS — R60.9 EDEMA, UNSPECIFIED TYPE: ICD-10-CM

## 2017-08-29 DIAGNOSIS — I48.19 PERSISTENT ATRIAL FIBRILLATION (HCC): Primary | ICD-10-CM

## 2017-08-29 DIAGNOSIS — I50.32 CHRONIC DIASTOLIC HEART FAILURE (HCC): ICD-10-CM

## 2017-08-29 DIAGNOSIS — N28.9 RENAL INSUFFICIENCY: ICD-10-CM

## 2017-08-29 DIAGNOSIS — E66.01 MORBID OBESITY DUE TO EXCESS CALORIES (HCC): ICD-10-CM

## 2017-08-29 NOTE — MR AVS SNAPSHOT
Visit Information Date & Time Provider Department Dept. Phone Encounter #  
 8/29/2017 11:20 AM Ulysses Decree, MD [facility] Ruby Harris [phone] [id number] Your Appointments 10/13/2017  9:40 AM  
ESTABLISHED PATIENT with Ulysses Decree, MD  
[facility] ([address]) Appt Note: 8 wk fup  
 [address] [facility] [address] [facility]  
[address] [facility] [address] [address] Upcoming Health Maintenance Date Due Pneumococcal 19-64 Medium Risk (1 of 1 - PPSV23) 3/6/1988 DTaP/Tdap/Td series (1 - Tdap) 3/6/1990 INFLUENZA AGE 9 TO ADULT 8/1/2017 Allergies as of 8/29/2017  Review Complete On: 8/29/2017 By: Kell Daigle LPN Severity Noted Reaction Type Reactions Penicillins Medium 06/22/2017   Systemic Hives Current Immunizations  Never Reviewed No immunizations on file. Not reviewed this visit Vitals BP Pulse Resp Height(growth percentile) Weight(growth percentile) SpO2  
 122/68 (BP 1 Location: Left arm, BP Patient Position: Sitting) (!) 50 22 5' 6\" (1.676 m) 302 lb (137 kg) 97% BMI Smoking Status 48.74 kg/m2 Former Smoker Vitals History BMI and BSA Data Body Mass Index Body Surface Area 48.74 kg/m 2 2.53 m 2 Preferred Pharmacy Pharmacy Name Phone [facility] [address] [place] [address] [phone] Your Updated Medication List  
  
   
This list is accurate as of: 8/29/17 12:11 PM.  Always use your most recent med list.  
  
  
  
  
 dilTIAZem  mg ER capsule Commonly known as:  CARDIZEM CD Take 1 Cap by mouth daily. furosemide 40 mg tablet Commonly known as:  LASIX Take 1 Tab by mouth two (2) times a day. metOLazone 2.5 mg tablet Commonly known as:  Arnold Louis Take 1 Tab by mouth daily. MIRALAX 17 gram/dose powder Generic drug:  polyethylene glycol Take 17 g by mouth daily. MULTIPLE VITAMIN PO Take  by mouth.  
  
 potassium chloride 10 mEq tablet Commonly known as:  KLOR-CON Take 1 Tab by mouth daily. warfarin 5 mg tablet Commonly known as:  COUMADIN Take 1 Tab by mouth daily. Indications: PREVENT THROMBOEMBOLISM IN CHRONIC ATRIAL FIBRILLATION Introducing [facility] & HEALTH SERVICES! Martita Troncoso introduces [employer] patient portal. Now you can access parts of your medical record, email your doctor's office, and request medication refills online. 1. In your internet browser, go to [email or web address] 2. Click on the First Time User? Click Here link in the Sign In box. You will see the New Member Sign Up page. 3. Enter your [employer] Access Code exactly as it appears below. You will not need to use this code after youve completed the sign-up process. If you do not sign up before the expiration date, you must request a new code. · [employer] Access Code: [email or web address] Expires: 9/24/2017  9:21 AM 
 
4. Enter the last four digits of your Social Security Number (xxxx) and Date of Birth (mm/dd/yyyy) as indicated and click Submit. You will be taken to the next sign-up page. 5. Create a [employer] ID. This will be your [employer] login ID and cannot be changed, so think of one that is secure and easy to remember. 6. Create a [employer] password. You can change your password at any time. 7. Enter your Password Reset Question and Answer. This can be used at a later time if you forget your password. 8. Enter your e-mail address. You will receive e-mail notification when new information is available in [address] 9. Click Sign Up. You can now view and download portions of your medical record. 10. Click the Download Summary menu link to download a portable copy of your medical information. If you have questions, please visit the Frequently Asked Questions section of the [employer] website. Remember, [employer] is NOT to be used for urgent needs. For medical emergencies, dial 911. Now available from your iPhone and Android! Please provide this summary of care documentation to your next provider. Your primary care clinician is listed as Eliz Hilliard. If you have any questions after today's visit, please call [phone].

## 2017-08-29 NOTE — PROGRESS NOTES
Figueroa Gregory MD    [id number] [place], [address] [facility]    Office [phone],PLB [phone]  Pager [phone]    Yaima Simms. is a 49 y.o. male is here for f/u visit. Primary care physician:  MENA Castro    Patient Active Problem List   Diagnosis Code    Renal insufficiency N28.9    PEPE (obstructive sleep apnea) G47.33    Tobacco use Z72.0    Chronic back pain M54.9, G89.29    Asthma J45.909    Elevated BP without diagnosis of hypertension R03.0    Exertional dyspnea R06.09    Persistent atrial fibrillation (HCC) I48.1    Morbid obesity due to excess calories [facility]) E66.01       Dear Mr. Michelle Hdez had the pleasure of seeing Mr. Yaima Simms. in the office today. Chief complaint:  Chief Complaint   Patient presents with    Irregular Heart Beat     3 week f/u       Assessment:   Chronic SHF - EF 45%. A fib with CVR   Chronic anticoagulation - INR 2.5  Morbid obesity   Smoker         Plan:   DC Zaroxolyn. Dec Lasix 40mg am and 20mg pm for a week - if wt stays down/edema conitnues to improved - dec to lasix 40mg daily. 8/24/17 - K 2.7 - Inc K dur 10meq to tid for 4 days and then bid - check K in 2 weeks ( he will get his PCP to check lab)  7/24/17-creatinine 1.23; 8/24/17 - Cr 1.58 - Cr creeping up ; dec lasix as above  Patient has stopped smoking and is wearing nicotine patch. Follow up in 6weeks. Patient understands the plan. All questions were answered to the patient's satisfaction. Medication Side Effects and Warnings were discussed with patient: yes  Patient Labs were reviewed and or requested:  yes  Patient Past Records were reviewed and or requested: yes    I appreciate the opportunity to be involved in . See note below for details. Please do not hesitate to contact us with questions or concerns. Figueroa Gregory MD    Cardiac Testing/ Procedures: A. Cardiac Cath/PCI:    B.ECHO/EVITA: 6/22/2017 Left ventricle: Systolic function was mildly reduced. Ejection fraction was estimated to be 45 %. There were no regional wall motion abnormalities. Tricuspid valve: There was mild regurgitation. C.StressNuclear/Stress ECHO/Stress test:    D.Vascular:    E. EP:    F. Miscellaneous:    Subjective:  Julio C Huynh is a 49 y.o. male who returns for follow up visit. Patient continues to lose weight. He has been compliant with her medications. His dyspnea is improved. No chest pain. The erythema in the lower legs is also improved. Edema has decreased    ROS:  (bold if positive, if negative)             Medications before admission:    Current Outpatient Prescriptions   Medication Sig Dispense    furosemide (LASIX) 40 mg tablet Take 1 Tab by mouth two (2) times a day. 60 Tab    MULTIVITAMIN (MULTIPLE VITAMIN PO) Take  by mouth.  metOLazone (ZAROXOLYN) 2.5 mg tablet Take 1 Tab by mouth daily. 30 Tab    dilTIAZem CD (CARDIZEM CD) 300 mg ER capsule Take 1 Cap by mouth daily. 30 Cap    warfarin (COUMADIN) 5 mg tablet Take 1 Tab by mouth daily. Indications: PREVENT THROMBOEMBOLISM IN CHRONIC ATRIAL FIBRILLATION 30 Tab    polyethylene glycol (MIRALAX) 17 gram/dose powder Take 17 g by mouth daily.  potassium chloride SR (KLOR-CON 10) 10 mEq tablet Take 1 Tab by mouth daily. Dose change -pt to take 3 tabs daily for 4 days then take 2 tabs thereafter 80 Tab     No current facility-administered medications for this visit.         Family History of CAD:    Yes    Social History:  Current  Smoker  No    Physical Exam:  Visit Vitals    /68 (BP 1 Location: Left arm, BP Patient Position: Sitting)    Pulse (!) 50    Resp 22    Ht 5' 6\" (1.676 m)    Wt 302 lb (137 kg)    SpO2 97%    BMI 48.74 kg/m2          Gen: Well-developed, well-nourished, morbid obesity  Neck: Supple,thick neck, unable to appreciate JVD  Resp: No accessory muscle use, Clear breath sounds, No rales or rhonchi  Card: Irregular Rate,Rythm,Normal S1, S2, No murmurs  Abd:  Soft,morbidly obese, Abd wall - indurated/erythema+  MSK: No cyanosis  Neuro: moving all four extremities , follows commands appropriately  Psych:  Good insight, oriented to person, place , alert, Nml Affect  LE: 1+ edema;  Erythema - both LE  ( improved)    EKG:       LABS:        Lab Results   Component Value Date/Time    WBC 6.8 06/28/2017 02:02 AM    HGB 12.8 06/28/2017 02:02 AM    HCT 39.9 06/28/2017 02:02 AM    PLATELET 122 40/81/8195 02:02 AM     Lab Results   Component Value Date/Time    Sodium 139 08/24/2017 12:54 PM    Potassium 2.7 08/24/2017 12:54 PM    Chloride 88 08/24/2017 12:54 PM    CO2 32 08/24/2017 12:54 PM    Anion gap 5 06/28/2017 02:02 AM    Glucose 96 08/24/2017 12:54 PM    BUN 26 08/24/2017 12:54 PM    Creatinine 1.58 08/24/2017 12:54 PM    BUN/Creatinine ratio 16 08/24/2017 12:54 PM    GFR est AA 59 08/24/2017 12:54 PM    GFR est non-AA 51 08/24/2017 12:54 PM    Calcium 9.8 08/24/2017 12:54 PM       Lab Results   Component Value Date/Time    aPTT 28.0 06/22/2017 12:54 PM     Lab Results   Component Value Date/Time    INR 2.5 06/28/2017 02:02 AM    INR 2.1 06/27/2017 02:05 AM    INR 1.5 06/26/2017 05:20 AM    Prothrombin time 26.1 06/28/2017 02:02 AM    Prothrombin time 21.8 06/27/2017 02:05 AM    Prothrombin time 15.4 06/26/2017 05:20 AM     No components found for: Camille Gardner MD

## 2017-08-30 ENCOUNTER — TELEPHONE (OUTPATIENT)
Dept: CARDIOLOGY CLINIC | Age: 47
End: 2017-08-30

## 2017-08-30 RX ORDER — POTASSIUM CHLORIDE 750 MG/1
10 TABLET, FILM COATED, EXTENDED RELEASE ORAL DAILY
COMMUNITY
End: 2017-08-30 | Stop reason: SDUPTHER

## 2017-08-30 RX ORDER — POTASSIUM CHLORIDE 750 MG/1
10 TABLET, FILM COATED, EXTENDED RELEASE ORAL DAILY
Qty: 80 TAB | Refills: 1 | Status: SHIPPED | OUTPATIENT
Start: 2017-08-30 | End: 2017-11-24 | Stop reason: SDUPTHER

## 2017-10-13 ENCOUNTER — OFFICE VISIT (OUTPATIENT)
Dept: CARDIOLOGY CLINIC | Age: 47
End: 2017-10-13

## 2017-10-13 VITALS
DIASTOLIC BLOOD PRESSURE: 62 MMHG | HEART RATE: 92 BPM | RESPIRATION RATE: 20 BRPM | HEIGHT: 66 IN | SYSTOLIC BLOOD PRESSURE: 106 MMHG | WEIGHT: 305.4 LBS | BODY MASS INDEX: 49.08 KG/M2 | OXYGEN SATURATION: 98 %

## 2017-10-13 DIAGNOSIS — N28.9 RENAL INSUFFICIENCY: ICD-10-CM

## 2017-10-13 DIAGNOSIS — Z79.01 CHRONIC ANTICOAGULATION: ICD-10-CM

## 2017-10-13 DIAGNOSIS — G47.33 OSA (OBSTRUCTIVE SLEEP APNEA): ICD-10-CM

## 2017-10-13 DIAGNOSIS — I48.19 PERSISTENT ATRIAL FIBRILLATION (HCC): Primary | ICD-10-CM

## 2017-10-13 DIAGNOSIS — E66.01 MORBID OBESITY DUE TO EXCESS CALORIES (HCC): ICD-10-CM

## 2017-10-13 RX ORDER — WARFARIN SODIUM 5 MG/1
5 TABLET ORAL DAILY
COMMUNITY
End: 2021-05-17 | Stop reason: SDUPTHER

## 2017-10-13 NOTE — PROGRESS NOTES
Matthias Andino MD    [address] [place], [address] [facility]    Office [phone],DUR [phone]  Pager [phone]    Tyler Jacques is a 49 y.o. male is here for f/u visit. Primary care physician:  MENA Iyer    Patient Active Problem List   Diagnosis Code    Renal insufficiency N28.9    PEPE (obstructive sleep apnea) G47.33    Tobacco use Z72.0    Chronic back pain M54.9, G89.29    Asthma J45.909    Elevated BP without diagnosis of hypertension R03.0    Exertional dyspnea R06.09    Persistent atrial fibrillation (HCC) I48.1    Morbid obesity due to excess calories [facility]) E66.01       Dear Won Bonilla Larry had the pleasure of seeing Mr. Tyler Jacques in the office today. Chief complaint:  Chief Complaint   Patient presents with    Irregular Heart Beat     afib       Assessment:   Chronic SHF - EF 45%. A fib with CVR   Chronic anticoagulation - INR 2.5  Morbid obesity   Smoker - quit smoking/but now chewing tobacco        Plan:     Dec Lasix 40 mg a.m., 20 mg p.m. to 40 mg daily. Watch weight/swelling and take extra half a tablet as needed. Parameters given. Check BMP. Currently taking potassium 10 mEq twice daily. Continue same dose for now.  7/24/17-creatinine 1.23; 8/24/17 - Cr 1.58 . Advised to see nephrologist  Pt needs dental procedure. Okay to stop Coumadin 5 days prior to the procedure and recheck thereafter. Follow up in 3 months          Patient understands the plan. All questions were answered to the patient's satisfaction. Medication Side Effects and Warnings were discussed with patient: yes  Patient Labs were reviewed and or requested:  yes  Patient Past Records were reviewed and or requested: yes    I appreciate the opportunity to be involved in . See note below for details. Please do not hesitate to contact us with questions or concerns. Matthias Andino MD    Cardiac Testing/ Procedures: A. Cardiac Cath/PCI:    B.ECHO/EVITA: 6/22/2017 Left ventricle: Systolic function was mildly reduced. Ejection fraction was estimated to be 45 %. There were no regional wall motion abnormalities. Tricuspid valve: There was mild regurgitation. C.StressNuclear/Stress ECHO/Stress test:    D.Vascular:    E. EP:    F. Miscellaneous:    Subjective:  Maegan Sands is a 49 y.o. male who returns for follow up visit. Patient continues to lose weight. He has been compliant with her medications. His dyspnea is improved. No chest pain. The erythema in the lower legs has resolved. Edema has decreased- almost at dry wt per pt    ROS:  (bold if positive, if negative)             Medications before admission:    Current Outpatient Prescriptions   Medication Sig Dispense    warfarin (COUMADIN) 5 mg tablet Take 10 mg by mouth daily.  potassium chloride SR (KLOR-CON 10) 10 mEq tablet Take 1 Tab by mouth daily. Dose change -pt to take 3 tabs daily for 4 days then take 2 tabs thereafter 80 Tab    furosemide (LASIX) 40 mg tablet Take 1 Tab by mouth two (2) times a day. 60 Tab    MULTIVITAMIN (MULTIPLE VITAMIN PO) Take  by mouth daily.  metOLazone (ZAROXOLYN) 2.5 mg tablet Take 1 Tab by mouth daily. 30 Tab    dilTIAZem CD (CARDIZEM CD) 300 mg ER capsule Take 1 Cap by mouth daily. 30 Cap    polyethylene glycol (MIRALAX) 17 gram/dose powder Take 17 g by mouth daily. No current facility-administered medications for this visit.         Family History of CAD:    Yes    Social History:  Current  Smoker  No    Physical Exam:  Visit Vitals    /62 (BP 1 Location: Left arm)    Pulse 92    Resp 20    Ht 5' 6\" (1.676 m)    Wt 305 lb 6.4 oz (138.5 kg)    SpO2 98%    BMI 49.29 kg/m2          Gen: Well-developed, well-nourished, morbid obesity  Neck: Supple,thick neck, unable to appreciate JVD  Resp: No accessory muscle use, Clear breath sounds, No rales or rhonchi  Card: Irregular Rate,Rythm,Normal S1, S2, No murmurs  Abd: Soft,morbidly obese, Abd wall - indurated/erythema+  MSK: No cyanosis  Neuro: moving all four extremities , follows commands appropriately  Psych:  Good insight, oriented to person, place , alert, Nml Affect  LE: Trace edema;     EKG:       LABS:        Lab Results   Component Value Date/Time    WBC 6.8 06/28/2017 02:02 AM    HGB 12.8 06/28/2017 02:02 AM    HCT 39.9 06/28/2017 02:02 AM    PLATELET 621 53/59/9550 02:02 AM     Lab Results   Component Value Date/Time    Sodium 139 08/24/2017 12:54 PM    Potassium 2.7 08/24/2017 12:54 PM    Chloride 88 08/24/2017 12:54 PM    CO2 32 08/24/2017 12:54 PM    Anion gap 5 06/28/2017 02:02 AM    Glucose 96 08/24/2017 12:54 PM    BUN 26 08/24/2017 12:54 PM    Creatinine 1.58 08/24/2017 12:54 PM    BUN/Creatinine ratio 16 08/24/2017 12:54 PM    GFR est AA 59 08/24/2017 12:54 PM    GFR est non-AA 51 08/24/2017 12:54 PM    Calcium 9.8 08/24/2017 12:54 PM       Lab Results   Component Value Date/Time    aPTT 28.0 06/22/2017 12:54 PM     Lab Results   Component Value Date/Time    INR 2.5 06/28/2017 02:02 AM    INR 2.1 06/27/2017 02:05 AM    INR 1.5 06/26/2017 05:20 AM    Prothrombin time 26.1 06/28/2017 02:02 AM    Prothrombin time 21.8 06/27/2017 02:05 AM    Prothrombin time 15.4 06/26/2017 05:20 AM     No components found for: Jacob Hernandez MD

## 2017-10-13 NOTE — MR AVS SNAPSHOT
Visit Information Date & Time Provider Department Dept. Phone Encounter #  
 10/13/2017  9:40 AM Oscar Lyn MD [facility] [phone] [id number] Upcoming Health Maintenance Date Due Pneumococcal 19-64 Medium Risk (1 of 1 - PPSV23) 3/6/1988 DTaP/Tdap/Td series (1 - Tdap) 3/6/1990 INFLUENZA AGE 9 TO ADULT 8/1/2017 Allergies as of 10/13/2017  Review Complete On: 10/13/2017 By: Adam Banks Severity Noted Reaction Type Reactions Penicillins Medium 06/22/2017   Systemic Hives Current Immunizations  Never Reviewed No immunizations on file. Not reviewed this visit You Were Diagnosed With   
  
 Codes Comments Persistent atrial fibrillation (HCC)    -  Primary ICD-10-CM: I48.1 ICD-9-CM: 427.31 Morbid obesity due to excess calories (HCC)     ICD-10-CM: E66.01 
ICD-9-CM: 278.01 Renal insufficiency     ICD-10-CM: N28.9 ICD-9-CM: 593.9   
 PEPE (obstructive sleep apnea)     ICD-10-CM: G47.33 
ICD-9-CM: 327.23 Chronic anticoagulation     ICD-10-CM: Z79.01 
ICD-9-CM: V58.61 Vitals BP Pulse Resp Height(growth percentile) Weight(growth percentile) SpO2  
 106/62 (BP 1 Location: Left arm) 92 20 5' 6\" (1.676 m) 305 lb 6.4 oz (138.5 kg) 98% BMI Smoking Status 49.29 kg/m2 Former Smoker Vitals History BMI and BSA Data Body Mass Index Body Surface Area  
 49.29 kg/m 2 2.54 m 2 Preferred Pharmacy Pharmacy Name Phone [facility] [address] [place] [address] [phone] Your Updated Medication List  
  
   
This list is accurate as of: 10/13/17 10:17 AM.  Always use your most recent med list.  
  
  
  
  
 dilTIAZem  mg ER capsule Commonly known as:  CARDIZEM CD Take 1 Cap by mouth daily. furosemide 40 mg tablet Commonly known as:  LASIX Take 1 Tab by mouth two (2) times a day. metOLazone 2.5 mg tablet Commonly known as:  Estephania Ou Take 1 Tab by mouth daily. MIRALAX 17 gram/dose powder Generic drug:  polyethylene glycol Take 17 g by mouth daily. MULTIPLE VITAMIN PO Take  by mouth daily. potassium chloride SR 10 mEq tablet Commonly known as:  KLOR-CON 10 Take 1 Tab by mouth daily. Dose change -pt to take 3 tabs daily for 4 days then take 2 tabs thereafter  
  
 warfarin 5 mg tablet Commonly known as:  COUMADIN Take 10 mg by mouth daily. Introducing [facility]! Khushbu Cyr introduces [employer] patient portal. Now you can access parts of your medical record, email your doctor's office, and request medication refills online. 1. In your internet browser, go to [email or web address] 2. Click on the First Time User? Click Here link in the Sign In box. You will see the New Member Sign Up page. 3. Enter your [employer] Access Code exactly as it appears below. You will not need to use this code after youve completed the sign-up process. If you do not sign up before the expiration date, you must request a new code. · [employer] Access Code: [email or web address] Expires: 1/11/2018  9:53 AM 
 
4. Enter the last four digits of your Social Security Number (xxxx) and Date of Birth (mm/dd/yyyy) as indicated and click Submit. You will be taken to the next sign-up page. 5. Create a [employer] ID. This will be your [employer] login ID and cannot be changed, so think of one that is secure and easy to remember. 6. Create a [employer] password. You can change your password at any time. 7. Enter your Password Reset Question and Answer. This can be used at a later time if you forget your password. 8. Enter your e-mail address. You will receive e-mail notification when new information is available in [address] 9. Click Sign Up. You can now view and download portions of your medical record.  
10. Click the Download Summary menu link to download a portable copy of your medical information. If you have questions, please visit the Frequently Asked Questions section of the [employer] website. Remember, [employer] is NOT to be used for urgent needs. For medical emergencies, dial 911. Now available from your iPhone and Android! Please provide this summary of care documentation to your next provider. Your primary care clinician is listed as Vitaliy Yadav. If you have any questions after today's visit, please call [phone].

## 2017-10-13 NOTE — LETTER
10/13/2017 10:19 AM 
 
Patient:  Usama Mohan. YOB: 1970 Date of Visit: 10/13/2017 Dear. Mr Joann Hernandez, [address] [facility] [address] [place] [address] VIA Facsimile: [phone] 
 : 
 
 
Thank you for referring Mr. Mao Hannah to me for evaluation/treatment. Below are the relevant portions of my assessment and plan of care. Assessment: 
 Chronic SHF - EF 45%. A fib with CVR Chronic anticoagulation - INR 2.5 Morbid obesity Smoker - quit smoking/but now chewing tobacco 
  
  
  
Plan:  
  
Dec Lasix 40 mg a.m., 20 mg p.m. to 40 mg daily. Watch weight/swelling and take extra half a tablet as needed. Parameters given. Check BMP. Currently taking potassium 10 mEq twice daily. Continue same dose for now. 
7/24/17-creatinine 1.23; 8/24/17 - Cr 1.58 . Advised to see nephrologist 
Pt needs dental procedure. Okay to stop Coumadin 5 days prior to the procedure and recheck thereafter. Follow up in 3 months If you have questions, please do not hesitate to call me. I look forward to following Mr. Leah Sweet along with you. Sincerely, Nevaeh Kingston MD

## 2017-10-14 LAB
BUN SERPL-MCNC: 14 MG/DL (ref 6–24)
BUN/CREAT SERPL: 11 (ref 9–20)
CALCIUM SERPL-MCNC: 9.6 MG/DL (ref 8.7–10.2)
CHLORIDE SERPL-SCNC: 100 MMOL/L (ref 96–106)
CO2 SERPL-SCNC: 28 MMOL/L (ref 18–29)
CREAT SERPL-MCNC: 1.25 MG/DL (ref 0.76–1.27)
GLUCOSE SERPL-MCNC: 95 MG/DL (ref 65–99)
POTASSIUM SERPL-SCNC: 4.7 MMOL/L (ref 3.5–5.2)
SODIUM SERPL-SCNC: 143 MMOL/L (ref 134–144)

## 2018-01-25 ENCOUNTER — OFFICE VISIT (OUTPATIENT)
Dept: CARDIOLOGY CLINIC | Age: 48
End: 2018-01-25

## 2018-01-25 VITALS
SYSTOLIC BLOOD PRESSURE: 120 MMHG | OXYGEN SATURATION: 96 % | HEART RATE: 101 BPM | WEIGHT: 309 LBS | HEIGHT: 66 IN | RESPIRATION RATE: 18 BRPM | DIASTOLIC BLOOD PRESSURE: 82 MMHG | BODY MASS INDEX: 49.66 KG/M2

## 2018-01-25 DIAGNOSIS — N28.9 RENAL INSUFFICIENCY: ICD-10-CM

## 2018-01-25 DIAGNOSIS — E66.01 MORBID OBESITY DUE TO EXCESS CALORIES (HCC): ICD-10-CM

## 2018-01-25 DIAGNOSIS — I48.19 PERSISTENT ATRIAL FIBRILLATION (HCC): Primary | ICD-10-CM

## 2018-01-25 DIAGNOSIS — Z72.0 TOBACCO USE: ICD-10-CM

## 2018-01-25 DIAGNOSIS — Z79.01 CHRONIC ANTICOAGULATION: ICD-10-CM

## 2018-01-25 RX ORDER — FUROSEMIDE 40 MG/1
40 TABLET ORAL 2 TIMES DAILY
Qty: 60 TAB | Refills: 3 | Status: SHIPPED | OUTPATIENT
Start: 2018-01-25 | End: 2018-01-25 | Stop reason: SDUPTHER

## 2018-01-25 RX ORDER — DILTIAZEM HYDROCHLORIDE 300 MG/1
300 CAPSULE, COATED, EXTENDED RELEASE ORAL DAILY
Qty: 30 CAP | Refills: 3 | Status: SHIPPED | OUTPATIENT
Start: 2018-01-25 | End: 2018-01-25 | Stop reason: SDUPTHER

## 2018-01-25 RX ORDER — POTASSIUM CHLORIDE 750 MG/1
20 TABLET, FILM COATED, EXTENDED RELEASE ORAL DAILY
Qty: 60 TAB | Refills: 3 | Status: SHIPPED | OUTPATIENT
Start: 2018-01-25 | End: 2018-01-25 | Stop reason: SDUPTHER

## 2018-01-25 RX ORDER — FUROSEMIDE 40 MG/1
40 TABLET ORAL 2 TIMES DAILY
Qty: 60 TAB | Refills: 3 | Status: SHIPPED | OUTPATIENT
Start: 2018-01-25 | End: 2018-04-23 | Stop reason: DRUGHIGH

## 2018-01-25 RX ORDER — DILTIAZEM HYDROCHLORIDE 300 MG/1
300 CAPSULE, COATED, EXTENDED RELEASE ORAL DAILY
Qty: 30 CAP | Refills: 3 | Status: SHIPPED | OUTPATIENT
Start: 2018-01-25 | End: 2018-05-26 | Stop reason: SDUPTHER

## 2018-01-25 RX ORDER — POTASSIUM CHLORIDE 750 MG/1
20 TABLET, FILM COATED, EXTENDED RELEASE ORAL DAILY
Qty: 60 TAB | Refills: 3 | Status: SHIPPED | OUTPATIENT
Start: 2018-01-25 | End: 2018-04-23 | Stop reason: DRUGHIGH

## 2018-01-25 NOTE — MR AVS SNAPSHOT
[address] 
[phone] Patient: Jeanette Myles. MRN: [id number] :1970 Visit Information Date & Time Provider Department Dept. Phone Encounter #  
 2018  1:40 PM Samy Merza MD [facility] [phone] [id number] Upcoming Health Maintenance Date Due Pneumococcal 19-64 Medium Risk (1 of 1 - PPSV23) 3/6/1988 DTaP/Tdap/Td series (1 - Tdap) 3/6/1990 Influenza Age 5 to Adult 2017 Allergies as of 2018  Review Complete On: 2018 By: Myrtle Magallon LPN Severity Noted Reaction Type Reactions Penicillins Medium 2017   Systemic Hives Current Immunizations  Never Reviewed No immunizations on file. Not reviewed this visit Vitals BP Pulse Resp Height(growth percentile) Weight(growth percentile) SpO2  
 120/82 (BP 1 Location: Right arm, BP Patient Position: Sitting) (!) 101 18 5' 6\" (1.676 m) 309 lb (140.2 kg) 96% BMI Smoking Status 49.87 kg/m2 Former Smoker Vitals History BMI and BSA Data Body Mass Index Body Surface Area  
 49.87 kg/m 2 2.56 m 2 Preferred Pharmacy Pharmacy Name Phone [address] [phone] Your Updated Medication List  
  
   
This list is accurate as of: 18  2:18 PM.  Always use your most recent med list.  
  
  
  
  
 dilTIAZem  mg ER capsule Commonly known as:  CARDIZEM CD Take 1 Cap by mouth daily. furosemide 40 mg tablet Commonly known as:  LASIX Take 1 Tab by mouth two (2) times a day. METAMUCIL FIBER SINGLES 3.4 gram Pwpk packet Generic drug:  psyllium husk-aspartame Take 1 Packet by mouth.  
  
 metOLazone 2.5 mg tablet Commonly known as:  Haily Diaz Take 1 Tab by mouth daily. MIRALAX 17 gram/dose powder Generic drug:  polyethylene glycol Take 17 g by mouth daily. MULTIPLE VITAMIN PO Take  by mouth daily. potassium chloride SR 10 mEq tablet Commonly known as:  KLOR-CON 10 Take 2 Tabs by mouth daily. warfarin 5 mg tablet Commonly known as:  COUMADIN Take 10 mg by mouth daily. Introducing [facility]! Nanette Greer introduces [employer] patient portal. Now you can access parts of your medical record, email your doctor's office, and request medication refills online. 1. In your internet browser, go to [email or web address] 2. Click on the First Time User? Click Here link in the Sign In box. You will see the New Member Sign Up page. 3. Enter your [employer] Access Code exactly as it appears below. You will not need to use this code after youve completed the sign-up process. If you do not sign up before the expiration date, you must request a new code. · [employer] Access Code: [email or web address] Expires: 4/25/2018  1:42 PM 
 
4. Enter the last four digits of your Social Security Number (xxxx) and Date of Birth (mm/dd/yyyy) as indicated and click Submit. You will be taken to the next sign-up page. 5. Create a [employer] ID. This will be your [employer] login ID and cannot be changed, so think of one that is secure and easy to remember. 6. Create a [employer] password. You can change your password at any time. 7. Enter your Password Reset Question and Answer. This can be used at a later time if you forget your password. 8. Enter your e-mail address. You will receive e-mail notification when new information is available in [address] 9. Click Sign Up. You can now view and download portions of your medical record. 10. Click the Download Summary menu link to download a portable copy of your medical information. If you have questions, please visit the Frequently Asked Questions section of the [employer] website.  Remember, [employer] is NOT to be used for urgent needs. For medical emergencies, dial 911. Now available from your iPhone and Android! Please provide this summary of care documentation to your next provider. Your primary care clinician is listed as Dejah Ontiveros. If you have any questions after today's visit, please call [phone].

## 2018-01-25 NOTE — PROGRESS NOTES
Neisha Chandler MD    [address] [place], [address] [facility]    Office [phone],XKN [phone]  Pager [phone]    Carlee Boyle is a 49 y.o. male is here for f/u visit. Primary care physician:  MENA Elizabeth    Patient Active Problem List   Diagnosis Code    Renal insufficiency N28.9    PEPE (obstructive sleep apnea) G47.33    Tobacco use Z72.0    Chronic back pain M54.9, G89.29    Asthma J45.909    Elevated BP without diagnosis of hypertension R03.0    Exertional dyspnea R06.09    Persistent atrial fibrillation (HCC) I48.1    Morbid obesity due to excess calories (HCC) E66.01    Chronic anticoagulation Z79.01       Dear  Armando Allen had the pleasure of seeing Mr. Carlee Boyle in the office today. Chief complaint:  Chief Complaint   Patient presents with    Follow-up     Persisitent Atrial Fibrillation; Chronic Systolic Heart Failure       Assessment:   Chronic SHF - EF 45%. A fib with CVR   Chronic anticoagulation - INR followed by PCP  Morbid obesity   Smoker - quit smoking/but now chewing tobacco        Plan:     Pt takes Lasix 40mg 1/2 to 1 bid. K dur 10 meq daily or bid based on how much lasix takes. 7/24/17-creatinine 1.23; 8/24/17 - Cr 1.58 . Followed by cardiology. Follow up in 3 months          Patient understands the plan. All questions were answered to the patient's satisfaction. Medication Side Effects and Warnings were discussed with patient: yes  Patient Labs were reviewed and or requested:  yes  Patient Past Records were reviewed and or requested: yes    I appreciate the opportunity to be involved in . See note below for details. Please do not hesitate to contact us with questions or concerns. Neisha Chandler MD    Cardiac Testing/ Procedures: A. Cardiac Cath/PCI:    B.ECHO/EVITA: 6/22/2017 Left ventricle: Systolic function was mildly reduced. Ejection fraction was estimated to be 45 %.  There were no regional wall motion abnormalities. Tricuspid valve: There was mild regurgitation. C.StressNuclear/Stress ECHO/Stress test:    D.Vascular:    E. EP:    F. Miscellaneous:    Subjective:  Lucy Durant is a 49 y.o. male who returns for follow up visit. He has been compliant with her medications. His dyspnea is improved. No chest pain. The erythema in the lower legs has resolved. Wt stable- trying to lose wt      ROS:  (bold if positive, if negative)             Medications before admission:    Current Outpatient Prescriptions   Medication Sig Dispense    psyllium husk-aspartame (METAMUCIL FIBER SINGLES) 3.4 gram pwpk packet Take 1 Packet by mouth.  potassium chloride SR (KLOR-CON 10) 10 mEq tablet Take 2 Tabs by mouth daily. 60 Tab    warfarin (COUMADIN) 5 mg tablet Take 10 mg by mouth daily.  furosemide (LASIX) 40 mg tablet Take 1 Tab by mouth two (2) times a day. (Patient taking differently: Take 40 mg by mouth daily.) 60 Tab    MULTIVITAMIN (MULTIPLE VITAMIN PO) Take  by mouth daily.  metOLazone (ZAROXOLYN) 2.5 mg tablet Take 1 Tab by mouth daily. 30 Tab    dilTIAZem CD (CARDIZEM CD) 300 mg ER capsule Take 1 Cap by mouth daily. 30 Cap    polyethylene glycol (MIRALAX) 17 gram/dose powder Take 17 g by mouth daily. No current facility-administered medications for this visit.         Family History of CAD:    Yes    Social History:  Current  Smoker  No    Physical Exam:  Visit Vitals    /82 (BP 1 Location: Right arm, BP Patient Position: Sitting)    Pulse (!) 101    Resp 18    Ht 5' 6\" (1.676 m)    Wt 309 lb (140.2 kg)    SpO2 96%    BMI 49.87 kg/m2          Gen: Well-developed, well-nourished, morbid obesity  Neck: Supple,thick neck, unable to appreciate JVD  Resp: No accessory muscle use, Clear breath sounds, No rales or rhonchi  Card: Irregular Rate,Rythm,Normal S1, S2, No murmurs  Abd:  Soft,morbidly obese, Abd wall - indurated/erythema+  MSK: No cyanosis  Neuro: moving all four extremities , follows commands appropriately  Psych:  Good insight, oriented to person, place , alert, Nml Affect  LE: Trace edema;     EKG:       LABS:        Lab Results   Component Value Date/Time    WBC 6.8 06/28/2017 02:02 AM    HGB 12.8 06/28/2017 02:02 AM    HCT 39.9 06/28/2017 02:02 AM    PLATELET 609 83/16/4961 02:02 AM     Lab Results   Component Value Date/Time    Sodium 143 10/13/2017 11:04 AM    Potassium 4.7 10/13/2017 11:04 AM    Chloride 100 10/13/2017 11:04 AM    CO2 28 10/13/2017 11:04 AM    Anion gap 5 06/28/2017 02:02 AM    Glucose 95 10/13/2017 11:04 AM    BUN 14 10/13/2017 11:04 AM    Creatinine 1.25 10/13/2017 11:04 AM    BUN/Creatinine ratio 11 10/13/2017 11:04 AM    GFR est AA 78 10/13/2017 11:04 AM    GFR est non-AA 68 10/13/2017 11:04 AM    Calcium 9.6 10/13/2017 11:04 AM       Lab Results   Component Value Date/Time    aPTT 28.0 06/22/2017 12:54 PM     Lab Results   Component Value Date/Time    INR 2.5 06/28/2017 02:02 AM    INR 2.1 06/27/2017 02:05 AM    INR 1.5 06/26/2017 05:20 AM    Prothrombin time 26.1 06/28/2017 02:02 AM    Prothrombin time 21.8 06/27/2017 02:05 AM    Prothrombin time 15.4 06/26/2017 05:20 AM     No components found for: Ej Powell MD

## 2018-01-25 NOTE — PROGRESS NOTES
Chief Complaint   Patient presents with    Follow-up     Persisitent Atrial Fibrillation; Chronic Systolic Heart Failure     1. Have you been to the ER, urgent care clinic since your last visit? Hospitalized since your last visit? No    2. Have you seen or consulted any other health care providers outside of the [address] [place] [address] since your last visit? Include any pap smears or colon screening.  No

## 2018-03-26 RX ORDER — POTASSIUM CHLORIDE 750 MG/1
TABLET, FILM COATED, EXTENDED RELEASE ORAL
Qty: 60 TAB | Refills: 1 | Status: SHIPPED | OUTPATIENT
Start: 2018-03-26 | End: 2018-04-23 | Stop reason: DRUGHIGH

## 2018-03-26 NOTE — TELEPHONE ENCOUNTER
Refill is per verbal order of Dr. Barbara Brizuela.     Requested Prescriptions     Pending Prescriptions Disp Refills    potassium chloride SR (KLOR-CON 10) 10 mEq tablet [Pharmacy Med Name: [address] [place] [address] TAB] 60 Tab 1     Sig: TAKE TWO TABLETS BY MOUTH ONCE DAILY

## 2018-04-23 ENCOUNTER — OFFICE VISIT (OUTPATIENT)
Dept: CARDIOLOGY CLINIC | Age: 48
End: 2018-04-23

## 2018-04-23 VITALS
DIASTOLIC BLOOD PRESSURE: 80 MMHG | RESPIRATION RATE: 20 BRPM | HEART RATE: 76 BPM | WEIGHT: 315 LBS | SYSTOLIC BLOOD PRESSURE: 138 MMHG | BODY MASS INDEX: 50.62 KG/M2 | OXYGEN SATURATION: 97 % | HEIGHT: 66 IN

## 2018-04-23 DIAGNOSIS — I48.19 PERSISTENT ATRIAL FIBRILLATION (HCC): ICD-10-CM

## 2018-04-23 DIAGNOSIS — I50.22 CHRONIC SYSTOLIC CONGESTIVE HEART FAILURE (HCC): Primary | ICD-10-CM

## 2018-04-23 DIAGNOSIS — G47.33 OSA (OBSTRUCTIVE SLEEP APNEA): ICD-10-CM

## 2018-04-23 DIAGNOSIS — E66.01 MORBID OBESITY DUE TO EXCESS CALORIES (HCC): ICD-10-CM

## 2018-04-23 DIAGNOSIS — Z72.0 TOBACCO USE: ICD-10-CM

## 2018-04-23 DIAGNOSIS — Z79.01 CHRONIC ANTICOAGULATION: ICD-10-CM

## 2018-04-23 RX ORDER — POTASSIUM CHLORIDE 750 MG/1
5 TABLET, FILM COATED, EXTENDED RELEASE ORAL 2 TIMES DAILY
COMMUNITY
End: 2018-11-12

## 2018-04-23 RX ORDER — FUROSEMIDE 20 MG/1
20 TABLET ORAL DAILY
COMMUNITY
End: 2018-11-12 | Stop reason: SDUPTHER

## 2018-04-23 NOTE — MR AVS SNAPSHOT
[address] [address] [address] [facility] 
[phone] Patient: Melvin Alvarado. MRN: [id number] :[id number] Visit Information Date & Time Provider Department Dept. Phone Encounter #  
 2018  1:40 PM Linda Vasquez MD [facility] [phone] [id number] Upcoming Health Maintenance Date Due Pneumococcal 19-64 Medium Risk (1 of 1 - PPSV23) 3/6/1988 DTaP/Tdap/Td series (1 - Tdap) 3/6/1990 Influenza Age 5 to Adult 2017 Allergies as of 2018  Review Complete On: 2018 By: Jeremy Serrano Severity Noted Reaction Type Reactions Penicillins Medium 2017   Systemic Hives Current Immunizations  Never Reviewed No immunizations on file. Not reviewed this visit Vitals BP Pulse Resp Height(growth percentile) Weight(growth percentile) SpO2  
 138/80 (BP 1 Location: Left arm) 76 20 5' 6\" (1.676 m) 319 lb 3.2 oz (144.8 kg) 97% BMI Smoking Status 51.52 kg/m2 Former Smoker Vitals History BMI and BSA Data Body Mass Index Body Surface Area 51.52 kg/m 2 2.6 m 2 Preferred Pharmacy Pharmacy Name Phone [address] [phone] Your Updated Medication List  
  
   
This list is accurate as of 18  1:49 PM.  Always use your most recent med list.  
  
  
  
  
 dilTIAZem  mg ER capsule Commonly known as:  CARDIZEM CD Take 1 Cap by mouth daily. KLOR-CON 10 10 mEq tablet Generic drug:  potassium chloride SR Take 5 mEq by mouth two (2) times a day. LASIX 40 mg tablet Generic drug:  furosemide Take 20 mg by mouth two (2) times a day. METAMUCIL FIBER SINGLES 3.4 gram Pwpk packet Generic drug:  psyllium husk-aspartame Take 1 Packet by mouth. MULTIPLE VITAMIN PO Take  by mouth daily. warfarin 5 mg tablet Commonly known as:  COUMADIN Take 10 mg by mouth daily. Per PCP. Introducing [facility]! [facility] introduces [employer]Light patient portal. Now you can access parts of your medical record, email your doctor's office, and request medication refills online. 1. In your internet browser, go to [email or web address] 2. Click on the First Time User? Click Here link in the Sign In box. You will see the New Member Sign Up page. 3. Enter your BroadLight Access Code exactly as it appears below. You will not need to use this code after youve completed the sign-up process. If you do not sign up before the expiration date, you must request a new code. · BroadLight Access Code: [email or web address] Expires: 4/25/2018  2:42 PM 
 
4. Enter the last four digits of your Social Security Number (xxxx) and Date of Birth (mm/dd/yyyy) as indicated and click Submit. You will be taken to the next sign-up page. 5. Create a BroadLight ID. This will be your BroadLight login ID and cannot be changed, so think of one that is secure and easy to remember. 6. Create a BroadLight password. You can change your password at any time. 7. Enter your Password Reset Question and Answer. This can be used at a later time if you forget your password. 8. Enter your e-mail address. You will receive e-mail notification when new information is available in [address] 9. Click Sign Up. You can now view and download portions of your medical record. 10. Click the Download Summary menu link to download a portable copy of your medical information. If you have questions, please visit the Frequently Asked Questions section of the BroadLight website. Remember, BroadLight is NOT to be used for urgent needs. For medical emergencies, dial 911. Now available from your iPhone and Android! Please provide this summary of care documentation to your next provider. Your primary care clinician is listed as Michael Villa. If you have any questions after today's visit, please call [phone].

## 2018-04-24 NOTE — PROGRESS NOTES
Sailaja Morgan MD    [address] [facility] [place], [address] [facility]    Office [phone],[facility] [phone]  Pager [phone]    Patrciio Jarrell is a 51 y.o. male is here for f/u visit. Primary care physician:  MENA Roland    Patient Active Problem List   Diagnosis Code    Renal insufficiency N28.9    PEPE (obstructive sleep apnea) G47.33    Tobacco use Z72.0    Chronic back pain M54.9, G89.29    Asthma J45.909    Elevated BP without diagnosis of hypertension R03.0    Exertional dyspnea R06.09    Persistent atrial fibrillation (HCC) I48.1    Morbid obesity due to excess calories (HCC) E66.01    Chronic anticoagulation Z79.01       Dear Mr. Cherelle Franco had the pleasure of seeing Mr. Patricio Jarrell in the office today. Chief complaint:  Chief Complaint   Patient presents with    Irregular Heart Beat     PAF    CHF       Assessment:   Chronic SHF - EF 45%. A fib with CVR   Chronic anticoagulation - INR followed by PCP  Morbid obesity   Smoker - quit smoking/but now chewing tobacco  Excessive EtOH         Plan:     On Lasix 1 tablet twice daily. On potassium supplements. Patient states that he has an appointment to see his PCP. Will need to get his creatinine/electrolytes check. Patient also states in the next few months, he will lose his insurance and is worried about it. I have given him information about the [facility] care card. Follow up in 3 months or earlier as needed. Patient understands the plan. All questions were answered to the patient's satisfaction. Medication Side Effects and Warnings were discussed with patient: yes  Patient Labs were reviewed and or requested:  yes  Patient Past Records were reviewed and or requested: yes    I appreciate the opportunity to be involved in . See note below for details. Please do not hesitate to contact us with questions or concerns.     Sailaja Morgan MD    Cardiac Testing/ Procedures: A. Cardiac Cath/PCI:    B.ECHO/EVITA: 6/22/2017 Left ventricle: Systolic function was mildly reduced. Ejection fraction was estimated to be 45 %. There were no regional wall motion abnormalities. Tricuspid valve: There was mild regurgitation. C.StressNuclear/Stress ECHO/Stress test:    D.Vascular:    E. EP:    F. Miscellaneous:    Subjective:  Todd Nieto is a [place] y.o. male who returns for follow up visit. Patient states that over the last few weeks, he started having shortness of breath and swelling lower extremities. He attributes this to starting drinking EtOH again. He has since then cut back. His swelling and dyspnea have improved. Continues to take medications. No chest pain. His weight which was trending up is now trending back down again. However he has difficulty losing weight. He also states that he is going to lose his insurance. ROS:  (bold if positive, if negative)             Medications before admission:    Current Outpatient Prescriptions   Medication Sig Dispense    furosemide (LASIX) 40 mg tablet Take 20 mg by mouth two (2) times a day.  potassium chloride SR (KLOR-CON 10) 10 mEq tablet Take 5 mEq by mouth two (2) times a day.  psyllium husk-aspartame (METAMUCIL FIBER SINGLES) 3.4 gram pwpk packet Take 1 Packet by mouth.  dilTIAZem CD (CARDIZEM CD) 300 mg ER capsule Take 1 Cap by mouth daily. 30 Cap    warfarin (COUMADIN) 5 mg tablet Take 10 mg by mouth daily. Per PCP.  MULTIVITAMIN (MULTIPLE VITAMIN PO) Take  by mouth daily. No current facility-administered medications for this visit.         Family History of CAD:    Yes    Social History:  Current  Smoker  No    Physical Exam:  Visit Vitals    /80 (BP 1 Location: Left arm)    Pulse 76    Resp 20    Ht 5' 6\" (1.676 m)    Wt 319 lb 3.2 oz (144.8 kg)    SpO2 97%    BMI 51.52 kg/m2          Gen: Well-developed, well-nourished, morbid obesity  Neck: Supple,thick neck, unable to appreciate JVD  Resp: No accessory muscle use, Clear breath sounds, No rales or rhonchi  Card: Irregular Rate,Rythm,Normal S1, S2, No murmurs  Abd:  Soft,morbidly obese, Abd wall - indurated/erythema+  MSK: No cyanosis  Neuro: moving all four extremities , follows commands appropriately  Psych:  Good insight, oriented to person, place , alert, Nml Affect  LE: Trace edema;     EKG:       LABS:        Lab Results   Component Value Date/Time    WBC 6.8 06/28/2017 02:02 AM    HGB 12.8 06/28/2017 02:02 AM    HCT 39.9 06/28/2017 02:02 AM    PLATELET 260 17/35/0248 02:02 AM     Lab Results   Component Value Date/Time    Sodium 143 10/13/2017 11:04 AM    Potassium 4.7 10/13/2017 11:04 AM    Chloride 100 10/13/2017 11:04 AM    CO2 28 10/13/2017 11:04 AM    Anion gap 5 06/28/2017 02:02 AM    Glucose 95 10/13/2017 11:04 AM    BUN 14 10/13/2017 11:04 AM    Creatinine 1.25 10/13/2017 11:04 AM    BUN/Creatinine ratio 11 10/13/2017 11:04 AM    GFR est AA 78 10/13/2017 11:04 AM    GFR est non-AA 68 10/13/2017 11:04 AM    Calcium 9.6 10/13/2017 11:04 AM       Lab Results   Component Value Date/Time    aPTT 28.0 06/22/2017 12:54 PM     Lab Results   Component Value Date/Time    INR 2.5 (H) 06/28/2017 02:02 AM    INR 2.1 (H) 06/27/2017 02:05 AM    INR 1.5 (H) 06/26/2017 05:20 AM    Prothrombin time 26.1 (H) 06/28/2017 02:02 AM    Prothrombin time 21.8 (H) 06/27/2017 02:05 AM    Prothrombin time 15.4 (H) 06/26/2017 05:20 AM     No components found for: Mia Gonsalez MD

## 2018-04-25 PROBLEM — I50.32 CHRONIC DIASTOLIC HEART FAILURE (HCC): Status: ACTIVE | Noted: 2018-04-25

## 2018-04-25 PROBLEM — I50.22 CHRONIC SYSTOLIC CONGESTIVE HEART FAILURE (HCC): Status: ACTIVE | Noted: 2018-04-25

## 2018-05-26 RX ORDER — DILTIAZEM HYDROCHLORIDE 300 MG/1
300 CAPSULE, COATED, EXTENDED RELEASE ORAL DAILY
Qty: 30 CAP | Refills: 3 | Status: SHIPPED | OUTPATIENT
Start: 2018-05-26 | End: 2018-11-12 | Stop reason: ALTCHOICE

## 2018-06-08 RX ORDER — DILTIAZEM HYDROCHLORIDE 300 MG/1
CAPSULE, EXTENDED RELEASE ORAL
Qty: 30 CAP | Refills: 11 | Status: SHIPPED | OUTPATIENT
Start: 2018-06-08 | End: 2018-06-26 | Stop reason: SDUPTHER

## 2018-06-08 NOTE — TELEPHONE ENCOUNTER
Refill is per verbal order of Dr. Serg Reyez.     Requested Prescriptions     Pending Prescriptions Disp Refills    CARTIA  mg ER capsule [Pharmacy Med Name: CARTIA /24HR  CAP] 30 Cap 11     Sig: TAKE ONE CAPSULE BY MOUTH ONCE DAILY

## 2018-06-26 RX ORDER — DILTIAZEM HYDROCHLORIDE 300 MG/1
CAPSULE, COATED, EXTENDED RELEASE ORAL
Qty: 30 CAP | Refills: 11 | Status: SHIPPED | OUTPATIENT
Start: 2018-06-26 | End: 2018-11-12 | Stop reason: SDUPTHER

## 2018-11-12 ENCOUNTER — OFFICE VISIT (OUTPATIENT)
Dept: CARDIOLOGY CLINIC | Age: 48
End: 2018-11-12

## 2018-11-12 VITALS
DIASTOLIC BLOOD PRESSURE: 72 MMHG | HEIGHT: 66 IN | BODY MASS INDEX: 49.82 KG/M2 | SYSTOLIC BLOOD PRESSURE: 112 MMHG | WEIGHT: 310 LBS | OXYGEN SATURATION: 99 % | HEART RATE: 85 BPM | RESPIRATION RATE: 16 BRPM

## 2018-11-12 DIAGNOSIS — I48.19 PERSISTENT ATRIAL FIBRILLATION (HCC): ICD-10-CM

## 2018-11-12 DIAGNOSIS — Z79.01 CHRONIC ANTICOAGULATION: ICD-10-CM

## 2018-11-12 DIAGNOSIS — I50.22 CHRONIC SYSTOLIC CONGESTIVE HEART FAILURE (HCC): ICD-10-CM

## 2018-11-12 DIAGNOSIS — E66.01 MORBID OBESITY DUE TO EXCESS CALORIES (HCC): ICD-10-CM

## 2018-11-12 DIAGNOSIS — G47.33 OSA (OBSTRUCTIVE SLEEP APNEA): ICD-10-CM

## 2018-11-12 DIAGNOSIS — I50.9 CONGESTIVE HEART FAILURE, UNSPECIFIED HF CHRONICITY, UNSPECIFIED HEART FAILURE TYPE (HCC): Primary | ICD-10-CM

## 2018-11-12 RX ORDER — DILTIAZEM HYDROCHLORIDE 300 MG/1
CAPSULE, COATED, EXTENDED RELEASE ORAL
Qty: 90 CAP | Refills: 3 | Status: SHIPPED | OUTPATIENT
Start: 2018-11-12 | End: 2019-06-26 | Stop reason: ALTCHOICE

## 2018-11-12 RX ORDER — POTASSIUM CHLORIDE 750 MG/1
10 TABLET, FILM COATED, EXTENDED RELEASE ORAL DAILY
Qty: 90 TAB | Refills: 3 | Status: SHIPPED | OUTPATIENT
Start: 2018-11-12 | End: 2019-08-23 | Stop reason: SDUPTHER

## 2018-11-12 RX ORDER — FUROSEMIDE 20 MG/1
20 TABLET ORAL DAILY
Qty: 90 TAB | Refills: 3 | Status: SHIPPED | OUTPATIENT
Start: 2018-11-12 | End: 2019-05-17 | Stop reason: SDUPTHER

## 2018-11-12 NOTE — PROGRESS NOTES
Melissa Ceja MD    [address] [facility] [place], [address] [facility]    Office [phone],[facility] [phone]  Pager [phone]    Robert Gli. is a 51 y.o. male is here for f/u visit. Primary care physician:  MENA Alfred    Patient Active Problem List   Diagnosis Code    Renal insufficiency N28.9    PEPE (obstructive sleep apnea) G47.33    Tobacco use Z72.0    Chronic back pain M54.9, G89.29    Asthma J45.909    Elevated BP without diagnosis of hypertension R03.0    Exertional dyspnea R06.09    Persistent atrial fibrillation (HCC) I48.1    Morbid obesity due to excess calories (HCC) E66.01    Chronic anticoagulation Z79.01    Chronic diastolic heart failure (HCC) I50.32    Chronic systolic congestive heart failure [facility]) I50.22       Dear Mr. Harrison Brown had the pleasure of seeing Mr. Robert Gil. in the office today. Chief complaint:  Chief Complaint   Patient presents with    CHF     50 y/o male reports to office for follow up.  Irregular Heart Beat    Medication Refill       Assessment:   Chronic SHF - EF 45%. A fib with CVR   Chronic anticoagulation - INR followed by PCP  Morbid obesity   Smoker - quit smoking/not chewing tobacco  Hx of Excessive EtOH  -- occ drinks beer now        Plan:       Currently taking Lasix 20 mg half a tablet daily along with potassium half a tablet daily. Weighs himself daily. Increase his dose based on his weight. On anticoagulation  Follow up in 6 months or earlier as needed. Patient understands the plan. All questions were answered to the patient's satisfaction. Medication Side Effects and Warnings were discussed with patient: yes  Patient Labs were reviewed and or requested:  yes  Patient Past Records were reviewed and or requested: yes    I appreciate the opportunity to be involved in . See note below for details. Please do not hesitate to contact us with questions or concerns.     Delon MARIANO Marie Mendez MD    Cardiac Testing/ Procedures: A. Cardiac Cath/PCI:    B.ECHO/EVITA: 6/22/2017 Left ventricle: Systolic function was mildly reduced. Ejection fraction was estimated to be 45 %. There were no regional wall motion abnormalities. Tricuspid valve: There was mild regurgitation. C.StressNuclear/Stress ECHO/Stress test:    D.Vascular:    E. EP:    F. Miscellaneous:    Subjective:  Clay Dailey is a 51 y.o. male who returns for follow up visit. Patient states that currently he is drinking occasional beer. Has mild chronic dyspnea on exertion. Mild swelling lower extremities. No chest pain. States that he is compliant with his medications. Tries to eat low-salt/heart healthy diet. No chest pain. Has difficulty losing weight. States that his weight usually fluctuates between 305-310 pounds. ROS:  (bold if positive, if negative)             Medications before admission:    Current Outpatient Medications   Medication Sig Dispense    potassium chloride SR (KLOR-CON 10) 10 mEq tablet Take 1 Tab by mouth two (2) times a day. 60 Tab    dilTIAZem CD (CARTIA XT) 300 mg ER capsule TAKE ONE CAPSULE BY MOUTH ONCE DAILY 30 Cap    furosemide (LASIX) 40 mg tablet Take 20 mg by mouth two (2) times a day.  potassium chloride SR (KLOR-CON 10) 10 mEq tablet Take 5 mEq by mouth two (2) times a day.  psyllium husk-aspartame (METAMUCIL FIBER SINGLES) 3.4 gram pwpk packet Take 1 Packet by mouth.  warfarin (COUMADIN) 5 mg tablet Take 10 mg by mouth daily. Per PCP.  MULTIVITAMIN (MULTIPLE VITAMIN PO) Take  by mouth daily.  dilTIAZem CD (CARDIZEM CD) 300 mg ER capsule Take 1 Cap by mouth daily. 30 Cap     No current facility-administered medications for this visit.         Family History of CAD:    Yes    Social History:  Current  Smoker  No    Physical Exam:  Visit Vitals  /72 (BP 1 Location: Left arm, BP Patient Position: Sitting)   Pulse 85   Resp 16   Ht 5' 6\" (1.676 m)   Wt 310 lb (140.6 kg)   SpO2 99%   BMI 50.04 kg/m²          Gen: Well-developed, well-nourished, morbid obesity  Neck: Supple,thick neck, unable to appreciate JVD  Resp: No accessory muscle use, Clear breath sounds, No rales or rhonchi  Card: Irregular Rate,Rythm,Normal S1, S2, No murmurs  Abd:  Soft,morbidly obese, Abd wall - indurated/erythema+  MSK: No cyanosis  Neuro: moving all four extremities , follows commands appropriately  Psych:  Good insight, oriented to person, place , alert, Nml Affect  LE: Trace edema;     EKG: A. fib, IVCD, NM WP      LABS:        Lab Results   Component Value Date/Time    WBC 6.8 06/28/2017 02:02 AM    HGB 12.8 06/28/2017 02:02 AM    HCT 39.9 06/28/2017 02:02 AM    PLATELET 393 89/39/3261 02:02 AM     Lab Results   Component Value Date/Time    Sodium 143 10/13/2017 11:04 AM    Potassium 4.7 10/13/2017 11:04 AM    Chloride 100 10/13/2017 11:04 AM    CO2 28 10/13/2017 11:04 AM    Anion gap 5 06/28/2017 02:02 AM    Glucose 95 10/13/2017 11:04 AM    BUN 14 10/13/2017 11:04 AM    Creatinine 1.25 10/13/2017 11:04 AM    BUN/Creatinine ratio 11 10/13/2017 11:04 AM    GFR est AA 78 10/13/2017 11:04 AM    GFR est non-AA 68 10/13/2017 11:04 AM    Calcium 9.6 10/13/2017 11:04 AM       Lab Results   Component Value Date/Time    aPTT 28.0 06/22/2017 12:54 PM     Lab Results   Component Value Date/Time    INR 2.5 (H) 06/28/2017 02:02 AM    INR 2.1 (H) 06/27/2017 02:05 AM    INR 1.5 (H) 06/26/2017 05:20 AM    Prothrombin time 26.1 (H) 06/28/2017 02:02 AM    Prothrombin time 21.8 (H) 06/27/2017 02:05 AM    Prothrombin time 15.4 (H) 06/26/2017 05:20 AM     No components found for: Angelita Soler MD

## 2018-11-12 NOTE — PROGRESS NOTES
1. Have you been to the ER, urgent care clinic since your last visit? Hospitalized since your last visit? No    2. Have you seen or consulted any other health care providers outside of the [address] [place] [address] since your last visit? Include any pap smears or colon screening. No    Chief Complaint   Patient presents with    CHF     52 y/o male reports to office for follow up.  Irregular Heart Beat    Medication Refill     Pt reports Med Rec. Completed.       Visit Vitals  /72 (BP 1 Location: Left arm, BP Patient Position: Sitting)   Pulse 85   Resp 16   Ht 5' 6\" (1.676 m)   Wt 310 lb (140.6 kg)   SpO2 99%   BMI 50.04 kg/m²

## 2019-05-17 ENCOUNTER — OFFICE VISIT (OUTPATIENT)
Dept: CARDIOLOGY CLINIC | Age: 49
End: 2019-05-17

## 2019-05-17 VITALS
HEIGHT: 66 IN | DIASTOLIC BLOOD PRESSURE: 78 MMHG | SYSTOLIC BLOOD PRESSURE: 98 MMHG | WEIGHT: 315 LBS | HEART RATE: 103 BPM | RESPIRATION RATE: 16 BRPM | BODY MASS INDEX: 50.62 KG/M2 | OXYGEN SATURATION: 97 %

## 2019-05-17 DIAGNOSIS — E66.01 MORBID OBESITY DUE TO EXCESS CALORIES (HCC): ICD-10-CM

## 2019-05-17 DIAGNOSIS — Z72.0 TOBACCO USE: ICD-10-CM

## 2019-05-17 DIAGNOSIS — I50.22 CHRONIC SYSTOLIC CONGESTIVE HEART FAILURE (HCC): Primary | ICD-10-CM

## 2019-05-17 DIAGNOSIS — Z79.01 CHRONIC ANTICOAGULATION: ICD-10-CM

## 2019-05-17 DIAGNOSIS — G47.33 OSA (OBSTRUCTIVE SLEEP APNEA): ICD-10-CM

## 2019-05-17 RX ORDER — FUROSEMIDE 20 MG/1
20 TABLET ORAL 2 TIMES DAILY
Qty: 180 TAB | Refills: 0 | Status: SHIPPED | OUTPATIENT
Start: 2019-05-17 | End: 2019-06-07 | Stop reason: SDUPTHER

## 2019-05-17 NOTE — PROGRESS NOTES
Ricardo Cantu MD    [id number] [facility] [place], [address] [facility]    Office [phone],[place] [phone]  Pager [phone]    Noemi Mayer. is a 47 y.o. male is here for f/u visit. Primary care physician:MENA Zuniga    Patient Active Problem List   Diagnosis Code    Renal insufficiency N28.9    PEPE (obstructive sleep apnea) G47.33    Tobacco use Z72.0    Chronic back pain M54.9, G89.29    Asthma J45.909    Elevated BP without diagnosis of hypertension R03.0    Exertional dyspnea R06.09    Persistent atrial fibrillation (HCC) I48.1    Morbid obesity due to excess calories (HCC) E66.01    Chronic anticoagulation Z79.01    Chronic diastolic heart failure (HCC) I50.32    Chronic systolic congestive heart failure [facility]) I50.22       Dear Mr. Legrand Kehr had the pleasure of seeing Mr. Noemi Mayer. in the office today. Chief complaint:  Chief Complaint   Patient presents with    Follow-up     Patient presents today for a 6 month follow up for CHF    CHF    Irregular Heart Beat       Assessment:   Chronic SHF - EF 45%. A fib with CVR   Chronic anticoagulation - INR followed by PCP  Morbid obesity   Smoker - quit smoking/not chewing tobacco  Hx of Excessive EtOH  -- occ drinks beer now        Plan:     Currently on Lasix 20 mg daily. K - 1 tab daily      Will increase it to twice daily for the next 4 days and then after that take it daily with an extra pill as needed. He will also check his blood pressure daily and if the systolic blood pressure is less than 100, he will hold is diltiazem CD (heart pill) for that day and then reevaluate his blood pressure the next day. He also knows that holding the Cardizem CD may increase his heart rate because of his paroxysmal atrial fibrillation. Schedule stress test       Weighs himself daily. Increase his dose based on his weight. On anticoagulation    Follow up in 4 weeeks  or earlier as needed. Patient understands the plan. All questions were answered to the patient's satisfaction. Medication Side Effects and Warnings were discussed with patient: yes  Patient Labs were reviewed and or requested:  yes  Patient Past Records were reviewed and or requested: yes    I appreciate the opportunity to be involved in . See note below for details. Please do not hesitate to contact us with questions or concerns. Nay So MD    Cardiac Testing/ Procedures: A. Cardiac Cath/PCI:    B.ECHO/EVITA: 6/22/2017 Left ventricle: Systolic function was mildly reduced. Ejection fraction was estimated to be 45 %. There were no regional wall motion abnormalities. Tricuspid valve: There was mild regurgitation. C.StressNuclear/Stress ECHO/Stress test:    D.Vascular:    E. EP:    F. Miscellaneous:    Subjective:  Karina Ford is a 47 y.o. male who returns for follow up visit. Has been feeling worse than before the last few days. Try to cut grass yesterday but could not do it. Continues to have chronic dyspnea on exertion which is slightly worsened. Also her weight has increased and his swelling the lower extremities is increased. No palpitations. No chest pain. Tries to eat low-salt/heart healthy diet. No chest pain. Has difficulty losing weight. States that his weight usually fluctuates between 305-310 pounds. Today 322 lbs      ROS:  (bold if positive, if negative)             Medications before admission:    Current Outpatient Medications   Medication Sig Dispense    furosemide (LASIX) 20 mg tablet Take 1 Tab by mouth daily. 90 Tab    potassium chloride SR (KLOR-CON 10) 10 mEq tablet Take 1 Tab by mouth daily. 90 Tab    dilTIAZem CD (CARTIA XT) 300 mg ER capsule TAKE ONE CAPSULE BY MOUTH ONCE DAILY 90 Cap    psyllium husk-aspartame (METAMUCIL FIBER SINGLES) 3.4 gram pwpk packet Take 1 Packet by mouth.  warfarin (COUMADIN) 5 mg tablet Take 10 mg by mouth daily.  Per FELIPE Lott MULTIVITAMIN (MULTIPLE VITAMIN PO) Take  by mouth daily. No current facility-administered medications for this visit.         Family History of CAD:    Yes    Social History:  Current  Smoker  No    Physical Exam:  Visit Vitals  BP 98/78 (BP 1 Location: Left arm, BP Patient Position: Sitting)   Pulse (!) 103   Resp 16   Ht 5' 6\" (1.676 m)   Wt 322 lb 6.4 oz (146.2 kg)   SpO2 97%   BMI 52.04 kg/m²          Gen: Well-developed, well-nourished, morbid obesity  Neck: Supple,thick neck, unable to appreciate JVD  Resp: No accessory muscle use, Clear breath sounds, No rales or rhonchi  Card: Irregular Rate,Rythm,Normal S1, S2, No murmurs  Abd:  Soft,morbidly obese, Abd wall - indurated/erythema+  MSK: No cyanosis  Neuro: moving all four extremities , follows commands appropriately  Psych:  Good insight, oriented to person, place , alert, Nml Affect  LE: 1+ edema;     EKG:       LABS:        Lab Results   Component Value Date/Time    WBC 6.8 06/28/2017 02:02 AM    HGB 12.8 06/28/2017 02:02 AM    HCT 39.9 06/28/2017 02:02 AM    PLATELET 663 00/50/5988 02:02 AM     Lab Results   Component Value Date/Time    Sodium 143 10/13/2017 11:04 AM    Potassium 4.7 10/13/2017 11:04 AM    Chloride 100 10/13/2017 11:04 AM    CO2 28 10/13/2017 11:04 AM    Anion gap 5 06/28/2017 02:02 AM    Glucose 95 10/13/2017 11:04 AM    BUN 14 10/13/2017 11:04 AM    Creatinine 1.25 10/13/2017 11:04 AM    BUN/Creatinine ratio 11 10/13/2017 11:04 AM    GFR est AA 78 10/13/2017 11:04 AM    GFR est non-AA 68 10/13/2017 11:04 AM    Calcium 9.6 10/13/2017 11:04 AM       Lab Results   Component Value Date/Time    aPTT 28.0 06/22/2017 12:54 PM     Lab Results   Component Value Date/Time    INR 2.5 (H) 06/28/2017 02:02 AM    INR 2.1 (H) 06/27/2017 02:05 AM    INR 1.5 (H) 06/26/2017 05:20 AM    Prothrombin time 26.1 (H) 06/28/2017 02:02 AM    Prothrombin time 21.8 (H) 06/27/2017 02:05 AM    Prothrombin time 15.4 (H) 06/26/2017 05:20 AM     No components found for: Radha Huerta MD

## 2019-05-17 NOTE — TELEPHONE ENCOUNTER
Medication change VO per   Dr Mj Nance. Requested Prescriptions     Pending Prescriptions Disp Refills    furosemide (LASIX) 20 mg tablet 180 Tab 0     Sig: Take 1 Tab by mouth two (2) times a day.

## 2019-05-17 NOTE — PATIENT INSTRUCTIONS
Currently on Lasix 20 mg daily. Will increase it to twice daily for the next 4 days and then after that take it daily with an extra pill as needed. He will also check his blood pressure daily and if the systolic blood pressure is less than 100, he will hold is diltiazem CD (heart pill) for that day and then reevaluate his blood pressure the next day. He also knows that holding the Cardizem CD may increase his heart rate because of his paroxysmal atrial fibrillation. Schedule stress test in 3 to 4 weeks and follow-up after that.

## 2019-05-17 NOTE — PROGRESS NOTES
Chief Complaint   Patient presents with    Follow-up     Patient presents today for a 6 month follow up for CHF    CHF    Irregular Heart Beat     Visit Vitals  BP 98/78 (BP 1 Location: Left arm, BP Patient Position: Sitting)   Pulse (!) 103   Resp 16   Ht 5' 6\" (1.676 m)   Wt 322 lb 6.4 oz (146.2 kg)   SpO2 97%   BMI 52.04 kg/m²     Chest pain - denied  SOB - with activity  Dizziness - denied  Swelling/Edema - lower abdomen  Recent hospital visit - denied  Refills - not at this time  Patient's weight has increased from 310 in 11/18 to 322 today

## 2019-05-17 NOTE — LETTER
5/17/19 Patient: Bryn Klein. YOB: 1970 Date of Visit: 5/17/2019 [place], [address] [facility] [address] [facility] [phone] V[address] Facsimile: [phone] Dear Governor MENA Lombardo, Thank you for referring Mr. Sumit Torres to [address] for evaluation. My notes for this consultation are attached. If you have questions, please do not hesitate to call me. I look forward to following your patient along with you. Sincerely, Aurora Pena MD

## 2019-05-19 NOTE — Clinical Note
Status[de-identified] Inpatient [101] Type of Bed: Stepdown [17] Inpatient Hospitalization Certified Necessary for the Following Reasons: 3. Patient receiving treatment that can only be provided in an inpatient setting (further clarification in H&P documentation) Admitting Diagnosis: [facility]rial [facility] [[id number]] Admitting Physician: Billy Ponce Attending Physician: Billy Ponce Estimated Length of Stay: 3-4 Midnights Discharge Plan[de-identified] Home with Office Follow-up DISPLAY PLAN FREE TEXT

## 2019-05-24 ENCOUNTER — DOCUMENTATION ONLY (OUTPATIENT)
Dept: CARDIOLOGY CLINIC | Age: 49
End: 2019-05-24

## 2019-05-24 NOTE — PROGRESS NOTES
Please verify test type needed. Your note just says stress test and the patient is on the schedule for a lexiscan cardiolite.

## 2019-06-07 ENCOUNTER — OFFICE VISIT (OUTPATIENT)
Dept: CARDIOLOGY CLINIC | Age: 49
End: 2019-06-07

## 2019-06-07 VITALS
WEIGHT: 315 LBS | HEART RATE: 90 BPM | SYSTOLIC BLOOD PRESSURE: 136 MMHG | BODY MASS INDEX: 50.62 KG/M2 | DIASTOLIC BLOOD PRESSURE: 88 MMHG | HEIGHT: 66 IN | RESPIRATION RATE: 18 BRPM | OXYGEN SATURATION: 96 %

## 2019-06-07 DIAGNOSIS — E66.01 MORBID OBESITY (HCC): ICD-10-CM

## 2019-06-07 DIAGNOSIS — I50.9 ACUTE ON CHRONIC CONGESTIVE HEART FAILURE, UNSPECIFIED HEART FAILURE TYPE (HCC): Primary | ICD-10-CM

## 2019-06-07 DIAGNOSIS — R06.09 DYSPNEA ON EXERTION: ICD-10-CM

## 2019-06-07 DIAGNOSIS — I48.21 PERMANENT ATRIAL FIBRILLATION (HCC): ICD-10-CM

## 2019-06-07 DIAGNOSIS — E66.01 MORBID OBESITY DUE TO EXCESS CALORIES (HCC): ICD-10-CM

## 2019-06-07 DIAGNOSIS — I50.22 CHRONIC SYSTOLIC CONGESTIVE HEART FAILURE (HCC): Primary | ICD-10-CM

## 2019-06-07 DIAGNOSIS — Z79.01 CHRONIC ANTICOAGULATION: ICD-10-CM

## 2019-06-07 DIAGNOSIS — I48.19 PERSISTENT ATRIAL FIBRILLATION (HCC): ICD-10-CM

## 2019-06-07 DIAGNOSIS — R82.90 FOUL SMELLING URINE: ICD-10-CM

## 2019-06-07 DIAGNOSIS — F10.11 H/O ETOH ABUSE: ICD-10-CM

## 2019-06-07 RX ORDER — FUROSEMIDE 40 MG/1
40 TABLET ORAL 2 TIMES DAILY
Qty: 60 TAB | Refills: 0
Start: 2019-06-07 | End: 2019-07-15

## 2019-06-07 NOTE — PROGRESS NOTES
Santos Singer. is a 47 y.o. male    Chief Complaint   Patient presents with    Follow-up     3 wk f/u     CHF       Chest pain NO  SOB Pt states having SOB with exertion   Dizziness NO  Swelling Pt states having some welling in his abdomen. Pt requesting to increase the Lasix. Recent hospital visit NO  Refills NO    Visit Vitals  /88 (BP 1 Location: Left arm, BP Patient Position: Sitting)   Pulse 90   Resp 18   Ht 5' 6\" (1.676 m)   Wt 326 lb 3.2 oz (148 kg)   SpO2 96%   BMI 52.65 kg/m²       1. Have you been to the ER, urgent care clinic since your last visit? Hospitalized since your last visit? NO    2. Have you seen or consulted any other health care providers outside of the [address] [place] [address] since your last visit? Include any pap smears or colon screening.   NO

## 2019-06-07 NOTE — PATIENT INSTRUCTIONS
Increase lasix to 40mg twice daily; please get blood work done today on the 3rd floor. I will call you with results. Follow-up in 2 weeks to see me with echo the same day.

## 2019-06-07 NOTE — PROGRESS NOTES
Evan Weinberg, JC  [address]  [place], [address] [facility]    Office [phone]  Fax [phone]        Brittany Spain is a 47 y.o. male who is followed outpatient by Dr. Deisy Lopez and was last seen 5/17/19. Patient is here today with ongoing SOB. Assessment  Encounter Diagnoses   Name Primary?  Acute on chronic congestive heart failure, unspecified heart failure type (HCC) Yes    Chronic anticoagulation     Permanent atrial fibrillation (HCC)     Morbid obesity (HCC)     Foul smelling urine     H/O ETOH abuse     Dyspnea on exertion      Recommendations:  Dyspnea on Exertion  - suspect 2/2 CHF (see below)  - also scheduled for stress test per Dr. Deisy Lopez 6/26  - on chronic anticoag - check CBC    Acute on Chronic Heart Failure, LVEF mildly reduced 45%  - increase lasix to 40mg BID  - repeat echo in 2 weeks  - check labs today including pBNP  - fluid restriction 1.5L per day; 2g sodium restriction    Afib   - rate controlled on diltiazem  - chronic anticoagulation on Warfarin- INR followed by PCP    Hx of Excessive EtOH    - still drinks 2-3 glasses of wine most days of the week  - recommended cessation or at least limited amounts (no more than 2 glasses per day)    Foul smelling Urine  - check UA; if abnormal will forward to PCP for managment    Morbid obesity     Hx of tobacco use - quit smoking; still vaping infrequently     F/u in 2 weeks or PRN.     Patient understands the plan. All questions were answered to the patient's satisfaction.     Medication Side Effects and Warnings were discussed with patient: yes  Patient Labs were reviewed and or requested:  yes  Patient Past Records were reviewed and or requested: yes     I appreciate the opportunity to be involved in patient's care. Please do not hesitate to contact us with questions or concerns.     Subjective:  Brittany Spain is a 47 y.o. male who returns for follow up; still with ongoing SOB which he states is worsening since his last visit. Weight continues to trend up. Has been taking lasix 20mg BID most days since last visit without improvement. He has some UOP with lasix but not like he has had in the past.  Has SOB with minimal activity including showers. Swelling in lower abdomen ongoing (rt side > left) as well as bilateral LE. Has ongoing abd rash which seems worse with swelling; has not be evaluated by PCP but will arrange f/u. Denies orthopnea or PND. Denies cough or congestion. Denies penile or scrotal edema. States that his weight usually fluctuates between 305-310 pounds. 326lb today.     Patient denies any exertional chest pain, palpitations, and syncope. Uses CPAP at night with good compliance. Has foul smelling urine which is new. Denies pain with urination; occasionally notes decreased UOP. Denies changes to diet. Pt concerned with cost of care; does not follow-up frequently with PCP other than for INR management. No recent labs other than INR. Cardiac testing  Lipids 3/16/18: , HDL 30, LDL 72, , VLDL 43    Cardiac Testing/ Procedures:     A. Cardiac Cath/PCI:     B. ECHO/EVITA: 6/22/2017 Left ventricle: Systolic function was mildly reduced. Ejection fraction was estimated to be 45 %. There were no regional wall motion abnormalities. Tricuspid valve: There was mild regurgitation.     C. StressNuclear/Stress ECHO/Stress test:     D. Vascular:     E. EP:     F. Miscellaneous    Past Medical History:   Diagnosis Date    Asthma     Back pain     Sleep apnea     Spinal stenosis     Staph infection         Current Outpatient Medications   Medication Sig Dispense Refill    furosemide (LASIX) 40 mg tablet Take 1 Tab by mouth two (2) times a day. 60 Tab 0    potassium chloride SR (KLOR-CON 10) 10 mEq tablet Take 1 Tab by mouth daily.  90 Tab 3    dilTIAZem CD (CARTIA XT) 300 mg ER capsule TAKE ONE CAPSULE BY MOUTH ONCE DAILY 90 Cap 3    psyllium husk-aspartame (METAMUCIL FIBER SINGLES) 3.4 gram pwpk packet Take 1 Packet by mouth.  warfarin (COUMADIN) 5 mg tablet Take 10 mg by mouth daily. Per PCP.  MULTIVITAMIN (MULTIPLE VITAMIN PO) Take  by mouth daily. Allergies   Allergen Reactions    Penicillins Hives          Review of Systems  Constitutional: Negative for fever, chills, and diaphoresis. +fatigue  Respiratory: Negative for cough, hemoptysis, sputum production, and wheezing. +SOB  Cardiovascular: Negative for chest pain, palpitations, orthopnea, claudication, and PND. +leg swelling  Gastrointestinal: Negative for heartburn, nausea, vomiting, blood in stool and melena. Genitourinary: Negative for dysuria and flank pain. +urinary odor  Neurological: Negative for focal weakness, seizures, loss of consciousness, weakness and headaches. Endo/Heme/Allergies: Negative for abnormal bleeding. Psychiatric/Behavioral: Negative for memory loss. Physical Exam    There were no vitals taken for this visit.   Wt Readings from Last 3 Encounters:   06/07/19 326 lb 3.2 oz (148 kg)   05/17/19 322 lb 6.4 oz (146.2 kg)   11/12/18 310 lb (140.6 kg)      Gen:    Well-developed, well-nourished, morbid obesity  Neck:  Supple, thick neck, JVP elevated  Resp:  No accessory muscle use, CTAB  Card:   irregular rate, nml S1, S2, no appreciable m/r/b but heart sounds distant  Abd:  morbidly obese, +BS, lower RQ indurated with +erythema; diffuse lower abd rash rt side > left  MSK:   No cyanosis  Neuro: moving all four extremities , follows commands appropriately  Psych:  Good insight, oriented to person, place , alert, Nml Affect  LE: 1+ LE edema;     Labs:   No results found for: HBA1C, HGBE8, SMP2NKZK, KUA9IXWS, KBQ8SCXI    Lab Results   Component Value Date/Time    Sodium 143 10/13/2017 11:04 AM    Sodium 139 08/24/2017 12:54 PM    Sodium 137 07/24/2017 11:30 AM    Sodium 139 06/28/2017 02:02 AM    Sodium 138 06/27/2017 02:05 AM    Potassium 4.7 10/13/2017 11:04 AM    Chloride 100 10/13/2017 11:04 AM    Chloride 88 (L) 08/24/2017 12:54 PM    Chloride 85 (L) 07/24/2017 11:30 AM    Chloride 102 06/28/2017 02:02 AM    Chloride 102 06/27/2017 02:05 AM    CO2 28 10/13/2017 11:04 AM    CO2 32 (H) 08/24/2017 12:54 PM    CO2 30 (H) 07/24/2017 11:30 AM    CO2 32 06/28/2017 02:02 AM    CO2 34 (H) 06/27/2017 02:05 AM    Anion gap 5 06/28/2017 02:02 AM    Anion gap 2 (L) 06/27/2017 02:05 AM    Anion gap 6 06/26/2017 05:20 AM    Anion gap 5 06/25/2017 05:37 AM    Anion gap 6 06/24/2017 10:50 AM    Glucose 95 10/13/2017 11:04 AM    Glucose 96 08/24/2017 12:54 PM    Glucose 94 07/24/2017 11:30 AM    Glucose 117 (H) 06/28/2017 02:02 AM    Glucose 115 (H) 06/27/2017 02:05 AM    BUN 14 10/13/2017 11:04 AM    BUN 26 (H) 08/24/2017 12:54 PM    BUN 11 07/24/2017 11:30 AM    BUN 10 06/28/2017 02:02 AM    BUN 11 06/27/2017 02:05 AM    Creatinine 1.25 10/13/2017 11:04 AM    Creatinine 1.58 (H) 08/24/2017 12:54 PM    Creatinine 1.23 07/24/2017 11:30 AM    Creatinine 0.98 06/28/2017 02:02 AM    Creatinine 1.09 06/27/2017 02:05 AM    BUN/Creatinine ratio 11 10/13/2017 11:04 AM    BUN/Creatinine ratio 16 08/24/2017 12:54 PM    BUN/Creatinine ratio 9 07/24/2017 11:30 AM    BUN/Creatinine ratio 10 (L) 06/28/2017 02:02 AM    BUN/Creatinine ratio 10 (L) 06/27/2017 02:05 AM    GFR est AA 78 10/13/2017 11:04 AM    GFR est AA 59 (L) 08/24/2017 12:54 PM    GFR est AA 80 07/24/2017 11:30 AM    GFR est AA >60 06/28/2017 02:02 AM    GFR est AA >60 06/27/2017 02:05 AM    GFR est non-AA 68 10/13/2017 11:04 AM    GFR est non-AA 51 (L) 08/24/2017 12:54 PM    GFR est non-AA 69 07/24/2017 11:30 AM    GFR est non-AA >60 06/28/2017 02:02 AM    GFR est non-AA >60 06/27/2017 02:05 AM    Calcium 9.6 10/13/2017 11:04 AM    Calcium 9.8 08/24/2017 12:54 PM    Calcium 9.5 07/24/2017 11:30 AM    Calcium 8.4 (L) 06/28/2017 02:02 AM    Calcium 8.3 (L) 06/27/2017 02:05 AM     Sandy Anaya, ANP

## 2019-06-07 NOTE — PROGRESS NOTES
Wilber Tripp MD    [address] [facility] [place], [address] [facility]    Office [phone],[facility] [phone]  Pager [phone]    Rocky Chow is a 47 y.o. male is here for f/u visit. Primary care physician:MENA Lopez    Patient Active Problem List   Diagnosis Code    Renal insufficiency N28.9    ADALBERTO (obstructive sleep apnea) G47.33    Tobacco use Z72.0    Chronic back pain M54.9, G89.29    Asthma J45.909    Elevated BP without diagnosis of hypertension R03.0    Exertional dyspnea R06.09    Persistent atrial fibrillation (HCC) I48.1    Morbid obesity due to excess calories (HCC) E66.01    Chronic anticoagulation Z79.01    Chronic diastolic heart failure (HCC) I50.32    Chronic systolic congestive heart failure [facility]) I50.22       Dear Mr. Adalberto Nuñez had the pleasure of seeing Mr. Rocky Chow in the office today. Chief complaint:  No chief complaint on file. Assessment:   Chronic SHF - EF 45%. A fib with CVR   Chronic anticoagulation - INR followed by PCP  Morbid obesity   Smoker - quit smoking/not chewing tobacco  Hx of Excessive EtOH  -- occ drinks beer now        Plan:     Currently on Lasix 20 mg daily. K - 1 tab daily      Will increase it to twice daily for the next 4 days and then after that take it daily with an extra pill as needed. He will also check his blood pressure daily and if the systolic blood pressure is less than 100, he will hold is diltiazem CD (heart pill) for that day and then reevaluate his blood pressure the next day. He also knows that holding the Cardizem CD may increase his heart rate because of his paroxysmal atrial fibrillation. Schedule stress test       Weighs himself daily. Increase his dose based on his weight. On anticoagulation    Follow up in 4 weeeks  or earlier as needed. Patient understands the plan. All questions were answered to the patient's satisfaction.     Medication Side Effects and Warnings were discussed with patient: yes  Patient Labs were reviewed and or requested:  yes  Patient Past Records were reviewed and or requested: yes    I appreciate the opportunity to be involved in . See note below for details. Please do not hesitate to contact us with questions or concerns. Demond Flores MD    Cardiac Testing/ Procedures: A. Cardiac Cath/PCI:    B.ECHO/EVITA: 6/22/2017 Left ventricle: Systolic function was mildly reduced. Ejection fraction was estimated to be 45 %. There were no regional wall motion abnormalities. Tricuspid valve: There was mild regurgitation. C.StressNuclear/Stress ECHO/Stress test:    D.Vascular:    E. EP:    F. Miscellaneous:    Subjective:  Patricio Milton is a 47 y.o. male who returns for follow up visit. Has been feeling worse than before the last few days. Try to cut grass yesterday but could not do it. Continues to have chronic dyspnea on exertion which is slightly worsened. Also her weight has increased and his swelling the lower extremities is increased. No palpitations. No chest pain. Tries to eat low-salt/heart healthy diet. No chest pain. Has difficulty losing weight. States that his weight usually fluctuates between 305-310 pounds. Today 322 lbs      ROS:  (bold if positive, if negative)             Medications before admission:    Current Outpatient Medications   Medication Sig Dispense    furosemide (LASIX) 20 mg tablet Take 1 Tab by mouth two (2) times a day. 180 Tab    potassium chloride SR (KLOR-CON 10) 10 mEq tablet Take 1 Tab by mouth daily. 90 Tab    dilTIAZem CD (CARTIA XT) 300 mg ER capsule TAKE ONE CAPSULE BY MOUTH ONCE DAILY 90 Cap    psyllium husk-aspartame (METAMUCIL FIBER SINGLES) 3.4 gram pwpk packet Take 1 Packet by mouth.  warfarin (COUMADIN) 5 mg tablet Take 10 mg by mouth daily. Per PCP.  MULTIVITAMIN (MULTIPLE VITAMIN PO) Take  by mouth daily.       No current facility-administered medications for this visit. Family History of CAD:    Yes    Social History:  Current  Smoker  No    Physical Exam:  There were no vitals taken for this visit.        Gen: Well-developed, well-nourished, morbid obesity  Neck: Supple,thick neck, unable to appreciate JVD  Resp: No accessory muscle use, Clear breath sounds, No rales or rhonchi  Card: Irregular Rate,Rythm,Normal S1, S2, No murmurs  Abd:  Soft,morbidly obese, Abd wall - indurated/erythema+  MSK: No cyanosis  Neuro: moving all four extremities , follows commands appropriately  Psych:  Good insight, oriented to person, place , alert, Nml Affect  LE: 1+ edema;     EKG:       LABS:        Lab Results   Component Value Date/Time    WBC 6.8 06/28/2017 02:02 AM    HGB 12.8 06/28/2017 02:02 AM    HCT 39.9 06/28/2017 02:02 AM    PLATELET 627 68/72/8214 02:02 AM     Lab Results   Component Value Date/Time    Sodium 143 10/13/2017 11:04 AM    Potassium 4.7 10/13/2017 11:04 AM    Chloride 100 10/13/2017 11:04 AM    CO2 28 10/13/2017 11:04 AM    Anion gap 5 06/28/2017 02:02 AM    Glucose 95 10/13/2017 11:04 AM    BUN 14 10/13/2017 11:04 AM    Creatinine 1.25 10/13/2017 11:04 AM    BUN/Creatinine ratio 11 10/13/2017 11:04 AM    GFR est AA 78 10/13/2017 11:04 AM    GFR est non-AA 68 10/13/2017 11:04 AM    Calcium 9.6 10/13/2017 11:04 AM       Lab Results   Component Value Date/Time    aPTT 28.0 06/22/2017 12:54 PM     Lab Results   Component Value Date/Time    INR 2.5 (H) 06/28/2017 02:02 AM    INR 2.1 (H) 06/27/2017 02:05 AM    INR 1.5 (H) 06/26/2017 05:20 AM    Prothrombin time 26.1 (H) 06/28/2017 02:02 AM    Prothrombin time 21.8 (H) 06/27/2017 02:05 AM    Prothrombin time 15.4 (H) 06/26/2017 05:20 AM     No components found for: Vivian Espinoza MD

## 2019-06-08 LAB
ALBUMIN SERPL-MCNC: 4.4 G/DL (ref 3.5–5.5)
ALBUMIN/GLOB SERPL: 1.9 {RATIO} (ref 1.2–2.2)
ALP SERPL-CCNC: 75 IU/L (ref 39–117)
ALT SERPL-CCNC: 20 IU/L (ref 0–44)
APPEARANCE UR: CLEAR
AST SERPL-CCNC: 18 IU/L (ref 0–40)
BILIRUB SERPL-MCNC: 0.9 MG/DL (ref 0–1.2)
BILIRUB UR QL STRIP: NEGATIVE
BUN SERPL-MCNC: 10 MG/DL (ref 6–24)
BUN/CREAT SERPL: 8 (ref 9–20)
CALCIUM SERPL-MCNC: 9 MG/DL (ref 8.7–10.2)
CHLORIDE SERPL-SCNC: 103 MMOL/L (ref 96–106)
CO2 SERPL-SCNC: 24 MMOL/L (ref 20–29)
COLOR UR: YELLOW
CREAT SERPL-MCNC: 1.24 MG/DL (ref 0.76–1.27)
ERYTHROCYTE [DISTWIDTH] IN BLOOD BY AUTOMATED COUNT: 15.6 % (ref 12.3–15.4)
GLOBULIN SER CALC-MCNC: 2.3 G/DL (ref 1.5–4.5)
GLUCOSE SERPL-MCNC: 98 MG/DL (ref 65–99)
GLUCOSE UR QL: NEGATIVE
HCT VFR BLD AUTO: 38.6 % (ref 37.5–51)
HGB BLD-MCNC: 12.5 G/DL (ref 13–17.7)
HGB UR QL STRIP: NEGATIVE
KETONES UR QL STRIP: NEGATIVE
LEUKOCYTE ESTERASE UR QL STRIP: NEGATIVE
MAGNESIUM SERPL-MCNC: 2.1 MG/DL (ref 1.6–2.3)
MCH RBC QN AUTO: 29.8 PG (ref 26.6–33)
MCHC RBC AUTO-ENTMCNC: 32.4 G/DL (ref 31.5–35.7)
MCV RBC AUTO: 92 FL (ref 79–97)
MICRO URNS: NORMAL
NITRITE UR QL STRIP: NEGATIVE
NT-PROBNP SERPL-MCNC: 1104 PG/ML (ref 0–121)
PH UR STRIP: 7.5 [PH] (ref 5–7.5)
PLATELET # BLD AUTO: 247 X10E3/UL (ref 150–450)
POTASSIUM SERPL-SCNC: 4.4 MMOL/L (ref 3.5–5.2)
PROT SERPL-MCNC: 6.7 G/DL (ref 6–8.5)
PROT UR QL STRIP: NEGATIVE
RBC # BLD AUTO: 4.19 X10E6/UL (ref 4.14–5.8)
SODIUM SERPL-SCNC: 142 MMOL/L (ref 134–144)
SP GR UR: 1.01 (ref 1–1.03)
UROBILINOGEN UR STRIP-MCNC: 0.2 MG/DL (ref 0.2–1)
WBC # BLD AUTO: 8.9 X10E3/UL (ref 3.4–10.8)

## 2019-06-10 NOTE — PROGRESS NOTES
Labs 6/7/19: overall stable. proBNP appears stable / only slightly elevated; however, this can be deceiving in setting of morbid obesity. Discussed with pt over phone. Weight is down trending and pt feeling better. Down to 320lbs now. Advised to continue lasix 40mg BID. Pt advised to call back 6/14 with update of symptoms / weight.

## 2019-06-13 DIAGNOSIS — I50.32 CHRONIC DIASTOLIC HEART FAILURE (HCC): Primary | ICD-10-CM

## 2019-06-13 DIAGNOSIS — I50.22 CHRONIC SYSTOLIC CONGESTIVE HEART FAILURE (HCC): ICD-10-CM

## 2019-06-21 ENCOUNTER — OFFICE VISIT (OUTPATIENT)
Dept: CARDIOLOGY CLINIC | Age: 49
End: 2019-06-21

## 2019-06-21 VITALS
BODY MASS INDEX: 50.62 KG/M2 | RESPIRATION RATE: 24 BRPM | HEART RATE: 84 BPM | DIASTOLIC BLOOD PRESSURE: 80 MMHG | WEIGHT: 315 LBS | HEIGHT: 66 IN | SYSTOLIC BLOOD PRESSURE: 120 MMHG | OXYGEN SATURATION: 97 %

## 2019-06-21 DIAGNOSIS — G47.33 OSA (OBSTRUCTIVE SLEEP APNEA): ICD-10-CM

## 2019-06-21 DIAGNOSIS — F10.11 H/O ETOH ABUSE: ICD-10-CM

## 2019-06-21 DIAGNOSIS — E66.01 MORBID OBESITY (HCC): ICD-10-CM

## 2019-06-21 DIAGNOSIS — I48.21 PERMANENT ATRIAL FIBRILLATION (HCC): ICD-10-CM

## 2019-06-21 DIAGNOSIS — R06.09 DYSPNEA ON EXERTION: ICD-10-CM

## 2019-06-21 DIAGNOSIS — I50.9 CHRONIC CONGESTIVE HEART FAILURE, UNSPECIFIED HEART FAILURE TYPE (HCC): Primary | ICD-10-CM

## 2019-06-21 DIAGNOSIS — Z79.01 CHRONIC ANTICOAGULATION: ICD-10-CM

## 2019-06-21 NOTE — PROGRESS NOTES
Malathi Vickers, JC  [id number]  [place], [address] [facility]    Office [phone]  Fax [phone]        Cortez Pratt is a 47 y.o. male who is followed outpatient by Dr. Aria Soler and was last seen by me on 6/7/19. Patient is here today for f/u of SOB. Assessment  Encounter Diagnoses   Name Primary?  Chronic congestive heart failure, unspecified heart failure type (HCC) Yes    Permanent atrial fibrillation (HCC)     Chronic anticoagulation     Morbid obesity ([address]     H/O ETOH abuse     Dyspnea on exertion     PEPE (obstructive sleep apnea)       Recommendations:  Acute on Chronic Heart Failure, LVEF prev mildly reduced at 45%  - cont lasix to 40mg BID  - repeat echo today (results pending); concern for further reduced EF   - pending results, will adjust meds via phone; may need acei and BB  - recheck BMP today  - cont fluid restriction 1.5L per day; 2g sodium restriction    Dyspnea on Exertion  - suspect 2/2 CHF (see above)  - also scheduled for stress test per Dr. Aria Soler 6/26  - on chronic anticoag but Hgb stable at 12.5 (slight down trend -cont to monitor in future)    Afib   - rate controlled on diltiazem  - chronic anticoagulation on Warfarin- INR followed by PCP    Hx of Excessive EtOH    - still drinks 2-3 glasses of wine most days of the week  - recommended cessation or at least limited amounts (no more than 2 glasses per day)    Morbid obesity     Hx of tobacco use - quit smoking; still vaping infrequently     F/u in 3 weeks or PRN.     Patient understands the plan. All questions were answered to the patient's satisfaction.     Medication Side Effects and Warnings were discussed with patient: yes  Patient Labs were reviewed and or requested:  yes  Patient Past Records were reviewed and or requested: yes     I appreciate the opportunity to be involved in patient's care.  Please do not hesitate to contact us with questions or concerns. Subjective:  Pt is feeling much better today with increased lasix. States swelling is sig improved. Rash and lower abd hardness firmness resolved. States weight overall down about 10lb from where he peaked at home (327lb now 317lb). Uses CPAP every night with good compliance. Denies orthopnea or PND. Denies cough. Has increased HR with ambulation but never at rest.      Still feels he has excess volume and notes weight has steady gone up since his last hospital admission. Prev baseline was 305lb but pt admits this was in 10/2017. Cardiac testing  Lipids 3/16/18: , HDL 30, LDL 72, , VLDL 43    Cardiac Testing/ Procedures:     A. Cardiac Cath/PCI:     B. ECHO/EVITA: 6/22/2017 Left ventricle: Systolic function was mildly reduced. Ejection fraction was estimated to be 45 %. There were no regional wall motion abnormalities. Tricuspid valve: There was mild regurgitation.     C. StressNuclear/Stress ECHO/Stress test:     D. Vascular:     E. EP:     F. Miscellaneous    Past Medical History:   Diagnosis Date    Asthma     Back pain     Sleep apnea     Spinal stenosis     Staph infection         Current Outpatient Medications   Medication Sig Dispense Refill    furosemide (LASIX) 40 mg tablet Take 1 Tab by mouth two (2) times a day. 60 Tab 0    potassium chloride SR (KLOR-CON 10) 10 mEq tablet Take 1 Tab by mouth daily. 90 Tab 3    dilTIAZem CD (CARTIA XT) 300 mg ER capsule TAKE ONE CAPSULE BY MOUTH ONCE DAILY 90 Cap 3    psyllium husk-aspartame (METAMUCIL FIBER SINGLES) 3.4 gram pwpk packet Take 1 Packet by mouth.  warfarin (COUMADIN) 5 mg tablet Take 10 mg by mouth daily. Per PCP.  MULTIVITAMIN (MULTIPLE VITAMIN PO) Take  by mouth daily. Allergies   Allergen Reactions    Penicillins Hives          Review of Systems  Constitutional: Negative for fever, chills, and diaphoresis. Respiratory: Negative for cough, hemoptysis, sputum production, and wheezing. +SOB  Cardiovascular: Negative for chest pain, palpitations, orthopnea, claudication, and PND. Gastrointestinal: Negative for heartburn, nausea, vomiting, blood in stool and melena. Genitourinary: Negative for dysuria and flank pain. Neurological: Negative for focal weakness, seizures, loss of consciousness, weakness and headaches. Endo/Heme/Allergies: Negative for abnormal bleeding. Psychiatric/Behavioral: Negative for memory loss. Physical Exam    Visit Vitals  /80   Pulse 84   Resp 24   Ht 5' 6\" (1.676 m)   Wt 320 lb (145.2 kg)   SpO2 97%   BMI 51.65 kg/m²     Wt Readings from Last 3 Encounters:   06/21/19 320 lb (145.2 kg)   06/21/19 320 lb (145.2 kg)   06/07/19 326 lb 3.2 oz (148 kg)      Gen:    Well-developed, well-nourished, morbid obesity  Neck:  Supple, thick neck, unable to appreciate JVP  Resp:  No accessory muscle use, CTAB  Card:   irregularly irregular, heart sounds distant  Abd:  morbidly obese, +BS, mod firm (improved)  MSK:   No cyanosis  Neuro: moving all four extremities , follows commands appropriately  Psych:  Good insight, oriented to person, place , alert, Nml Affect  LE: trace to 1+ LE edema;     Labs:     Component      Latest Ref Rng & Units 6/7/2019 6/7/2019 6/7/2019           2:46 PM  2:46 PM  2:46 PM   Glucose      65 - 99 mg/dL   98   BUN      6 - 24 mg/dL   10   Creatinine      0.76 - 1.27 mg/dL   1.24   GFR est non-AA      >59 mL/min/1.73   67   GFR est AA      >59 mL/min/1.73   78   BUN/Creatinine ratio      9 - 20   8 (L)   Sodium      134 - 144 mmol/L   142   Potassium      3.5 - 5.2 mmol/L   4.4   Chloride      96 - 106 mmol/L   103   CO2      20 - 29 mmol/L   24   Calcium      8.7 - 10.2 mg/dL   9.0   Protein, total      6.0 - 8.5 g/dL   6.7   Albumin      3.5 - 5.5 g/dL   4.4   GLOBULIN, TOTAL      1.5 - 4.5 g/dL   2.3   A-G Ratio      1.2 - 2.2   1.9   Bilirubin, total      0.0 - 1.2 mg/dL   0.9   Alk.  phosphatase      39 - 117 IU/L   75   AST      0 - 40 IU/L   18   ALT (SGPT)      0 - 44 IU/L   20   Magnesium      1.6 - 2.3 mg/dL  2.1    NT pro-BNP      0 - 121 pg/mL 1,104 (H)       Component      Latest Ref Rng & Units 6/7/2019 6/7/2019           2:46 PM  2:46 PM   Specific Gravity      1.005 - 1.030  1.011   pH (UA)      5.0 - 7.5  7.5   Color      Yellow  Yellow   Appearance      Clear  Clear   Leukocyte Esterase      Negative  Negative   Protein      Negative/Trace  Negative   Glucose      Negative  Negative   Ketone      Negative  Negative   Blood      Negative  Negative   Bilirubin      Negative  Negative   Urobilinogen      0.2 - 1.0 mg/dL  0.2   Nitrites      Negative  Negative   Microscopic Examination        Comment   WBC      3.4 - 10.8 x10E3/uL 8.9    RBC      4.14 - 5.80 x10E6/uL 4.19    HGB      13.0 - 17.7 g/dL 12.5 (L)    HCT      37.5 - 51.0 % 38.6    MCV      79 - 97 fL 92    MCH      26.6 - 33.0 pg 29.8    MCHC      31.5 - 35.7 g/dL 32.4    RDW      12.3 - 15.4 % 15.6 (H)    PLATELET      034 - 980 x10E3/uL 247      Evan Sultana, ANP

## 2019-06-21 NOTE — PATIENT INSTRUCTIONS
Get labs today on the 3rd floor. I will call you with lab and echo results as well as med changes. Follow-up with MD in 3 weeks.

## 2019-06-21 NOTE — PROGRESS NOTES
Visit Vitals  /80   Pulse 84   Resp 24   Ht 5' 6\" (1.676 m)   Wt 320 lb (145.2 kg)   SpO2 97%   BMI 51.65 kg/m²     ECHO today  SOB improved  Denies CP or dizziness  Swelling in feet a few days ago, has resolved.

## 2019-06-22 LAB
BUN SERPL-MCNC: 9 MG/DL (ref 6–24)
BUN/CREAT SERPL: 8 (ref 9–20)
CALCIUM SERPL-MCNC: 9 MG/DL (ref 8.7–10.2)
CHLORIDE SERPL-SCNC: 104 MMOL/L (ref 96–106)
CO2 SERPL-SCNC: 25 MMOL/L (ref 20–29)
CREAT SERPL-MCNC: 1.1 MG/DL (ref 0.76–1.27)
GLUCOSE SERPL-MCNC: 92 MG/DL (ref 65–99)
POTASSIUM SERPL-SCNC: 4.1 MMOL/L (ref 3.5–5.2)
SODIUM SERPL-SCNC: 144 MMOL/L (ref 134–144)

## 2019-06-25 ENCOUNTER — TELEPHONE (OUTPATIENT)
Dept: CARDIOLOGY CLINIC | Age: 49
End: 2019-06-25

## 2019-06-26 RX ORDER — METOPROLOL SUCCINATE 50 MG/1
50 TABLET, EXTENDED RELEASE ORAL
Qty: 30 TAB | Refills: 0 | Status: SHIPPED | OUTPATIENT
Start: 2019-06-26 | End: 2019-07-08

## 2019-06-26 RX ORDER — LOSARTAN POTASSIUM 25 MG/1
25 TABLET ORAL DAILY
Qty: 30 TAB | Refills: 0 | Status: SHIPPED | OUTPATIENT
Start: 2019-06-26 | End: 2019-07-15

## 2019-06-26 NOTE — PROGRESS NOTES
BMP 6/21/19 stable. Echo with LVEF 26-30%, mod dilated LA. Dilated RA and RV. AV sclerosis. Mild to mod MR and TR. Elevated CVP with enlarged IVC. Mod pulmonary HTN. Stop Diltiazem. Start Toprol XL 50mg at bedtime and losartan 25mg daily. Pt very concerned about cost of testing and recurrent visits; will try to minimize as able. Pt to monitor home BP and HR. Call if HR >898 or BP systolic <085 or >292.

## 2019-06-26 NOTE — TELEPHONE ENCOUNTER
Called back; left VM. Pt coming to office today for stress testing; will discuss echo results and med changes with him while here.

## 2019-07-01 ENCOUNTER — TELEPHONE (OUTPATIENT)
Dept: CARDIOLOGY CLINIC | Age: 49
End: 2019-07-01

## 2019-07-01 NOTE — TELEPHONE ENCOUNTER
Patient is calling again to speak with Adelina Alpers regarding his medication. He stated that it is urgent that she call him because he needs to take his medications. Please advise.     Phone #: [phone]  Thanks

## 2019-07-01 NOTE — TELEPHONE ENCOUNTER
Return call placed to pt (IDx2). Amanda Mooney NP recommendations. DECREASE Metoprolol Succinate to 25 mg daily. Take Medication at night. Patient will make changes starting tomorrow and follow his HR and BP. He will call back prior to next appt if any concerns. Pt expressed understanding. Opportunities for questions, clarifications, and concerns provided.

## 2019-07-01 NOTE — TELEPHONE ENCOUNTER
Return call placed to pt (IDx2). Patient states that he made the recommended medication adjustments on Friday 6/28/19. Since then he has been feeling extremely SOB and fatigued to the point that it is affecting his ADLs. Patient states \"I can't even take a shower without having to sit down and catch my breath. \" He states \"it feels like when I had that stress test medicine. \"     Vitals:  Recent BP: low 100s/60s-70s  Recent HR: 80s-low 100s at rest.     Today his HR is 85 and BP is 106/74. He is having extreme SOB and does not want to take his medications until he talks to Brooke Contreras NP. Advised that changes in medications can sometimes affect the way you feel within first couple weeks and usually dissipate. Will forward to Brooke Contreras NP for recommendation. Patient would prefer a call back from Brooke Contreras.

## 2019-07-01 NOTE — TELEPHONE ENCOUNTER
Patient states \"it is urgent\" he speaks to Kimberly Carballo NP. He is having issues with the new medication she prescribed for him. Patient stated he would give Lew Barber more information.      Phone: [phone]

## 2019-07-08 ENCOUNTER — APPOINTMENT (OUTPATIENT)
Dept: GENERAL RADIOLOGY | Age: 49
DRG: 286 | End: 2019-07-08
Attending: EMERGENCY MEDICINE
Payer: COMMERCIAL

## 2019-07-08 ENCOUNTER — HOSPITAL ENCOUNTER (INPATIENT)
Age: 49
LOS: 7 days | Discharge: HOME | DRG: 286 | End: 2019-07-15
Attending: EMERGENCY MEDICINE | Admitting: INTERNAL MEDICINE
Payer: COMMERCIAL

## 2019-07-08 ENCOUNTER — TELEPHONE (OUTPATIENT)
Dept: CARDIOLOGY CLINIC | Age: 49
End: 2019-07-08

## 2019-07-08 DIAGNOSIS — I50.33 ACUTE ON CHRONIC DIASTOLIC HEART FAILURE (HCC): ICD-10-CM

## 2019-07-08 DIAGNOSIS — R06.09 DOE (DYSPNEA ON EXERTION): Primary | ICD-10-CM

## 2019-07-08 DIAGNOSIS — R07.9 CHEST PAIN, UNSPECIFIED: ICD-10-CM

## 2019-07-08 DIAGNOSIS — I48.91 ATRIAL FIBRILLATION WITH RVR (HCC): ICD-10-CM

## 2019-07-08 PROBLEM — N28.9 ACUTE RENAL INSUFFICIENCY: Status: ACTIVE | Noted: 2019-07-08

## 2019-07-08 PROBLEM — I50.23 ACUTE ON CHRONIC SYSTOLIC CHF (CONGESTIVE HEART FAILURE) (HCC): Status: ACTIVE | Noted: 2019-07-08

## 2019-07-08 LAB
ALBUMIN SERPL-MCNC: 3.6 G/DL (ref 3.5–5)
ALBUMIN/GLOB SERPL: 1.1 {RATIO} (ref 1.1–2.2)
ALP SERPL-CCNC: 78 U/L (ref 45–117)
ALT SERPL-CCNC: 70 U/L (ref 12–78)
ANION GAP SERPL CALC-SCNC: 7 MMOL/L (ref 5–15)
AST SERPL-CCNC: 56 U/L (ref 15–37)
ATRIAL RATE: 109 BPM
BASOPHILS # BLD: 0 K/UL (ref 0–0.1)
BASOPHILS NFR BLD: 0 % (ref 0–1)
BILIRUB SERPL-MCNC: 1 MG/DL (ref 0.2–1)
BNP SERPL-MCNC: 2794 PG/ML
BUN SERPL-MCNC: 23 MG/DL (ref 6–20)
BUN/CREAT SERPL: 14 (ref 12–20)
CALCIUM SERPL-MCNC: 8.6 MG/DL (ref 8.5–10.1)
CALCULATED R AXIS, ECG10: 60 DEGREES
CALCULATED T AXIS, ECG11: -48 DEGREES
CHLORIDE SERPL-SCNC: 106 MMOL/L (ref 97–108)
CO2 SERPL-SCNC: 27 MMOL/L (ref 21–32)
CREAT SERPL-MCNC: 1.67 MG/DL (ref 0.7–1.3)
DIAGNOSIS, 93000: NORMAL
DIFFERENTIAL METHOD BLD: ABNORMAL
EOSINOPHIL # BLD: 0.1 K/UL (ref 0–0.4)
EOSINOPHIL NFR BLD: 1 % (ref 0–7)
ERYTHROCYTE [DISTWIDTH] IN BLOOD BY AUTOMATED COUNT: 14.8 % (ref 11.5–14.5)
GLOBULIN SER CALC-MCNC: 3.2 G/DL (ref 2–4)
GLUCOSE SERPL-MCNC: 101 MG/DL (ref 65–100)
HCT VFR BLD AUTO: 39.5 % (ref 36.6–50.3)
HGB BLD-MCNC: 12.5 G/DL (ref 12.1–17)
IMM GRANULOCYTES # BLD AUTO: 0 K/UL (ref 0–0.04)
IMM GRANULOCYTES NFR BLD AUTO: 0 % (ref 0–0.5)
INR BLD: 2 (ref 0.9–1.2)
LYMPHOCYTES # BLD: 1.6 K/UL (ref 0.8–3.5)
LYMPHOCYTES NFR BLD: 16 % (ref 12–49)
MCH RBC QN AUTO: 29.8 PG (ref 26–34)
MCHC RBC AUTO-ENTMCNC: 31.6 G/DL (ref 30–36.5)
MCV RBC AUTO: 94.3 FL (ref 80–99)
MONOCYTES # BLD: 1.1 K/UL (ref 0–1)
MONOCYTES NFR BLD: 11 % (ref 5–13)
NEUTS SEG # BLD: 6.8 K/UL (ref 1.8–8)
NEUTS SEG NFR BLD: 72 % (ref 32–75)
NRBC # BLD: 0.02 K/UL (ref 0–0.01)
NRBC BLD-RTO: 0.2 PER 100 WBC
PLATELET # BLD AUTO: 235 K/UL (ref 150–400)
PMV BLD AUTO: 11.2 FL (ref 8.9–12.9)
POTASSIUM SERPL-SCNC: 5 MMOL/L (ref 3.5–5.1)
PROT SERPL-MCNC: 6.8 G/DL (ref 6.4–8.2)
Q-T INTERVAL, ECG07: 320 MS
QRS DURATION, ECG06: 88 MS
QTC CALCULATION (BEZET), ECG08: 435 MS
RBC # BLD AUTO: 4.19 M/UL (ref 4.1–5.7)
SODIUM SERPL-SCNC: 140 MMOL/L (ref 136–145)
TROPONIN I SERPL-MCNC: <0.05 NG/ML
VENTRICULAR RATE, ECG03: 111 BPM
WBC # BLD AUTO: 9.7 K/UL (ref 4.1–11.1)

## 2019-07-08 PROCEDURE — 83880 ASSAY OF NATRIURETIC PEPTIDE: CPT

## 2019-07-08 PROCEDURE — 36415 COLL VENOUS BLD VENIPUNCTURE: CPT

## 2019-07-08 PROCEDURE — 85610 PROTHROMBIN TIME: CPT

## 2019-07-08 PROCEDURE — 94761 N-INVAS EAR/PLS OXIMETRY MLT: CPT

## 2019-07-08 PROCEDURE — 93005 ELECTROCARDIOGRAM TRACING: CPT

## 2019-07-08 PROCEDURE — 74011250637 HC RX REV CODE- 250/637: Performed by: STUDENT IN AN ORGANIZED HEALTH CARE EDUCATION/TRAINING PROGRAM

## 2019-07-08 PROCEDURE — 99284 EMERGENCY DEPT VISIT MOD MDM: CPT

## 2019-07-08 PROCEDURE — 74011000250 HC RX REV CODE- 250: Performed by: PHYSICIAN ASSISTANT

## 2019-07-08 PROCEDURE — 74011000250 HC RX REV CODE- 250: Performed by: STUDENT IN AN ORGANIZED HEALTH CARE EDUCATION/TRAINING PROGRAM

## 2019-07-08 PROCEDURE — 74011000258 HC RX REV CODE- 258: Performed by: PHYSICIAN ASSISTANT

## 2019-07-08 PROCEDURE — 71046 X-RAY EXAM CHEST 2 VIEWS: CPT

## 2019-07-08 PROCEDURE — 74011250637 HC RX REV CODE- 250/637: Performed by: INTERNAL MEDICINE

## 2019-07-08 PROCEDURE — 85025 COMPLETE CBC W/AUTO DIFF WBC: CPT

## 2019-07-08 PROCEDURE — 80053 COMPREHEN METABOLIC PANEL: CPT

## 2019-07-08 PROCEDURE — 96374 THER/PROPH/DIAG INJ IV PUSH: CPT

## 2019-07-08 PROCEDURE — 74011250636 HC RX REV CODE- 250/636: Performed by: STUDENT IN AN ORGANIZED HEALTH CARE EDUCATION/TRAINING PROGRAM

## 2019-07-08 PROCEDURE — 84484 ASSAY OF TROPONIN QUANT: CPT

## 2019-07-08 PROCEDURE — 65660000000 HC RM CCU STEPDOWN

## 2019-07-08 RX ORDER — BUMETANIDE 0.25 MG/ML
1 INJECTION INTRAMUSCULAR; INTRAVENOUS EVERY 12 HOURS
Status: DISCONTINUED | OUTPATIENT
Start: 2019-07-08 | End: 2019-07-08

## 2019-07-08 RX ORDER — ENOXAPARIN SODIUM 100 MG/ML
1 INJECTION SUBCUTANEOUS EVERY 12 HOURS
Status: DISCONTINUED | OUTPATIENT
Start: 2019-07-08 | End: 2019-07-12

## 2019-07-08 RX ORDER — DOCUSATE SODIUM 100 MG/1
100 CAPSULE, LIQUID FILLED ORAL 2 TIMES DAILY
Status: DISCONTINUED | OUTPATIENT
Start: 2019-07-08 | End: 2019-07-15 | Stop reason: HOSPADM

## 2019-07-08 RX ORDER — METOPROLOL SUCCINATE 50 MG/1
25 TABLET, EXTENDED RELEASE ORAL
Qty: 30 TAB | Refills: 0
Start: 2019-07-08 | End: 2019-07-15

## 2019-07-08 RX ORDER — METOPROLOL SUCCINATE 50 MG/1
50 TABLET, EXTENDED RELEASE ORAL
Status: DISCONTINUED | OUTPATIENT
Start: 2019-07-08 | End: 2019-07-12

## 2019-07-08 RX ORDER — DILTIAZEM HYDROCHLORIDE 5 MG/ML
10 INJECTION INTRAVENOUS
Status: COMPLETED | OUTPATIENT
Start: 2019-07-08 | End: 2019-07-08

## 2019-07-08 RX ORDER — BUMETANIDE 0.25 MG/ML
1 INJECTION INTRAMUSCULAR; INTRAVENOUS EVERY 12 HOURS
Status: DISCONTINUED | OUTPATIENT
Start: 2019-07-09 | End: 2019-07-08

## 2019-07-08 RX ORDER — BUMETANIDE 0.25 MG/ML
1 INJECTION INTRAMUSCULAR; INTRAVENOUS
Status: COMPLETED | OUTPATIENT
Start: 2019-07-08 | End: 2019-07-08

## 2019-07-08 RX ORDER — BUMETANIDE 0.25 MG/ML
2 INJECTION INTRAMUSCULAR; INTRAVENOUS EVERY 12 HOURS
Status: DISCONTINUED | OUTPATIENT
Start: 2019-07-08 | End: 2019-07-09

## 2019-07-08 RX ORDER — SODIUM CHLORIDE 0.9 % (FLUSH) 0.9 %
5-40 SYRINGE (ML) INJECTION AS NEEDED
Status: DISCONTINUED | OUTPATIENT
Start: 2019-07-08 | End: 2019-07-15 | Stop reason: HOSPADM

## 2019-07-08 RX ORDER — ONDANSETRON 2 MG/ML
4 INJECTION INTRAMUSCULAR; INTRAVENOUS
Status: DISCONTINUED | OUTPATIENT
Start: 2019-07-08 | End: 2019-07-15 | Stop reason: HOSPADM

## 2019-07-08 RX ORDER — SODIUM CHLORIDE 0.9 % (FLUSH) 0.9 %
5-40 SYRINGE (ML) INJECTION EVERY 8 HOURS
Status: DISCONTINUED | OUTPATIENT
Start: 2019-07-08 | End: 2019-07-15 | Stop reason: HOSPADM

## 2019-07-08 RX ORDER — WARFARIN SODIUM 5 MG/1
10 TABLET ORAL
Status: DISCONTINUED | OUTPATIENT
Start: 2019-07-08 | End: 2019-07-08

## 2019-07-08 RX ORDER — WARFARIN SODIUM 5 MG/1
5 TABLET ORAL
COMMUNITY
End: 2021-04-12

## 2019-07-08 RX ORDER — ACETAMINOPHEN 325 MG/1
650 TABLET ORAL
Status: DISCONTINUED | OUTPATIENT
Start: 2019-07-08 | End: 2019-07-15 | Stop reason: HOSPADM

## 2019-07-08 RX ORDER — ATORVASTATIN CALCIUM 20 MG/1
20 TABLET, FILM COATED ORAL
Status: DISCONTINUED | OUTPATIENT
Start: 2019-07-08 | End: 2019-07-11

## 2019-07-08 RX ADMIN — DOCUSATE SODIUM 100 MG: 100 CAPSULE ORAL at 21:59

## 2019-07-08 RX ADMIN — ENOXAPARIN SODIUM 160 MG: 80 INJECTION SUBCUTANEOUS at 21:59

## 2019-07-08 RX ADMIN — ATORVASTATIN CALCIUM 20 MG: 20 TABLET, FILM COATED ORAL at 21:59

## 2019-07-08 RX ADMIN — BUMETANIDE 2 MG: 0.25 INJECTION INTRAMUSCULAR; INTRAVENOUS at 21:59

## 2019-07-08 RX ADMIN — Medication 10 ML: at 22:00

## 2019-07-08 RX ADMIN — BUMETANIDE 1 MG: 0.25 INJECTION INTRAMUSCULAR; INTRAVENOUS at 14:34

## 2019-07-08 RX ADMIN — METOPROLOL SUCCINATE 50 MG: 50 TABLET, EXTENDED RELEASE ORAL at 21:59

## 2019-07-08 RX ADMIN — Medication 10 ML: at 16:19

## 2019-07-08 RX ADMIN — DILTIAZEM HYDROCHLORIDE 10 MG: 5 INJECTION INTRAVENOUS at 16:17

## 2019-07-08 RX ADMIN — DILTIAZEM HYDROCHLORIDE 10 MG/HR: 5 INJECTION INTRAVENOUS at 16:18

## 2019-07-08 NOTE — H&P
Admission History and Physical      NAME:  Yue Cornell. :   1970   MRN:  [id number]     PCP:  MENA Mcmullen     Date/Time:  2019           Assessment/Plan:       Acute on chronic systolic CHF (congestive heart failure) ([facility] (2019):  Symptoms of increasing SOB and orthopnea. Suggestive of CHF. Recent stress test with EF 24% w/o reversible defect. -- bumex IV  -- daily weight  -- strict I/O  -- cardiology consult  -- hold ARB due to SYED and possible adverse effects    Persistent atrial fibrillation with RVR in ED. Likely contributing to exacerbation of CHF. Was recently taken off dilt and started on Toprol. -- continue IV dilt  -- hold Toprol while on IV dilt   -- check PT/INR now  -- pharmacy to dose warfarin    Acute renal insufficiency (2019): May be from acute CHF exacerbation vs ARB use. -- hold ARB  -- monitor creatinine with diuresis  -- nephrology consult    PEPE (obstructive sleep apnea) (2017):  -- continue CPAP    Morbid obesity due to excess calories ([facility] (2017):  -- would benefit from weight loss             Subjective:     CHIEF COMPLAINT:  SOB    HISTORY OF PRESENT ILLNESS:     Mr. Darlene Cortez is a 47 y.o.  male who is admitted with Acute on chronic systolic CHF (congestive heart failure) ([facility] Mr. Darlene Cortez presented to the Emergency Department today complaining of SOB. Increasing for the past two weeks. Worse with supine. Leg edema unchanged. No chest pains. Two weeks ago had po dilt stopped in favor of losartan and Toprol. Has had belching and burping since medication change as well. Has had adjustments to diuretic regimen up and down due to symptoms. However, patient attributes symptoms onset after stress test and changes in medication two weeks ago. Reports compliance with CPAP.       Past Medical History:   Diagnosis Date    Asthma     Back pain     Sleep apnea     Spinal stenosis     Staph infection         Past Surgical History:   Procedure Laterality Date    HX TYMPANOSTOMY         Social History     Tobacco Use    Smoking status: Former Smoker     Packs/day: 1.00    Smokeless tobacco: Current User   Substance Use Topics    Alcohol use: Yes     Alcohol/week: 3.6 oz     Types: 3 Glasses of wine, 3 Cans of beer per week        Family History   Problem Relation Age of Onset    COPD Mother     Hypertension Mother     Diabetes Mother         Allergies   Allergen Reactions    Penicillins Hives        Prior to Admission medications    Medication Sig Start Date End Date Taking? Authorizing Provider   metoprolol succinate (TOPROL-XL) 50 mg XL tablet Take 0.5 Tabs by mouth nightly. 7/8/19  Yes Everet Erickson BARRON, NP   warfarin (COUMADIN) 5 mg tablet Take 5 mg by mouth two (2) days a week. Patient takes 5 mg on Tuesday and Wednesday   Yes Provider, Historical   losartan (COZAAR) 25 mg tablet Take 1 Tab by mouth daily. 6/26/19  Yes Lazara BARRON NP   furosemide (LASIX) 40 mg tablet Take 1 Tab by mouth two (2) times a day. 6/7/19  Yes Lazara BARRON NP   potassium chloride SR (KLOR-CON 10) 10 mEq tablet Take 1 Tab by mouth daily. 11/12/18  Yes Lexx Weber MD   psyllium husk-aspartame (METAMUCIL FIBER SINGLES) 3.4 gram pwpk packet Take 1 Packet by mouth daily. Yes Provider, Historical   warfarin (COUMADIN) 5 mg tablet Take 10 mg by mouth five (5) days a week. Patient takes 10 mg on Monday, Thursday, Friday, Saturday, Sunday   Yes Provider, Historical   MULTIVITAMIN (MULTIPLE VITAMIN PO) Take 1 Tab by mouth daily.    Yes Provider, Historical         Review of Systems:  (bold if positive, if negative)    Gen:  Eyes:  ENT:  CVS:  edemaPulm:  dyspneaGI:    :    MS:  Skin:  Endo:    Hem:  Renal:    Neuro:            Objective:      VITALS:    Vital signs reviewed; most recent are:    Visit Vitals  BP 99/69   Pulse (!) 102   Temp 98.3 °F (36.8 °C)   Resp 28   Ht 5' 6\" (1.676 m)   Wt 152 kg (335 lb)   SpO2 98%   BMI 54.07 kg/m²     SpO2 Readings from Last 6 Encounters:   07/08/19 98%   06/21/19 97%   06/07/19 96%   05/17/19 97%   11/12/18 99%   04/23/18 97%        No intake or output data in the 24 hours ending 07/08/19 5835         Exam:     Physical Exam:    Gen:  obese, in no acute distress  HEENT:  Pink conjunctivae, PERRL, hearing intact to voice, moist mucous membranes  Neck:  Supple  Resp:  No accessory muscle use, decreased basilar BS  Card:  No murmurs, normal S1, S2 without thrills, 2+ peripheral edema  Abd:  Soft, non-tender, protuberant, normoactive bowel sounds are present  Musc:  No cyanosis  Skin:  No rashes or ulcers, skin turgor is good  Neuro:  Cranial nerves 3-12 are grossly intact,  strength is 5/5 bilaterally, dorsi / plantarflexion strength is 5/5 bilaterally, follows commands appropriately  Psych:  Alert with good insight. Oriented to person, place, and time       Labs:    Recent Labs     07/08/19  1222   WBC 9.7   HGB 12.5   HCT 39.5        Recent Labs     07/08/19  1222      K 5.0      CO2 27   *   BUN 23*   CREA 1.67*   CA 8.6   ALB 3.6   TBILI 1.0   SGOT 56*   ALT 70     No results found for: GLUCPOC  No results for input(s): PH, PCO2, PO2, HCO3, FIO2 in the last 72 hours. No results for input(s): INR in the last 72 hours. No lab exists for component: INREXT    Chest Xray:  No acute process. EKG reviewed:   Afib, inferior TWI noted previously. Medical records reviewed in preparation for this admission: Old medical records.     Surrogate decision maker:  mother    Total time spent in care of this patient: [address] discussed with: Patient and Family    Discussed:  Care Plan    Prophylaxis:  Coumadin    Probable Disposition:   PT, OT, RN           ___________________________________________________    Attending Physician: Xavier Sargent MD

## 2019-07-08 NOTE — ED PROVIDER NOTES
I have evaluated the patient as the Provider in Triage. I have reviewed His vital signs and the triage nurse assessment. I have talked with the patient and any available family and advised that I am the provider in triage and have ordered the appropriate study to initiate their work up based on the clinical presentation during my assessment. I have advised that the patient will be accommodated in the Main ED as soon as possible. I have also requested to contact the triage nurse or myself immediately if the patient experiences any changes in their condition during this brief waiting period. Note written by Skyla Jones. Vanessa Iyer, as dictated by Colette Bates MD 12:05 PM    47 y.o. male with past medical history significant for a-fib, sleep apnea, asthma, CHF, and spinal stenosis who presents from home with chief complaint of SOB. Pt complains of shortness of breath with associated 20 #  weight gain, leg and abd swelling for the past 2 weeks. His sx are worse with exertion. Pt notes he has a hx of chf and had been on 80mg lasix for a while. He was decreased on his dose with improvement, then slowly increased as his sx improved. Pt states he had recent medication changes by Dr. Lencho Howell with no relief of symptoms. (stopped Cardizem and added metoprolol)  Pt notes he had a recent echo, and nuclear stress test that was significant for an EF of 27%. Pt denies recent illness exposure, or cough. He specifically denies any fevers, chills, nausea, vomiting, chest pain, abd pain, urinary sx, headache, rash, diarrhea, sweating or weight loss. There are no other acute medical concerns at this time. Social hx: vaping. EtOH Use. PCP: MENA Escamilla            The history is provided by the patient and a relative.         Past Medical History:   Diagnosis Date    Asthma     Back pain     Sleep apnea     Spinal stenosis     Staph infection        Past Surgical History:   Procedure Laterality Date  HX TYMPANOSTOMY           Family History:   Problem Relation Age of Onset   [facility] COPD Mother     Hypertension Mother     Diabetes Mother        Social History     Socioeconomic History    Marital status: SINGLE     Spouse name: Not on file    Number of children: Not on file    Years of education: Not on file    Highest education level: Not on file   Occupational History    Not on file   Social Needs    Financial resource strain: Not on file    Food insecurity:     Worry: Not on file     Inability: Not on file    Transportation needs:     Medical: Not on file     Non-medical: Not on file   Tobacco Use    Smoking status: Former Smoker     Packs/day: 1.00    Smokeless tobacco: Current User   Substance and Sexual Activity    Alcohol use: Yes     Alcohol/week: 3.6 oz     Types: 3 Glasses of wine, 3 Cans of beer per week    Drug use: No    Sexual activity: Never   Lifestyle    Physical activity:     Days per week: Not on file     Minutes per session: Not on file    Stress: Not on file   Relationships    Social connections:     Talks on phone: Not on file     Gets together: Not on file     Attends [employer] service: Not on file     Active member of club or organization: Not on file     Attends meetings of clubs or organizations: Not on file     Relationship status: Not on file    Intimate partner violence:     Fear of current or ex partner: Not on file     Emotionally abused: Not on file     Physically abused: Not on file     Forced sexual activity: Not on file   Other Topics Concern    Not on file   Social History Narrative    Not on file         ALLERGIES: Penicillins    Review of Systems   Constitutional: Negative for chills and fever. HENT: Negative for congestion, rhinorrhea, sneezing and sore throat. Eyes: Negative for redness and visual disturbance. Respiratory: Positive for shortness of breath. Cardiovascular: Positive for leg swelling. Negative for chest pain.    Gastrointestinal: Negative for abdominal pain, nausea and vomiting. Genitourinary: Negative for difficulty urinating and frequency. Musculoskeletal: Negative for back pain, myalgias and neck stiffness. Skin: Negative for rash. Neurological: Negative for dizziness, syncope, weakness and headaches. Hematological: Negative for adenopathy. ,  Patient Vitals for the past 12 hrs:   Temp Pulse Resp BP SpO2   07/08/19 1827 98.1 °F (36.7 °C) 76 19 111/81 92 %   07/08/19 1800 98 °F (36.7 °C) 93 23 97/71 94 %   07/08/19 1628 -- -- -- -- 98 %   07/08/19 1617 -- (!) 112 -- 126/89 --   07/08/19 1600 -- (!) 119 17 126/89 96 %   07/08/19 1545 -- (!) 127 23 133/73 96 %   07/08/19 1530 -- (!) 108 23 109/90 95 %   07/08/19 1515 -- (!) 120 22 96/71 96 %   07/08/19 1500 -- (!) 119 25 (!) 117/96 96 %   07/08/19 1445 -- 100 21 (!) 115/98 97 %   07/08/19 1434 -- (!) 102 -- 99/69 --   07/08/19 1203 98.3 °F (36.8 °C) (!) 126 28 131/80 98 %              Physical Exam   Constitutional: He is oriented to person, place, and time. He appears well-developed and well-nourished. No distress. Markedly above average bmi male   HENT:   Head: Normocephalic and atraumatic. Right Ear: External ear normal.   Left Ear: External ear normal.   Eyes: Pupils are equal, round, and reactive to light. EOM are normal.   Neck: Neck supple. Cardiovascular: Normal heart sounds and intact distal pulses. Exam reveals no gallop and no friction rub. No murmur heard. Irregularly irregular, tachycardia   Pulmonary/Chest: Effort normal and breath sounds normal. No stridor. No respiratory distress. He has no wheezes. He has no rales. He exhibits no tenderness. Abdominal: Soft. Bowel sounds are normal. He exhibits distension. He exhibits no mass. There is no tenderness. There is no rebound and no guarding. No hernia. Musculoskeletal: Normal range of motion. He exhibits edema. He exhibits no tenderness or deformity.    Pitting edema 2+    Neurological: He is alert and oriented to person, place, and time. No cranial nerve deficit. Coordination normal.   Skin: Skin is warm and dry. Capillary refill takes less than 2 seconds. No rash noted. No erythema. No pallor. Psychiatric: He has a normal mood and affect. His behavior is normal.   Nursing note and vitals reviewed. MDM  Number of Diagnoses or Management Options  Acute on chronic diastolic heart failure [facility]):   Atrial fibrillation with RVR ([address]):   VERDUGO (dyspnea on exertion):      Amount and/or Complexity of Data Reviewed  Clinical lab tests: ordered and reviewed  Tests in the radiology section of CPT®: ordered and reviewed  Tests in the medicine section of CPT®: reviewed and ordered  Obtain history from someone other than the patient: yes (family)  Review and summarize past medical records: yes  Independent visualization of images, tracings, or specimens: yes    Critical Care  Total time providing critical care: 30-74 minutes         Procedures    Discussed with the patient the medical risks of prolonged smoking habits and advised the patient of the benefits of the cessation of smoking. The patient verbalized their understanding. MENA Pak        ED EKG interpretation: 12:16 PM  Rhythm: atrial fib with rvr; and irregular. Rate (approx.): 111; Axis: normal; P wave: normal; QRS interval: low voltage QRS; ST/T wave: non-specific changes; Other findings: abnormal ekg. This EKG was interpreted by Ranjan Joaquin MD,ED Provider. 1:35 PM  Discussed pt, sx, hx and current findings with Ranjan Joaquin MD. He is in agreement with plan and will see pt. Will get labs, ekg, cxr and continue to monitor pt. Randi More. EDIN Rooney    2:30 PM   Pt with afib with rvr, + increased bnp, verdugo, will admit  Randi More. EDIN Rooney      2:33 PM  Randi Rooney PA-C spoke with Dr. Delfina Bower, Consult for Cardiology. Discussed available diagnostic tests and clinical findings.  He is in agreement with care plans as outlined and Recommends admitting to the hospitalist  Pooja Rebolledo. EDIN Rooney      Hospitalist Olivia for Admission  2:50 PM    ED Room Number: 328/01  Patient Name and age:  Rosa Davis. 47 y.o.  male  Working Diagnosis:   1. FENTON (dyspnea on exertion)    2. Acute on chronic diastolic heart failure ([facility]    3. Atrial fibrillation with RVR ([facility] [address]      Readmission: no  Isolation Requirements:  no  Recommended Level of Care:  telemetry  Code Status:  full  Other:  Cards to see in consult     Critical Care: The reason for providing this level of medical care for this critically ill patient was due to a critical illness that impaired one or more vital organ systems such that there was a high probability of imminent or life threatening deterioration in the patients condition. This care involved high complexity decision making to assess, manipulate, and support vital system functions. Total critical care time spent exclusive of procedures:  35 min    LABS COMPLETED AND REVIEWED:  Recent Results (from the past 12 hour(s))   EKG, 12 LEAD, INITIAL    Collection Time: 07/08/19 12:16 PM   Result Value Ref Range    Ventricular Rate 111 BPM    Atrial Rate 109 BPM    QRS Duration 88 ms    Q-T Interval 320 ms    QTC Calculation (Bezet) 435 ms    Calculated R Axis 60 degrees    Calculated T Axis -48 degrees    Diagnosis       Atrial fibrillation with rapid ventricular response  Low voltage QRS  Septal infarct , age undetermined  Abnormal ECG  When compared with ECG of 22-JUN-2017 12:28,  Septal infarct is now present  Confirmed by Shavon NETTLES, Char Stokes ([id number]) on 7/8/2019 5:33:15 PM     CBC WITH AUTOMATED DIFF    Collection Time: 07/08/19 12:22 PM   Result Value Ref Range    WBC 9.7 4.1 - 11.1 K/uL    RBC 4.19 4. 10 - 5.70 M/uL    HGB 12.5 12.1 - 17.0 g/dL    HCT 39.5 36.6 - 50.3 %    MCV 94.3 80.0 - 99.0 FL    MCH 29.8 26.0 - 34.0 PG    MCHC 31.6 30.0 - 36.5 g/dL    RDW 14.8 (H) 11.5 - 14.5 %    PLATELET 702 050 - 893 K/uL    MPV 11.2 8.9 - 12.9 FL    NRBC 0.2 (H) 0  WBC    ABSOLUTE NRBC 0.02 (H) 0.00 - 0.01 K/uL    NEUTROPHILS 72 32 - 75 %    LYMPHOCYTES 16 12 - 49 %    MONOCYTES 11 5 - 13 %    EOSINOPHILS 1 0 - 7 %    BASOPHILS 0 0 - 1 %    IMMATURE GRANULOCYTES 0 0.0 - 0.5 %    ABS. NEUTROPHILS 6.8 1.8 - 8.0 K/UL    ABS. LYMPHOCYTES 1.6 0.8 - 3.5 K/UL    ABS. MONOCYTES 1.1 (H) 0.0 - 1.0 K/UL    ABS. EOSINOPHILS 0.1 0.0 - 0.4 K/UL    ABS. BASOPHILS 0.0 0.0 - 0.1 K/UL    ABS. IMM. GRANS. 0.0 0.00 - 0.04 K/UL    DF AUTOMATED     METABOLIC PANEL, COMPREHENSIVE    Collection Time: 07/08/19 12:22 PM   Result Value Ref Range    Sodium 140 136 - 145 mmol/L    Potassium 5.0 3.5 - 5.1 mmol/L    Chloride 106 97 - 108 mmol/L    CO2 27 21 - 32 mmol/L    Anion gap 7 5 - 15 mmol/L    Glucose 101 (H) 65 - 100 mg/dL    BUN 23 (H) 6 - 20 MG/DL    Creatinine 1.67 (H) 0.70 - 1.30 MG/DL    BUN/Creatinine ratio 14 12 - 20      GFR est AA 53 (L) >60 ml/min/1.73m2    GFR est non-AA 44 (L) >60 ml/min/1.73m2    Calcium 8.6 8.5 - 10.1 MG/DL    Bilirubin, total 1.0 0.2 - 1.0 MG/DL    ALT (SGPT) 70 12 - 78 U/L    AST (SGOT) 56 (H) 15 - 37 U/L    Alk. phosphatase 78 45 - 117 U/L    Protein, total 6.8 6.4 - 8.2 g/dL    Albumin 3.6 3.5 - 5.0 g/dL    Globulin 3.2 2.0 - 4.0 g/dL    A-G Ratio 1.1 1.1 - 2.2     NT-PRO BNP    Collection Time: 07/08/19 12:22 PM   Result Value Ref Range    NT pro-BNP 2,794 (H) <125 PG/ML   TROPONIN I    Collection Time: 07/08/19 12:22 PM   Result Value Ref Range    Troponin-I, Qt. <0.05 <0.05 ng/mL   POC INR    Collection Time: 07/08/19  6:16 PM   Result Value Ref Range    INR (POC) 2.0 (H) <1.2         IMAGING COMPLETED AND REVIEWED:  The following have been ordered and reviewed:    Xr Chest Pa Lat    Result Date: 7/8/2019  INDICATION:  dyspnea COMPARISON: June 2017 FINDINGS: PA and lateral views of the chest demonstrate a stable cardiomediastinal silhouette and no focal airspace disease. There is chronic cardiomegaly and trace pleural effusions.  The visualized osseous structures are unremarkable. IMPRESSION: No acute process or significant change. MEDICATIONS GIVEN:  Medications   ondansetron (ZOFRAN) injection 4 mg (has no administration in time range)   docusate sodium (COLACE) capsule 100 mg (has no administration in time range)   sodium chloride (NS) flush 5-40 mL (10 mL IntraVENous Given 7/8/19 1619)   sodium chloride (NS) flush 5-40 mL (has no administration in time range)   bumetanide (BUMEX) injection 2 mg (has no administration in time range)   metoprolol succinate (TOPROL-XL) XL tablet 50 mg (has no administration in time range)   Warfarin - Pharmacist to dose  (has no administration in time range)   acetaminophen (TYLENOL) tablet 650 mg (has no administration in time range)   enoxaparin (LOVENOX) injection 160 mg (has no administration in time range)   atorvastatin (LIPITOR) tablet 20 mg (has no administration in time range)   bumetanide (BUMEX) injection 1 mg (1 mg IntraVENous Given 7/8/19 1434)   dilTIAZem (CARDIZEM) injection 10 mg (10 mg IntraVENous Given 7/8/19 1617)         CLINICAL IMPRESSION:  1. FENTON (dyspnea on exertion)    2. Acute on chronic diastolic heart failure ([address]    3. Atrial fibrillation with RVR ([address]          Plan  1. Admission per hospitalist      2:50 PM  The patient is being admitted to the hospital.  The results of their tests and reasons for their admission have been discussed with them and/or available family. The patient/family has conveyed agreement and understanding for the need to be admitted and for their admission diagnosis. Consultation has been made with the inpatient physician specialist for hospitalization.

## 2019-07-08 NOTE — Clinical Note
TRANSFER - IN REPORT:  
 
Verbal report received from: [facility]. Report consisted of patient's Situation, Background, Assessment and  
Recommendations(SBAR). Opportunity for questions and clarification was provided. Assessment completed upon patient's arrival to unit and care assumed. Patient transported with a Registered Nurse, [address] [place] [address] / Patient Care Tech and Monitor.

## 2019-07-08 NOTE — CONSULTS
[address] [facility], [address] [place] [address] [phone]; Fax [phone]      Patient: Jackie Fairbanks. : 1970    Today's Date: 2019    HISTORY OF PRESENT ILLNESS:     History of Present Illness:    Jackie Sevilla is a 47 y.o. male w/ hx of CHF (EF 26-30% w/ mod dilated RA/LA/RV), permanent Afib on warfarin, aortic valve sclerosis, PEPE, asthma, spinal stenosis, ETOH abuse, morbid obesity presents with worsening sob, LE edema and weight gain over the past 2 weeks. Pt was seen in 2019 with repeat ECHO and nuclear stress test on . His diltiazem for his afib was stopped and changed to Toprol XL 50mg given his EF 26-30%. Since the change, per pt, he says his lasix 40mg BID has not been working and that he has been feeling short of breath. He called the office and his Toprol XL was decrease 25mg per pt. He has gained over 20 lbs since 2 weeks and says he has increased swelling (mostly in his belly and some in his legs). Pt denies cp, palpitation, fever/chills, cough, wheezing, sick contact Pt has been using his CPAP consistently. Pt vapes for the past 2 years and has hx of smoking for 30 years). PAST MEDICAL HISTORY:     Past Medical History:   Diagnosis Date    Asthma     Back pain     Sleep apnea     Spinal stenosis     Staph infection        Past Surgical History:   Procedure Laterality Date    HX TYMPANOSTOMY           MEDICATIONS:     Current Outpatient Medications   Medication Sig Dispense Refill    metoprolol succinate (TOPROL-XL) 50 mg XL tablet Take 0.5 Tabs by mouth nightly. 30 Tab 0    warfarin (COUMADIN) 5 mg tablet Take 5 mg by mouth two (2) days a week. Patient takes 5 mg on Tuesday and Wednesday      losartan (COZAAR) 25 mg tablet Take 1 Tab by mouth daily. 30 Tab 0    furosemide (LASIX) 40 mg tablet Take 1 Tab by mouth two (2) times a day. 60 Tab 0    potassium chloride SR (KLOR-CON 10) 10 mEq tablet Take 1 Tab by mouth daily.  90 Tab 3    psyllium husk-aspartame (METAMUCIL FIBER SINGLES) 3.4 gram pwpk packet Take 1 Packet by mouth daily.  warfarin (COUMADIN) 5 mg tablet Take 10 mg by mouth five (5) days a week. Patient takes 10 mg on Monday, Thursday, Friday, Saturday, Sunday      MULTIVITAMIN (MULTIPLE VITAMIN PO) Take 1 Tab by mouth daily. Allergies   Allergen Reactions    Penicillins Hives         SOCIAL HISTORY:     Social History     Tobacco Use    Smoking status: Former Smoker     Packs/day: 1.00    Smokeless tobacco: Current User   Substance Use Topics    Alcohol use: Yes     Alcohol/week: 3.6 oz     Types: 3 Glasses of wine, 3 Cans of beer per week    Drug use: No         FAMILY HISTORY:     Family History   Problem Relation Age of Onset    COPD Mother     Hypertension Mother     Diabetes Mother          REVIEW OF SYMPTOMS:     Review of Symptoms:  Constitutional: Negative for fever, chills  HEENT: Negative for nosebleeds, tinnitus, and vision changes. Respiratory: Negative for cough, wheezing  Cardiovascular: Negative for claudication, syncope, and PND. Positive for orthopnea. Gastrointestinal: Negative for abdominal pain, diarrhea, melena. Genitourinary: Negative for dysuria  Musculoskeletal: Negative for myalgias. Skin: Negative for rash  Heme: No problems bleeding. Neurological: Negative for speech change and focal weakness. PHYSICAL EXAM:     Physical Exam:  Visit Vitals  /89   Pulse (!) 112   Temp 98.3 °F (36.8 °C)   Resp 28   Ht 5' 6\" (1.676 m)   Wt 335 lb (152 kg)   SpO2 98%   BMI 54.07 kg/m²     Patient appears generally well, mood and affect are appropriate and pleasant. HEENT:  Hearing intact, non-icteric, normocephalic, atraumatic. Neck Exam: Supple, No JVD or carotid bruits. Lung Exam: bibasilar crackles, no wheezing  Cardiac Exam: irregular rate and rhythm with no murmur  Abdomen: Soft, non-tender, normal bowel sounds. No bruits or masses. Distended. +morbid obesity.    Extremities: Moves all ext well. +2 lower extremity edema. Vascular: 2+ dorsalis pedis pulses bilaterally. Psych: Appropriate affect  Neuro - Grossly intact      LABS / OTHER STUDIES:     Recent Results (from the past 24 hour(s))   EKG, 12 LEAD, INITIAL    Collection Time: 07/08/19 12:16 PM   Result Value Ref Range    Ventricular Rate 111 BPM    Atrial Rate 109 BPM    QRS Duration 88 ms    Q-T Interval 320 ms    QTC Calculation (Bezet) 435 ms    Calculated R Axis 60 degrees    Calculated T Axis -48 degrees    Diagnosis       Atrial fibrillation with rapid ventricular response  Low voltage QRS  Septal infarct , age undetermined  Abnormal ECG  When compared with ECG of 22-JUN-2017 12:28,  Septal infarct is now present     CBC WITH AUTOMATED DIFF    Collection Time: 07/08/19 12:22 PM   Result Value Ref Range    WBC 9.7 4.1 - 11.1 K/uL    RBC 4.19 4. 10 - 5.70 M/uL    HGB 12.5 12.1 - 17.0 g/dL    HCT 39.5 36.6 - 50.3 %    MCV 94.3 80.0 - 99.0 FL    MCH 29.8 26.0 - 34.0 PG    MCHC 31.6 30.0 - 36.5 g/dL    RDW 14.8 (H) 11.5 - 14.5 %    PLATELET 908 189 - 290 K/uL    MPV 11.2 8.9 - 12.9 FL    NRBC 0.2 (H) 0  WBC    ABSOLUTE NRBC 0.02 (H) 0.00 - 0.01 K/uL    NEUTROPHILS 72 32 - 75 %    LYMPHOCYTES 16 12 - 49 %    MONOCYTES 11 5 - 13 %    EOSINOPHILS 1 0 - 7 %    BASOPHILS 0 0 - 1 %    IMMATURE GRANULOCYTES 0 0.0 - 0.5 %    ABS. NEUTROPHILS 6.8 1.8 - 8.0 K/UL    ABS. LYMPHOCYTES 1.6 0.8 - 3.5 K/UL    ABS. MONOCYTES 1.1 (H) 0.0 - 1.0 K/UL    ABS. EOSINOPHILS 0.1 0.0 - 0.4 K/UL    ABS. BASOPHILS 0.0 0.0 - 0.1 K/UL    ABS. IMM.  GRANS. 0.0 0.00 - 0.04 K/UL    DF AUTOMATED     METABOLIC PANEL, COMPREHENSIVE    Collection Time: 07/08/19 12:22 PM   Result Value Ref Range    Sodium 140 136 - 145 mmol/L    Potassium 5.0 3.5 - 5.1 mmol/L    Chloride 106 97 - 108 mmol/L    CO2 27 21 - 32 mmol/L    Anion gap 7 5 - 15 mmol/L    Glucose 101 (H) 65 - 100 mg/dL    BUN 23 (H) 6 - 20 MG/DL    Creatinine 1.67 (H) 0.70 - 1.30 MG/DL    BUN/Creatinine ratio 14 12 - 20      GFR est AA 53 (L) >60 ml/min/1.73m2    GFR est non-AA 44 (L) >60 ml/min/1.73m2    Calcium 8.6 8.5 - 10.1 MG/DL    Bilirubin, total 1.0 0.2 - 1.0 MG/DL    ALT (SGPT) 70 12 - 78 U/L    AST (SGOT) 56 (H) 15 - 37 U/L    Alk. phosphatase 78 45 - 117 U/L    Protein, total 6.8 6.4 - 8.2 g/dL    Albumin 3.6 3.5 - 5.0 g/dL    Globulin 3.2 2.0 - 4.0 g/dL    A-G Ratio 1.1 1.1 - 2.2     NT-PRO BNP    Collection Time: 07/08/19 12:22 PM   Result Value Ref Range    NT pro-BNP 2,794 (H) <125 PG/ML   TROPONIN I    Collection Time: 07/08/19 12:22 PM   Result Value Ref Range    Troponin-I, Qt. <0.05 <0.05 ng/mL         CARDIAC DIAGNOSTICS:     Cardiac Evaluation Includes:    Lipids 3/16/18: , HDL 30, LDL 72, , VLDL 43     Cardiac Testing/ Procedures:     A. Cardiac Cath/PCI: none     B. ECHO 6/21/19: EF 26-30%, mod LA dilation, dilation of RA/RV, aortic valve sclerosis, mild-mod MR/TR, Severely elevated CVP (15+ mmHg) in IVC/Hepatic Veins; IVC diameter larger than 21 mm and collapses less than 50% with respiration. Moderate pulmonary hypertension. ECHO/EVITA: 6/22/2017 Left ventricle: Systolic function was mildly reduced. Ejection fraction was estimated to be 45 %. There were no regional wall motion abnormalities. Tricuspid valve: There was mild regurgitation.     C. StressNuclear/Stress ECHO/Stress test:    6/21/19: negative stress test. EF 24%. · Myocardial perfusion imaging defect 1: There is a defect that is moderate to large in size with a severe reduction in uptake present in the apical to basal inferior and apex location(s) that is non-reversible. There is abnormal wall motion in the defect area. The defect appears to be infarction. · Abnormal myocardial perfusion imaging. Fixed defect consistent with prior myocardial infarction. Myocardial perfusion imaging supports a low risk stress test.     D. Vascular: none     E. EP: none     F.  Miscellaneous: none      ASSESSMENT AND PLAN: Assessment and Plan:    1. Acute on chronic CHF. Clinically fluid overloaded. BNP 2794 (baseline near 1000). EF 26-30% w/ mod LA dilation and mild-mod dilation of RA/RV. Nuclear cardiac stress test negative except for old MI in 6/2019. Trop neg.   - Diuresis with Bumex 2mg BID  - Increase Toprol to 50 mg daily   - Hold Losartan given XU  - Strict I/O    2. Permanent Afib on chronic anticoagulation (warfarin)  - INR pending.   - Avoid diltiazem given low EF.  - Toprol XL 50mg     3. XU     4. PEPE. CPAP. Per primary team.    5. ETOH abuse. Per primary team.     Resident: Shruthi Zhang MD         ATTENDING CARDIOLOGIST  The patient was personally examined and chart reviewed. All the elements of history and examination were personally performed and I agree with the plan as listed by resident. Treatment plan was addressed with the patient. Acute on chronic HFrEF  Ischemic cardiomyopathy  CAD  Persistent afib  Morbid obesity    Recent stress test consistent with prior inferior wall infarction    - cont diuresis  - hold warfarin for planned RHC/LHC when able to lay flat, lovenox currently GFR >30  - cont metoprolol for rate control  - avoid dilt due to low ef  - hold losartan for now given low EF. Long term should be on entresto and RAASi        Plan of care discussed with patient and wishes to proceed as outlined. Parvin Klein, DO Parvin Klein, DO  [facility] [address], [address]                                       Office [phone],EDOUARD [phone]        [place] [id number]  [address], [address]  [address]  [address]  [facility] [address], [address] [place] [address]  Ph: [phone]   Ph [phone]

## 2019-07-08 NOTE — ED NOTES
Pt Throughput: Charge Nurse on [facility]  made aware of patient's bed assignment. Herbert Jackson RN  Emergency Dept Charge RN.

## 2019-07-08 NOTE — Clinical Note
TRANSFER - OUT REPORT:  
 
Verbal report given to: ED. Report consisted of patient's Situation, Background, Assessment and  
Recommendations(SBAR). Opportunity for questions and clarification was provided. Patient transported with a Registered Nurse and [address] [place] [address] / Patient Care Tech. Patient transported to: [facility].

## 2019-07-08 NOTE — ROUTINE PROCESS
TRANSFER - OUT REPORT:    Verbal report given to Tali on Orly Axcheng.  being transferred to [facility] for routine progression of care       Report consisted of patients Situation, Background, Assessment and   Recommendations(SBAR). Information from the following report(s) SBAR, ED Summary, [facility] and Recent Results was reviewed with the receiving nurse. Opportunity for questions and clarification was provided.

## 2019-07-08 NOTE — ED TRIAGE NOTES
Pt with hx of afib and chf. Pt dyspnea with exertion, 20 lbs weight gain over past two weeks, and swelling to bilateral lower extremities. Pt with recent medications adjustments. Pt of Dr. Karlene Fisher.

## 2019-07-08 NOTE — TELEPHONE ENCOUNTER
Patient called into office concerning \"extreme SOB, weight gain of 15 lbs in 1 week, and fatigue. \"    Patient called last week with SOB and fatigue, which he believed to be due to starting metoprolol succinate 50 mg daily. He was then cut down to 12.5 mg daily per VO of Carmela Leon, and asked to call back office should his HR maintain >100 or worsening of symptoms. Patient states that his symptoms are now worse. He has had a 15 lbs weight gain in 1 week (up to 336lbs currently). He has not monitored his BP and HR as asked and cannot take currently d/t not feeling well. Patient states \"I can barely move, my feet are going to explode, and I cannot breathe. \"     Nurse note: Patient did sound like he was working to breathe, (having to take breaths between words, heavy panting). Advised patient that will send message to Jen Madsen NP for recommendation, but if he is feeling poorly, and with weight gain and issues breathing she have someone take him to ED.

## 2019-07-08 NOTE — PROGRESS NOTES
[facility] Pharmacy Dosing Services: 7/8/2019    Consult for Warfarin Dosing by Pharmacy by Dr. Harvey So provided for this 47 y.o.  male , for indication of Atrial Fibrillation. Day of Therapy - Continuation from home  Home dose - 10 mg every day except 5 mg on Tuesday and Wednesday   Dose to achieve an INR goal of 2-3    Order entered for  Warfarin  10 mg ordered to be given now    Significant drug interactions: n/a  Previous dose given 10 mg yesterday at home   PT/INR Lab Results   Component Value Date/Time    INR 2.5 (H) 06/28/2017 02:02 AM    INR (POC) 2.0 (H) 07/08/2019 06:16 PM        Platelets Lab Results   Component Value Date/Time    PLATELET 711 95/93/8712 12:22 PM        H/H Lab Results   Component Value Date/Time    HGB 12.5 07/08/2019 12:22 PM          Pharmacy to follow daily and will provide subsequent Warfarin dosing based on clinical status.   [address] [facility])  Contact information [phone]

## 2019-07-08 NOTE — Clinical Note
Sheath #2: Dressed using transparent dressing. Site: clean, dry, & intact, no bleeding and no hematoma.

## 2019-07-08 NOTE — PROGRESS NOTES
1810 TRANSFER - IN REPORT:    Verbal report received from [facility]) on [facility].  being received from ED(unit) for routine progression of care      Report consisted of patients Situation, Background, Assessment and   Recommendations(SBAR). Information from the following report(s) SBAR, Kardex, ED Summary and Cardiac Rhythm a fib was reviewed with the receiving nurse. Opportunity for questions and clarification was provided. Assessment completed upon patients arrival to unit and care assumed. 1827 pt arrive to unit. Vitals and dual skin     1930 Bedside and Verbal shift change report given to [address] (oncoming nurse) by Eric Rangel (offgoing nurse). Report included the following information SBAR, Kardex, MAR, Med Rec Status and Cardiac Rhythm a fib.

## 2019-07-08 NOTE — PROGRESS NOTES
[facility]: MED RECONCILIATION    Comments/Recommendations:   Patient is awake, alert, and knowledgeable about home medications   Verifies allergies and preferred pharmacy listed  Reports compliance to prescribed regimen  The patient reports many recent changes to his home medications but he does not feel better after the changes. Furosemide has been increased from 20 mg daily to 40 mg twice daily over the past three to four weeks. 6/21 Diltiazem was stopped and metoprolol succinate 50 mg daily and losartan 25 mg daily were started  7/1 metoprolol succinate decreased to 25 mg HS. The patient reports warfarin to be 10 mg five days per week (Monday, Thursday, Friday, Saturday, Sunday) and 5 mg two days per week on Tuesday and Wednesday. He reports he has been on this regimen for a few months. His last INR check was a month and a half ago. The cost of frequent visits to check INR was too much so he waited a little over a month for a recheck. Allergies: Penicillins    Prior to Admission Medications:     Prior to Admission Medications   Prescriptions Last Dose Informant Patient Reported? Taking? MULTIVITAMIN (MULTIPLE VITAMIN PO) 7/7/2019 at Unknown time Self Yes Yes   Sig: Take 1 Tab by mouth daily. furosemide (LASIX) 40 mg tablet 7/7/2019 at am Self No Yes   Sig: Take 1 Tab by mouth two (2) times a day. losartan (COZAAR) 25 mg tablet 7/7/2019 at Unknown time Self No Yes   Sig: Take 1 Tab by mouth daily. metoprolol succinate (TOPROL-XL) 50 mg XL tablet 7/7/2019 at Unknown time Self No Yes   Sig: Take 0.5 Tabs by mouth nightly. potassium chloride SR (KLOR-CON 10) 10 mEq tablet 7/7/2019 at Unknown time Self No Yes   Sig: Take 1 Tab by mouth daily. psyllium husk-aspartame (METAMUCIL FIBER SINGLES) 3.4 gram pwpk packet 7/7/2019 at Unknown time Self Yes Yes   Sig: Take 1 Packet by mouth daily. warfarin (COUMADIN) 5 mg tablet 7/7/2019 at Unknown time Self Yes Yes   Sig: Take 10 mg by mouth five (5) days a week. Patient takes 10 mg on Monday, Thursday, Friday, Saturday, Sunday   warfarin (COUMADIN) 5 mg tablet 7/3/2019 Self Yes Yes   Sig: Take 5 mg by mouth two (2) days a week.  Patient takes 5 mg on Tuesday and Wednesday      Facility-Administered Medications: None      Thank you,      Jc Mccoy, PharmD, BCPS

## 2019-07-09 ENCOUNTER — APPOINTMENT (OUTPATIENT)
Dept: ULTRASOUND IMAGING | Age: 49
DRG: 286 | End: 2019-07-09
Attending: INTERNAL MEDICINE
Payer: COMMERCIAL

## 2019-07-09 LAB
ALBUMIN SERPL-MCNC: 3.6 G/DL (ref 3.5–5)
ANION GAP SERPL CALC-SCNC: 9 MMOL/L (ref 5–15)
APPEARANCE UR: CLEAR
BILIRUB UR QL: NEGATIVE
BUN SERPL-MCNC: 26 MG/DL (ref 6–20)
BUN/CREAT SERPL: 16 (ref 12–20)
CALCIUM SERPL-MCNC: 8.5 MG/DL (ref 8.5–10.1)
CHLORIDE SERPL-SCNC: 105 MMOL/L (ref 97–108)
CO2 SERPL-SCNC: 24 MMOL/L (ref 21–32)
COLOR UR: NORMAL
CREAT SERPL-MCNC: 1.67 MG/DL (ref 0.7–1.3)
CREAT UR-MCNC: 86.8 MG/DL
CREAT UR-MCNC: 87.1 MG/DL
ERYTHROCYTE [DISTWIDTH] IN BLOOD BY AUTOMATED COUNT: 14.9 % (ref 11.5–14.5)
EST. AVERAGE GLUCOSE BLD GHB EST-MCNC: 137 MG/DL
GLUCOSE SERPL-MCNC: 107 MG/DL (ref 65–100)
GLUCOSE UR STRIP.AUTO-MCNC: NEGATIVE MG/DL
HBA1C MFR BLD: 6.4 % (ref 4.2–6.3)
HCT VFR BLD AUTO: 39.2 % (ref 36.6–50.3)
HGB BLD-MCNC: 12.3 G/DL (ref 12.1–17)
HGB UR QL STRIP: NEGATIVE
IRON SATN MFR SERPL: 10 % (ref 20–50)
IRON SERPL-MCNC: 42 UG/DL (ref 35–150)
KETONES UR QL STRIP.AUTO: NEGATIVE MG/DL
LEUKOCYTE ESTERASE UR QL STRIP.AUTO: NEGATIVE
MAGNESIUM SERPL-MCNC: 2.5 MG/DL (ref 1.6–2.4)
MCH RBC QN AUTO: 29.3 PG (ref 26–34)
MCHC RBC AUTO-ENTMCNC: 31.4 G/DL (ref 30–36.5)
MCV RBC AUTO: 93.3 FL (ref 80–99)
MICROALBUMIN UR-MCNC: 4.49 MG/DL
MICROALBUMIN/CREAT UR-RTO: 52 MG/G (ref 0–30)
NITRITE UR QL STRIP.AUTO: NEGATIVE
NRBC # BLD: 0.03 K/UL (ref 0–0.01)
NRBC BLD-RTO: 0.3 PER 100 WBC
PH UR STRIP: 6.5 [PH] (ref 5–8)
PHOSPHATE SERPL-MCNC: 4 MG/DL (ref 2.6–4.7)
PLATELET # BLD AUTO: 201 K/UL (ref 150–400)
PMV BLD AUTO: 11.8 FL (ref 8.9–12.9)
POTASSIUM SERPL-SCNC: 4.7 MMOL/L (ref 3.5–5.1)
PROT UR STRIP-MCNC: NEGATIVE MG/DL
RBC # BLD AUTO: 4.2 M/UL (ref 4.1–5.7)
SODIUM SERPL-SCNC: 138 MMOL/L (ref 136–145)
SODIUM UR-SCNC: 27 MMOL/L
SP GR UR REFRACTOMETRY: 1.01 (ref 1–1.03)
TIBC SERPL-MCNC: 429 UG/DL (ref 250–450)
UROBILINOGEN UR QL STRIP.AUTO: 1 EU/DL (ref 0.2–1)
WBC # BLD AUTO: 10.5 K/UL (ref 4.1–11.1)

## 2019-07-09 PROCEDURE — 74011250636 HC RX REV CODE- 250/636: Performed by: STUDENT IN AN ORGANIZED HEALTH CARE EDUCATION/TRAINING PROGRAM

## 2019-07-09 PROCEDURE — 36415 COLL VENOUS BLD VENIPUNCTURE: CPT

## 2019-07-09 PROCEDURE — 85027 COMPLETE CBC AUTOMATED: CPT

## 2019-07-09 PROCEDURE — 84300 ASSAY OF URINE SODIUM: CPT

## 2019-07-09 PROCEDURE — 83540 ASSAY OF IRON: CPT

## 2019-07-09 PROCEDURE — 83970 ASSAY OF PARATHORMONE: CPT

## 2019-07-09 PROCEDURE — 82570 ASSAY OF URINE CREATININE: CPT

## 2019-07-09 PROCEDURE — 82043 UR ALBUMIN QUANTITATIVE: CPT

## 2019-07-09 PROCEDURE — 83036 HEMOGLOBIN GLYCOSYLATED A1C: CPT

## 2019-07-09 PROCEDURE — 76770 US EXAM ABDO BACK WALL COMP: CPT

## 2019-07-09 PROCEDURE — 65660000000 HC RM CCU STEPDOWN

## 2019-07-09 PROCEDURE — 80069 RENAL FUNCTION PANEL: CPT

## 2019-07-09 PROCEDURE — 74011250637 HC RX REV CODE- 250/637: Performed by: STUDENT IN AN ORGANIZED HEALTH CARE EDUCATION/TRAINING PROGRAM

## 2019-07-09 PROCEDURE — 83735 ASSAY OF MAGNESIUM: CPT

## 2019-07-09 PROCEDURE — 74011000250 HC RX REV CODE- 250: Performed by: STUDENT IN AN ORGANIZED HEALTH CARE EDUCATION/TRAINING PROGRAM

## 2019-07-09 PROCEDURE — 74011250637 HC RX REV CODE- 250/637: Performed by: INTERNAL MEDICINE

## 2019-07-09 PROCEDURE — 81003 URINALYSIS AUTO W/O SCOPE: CPT

## 2019-07-09 PROCEDURE — 82306 VITAMIN D 25 HYDROXY: CPT

## 2019-07-09 PROCEDURE — 74011000250 HC RX REV CODE- 250: Performed by: NURSE PRACTITIONER

## 2019-07-09 RX ORDER — AMIODARONE HYDROCHLORIDE 200 MG/1
400 TABLET ORAL 3 TIMES DAILY
Status: DISCONTINUED | OUTPATIENT
Start: 2019-07-09 | End: 2019-07-12

## 2019-07-09 RX ORDER — BUMETANIDE 0.25 MG/ML
2 INJECTION INTRAMUSCULAR; INTRAVENOUS EVERY 12 HOURS
Status: DISCONTINUED | OUTPATIENT
Start: 2019-07-09 | End: 2019-07-12

## 2019-07-09 RX ADMIN — BUMETANIDE 2 MG: 0.25 INJECTION INTRAMUSCULAR; INTRAVENOUS at 16:35

## 2019-07-09 RX ADMIN — ENOXAPARIN SODIUM 160 MG: 80 INJECTION SUBCUTANEOUS at 21:01

## 2019-07-09 RX ADMIN — Medication 10 ML: at 05:09

## 2019-07-09 RX ADMIN — Medication 10 ML: at 16:36

## 2019-07-09 RX ADMIN — BUMETANIDE 2 MG: 0.25 INJECTION INTRAMUSCULAR; INTRAVENOUS at 09:02

## 2019-07-09 RX ADMIN — DOCUSATE SODIUM 100 MG: 100 CAPSULE ORAL at 09:02

## 2019-07-09 RX ADMIN — AMIODARONE HYDROCHLORIDE 400 MG: 200 TABLET ORAL at 09:01

## 2019-07-09 RX ADMIN — Medication 10 ML: at 21:02

## 2019-07-09 RX ADMIN — ATORVASTATIN CALCIUM 20 MG: 20 TABLET, FILM COATED ORAL at 21:00

## 2019-07-09 RX ADMIN — AMIODARONE HYDROCHLORIDE 400 MG: 200 TABLET ORAL at 16:35

## 2019-07-09 RX ADMIN — AMIODARONE HYDROCHLORIDE 400 MG: 200 TABLET ORAL at 21:00

## 2019-07-09 RX ADMIN — ENOXAPARIN SODIUM 160 MG: 80 INJECTION SUBCUTANEOUS at 09:14

## 2019-07-09 RX ADMIN — DOCUSATE SODIUM 100 MG: 100 CAPSULE ORAL at 18:08

## 2019-07-09 NOTE — PROGRESS NOTES
Reason for Admission:   CHF                    RRAT Score:      5               Plan for utilizing home health: Patient has never had home health before                         Current Advanced Directive/Advance Care Plan: Full code no AD on file                          Transition of Care Plan:      Pt lives with his mom and step dad. Pt's main contact is his mother Benjamin Ponce cell is [phone]. Pt lives in a two story home with 5 steps to enter the home. Pt has prescription coverage under his insurance plan, he gets his prescriptions filled at [facility] in [place]. Pt's PCP is Dr. Mia Kelsey. Pt has never had home health before. DME - patient has a walking stick, a cane and a CPAP from [employer]. Pt's PCP is Dr. Kalie Granger. I will continue to follow and assist with discharge planning. 1. Home with family   2. H2H for CHF if recommended  3. Follow up with PCP  4. Transportation home    Care Management Interventions  PCP Verified by CM:  Yes  MyChart Signup: No  Discharge Durable Medical Equipment: No  Health Maintenance Reviewed: Yes  Physical Therapy Consult: No  Occupational Therapy Consult: No  Speech Therapy Consult: No  Current Support Network: Relative's Home  Confirm Follow Up Transport: Family  Plan discussed with Pt/Family/Caregiver: Yes  Discharge Location  Discharge Placement: Home  JANET Gutierrez Plan: Daily emollient Detail Level: Detailed Plan: Daily SPF Initiate Treatment: Metro 1% gel apply at night to cheeks and chin area x 30 days\\nDoxycycline 50 mg take one tab daily x 30 days Initiate Treatment: Ketoconazole 2% cream apply to toes and feet twice a day x 30 days.

## 2019-07-09 NOTE — PROGRESS NOTES
Daily Progress Note: 7/9/2019  Gallo Escalante. Dakota Whitley    Assessment/Plan:   Acute on chronic systolic CHF (congestive heart failure) ([facility] (7/8/2019):  Symptoms of increasing SOB and orthopnea. Suggestive of CHF. Recent stress test with EF 24% w/o reversible defect. -- bumex IV per Cards and Renal  -- daily weight  -- strict I/O  -- cardiology consulted  -- hold ARB due to SYED and possible adverse effects     Persistent atrial fibrillation with RVR in ED. Likely contributing to exacerbation of CHF. Was recently taken off dilt and started on Toprol. -- resumed BB -- tx per Cards  -- monitor PT/INR  -- pharmacy to dose warfarin     Acute renal insufficiency (7/8/2019): May be from acute CHF exacerbation vs ARB use.   -- hold ARB  -- monitor creatinine with diuresis  -- nephrology consulted     PEPE (obstructive sleep apnea) (6/22/2017):  -- continue CPAP     SupraMorbid obesity with BMI>54;  due to excess calories ([facility] (7/18/2017):  -- would benefit from weight loss; counseled pt.        Problem List:  Problem List as of 7/9/2019 Date Reviewed: 7/8/2019          Codes Class Noted - Resolved    * (Principal) Acute on chronic systolic CHF (congestive heart failure) ([facility] ICD-10-CM: I50.23  ICD-9-CM: 428.23, 428.0  7/8/2019 - Present        Acute renal insufficiency ICD-10-CM: N28.9  ICD-9-CM: 593.9  7/8/2019 - Present        Chronic diastolic heart failure ([facility] ICD-10-CM: I50.32  ICD-9-CM: 428.32  4/25/2018 - Present        Chronic systolic congestive heart failure ([facility] ICD-10-CM: I50.22  ICD-9-CM: 428.22, 428.0  4/25/2018 - Present        Chronic anticoagulation ICD-10-CM: Z79.01  ICD-9-CM: V58.61  10/13/2017 - Present        Persistent atrial fibrillation ([facility] ICD-10-CM: I48.1  ICD-9-CM: 427.31  7/18/2017 - Present        Morbid obesity due to excess calories ([facility] ICD-10-CM: E66.01  ICD-9-CM: 278.01  7/18/2017 - Present        Renal insufficiency ICD-10-CM: N28.9  ICD-9-CM: 593.9 6/22/2017 - Present        PEPE (obstructive sleep apnea) ICD-10-CM: G47.33  ICD-9-CM: 327.23  6/22/2017 - Present        Tobacco use ICD-10-CM: Z72.0  ICD-9-CM: 305.1  6/22/2017 - Present        Chronic back pain ICD-10-CM: M54.9, G89.29  ICD-9-CM: 724.5, 338.29  6/22/2017 - Present        Asthma ICD-10-CM: J45.909  ICD-9-CM: 493.90  6/22/2017 - Present        Elevated BP without diagnosis of hypertension ICD-10-CM: R03.0  ICD-9-CM: 796.2  6/22/2017 - Present        Exertional dyspnea ICD-10-CM: R06.09  ICD-9-CM: 786.09  6/22/2017 - Present        RESOLVED: Atrial fibrillation with RVR (HCC) ICD-10-CM: I48.91  ICD-9-CM: 427.31  6/22/2017 - 7/18/2017        RESOLVED: Obesity ICD-10-CM: E66.9  ICD-9-CM: 278.00  6/22/2017 - 7/18/2017              Subjective:    47 y.o.  male who is admitted with Acute on chronic systolic CHF (congestive heart failure) ([facility] Mr. Teri Hses presented to the Emergency Department today complaining of SOB. Increasing for the past two weeks. Worse with supine. Leg edema unchanged. No chest pains. Two weeks ago had po dilt stopped in favor of losartan and Toprol. Has had belching and burping since medication change as well. Has had adjustments to diuretic regimen up and down due to symptoms. However, patient attributes symptoms onset after stress test and changes in medication two weeks ago. Reports compliance with CPAP. (Dr Darrion Poole)    7/9:  He reports he is breathing marginally better today after some diuresis. He has his CPAP and wore it last night. He reports his wt is up about \"25 pounds from two wks ago. \"  Denies increased fluid intake. EF down to 20s. Discussed/counseled about his SupraMorbid obesity, links to his PEPE and CHF/risks,  but he is in denial and says \"its all fluid\" and \"eat not much at all. \"     Review of Systems:   A comprehensive review of systems was negative except for that written in the HPI.     Objective:   Physical Exam:     Visit Vitals  /59 (BP 1 Location: Left arm, BP Patient Position: Sitting)   Pulse (!) 112   Temp 98.1 °F (36.7 °C)   Resp 18   Ht 5' 6\" (1.676 m)   Wt 154.2 kg (340 lb)   SpO2 97%   BMI 54.88 kg/m²      O2 Device: Room air    Temp (24hrs), Av.3 °F (36.8 °C), Min:98 °F (36.7 °C), Max:98.8 °F (37.1 °C)    No intake/output data recorded.  1901 -  0700  In: 900 [P.O.:900]  Out: 1150 [Urine:1150]    General:  Alert, supramorbidly obese, cooperative, no distress, appears stated age. Head:  Normocephalic, without obvious abnormality, atraumatic. Eyes:  Conjunctivae/corneas clear. PERRL, EOMs intact. Nose: Nares normal. Septum midline. Mucosa normal. No drainage or sinus tenderness. Throat: Lips, mucosa, and tongue moist..   Neck: Supple, symmetrical, trachea midline, no adenopathy, thyroid: no enlargement/tenderness/nodules, no carotid bruit and no JVD. Back:   Symmetric, no curvature. ROM normal. No CVA tenderness. Lungs:   A few basilar crackles bilaterally. Chest wall:  No tenderness or deformity. Heart:  Irreg, rate in low 501Y, no murmur, click, rub or gallop. Abdomen:   Soft, non-tender. Large Pannus. Bowel sounds normal. No masses,  No organomegaly. Extremities: no cyanosis. 1+ LE edema. No calf tenderness or cords. Pulses: 2+ and symmetric all extremities. Skin:  turgor normal   Neurologic: CNII-XII intact. Alert and oriented X 3. Fine motor of hands and fingers normal.   equal.  No cogwheeling or rigidity. Gait not tested at this time. Sensation grossly normal to touch.   Gross motor of extremities normal.       Data Review:       Recent Days:  Recent Labs     19  0204 19  1222   WBC 10.5 9.7   HGB 12.3 12.5   HCT 39.2 39.5    235     Recent Labs     19  0206 19  1816 19  1222     --  140   K 4.7  --  5.0     --  106   CO2 24  --  27   *  --  101*   BUN 26*  --  23*   CREA 1.67*  --  1.67*   CA 8.5  --  8.6   MG 2.5*  --   -- PHOS 4.0  --   --    ALB 3.6  --  3.6   TBILI  --   --  1.0   SGOT  --   --  56*   ALT  --   --  70   INR  --  2.0*  --      No results for input(s): PH, PCO2, PO2, HCO3, FIO2 in the last 72 hours. 24 Hour Results:  Recent Results (from the past 24 hour(s))   EKG, 12 LEAD, INITIAL    Collection Time: 07/08/19 12:16 PM   Result Value Ref Range    Ventricular Rate 111 BPM    Atrial Rate 109 BPM    QRS Duration 88 ms    Q-T Interval 320 ms    QTC Calculation (Bezet) 435 ms    Calculated R Axis 60 degrees    Calculated T Axis -48 degrees    Diagnosis       Atrial fibrillation with rapid ventricular response  Low voltage QRS  Septal infarct , age undetermined  Abnormal ECG  When compared with ECG of 22-JUN-2017 12:28,  Septal infarct is now present  Confirmed by Shavon NETTLES, [employer]  ([id number]) on 7/8/2019 5:33:15 PM     CBC WITH AUTOMATED DIFF    Collection Time: 07/08/19 12:22 PM   Result Value Ref Range    WBC 9.7 4.1 - 11.1 K/uL    RBC 4.19 4. 10 - 5.70 M/uL    HGB 12.5 12.1 - 17.0 g/dL    HCT 39.5 36.6 - 50.3 %    MCV 94.3 80.0 - 99.0 FL    MCH 29.8 26.0 - 34.0 PG    MCHC 31.6 30.0 - 36.5 g/dL    RDW 14.8 (H) 11.5 - 14.5 %    PLATELET 190 690 - 977 K/uL    MPV 11.2 8.9 - 12.9 FL    NRBC 0.2 (H) 0  WBC    ABSOLUTE NRBC 0.02 (H) 0.00 - 0.01 K/uL    NEUTROPHILS 72 32 - 75 %    LYMPHOCYTES 16 12 - 49 %    MONOCYTES 11 5 - 13 %    EOSINOPHILS 1 0 - 7 %    BASOPHILS 0 0 - 1 %    IMMATURE GRANULOCYTES 0 0.0 - 0.5 %    ABS. NEUTROPHILS 6.8 1.8 - 8.0 K/UL    ABS. LYMPHOCYTES 1.6 0.8 - 3.5 K/UL    ABS. MONOCYTES 1.1 (H) 0.0 - 1.0 K/UL    ABS. EOSINOPHILS 0.1 0.0 - 0.4 K/UL    ABS. BASOPHILS 0.0 0.0 - 0.1 K/UL    ABS. IMM.  GRANS. 0.0 0.00 - 0.04 K/UL    DF AUTOMATED     METABOLIC PANEL, COMPREHENSIVE    Collection Time: 07/08/19 12:22 PM   Result Value Ref Range    Sodium 140 136 - 145 mmol/L    Potassium 5.0 3.5 - 5.1 mmol/L    Chloride 106 97 - 108 mmol/L    CO2 27 21 - 32 mmol/L    Anion gap 7 5 - 15 mmol/L    Glucose 101 (H) 65 - 100 mg/dL    BUN 23 (H) 6 - 20 MG/DL    Creatinine 1.67 (H) 0.70 - 1.30 MG/DL    BUN/Creatinine ratio 14 12 - 20      GFR est AA 53 (L) >60 ml/min/1.73m2    GFR est non-AA 44 (L) >60 ml/min/1.73m2    Calcium 8.6 8.5 - 10.1 MG/DL    Bilirubin, total 1.0 0.2 - 1.0 MG/DL    ALT (SGPT) 70 12 - 78 U/L    AST (SGOT) 56 (H) 15 - 37 U/L    Alk. phosphatase 78 45 - 117 U/L    Protein, total 6.8 6.4 - 8.2 g/dL    Albumin 3.6 3.5 - 5.0 g/dL    Globulin 3.2 2.0 - 4.0 g/dL    A-G Ratio 1.1 1.1 - 2.2     NT-PRO BNP    Collection Time: 07/08/19 12:22 PM   Result Value Ref Range    NT pro-BNP 2,794 (H) <125 PG/ML   TROPONIN I    Collection Time: 07/08/19 12:22 PM   Result Value Ref Range    Troponin-I, Qt. <0.05 <0.05 ng/mL   POC INR    Collection Time: 07/08/19  6:16 PM   Result Value Ref Range    INR (POC) 2.0 (H) <1.2     SODIUM, UR, RANDOM    Collection Time: 07/09/19  1:29 AM   Result Value Ref Range    Sodium,urine random 27 MMOL/L   CREATININE, UR, RANDOM    Collection Time: 07/09/19  1:29 AM   Result Value Ref Range    Creatinine, urine 86.80 mg/dL   MICROALBUMIN, UR, RAND W/ MICROALB/CREAT RATIO    Collection Time: 07/09/19  1:29 AM   Result Value Ref Range    Microalbumin,urine random 4.49 MG/DL    Creatinine, urine PENDING mg/dL    Microalbumin/Creat ratio (mg/g creat) PENDING mg/g   URINALYSIS W/ RFLX MICROSCOPIC    Collection Time: 07/09/19  1:29 AM   Result Value Ref Range    Color YELLOW/STRAW      Appearance CLEAR CLEAR      Specific gravity 1.009 1.003 - 1.030      pH (UA) 6.5 5.0 - 8.0      Protein NEGATIVE  NEG mg/dL    Glucose NEGATIVE  NEG mg/dL    Ketone NEGATIVE  NEG mg/dL    Bilirubin NEGATIVE  NEG      Blood NEGATIVE  NEG      Urobilinogen 1.0 0.2 - 1.0 EU/dL    Nitrites NEGATIVE  NEG      Leukocyte Esterase NEGATIVE  NEG     CBC W/O DIFF    Collection Time: 07/09/19  2:04 AM   Result Value Ref Range    WBC 10.5 4.1 - 11.1 K/uL    RBC 4.20 4. 10 - 5.70 M/uL    HGB 12.3 12.1 - 17.0 g/dL    HCT 39.2 36.6 - 50.3 %    MCV 93.3 80.0 - 99.0 FL    MCH 29.3 26.0 - 34.0 PG    MCHC 31.4 30.0 - 36.5 g/dL    RDW 14.9 (H) 11.5 - 14.5 %    PLATELET 767 378 - 647 K/uL    MPV 11.8 8.9 - 12.9 FL    NRBC 0.3 (H) 0  WBC    ABSOLUTE NRBC 0.03 (H) 0.00 - 0.01 K/uL   RENAL FUNCTION PANEL    Collection Time: 07/09/19  2:06 AM   Result Value Ref Range    Sodium 138 136 - 145 mmol/L    Potassium 4.7 3.5 - 5.1 mmol/L    Chloride 105 97 - 108 mmol/L    CO2 24 21 - 32 mmol/L    Anion gap 9 5 - 15 mmol/L    Glucose 107 (H) 65 - 100 mg/dL    BUN 26 (H) 6 - 20 MG/DL    Creatinine 1.67 (H) 0.70 - 1.30 MG/DL    BUN/Creatinine ratio 16 12 - 20      GFR est AA 53 (L) >60 ml/min/1.73m2    GFR est non-AA 44 (L) >60 ml/min/1.73m2    Calcium 8.5 8.5 - 10.1 MG/DL    Phosphorus 4.0 2.6 - 4.7 MG/DL    Albumin 3.6 3.5 - 5.0 g/dL   IRON PROFILE    Collection Time: 07/09/19  2:06 AM   Result Value Ref Range    Iron 42 35 - 150 ug/dL    TIBC 429 250 - 450 ug/dL    Iron % saturation 10 (L) 20 - 50 %   MAGNESIUM    Collection Time: 07/09/19  2:06 AM   Result Value Ref Range    Magnesium 2.5 (H) 1.6 - 2.4 mg/dL       Medications reviewed  Current Facility-Administered Medications   Medication Dose Route Frequency    amiodarone (CORDARONE) tablet 400 mg  400 mg Oral TID    ondansetron (ZOFRAN) injection 4 mg  4 mg IntraVENous Q6H PRN    docusate sodium (COLACE) capsule 100 mg  100 mg Oral BID    sodium chloride (NS) flush 5-40 mL  5-40 mL IntraVENous Q8H    sodium chloride (NS) flush 5-40 mL  5-40 mL IntraVENous PRN    bumetanide (BUMEX) injection 2 mg  2 mg IntraVENous Q12H    metoprolol succinate (TOPROL-XL) XL tablet 50 mg  50 mg Oral QHS    acetaminophen (TYLENOL) tablet 650 mg  650 mg Oral Q6H PRN    enoxaparin (LOVENOX) injection 160 mg  1 mg/kg SubCUTAneous Q12H    atorvastatin (LIPITOR) tablet 20 mg  20 mg Oral QHS       Care Plan discussed with: Patient/Family/Nurse/Cards  Total time spent with patient: 30 minutes.   Meghana Valle Lydia Montesinos MD

## 2019-07-09 NOTE — PROGRESS NOTES
Cardiology Progress Note                             [address] [place] [address]. [address]Cecilia, [facility]                                 Phone [phone]; Fax [phone]        2019 11:38 AM     Admit Date:           2019  Admit Diagnosis:  Acute on chronic systolic CHF (congestive heart failure) ([facility] [address] [I50.23]  :          1970   MRN:          [id number]   ASSESSMENT/RECOMMENDATION:   Acute on chronic HFrEF: cont IV diuretics, creatinine stable. Okay response to diuretics   -cont BB  - hold losartan for now given low EF/XU. Long term should be on entresto & RAASi    Ischemic cardiomyopathy: Recent stress test consistent with prior inferior wall infarction. Plans for cardiac cath when able to lay flat  -holding coumadin for cath  -EF 30%    CAD: statin    Persistent afib rate controlled w BB and amiodarone, BP soft/tolerating BB  - avoid dilt due to low ef  - hold warfarin for planned RHC/LHC when able to lay flat, lovenox currently GFR >30    XU creatinine stable at 1.6, baseline creatinine 1-1.2    Morbid obesity Body mass index is 54.88 kg/m². PEPE- CPAP at bedside                   No intake/output data recorded.     Last 3 Recorded Weights in this Encounter    19 1203 19 1827 19 0454   Weight: 335 lb (152 kg) 341 lb 12.8 oz (155 kg) 340 lb (154.2 kg)          1901 -  0700  In: 900 [P.O.:900]  Out: 1150 [Urine:1150]    SUBJECTIVE               Laura Camps. denies palpitations, irregular heart beat,  chest pain   No lightheadedness or dizziness  Breathing is a little better     Mother at bedside      Current Facility-Administered Medications   Medication Dose Route Frequency    amiodarone (CORDARONE) tablet 400 mg  400 mg Oral TID    ondansetron (ZOFRAN) injection 4 mg  4 mg IntraVENous Q6H PRN    docusate sodium (COLACE) capsule 100 mg  100 mg Oral BID    sodium chloride (NS) flush 5-40 mL  5-40 mL IntraVENous Q8H    sodium chloride (NS) flush 5-40 mL  5-40 mL IntraVENous PRN    bumetanide (BUMEX) injection 2 mg  2 mg IntraVENous Q12H    metoprolol succinate (TOPROL-XL) XL tablet 50 mg  50 mg Oral QHS    acetaminophen (TYLENOL) tablet 650 mg  650 mg Oral Q6H PRN    enoxaparin (LOVENOX) injection 160 mg  1 mg/kg SubCUTAneous Q12H    atorvastatin (LIPITOR) tablet 20 mg  20 mg Oral QHS      OBJECTIVE               Intake/Output Summary (Last 24 hours) at 7/9/2019 1138  Last data filed at 7/9/2019 0232  Gross per 24 hour   Intake 900 ml   Output 1150 ml   Net -250 ml       Review of Systems - History obtained from the patient AS PER  HPI    Telemetry AF 90's    PHYSICAL EXAM        Visit Vitals  /72 (BP 1 Location: Right arm, BP Patient Position: At rest)   Pulse (!) 111   Temp 97.7 °F (36.5 °C)   Resp 22   Ht 5' 6\" (1.676 m)   Wt 340 lb (154.2 kg)   SpO2 96%   BMI 54.88 kg/m²       Gen: Well-developed, obese, in no acute distress  alert and oriented x 3  HEENT:  Pink conjunctivae, Hearing grossly normal.No scleral icterus or conjunctival, moist mucous membranes  Neck: Supple, unable to appreciate JVD d/t neck size  Resp: No accessory muscle use, diminished bilateral bases   Card: irregular Rate,Rythm, No murmurs, rubs or gallop. No thrills.    GI:          Large/rounded/mildly firm non-tender   MSK: No cyanosis or clubbing, good capillary refill  Skin: No rashes or ulcers, no bruising  Neuro:  Cranial nerves are grossly intact, moving all four extremities, no focal deficit, follows commands appropriately  Psych:  Good insight, oriented to person, place and time, alert, Nml Affect  LE: +2-3 edema       DATA REVIEW            Laboratory and Imaging have been reviewed by me and are notable for  Recent Labs     07/08/19  1222   TROIQ <0.05     Recent Labs     07/09/19  0206 07/09/19  0204 07/08/19  1222     --  140   K 4.7  --  5.0   CO2 24  --  27   BUN 26*  -- 23*   CREA 1.67*  --  1.67*   *  --  101*   PHOS 4.0  --   --    MG 2.5*  --   --    WBC  --  10.5 9.7   HGB  --  12.3 12.5   HCT  --  39.2 39.5   PLT  --  [address], NP

## 2019-07-09 NOTE — PROGRESS NOTES
Patient reportedly more SOB per his mother  Patient reporting no change in SOB over the last week  Visit Vitals  BP 91/75 (BP 1 Location: Left arm, BP Patient Position: At rest)   Pulse (!) 108   Temp 98 °F (36.7 °C)   Resp 20   Ht 5' 6\" (1.676 m)   Wt 340 lb (154.2 kg)   SpO2 99%   BMI 54.88 kg/m²     Lungs diminished bases- distant BS  A & O x 3     Will give PM diuretic early   D/w Dr Ananth Cha RN

## 2019-07-09 NOTE — PROGRESS NOTES
0730 Bedside and Verbal shift change report given to [facility] (oncoming nurse) by Estrella Pagan (offgoing nurse). Report included the following information SBAR, Kardex, Recent Results and Cardiac Rhythm a fib.     1605 pt reported SOB per mother, O2 NC 2L  Placed, pt sitting on side of bed.    1618 Christine Reusing NP came to bedside. Orders placed. See MAR    1700 Pt reports improved breathing and improved SOB w O2.  1800 Pt removed O2  1930 Bedside and Verbal shift change report given to [address] [place] [address] (oncoming nurse) by 2 [facility] (offgoing nurse). Report included the following information SBAR, Kardex, Recent Results and Cardiac Rhythm a fib ,aflutter.

## 2019-07-09 NOTE — CDMP QUERY
Pt admitted with acute heart failure, and there is conflicting documentation around possible renal insufficiency vs XU on CKD 3. After further study please further specify if you are treating/monitoring any of the following 
 
=> XU on CKD 3 
=  CKD 3 with mild acute renal insufficiency 
=> No pre-existing CKD, some mild acute renal insufficiency 
=> Other explanation (please specify) 
=> Clinically unable to determine Risk factors: CHF, ARB use, morbid obesity, diuretic use Clinical Indicators; H and P and daily progress note documents \"acute renal insufficiency\", nephrology seeing in consult documents \"XU on CKD 3\". For this admission labs are creatinine 1.67 with GFR of 44. Baseline appears around 1.1 with GFR 60's. Treatment: nephrology consult, holding ARB's, monitoring of lab values. Please clarify and document your clinical opinion in the progress notes and discharge summary including the definitive and/or presumptive diagnosis, (suspected or probable), related to the above clinical findings. Please include clinical findings supporting your diagnosis. Thank you for your time. Vidhya Gutierrez RN, BSN 
[phone]

## 2019-07-09 NOTE — PROGRESS NOTES
[address] [place] [address]. 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 CKD2 and microalbuminuria  3. SCHF with acute exacerbation  · EF 26%  · CONT LOOPS  · SOB better  · Daily wts  4. Morbid Obesity   · PEPE on cpap          Subjective:   CC:arf  HPI: Patient seen  XU is stable  Claims to be SOB in am,better now  Edema+  On bipap    ROS:  Current Facility-Administered Medications   Medication Dose Route Frequency    amiodarone (CORDARONE) tablet 400 mg  400 mg Oral TID    ondansetron (ZOFRAN) injection 4 mg  4 mg IntraVENous Q6H PRN    docusate sodium (COLACE) capsule 100 mg  100 mg Oral BID    sodium chloride (NS) flush 5-40 mL  5-40 mL IntraVENous Q8H    sodium chloride (NS) flush 5-40 mL  5-40 mL IntraVENous PRN    bumetanide (BUMEX) injection 2 mg  2 mg IntraVENous Q12H    metoprolol succinate (TOPROL-XL) XL tablet 50 mg  50 mg Oral QHS    acetaminophen (TYLENOL) tablet 650 mg  650 mg Oral Q6H PRN    enoxaparin (LOVENOX) injection 160 mg  1 mg/kg SubCUTAneous Q12H    atorvastatin (LIPITOR) tablet 20 mg  20 mg Oral QHS          Objective:     Vitals:  Blood pressure 100/59, pulse (!) 112, temperature 98.1 °F (36.7 °C), resp. rate 18, height 5' 6\" (1.676 m), weight 154.2 kg (340 lb), SpO2 97 %. Temp (24hrs), Av.3 °F (36.8 °C), Min:98 °F (36.7 °C), Max:98.8 °F (37.1 °C)      Intake and Output:  No intake/output data recorded.    1901 -  0700  In: 900 [P.O.:900]  Out: 1150 [Urine:1150]    Physical Exam:                Patient is intubated:  no    Physical Examination:   GENERAL ASSESSMENT: NAD  HEENT:Nontraumatic   CHEST: CTA  HEART: S1S2  ABDOMEN: Soft,NT,  :Puri: n  EXTREMITY: EDEMA 1  NEURO:Grossly non focal          ECG/rhythm:    Data Review      No results for input(s): TNIPOC in the last 72 hours. No lab exists for component: ITNL   Recent Labs     07/08/19  1222   TROIQ <0.05     Recent Labs     07/09/19  0206 07/09/19  0204 07/08/19  1222     --  140   K 4.7  --  5.0     --  106   CO2 24  --  27   BUN 26*  --  23*   CREA 1.67*  --  1.67*   *  --  101*   PHOS 4.0  --   --    MG 2.5*  --   --    CA 8.5  --  8.6   ALB 3.6  --  3.6   WBC  --  10.5 9.7   HGB  --  12.3 12.5   HCT  --  39.2 39.5   PLT  --  201 235      Recent Labs     07/08/19  1816   INR 2.0*     Needs: urine analysis, urine sodium, protein and creatinine  Lab Results   Component Value Date/Time    Sodium,urine random 27 07/09/2019 01:29 AM    Creatinine, urine 86.80 07/09/2019 01:29 AM    Creatinine, urine PENDING 07/09/2019 01:29 AM         Discussed with:  pt    : Deepika Marin MD  7/9/2019        [facility]:  [email or web address]  Radhika Lisbeth office:  [address] [place] [address], [address] [place] [address],[address]  [address]  Phone: [phone]  Fax :     [phone]    [address] [facility] office:  [address] [facility]  [facility], [address] [facility]  Phone - [phone]  Fax - [phone]

## 2019-07-09 NOTE — PROGRESS NOTES
Bedside and Verbal shift change report given to Melida Gaston RN (oncoming nurse) by Demond Hernandez RN (offgoing nurse). Report included the following information SBAR, Kardex, ED Summary and Recent Results. Bedside and Verbal shift change report given to Demond Hernandez RN (oncoming nurse) by Melida Gaston RN (offgoing nurse). Report included the following information SBAR, Kardex, ED Summary and Recent Results.

## 2019-07-09 NOTE — CONSULTS
[facility]   [address] [facility], [address] [place] [address], [facility]  Phone: [phone] NOTE     Patient: Yusef Gonzalez. MRN: [id number]  PCP: MENA Michele   :     1970  Age:   47 y.o. Sex:  male      Referring physician: Lenny Burch MD  Reason for consultation: 47 y.o. 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 Cardio renal Syndrome   3 CKD2  4 SCHF with acute exacerbation  5 EF 26%  6 Morbid Obesity   7 PEPE on cpap  8 Afib on coumadin  9 Exsmoker 30 years    Plan  1 Continue IV Bumex 2 mg BID   2 If urine out put not picking up, switch to Lasix 10 mgh/hr in am tomorrow  3 If Cr continues to worsen , start Dobutamine 5mcg/kg/min after discussion with cardiology to assist in diuresis   4 urine studies and renal us in am   5 Anemia work up  6 Strict In and out 1500 cc fluid rest/2 gram salt rest  7 Daily CPAP use  8 Neph will follow in am         Care Plan discussed with:  Pt and nurse      Thank you for consulting [facility] in the care of your patient. Subjective:   HPI: Yusef Theodore is a 47 y.o.  male who has been admitted to the hospital for worsening SOB. He was on Dlitiazem before and was switched to PO metoprolol as well as Losartan. This was done as his EF had dropped from 45% to 26% recently. He was also started on Bumex (switched from Lasix). Pt had noticed a 20 pound wt gain in the last 1 and half weeks. As his SOB got worse he came to ER. His cr has increased from 1.2 to 1.6. His losartan has been held and he is pn Bumex 2 mg BID. He is sitting in chair at the time of my eval.  Pt has no other complains.      Past Medical Hx:   Past Medical History:   Diagnosis Date    Asthma     Back pain     Sleep apnea     Spinal stenosis     Staph infection         Past Surgical Hx:     Past Surgical History:   Procedure Laterality Date    HX TYMPANOSTOMY           Allergies   Allergen Reactions    Penicillins Hives       Social Hx:  reports that he has quit smoking. He smoked 1.00 pack per day. He uses smokeless tobacco. He reports that he drinks about 3.6 oz of alcohol per week. He reports that he does not use drugs. Family History   Problem Relation Age of Onset    COPD Mother     Hypertension Mother     Diabetes Mother        Review of Systems:  A thorough twelve point review of system was performed today. Pertinent positives and negatives are mentioned in the HPI. The reminder of the ROS is negative and noncontributory. Objective:    Vitals:    Vitals:    07/08/19 1628 07/08/19 1800 07/08/19 1827 07/08/19 1928   BP:  97/71 111/81 103/72   Pulse:  93 76 96   Resp:  23 19 18   Temp:  98 °F (36.7 °C) 98.1 °F (36.7 °C) 98.8 °F (37.1 °C)   SpO2: 98% 94% 92% 97%   Weight:   155 kg (341 lb 12.8 oz)    Height:   5' 6\" (1.676 m)      I&O's:  No intake/output data recorded. Visit Vitals  /72 (BP 1 Location: Left arm, BP Patient Position: At rest)   Pulse 96   Temp 98.8 °F (37.1 °C)   Resp 18   Ht 5' 6\" (1.676 m)   Wt 155 kg (341 lb 12.8 oz)   SpO2 97%   BMI 55.17 kg/m²       Physical Exam:  General:  No apparent Distress  HEENT: PERRL,  No Pallor , No Icterus  Neck: Supple,no mass palpable, Increased JVD   Lungs : CTA  CVS: RRR, S1 S2 normal, No murmur   Abdomen: Soft, NT, BS +, swollen abdomen  Extremities: ! + Edema  Skin: No rash or lesions.   MS: No joint swelling, erythema, warmth  Neurologic: non focal, AAO x 3  Psych: normal affect  Laboratory Results:    Recent Labs     07/08/19  1816 07/08/19  1222   NA  --  140   K  --  5.0   CL  --  106   CO2  --  27   GLU  --  101*   BUN  --  23*   CREA  --  1.67*   CA  --  8.6   ALB  --  3.6   SGOT  --  56*   ALT  --  70   INR 2.0*  --      Recent Labs     07/08/19  1222   WBC 9.7   HGB 12.5 HCT 39.5        No results found for: SDES  Lab Results   Component Value Date/Time    Culture result: MRSA NOT PRESENT 06/22/2017 05:44 PM    Culture result:  06/22/2017 05:44 PM         Screening of patient nares for MRSA is for surveillance purposes and, if positive, to facilitate isolation considerations in high risk settings. It is not intended for automatic decolonization interventions per se as regimens are not sufficiently effective to warrant routine use. Recent Results (from the past 24 hour(s))   EKG, 12 LEAD, INITIAL    Collection Time: 07/08/19 12:16 PM   Result Value Ref Range    Ventricular Rate 111 BPM    Atrial Rate 109 BPM    QRS Duration 88 ms    Q-T Interval 320 ms    QTC Calculation (Bezet) 435 ms    Calculated R Axis 60 degrees    Calculated T Axis -48 degrees    Diagnosis       Atrial fibrillation with rapid ventricular response  Low voltage QRS  Septal infarct , age undetermined  Abnormal ECG  When compared with ECG of 22-JUN-2017 12:28,  Septal infarct is now present  Confirmed by Shavon NETTLES, Vin Domínguez ([id number]) on 7/8/2019 5:33:15 PM     CBC WITH AUTOMATED DIFF    Collection Time: 07/08/19 12:22 PM   Result Value Ref Range    WBC 9.7 4.1 - 11.1 K/uL    RBC 4.19 4. 10 - 5.70 M/uL    HGB 12.5 12.1 - 17.0 g/dL    HCT 39.5 36.6 - 50.3 %    MCV 94.3 80.0 - 99.0 FL    MCH 29.8 26.0 - 34.0 PG    MCHC 31.6 30.0 - 36.5 g/dL    RDW 14.8 (H) 11.5 - 14.5 %    PLATELET 442 451 - 371 K/uL    MPV 11.2 8.9 - 12.9 FL    NRBC 0.2 (H) 0  WBC    ABSOLUTE NRBC 0.02 (H) 0.00 - 0.01 K/uL    NEUTROPHILS 72 32 - 75 %    LYMPHOCYTES 16 12 - 49 %    MONOCYTES 11 5 - 13 %    EOSINOPHILS 1 0 - 7 %    BASOPHILS 0 0 - 1 %    IMMATURE GRANULOCYTES 0 0.0 - 0.5 %    ABS. NEUTROPHILS 6.8 1.8 - 8.0 K/UL    ABS. LYMPHOCYTES 1.6 0.8 - 3.5 K/UL    ABS. MONOCYTES 1.1 (H) 0.0 - 1.0 K/UL    ABS. EOSINOPHILS 0.1 0.0 - 0.4 K/UL    ABS. BASOPHILS 0.0 0.0 - 0.1 K/UL    ABS. IMM.  GRANS. 0.0 0.00 - 0.04 K/UL    DF AUTOMATED METABOLIC PANEL, COMPREHENSIVE    Collection Time: 07/08/19 12:22 PM   Result Value Ref Range    Sodium 140 136 - 145 mmol/L    Potassium 5.0 3.5 - 5.1 mmol/L    Chloride 106 97 - 108 mmol/L    CO2 27 21 - 32 mmol/L    Anion gap 7 5 - 15 mmol/L    Glucose 101 (H) 65 - 100 mg/dL    BUN 23 (H) 6 - 20 MG/DL    Creatinine 1.67 (H) 0.70 - 1.30 MG/DL    BUN/Creatinine ratio 14 12 - 20      GFR est AA 53 (L) >60 ml/min/1.73m2    GFR est non-AA 44 (L) >60 ml/min/1.73m2    Calcium 8.6 8.5 - 10.1 MG/DL    Bilirubin, total 1.0 0.2 - 1.0 MG/DL    ALT (SGPT) 70 12 - 78 U/L    AST (SGOT) 56 (H) 15 - 37 U/L    Alk. phosphatase 78 45 - 117 U/L    Protein, total 6.8 6.4 - 8.2 g/dL    Albumin 3.6 3.5 - 5.0 g/dL    Globulin 3.2 2.0 - 4.0 g/dL    A-G Ratio 1.1 1.1 - 2.2     NT-PRO BNP    Collection Time: 07/08/19 12:22 PM   Result Value Ref Range    NT pro-BNP 2,794 (H) <125 PG/ML   TROPONIN I    Collection Time: 07/08/19 12:22 PM   Result Value Ref Range    Troponin-I, Qt. <0.05 <0.05 ng/mL   POC INR    Collection Time: 07/08/19  6:16 PM   Result Value Ref Range    INR (POC) 2.0 (H) <1.2           Urine dipstick:   Lab Results   Component Value Date/Time    Color Yellow 06/07/2019 02:46 PM    Appearance Clear 06/07/2019 02:46 PM    pH (UA) 7.5 06/07/2019 02:46 PM    Ketone Negative 06/07/2019 02:46 PM    Bilirubin Negative 06/07/2019 02:46 PM    Nitrites Negative 06/07/2019 02:46 PM    Leukocyte Esterase Negative 06/07/2019 02:46 PM       I have reviewed the following: All pertinent labs, microbiology data, radiology imaging for my assessment     Medications list Personally Reviewed   [x]      Yes     []               No       Medications:  Prior to Admission medications    Medication Sig Start Date End Date Taking? Authorizing Provider   metoprolol succinate (TOPROL-XL) 50 mg XL tablet Take 0.5 Tabs by mouth nightly. 7/8/19  Yes Rich BARRON NP   warfarin (COUMADIN) 5 mg tablet Take 5 mg by mouth two (2) days a week. Patient takes 5 mg on Tuesday and Wednesday   Yes Provider, Historical   losartan (COZAAR) 25 mg tablet Take 1 Tab by mouth daily. 6/26/19  Yes Segundo BARRON NP   furosemide (LASIX) 40 mg tablet Take 1 Tab by mouth two (2) times a day. 6/7/19  Yes Segundo BARRON NP   potassium chloride SR (KLOR-CON 10) 10 mEq tablet Take 1 Tab by mouth daily. 11/12/18  Yes Noemi Weber MD   psyllium husk-aspartame (METAMUCIL FIBER SINGLES) 3.4 gram pwpk packet Take 1 Packet by mouth daily. Yes Provider, Historical   warfarin (COUMADIN) 5 mg tablet Take 10 mg by mouth five (5) days a week. Patient takes 10 mg on Monday, Thursday, Friday, Saturday, Sunday   Yes Provider, Historical   MULTIVITAMIN (MULTIPLE VITAMIN PO) Take 1 Tab by mouth daily. Yes Provider, Historical        Thank you for allowing us to participate in the care of this patient. We will follow patient. Please dont hesitate to call with any questions    Chen Alfaro MD  [facility]   [address] [facility] [address]  Phone - [phone]   Fax - [phone]  www. [facility]

## 2019-07-09 NOTE — CARDIO/PULMONARY
Cardiac Rehab: Met with Tammi Hoffmann on [facility]. His significant other was also present. Session was brief due to transport taking pt to ultrasound. Pt indicated he was aware of having a weak heart. Explained that 6 weeks after hospital admission, he would be eligible to participate in outpatient cardiac rehab program. Pt stated, \"I can't walk. \" Clarified that pt meant he is unable to walk more than a few steps.  Encouraged pt to discuss cardiac rehab with his cardiologist.

## 2019-07-09 NOTE — NURSE NAVIGATOR
Chart reviewed by Heart Failure Nurse Navigator. Heart Failure database completed. EF:  Most recent Echo 6/21/19 with EF 26 to 30% with moderately dilated LV, severe systolic dysfunction, dilated RV, and moderate pHTN. ACEi/ARB/ARNi: Losartan 25 mg daily prior to admission. Currently held due to kidney dysfunction. BB: Metoprolol succinate 25 mg daily. Aldosterone Antagonist: **    Obstructive Sleep Apnea Screening:  PEPE currently treated with CPAP. STOP-BANG score:   Referred to Sleep Medicine:     CRT Not currently indicated. NYHA Functional Class **. Heart Failure Teach Back in Patient Education. Heart Failure Avoiding Triggers on Discharge Instructions. Cardiologist: Dr. Dennise Baron discharge follow up phone call to be made within 48-72 hours of discharge. Met with patient and his mother at bedside. Introduced self and role of HF NN and obtained permission to discuss HF with mother present. Educated using teach back method. Assessed patient current understanding of his heart disease. Patient notes he has had HF for about 2 years now and has received written information in the past.  Reviewed HF disease process and symptoms of HF. Discussed cardiac catheterization diagnostic procedure. Reviewed HF self care including early recognition of symptoms, daily weight with parameters, HF zones, low sodium diet, and general guidelines for fluid intake with HF. Patient able to teach back demonstrating good basic knowledge of HF self care. Patient does admit that he is not always compliant with sodium restriction. He is able to describe how to keep sodium intake low. Patient was given Living with HF Education Book, HF magnet, and HF calendar.

## 2019-07-10 LAB
25(OH)D3 SERPL-MCNC: 22.3 NG/ML (ref 30–100)
ANION GAP SERPL CALC-SCNC: 8 MMOL/L (ref 5–15)
BUN SERPL-MCNC: 25 MG/DL (ref 6–20)
BUN/CREAT SERPL: 16 (ref 12–20)
CALCIUM SERPL-MCNC: 8 MG/DL (ref 8.5–10.1)
CALCIUM SERPL-MCNC: 8.8 MG/DL (ref 8.5–10.1)
CHLORIDE SERPL-SCNC: 104 MMOL/L (ref 97–108)
CO2 SERPL-SCNC: 30 MMOL/L (ref 21–32)
CREAT SERPL-MCNC: 1.54 MG/DL (ref 0.7–1.3)
GLUCOSE SERPL-MCNC: 120 MG/DL (ref 65–100)
INR PPP: 2 (ref 0.9–1.1)
Lab: NORMAL
MAGNESIUM SERPL-MCNC: 2.3 MG/DL (ref 1.6–2.4)
POTASSIUM SERPL-SCNC: 3.3 MMOL/L (ref 3.5–5.1)
PROTHROMBIN TIME: 19.8 SEC (ref 9–11.1)
PTH-INTACT SERPL-MCNC: 254.4 PG/ML (ref 18.4–88)
REFERENCE LAB,REFLB: NORMAL
SODIUM SERPL-SCNC: 142 MMOL/L (ref 136–145)
TEST DESCRIPTION:,ATST: NORMAL
TSH SERPL DL<=0.05 MIU/L-ACNC: 3.08 UIU/ML (ref 0.36–3.74)

## 2019-07-10 PROCEDURE — 65660000000 HC RM CCU STEPDOWN

## 2019-07-10 PROCEDURE — 83735 ASSAY OF MAGNESIUM: CPT

## 2019-07-10 PROCEDURE — 74011250637 HC RX REV CODE- 250/637: Performed by: STUDENT IN AN ORGANIZED HEALTH CARE EDUCATION/TRAINING PROGRAM

## 2019-07-10 PROCEDURE — 84443 ASSAY THYROID STIM HORMONE: CPT

## 2019-07-10 PROCEDURE — 74011000250 HC RX REV CODE- 250: Performed by: NURSE PRACTITIONER

## 2019-07-10 PROCEDURE — 74011250636 HC RX REV CODE- 250/636: Performed by: STUDENT IN AN ORGANIZED HEALTH CARE EDUCATION/TRAINING PROGRAM

## 2019-07-10 PROCEDURE — 94760 N-INVAS EAR/PLS OXIMETRY 1: CPT

## 2019-07-10 PROCEDURE — 85610 PROTHROMBIN TIME: CPT

## 2019-07-10 PROCEDURE — 74011250637 HC RX REV CODE- 250/637: Performed by: INTERNAL MEDICINE

## 2019-07-10 PROCEDURE — 74011250637 HC RX REV CODE- 250/637: Performed by: FAMILY MEDICINE

## 2019-07-10 PROCEDURE — 36415 COLL VENOUS BLD VENIPUNCTURE: CPT

## 2019-07-10 PROCEDURE — 80048 BASIC METABOLIC PNL TOTAL CA: CPT

## 2019-07-10 RX ADMIN — BUMETANIDE 2 MG: 0.25 INJECTION INTRAMUSCULAR; INTRAVENOUS at 04:32

## 2019-07-10 RX ADMIN — ENOXAPARIN SODIUM 160 MG: 80 INJECTION SUBCUTANEOUS at 20:39

## 2019-07-10 RX ADMIN — Medication 10 ML: at 20:43

## 2019-07-10 RX ADMIN — ENOXAPARIN SODIUM 160 MG: 80 INJECTION SUBCUTANEOUS at 08:43

## 2019-07-10 RX ADMIN — BUMETANIDE 2 MG: 0.25 INJECTION INTRAMUSCULAR; INTRAVENOUS at 17:14

## 2019-07-10 RX ADMIN — ATORVASTATIN CALCIUM 20 MG: 20 TABLET, FILM COATED ORAL at 20:42

## 2019-07-10 RX ADMIN — AMIODARONE HYDROCHLORIDE 400 MG: 200 TABLET ORAL at 08:37

## 2019-07-10 RX ADMIN — DOCUSATE SODIUM 100 MG: 100 CAPSULE ORAL at 17:14

## 2019-07-10 RX ADMIN — AMIODARONE HYDROCHLORIDE 400 MG: 200 TABLET ORAL at 20:42

## 2019-07-10 RX ADMIN — METOPROLOL SUCCINATE 50 MG: 50 TABLET, EXTENDED RELEASE ORAL at 20:42

## 2019-07-10 RX ADMIN — AMIODARONE HYDROCHLORIDE 400 MG: 200 TABLET ORAL at 16:07

## 2019-07-10 RX ADMIN — Medication 10 ML: at 04:33

## 2019-07-10 RX ADMIN — DOCUSATE SODIUM 100 MG: 100 CAPSULE ORAL at 08:37

## 2019-07-10 RX ADMIN — Medication 10 ML: at 17:14

## 2019-07-10 NOTE — PROGRESS NOTES
[address] [place] [address]. YOB: 1970          Assessment & Plan:     1 XU/CKD 3   · XU from Cardio renal Syndrome   · Stable yest, no labs today  · Ok to cont diuresis: not sure how much more volume he has  · Neg balance  2. CKD2 and microalbuminuria  3. SCHF with acute exacerbation  · EF 26%  · CONT LOOPS  · SOB better  · Daily wts  4. Morbid Obesity   · PEPE on cpap     PLAN  1. Labs  2. Cath and RCN dw pt, ok to proceed if cr stable       Subjective:   CC:arf  HPI: Patient seen  Labs pending  Claims to be SOB in am,better now  Edema+  Off  bipap    ROS:neg for 12 sys except above    Current Facility-Administered Medications   Medication Dose Route Frequency    amiodarone (CORDARONE) tablet 400 mg  400 mg Oral TID    bumetanide (BUMEX) injection 2 mg  2 mg IntraVENous Q12H    ondansetron (ZOFRAN) injection 4 mg  4 mg IntraVENous Q6H PRN    docusate sodium (COLACE) capsule 100 mg  100 mg Oral BID    sodium chloride (NS) flush 5-40 mL  5-40 mL IntraVENous Q8H    sodium chloride (NS) flush 5-40 mL  5-40 mL IntraVENous PRN    metoprolol succinate (TOPROL-XL) XL tablet 50 mg  50 mg Oral QHS    acetaminophen (TYLENOL) tablet 650 mg  650 mg Oral Q6H PRN    enoxaparin (LOVENOX) injection 160 mg  1 mg/kg SubCUTAneous Q12H    atorvastatin (LIPITOR) tablet 20 mg  20 mg Oral QHS          Objective:     Vitals:  Blood pressure 108/79, pulse 98, temperature 97.6 °F (36.4 °C), resp. rate 18, height 5' 6\" (1.676 m), weight 152.4 kg (335 lb 14.4 oz), SpO2 99 %. Temp (24hrs), Av.9 °F (36.6 °C), Min:97.5 °F (36.4 °C), Max:98.2 °F (36.8 °C)      Intake and Output:  No intake/output data recorded.    1901 - 07/10 0700  In: 1200 [P.O.:1200]  Out: 2450 [Urine:2450]    Physical Exam:                Patient is intubated:  no    Physical Examination:   GENERAL ASSESSMENT: NAD  HEENT:Nontraumatic   CHEST: CTA  HEART: S1S2  ABDOMEN: Soft,NT,  :Puri: n  EXTREMITY: EDEMA 1  NEURO:Grossly non focal          ECG/rhythm:    Data Review      No results for input(s): TNIPOC in the last 72 hours. No lab exists for component: ITNL   Recent Labs     07/08/19  1222   TROIQ <0.05     Recent Labs     07/09/19  0206 07/09/19  0204 07/08/19  1222     --  140   K 4.7  --  5.0     --  106   CO2 24  --  27   BUN 26*  --  23*   CREA 1.67*  --  1.67*   *  --  101*   PHOS 4.0  --   --    MG 2.5*  --   --    CA 8.8  8.5  --  8.6   ALB 3.6  --  3.6   WBC  --  10.5 9.7   HGB  --  12.3 12.5   HCT  --  39.2 39.5   PLT  --  201 235      Recent Labs     07/08/19  1816   INR 2.0*     Needs: urine analysis, urine sodium, protein and creatinine  Lab Results   Component Value Date/Time    Sodium,urine random 27 07/09/2019 01:29 AM    Creatinine, urine 86.80 07/09/2019 01:29 AM    Creatinine, urine 87.10 07/09/2019 01:29 AM         Discussed with:  pt    : Jeni Hensley MD  7/10/2019        [facility]:  [email or web address]  Sandy Quig[place] office:  [place] [address] [place] [address], [address] [place] [address],[address]  [address]  Phone: [phone]  Fax :     [phone]    [place] office:  [address] [place] [address], [address] [place] [address]  Phone - [phone]  Fax - [phone]

## 2019-07-10 NOTE — ROUTINE PROCESS
2005  Spoke with Dr. Nicky Burnett regarding current blood pressure and heart rate. Advised of BP trends and obtained parameters to hold metoprolol for SBP less than 105 and pulse less than 55.     2311  Bedside and Verbal shift change report given to Ana Paula Flores (oncoming nurse) by Annmarie Phipps (offgoing nurse). Report included the following information SBAR, Kardex, ED Summary, Procedure Summary, Intake/Output, MAR, Recent Results and Cardiac Rhythm A Flutter.

## 2019-07-10 NOTE — PROGRESS NOTES
Problem: Falls - Risk of  Goal: *Absence of Falls  Description  Document Erin Bernal Fall Risk and appropriate interventions in the flowsheet.   Outcome: Progressing Towards Goal     Problem: Heart Failure: Discharge Outcomes  Goal: *Demonstrates ability to perform prescribed activity without shortness of breath or discomfort  Outcome: Progressing Towards Goal

## 2019-07-10 NOTE — PROGRESS NOTES
Cardiology Progress Note                             [address], [place], [address] [facility]                                 Phone [phone]; Fax [phone]        7/10/2019 11:38 AM     Admit Date:           2019  Admit Diagnosis:  Acute on chronic systolic CHF (congestive heart failure) ([facility] [I50.23]  :          1970   MRN:          [id number]   ASSESSMENT/RECOMMENDATION:   Acute on chronic HFrEF: cont IV diuretics- good response to diuretics, awaiting AM labs  -cont BB  - hold losartan for now given low EF/XU. Long term should be on entresto & RAASi    Ischemic cardiomyopathy: Recent stress test consistent with prior inferior wall infarction. Plans for cardiac cath when able to lay flat- hopefully tomorrow - d/w nephrology - okay to proceed- keep NPO after mid night   -holding coumadin for cath- PT INR this AM  -EF 30%    CAD: statin    Persistent afib rate controlled w BB and amiodarone, BP soft- continue to monitor closely   - avoid dilt due to low ef  - hold warfarin for planned RHC/LHC when able to lay flat, lovenox currently GFR >30  TSh WNL    XU AM labs pending, baseline creatinine 1-1.2    Morbid obesity Body mass index is 54.22 kg/m².     PEPE- CPAP at bedside                   07/10 0701 - 07/10 1900  In: -   Out: 2200 [Urine:2200]    Last 3 Recorded Weights in this Encounter    19 1827 19 0454 07/10/19 0650   Weight: 341 lb 12.8 oz (155 kg) 340 lb (154.2 kg) 335 lb 14.4 oz (152.4 kg)         1901 - 07/10 0700  In: 1200 [P.O.:1200]  Out: 2450 [Urine:2450]    SUBJECTIVE               Nae Bellow. denies palpitations, irregular heart beat,  chest pain   No lightheadedness or dizziness  Breathing and feeling a little better   Had a SOB episode yesterday evening - received diuretic dose early           Current Facility-Administered Medications   Medication Dose Route Frequency    amiodarone (CORDARONE) tablet 400 mg  400 mg Oral TID    bumetanide (BUMEX) injection 2 mg  2 mg IntraVENous Q12H    ondansetron (ZOFRAN) injection 4 mg  4 mg IntraVENous Q6H PRN    docusate sodium (COLACE) capsule 100 mg  100 mg Oral BID    sodium chloride (NS) flush 5-40 mL  5-40 mL IntraVENous Q8H    sodium chloride (NS) flush 5-40 mL  5-40 mL IntraVENous PRN    metoprolol succinate (TOPROL-XL) XL tablet 50 mg  50 mg Oral QHS    acetaminophen (TYLENOL) tablet 650 mg  650 mg Oral Q6H PRN    enoxaparin (LOVENOX) injection 160 mg  1 mg/kg SubCUTAneous Q12H    atorvastatin (LIPITOR) tablet 20 mg  20 mg Oral QHS      OBJECTIVE               Intake/Output Summary (Last 24 hours) at 7/10/2019 0902  Last data filed at 7/10/2019 0843  Gross per 24 hour   Intake 60 ml   Output 3000 ml   Net -2940 ml       Review of Systems - History obtained from the patient AS PER  HPI    Telemetry AF 's    PHYSICAL EXAM        Visit Vitals  /79 (BP 1 Location: Right arm, BP Patient Position: At rest)   Pulse 98   Temp 97.6 °F (36.4 °C)   Resp 18   Ht 5' 6\" (1.676 m)   Wt 335 lb 14.4 oz (152.4 kg)   SpO2 99%   BMI 54.22 kg/m²       Gen: Well-developed, obese, in no acute distress  alert and oriented x 3  HEENT:  Pink conjunctivae, Hearing grossly normal.No scleral icterus or conjunctival, moist mucous membranes  Neck: Supple, unable to appreciate JVD d/t neck size  Resp: No accessory muscle use, diminished bilateral bases   Card: irregular Rate,Rythm, No murmurs, rubs or gallop. No thrills.    GI:          Large/rounded/mildly firm non-tender   MSK: No cyanosis or clubbing, good capillary refill  Skin: No rashes or ulcers, no bruising  Neuro:  Cranial nerves are grossly intact, moving all four extremities, no focal deficit, follows commands appropriately  Psych:  Good insight, oriented to person, place and time, alert, Nml Affect  LE: +2 BLL edema       DATA REVIEW            Laboratory and Imaging have been reviewed by me and are notable for  Recent Labs     07/08/19  1222   TROIQ <0.05     Recent Labs     07/09/19  0206 07/09/19  0204 07/08/19  1222     --  140   K 4.7  --  5.0   CO2 24  --  27   BUN 26*  --  23*   CREA 1.67*  --  1.67*   *  --  101*   PHOS 4.0  --   --    MG 2.5*  --   --    WBC  --  10.5 9.7   HGB  --  12.3 12.5   HCT  --  39.2 39.5   PLT  --  [address], NP

## 2019-07-10 NOTE — PROGRESS NOTES
Consult noted for [facility] I sent referral to [facility]. I will continue to follow and assist with discharge planning. 1. Home with home health   2.  [address], MSW

## 2019-07-10 NOTE — PROGRESS NOTES
0720 - Bedside and Verbal shift change report given to lincoln cordero (oncoming nurse) by Orlando Childers (offgoing nurse). Report included the following information SBAR, Kardex, Intake/Output, MAR, Recent Results and Cardiac Rhythm afib. 1920 - Bedside and Verbal shift change report given to antony (oncoming nurse) by Cathi Turk (offgoing nurse). Report included the following information SBAR, Kardex, Intake/Output, MAR, Recent Results and Cardiac Rhythm afib.

## 2019-07-10 NOTE — PROGRESS NOTES
Daily Progress Note: 7/10/2019  Mac Ricketts. [place], [address]    Assessment/Plan:   Acute on chronic systolic CHF (congestive heart failure) ([facility] (7/8/2019):  Symptoms of increasing SOB and orthopnea. Suggestive of CHF. Recent stress test with EF 24% w/o reversible defect. -- bumex IV per Cards and Renal  -- daily weight  -- strict I/O  -- cardiology consulted  -- hold ARB due to SYED and possible adverse effects     Persistent atrial fibrillation with RVR in ED. Likely contributing to exacerbation of CHF. Was recently taken off dilt and started on Toprol. -- resumed BB -- tx per Cards  -- monitor PT/INR  -- pharmacy to dose warfarin     Acute renal insufficiency (7/8/2019): May be from acute CHF exacerbation vs ARB use.   -- hold ARB  -- monitor creatinine with diuresis  -- nephrology consulted     PEPE (obstructive sleep apnea) (6/22/2017):  -- continue CPAP     SupraMorbid obesity with BMI>54;  due to excess calories ([facility] (7/18/2017):  -- would benefit from weight loss; counseled pt.        Problem List:  Problem List as of 7/10/2019 Date Reviewed: 7/8/2019          Codes Class Noted - Resolved    * (Principal) Acute on chronic systolic CHF (congestive heart failure) ([facility] ICD-10-CM: I50.23  ICD-9-CM: 428.23, 428.0  7/8/2019 - Present        Acute renal insufficiency ICD-10-CM: N28.9  ICD-9-CM: 593.9  7/8/2019 - Present        Chronic diastolic heart failure ([facility] ICD-10-CM: I50.32  ICD-9-CM: 428.32  4/25/2018 - Present        Chronic systolic congestive heart failure ([facility] ICD-10-CM: I50.22  ICD-9-CM: 428.22, 428.0  4/25/2018 - Present        Chronic anticoagulation ICD-10-CM: Z79.01  ICD-9-CM: V58.61  10/13/2017 - Present        Persistent atrial fibrillation ([facility] ICD-10-CM: I48.1  ICD-9-CM: 427.31  7/18/2017 - Present        Morbid obesity due to excess calories ([facility] ICD-10-CM: E66.01  ICD-9-CM: 278.01  7/18/2017 - Present        Renal insufficiency ICD-10-CM: N28.9  ICD-9-CM: 593.9 6/22/2017 - Present        PEPE (obstructive sleep apnea) ICD-10-CM: G47.33  ICD-9-CM: 327.23  6/22/2017 - Present        Tobacco use ICD-10-CM: Z72.0  ICD-9-CM: 305.1  6/22/2017 - Present        Chronic back pain ICD-10-CM: M54.9, G89.29  ICD-9-CM: 724.5, 338.29  6/22/2017 - Present        Asthma ICD-10-CM: J45.909  ICD-9-CM: 493.90  6/22/2017 - Present        Elevated BP without diagnosis of hypertension ICD-10-CM: R03.0  ICD-9-CM: 796.2  6/22/2017 - Present        Exertional dyspnea ICD-10-CM: R06.09  ICD-9-CM: 786.09  6/22/2017 - Present        RESOLVED: Atrial fibrillation with RVR (HCC) ICD-10-CM: I48.91  ICD-9-CM: 427.31  6/22/2017 - 7/18/2017        RESOLVED: Obesity ICD-10-CM: E66.9  ICD-9-CM: 278.00  6/22/2017 - 7/18/2017              Subjective:    47 y.o.  male who is admitted with Acute on chronic systolic CHF (congestive heart failure) ([facility] Mr. Eliseo Santos presented to the Emergency Department today complaining of SOB. Increasing for the past two weeks. Worse with supine. Leg edema unchanged. No chest pains. Two weeks ago had po dilt stopped in favor of losartan and Toprol. Has had belching and burping since medication change as well. Has had adjustments to diuretic regimen up and down due to symptoms. However, patient attributes symptoms onset after stress test and changes in medication two weeks ago. Reports compliance with CPAP. (Dr Thomas Flores)    7/9:  He reports he is breathing marginally better today after some diuresis. He has his CPAP and wore it last night. He reports his wt is up about \"25 pounds from two wks ago. \"  Denies increased fluid intake. EF down to 20s. Discussed/counseled about his SupraMorbid obesity, links to his PEPE and CHF/risks,  but he is in denial and says \"its all fluid\" and \"eat not much at all. \"    7/10:  Breathing better today and speaks in full sentences this AM.  About a liter more out. Right heart cath when volume status is better.   BP better this AM.    Review of Systems:   A comprehensive review of systems was negative except for that written in the HPI. Objective:   Physical Exam:     Visit Vitals  /79 (BP 1 Location: Right arm, BP Patient Position: At rest)   Pulse 98   Temp 97.6 °F (36.4 °C)   Resp 18   Ht 5' 6\" (1.676 m)   Wt 152.4 kg (335 lb 14.4 oz)   SpO2 99%   BMI 54.22 kg/m²      O2 Device: Room air    Temp (24hrs), Av.9 °F (36.6 °C), Min:97.5 °F (36.4 °C), Max:98.2 °F (36.8 °C)    No intake/output data recorded.  1901 - 07/10 0700  In: 1200 [P.O.:1200]  Out: 2450 [Urine:2450]    General:  Alert, supramorbidly obese, cooperative, no distress, appears stated age. Head:  Normocephalic, without obvious abnormality, atraumatic. Eyes:  Conjunctivae/corneas clear. PERRL, EOMs intact. Nose: Nares normal. Septum midline. Mucosa normal. No drainage or sinus tenderness. Throat: Lips, mucosa, and tongue moist..   Neck: Supple, symmetrical, trachea midline, no adenopathy, thyroid: no enlargement/tenderness/nodules, no carotid bruit and no JVD. Back:   Symmetric, no curvature. ROM normal. No CVA tenderness. Lungs:   Sounds clear at this moment. Chest wall:  No tenderness or deformity. Heart:  Irreg, rate in low 909T, no murmur, click, rub or gallop. Abdomen:   Soft, non-tender. Large Pannus. Bowel sounds normal. No masses,  No organomegaly. Extremities: no cyanosis. Trace to 1+ LE edema. No calf tenderness or cords. Pulses: 2+ and symmetric all extremities. Skin:  turgor normal   Neurologic: CNII-XII intact. Alert and oriented X 3. Fine motor of hands and fingers normal.   equal.  No cogwheeling or rigidity. Gait not tested at this time. Sensation grossly normal to touch.   Gross motor of extremities normal.       Data Review:       Recent Days:  Recent Labs     19  0204 19  1222   WBC 10.5 9.7   HGB 12.3 12.5   HCT 39.2 39.5    235     Recent Labs     19  0206 19  1819 07/08/19  1222     --  140   K 4.7  --  5.0     --  106   CO2 24  --  27   *  --  101*   BUN 26*  --  23*   CREA 1.67*  --  1.67*   CA 8.8  8.5  --  8.6   MG 2.5*  --   --    PHOS 4.0  --   --    ALB 3.6  --  3.6   TBILI  --   --  1.0   SGOT  --   --  56*   ALT  --   --  70   INR  --  2.0*  --      No results for input(s): PH, PCO2, PO2, HCO3, FIO2 in the last 72 hours. 24 Hour Results:  Recent Results (from the past 24 hour(s))   TSH 3RD GENERATION    Collection Time: 07/10/19  4:02 AM   Result Value Ref Range    TSH 3.08 0.36 - 3.74 uIU/mL       Medications reviewed  Current Facility-Administered Medications   Medication Dose Route Frequency    amiodarone (CORDARONE) tablet 400 mg  400 mg Oral TID    bumetanide (BUMEX) injection 2 mg  2 mg IntraVENous Q12H    ondansetron (ZOFRAN) injection 4 mg  4 mg IntraVENous Q6H PRN    docusate sodium (COLACE) capsule 100 mg  100 mg Oral BID    sodium chloride (NS) flush 5-40 mL  5-40 mL IntraVENous Q8H    sodium chloride (NS) flush 5-40 mL  5-40 mL IntraVENous PRN    metoprolol succinate (TOPROL-XL) XL tablet 50 mg  50 mg Oral QHS    acetaminophen (TYLENOL) tablet 650 mg  650 mg Oral Q6H PRN    enoxaparin (LOVENOX) injection 160 mg  1 mg/kg SubCUTAneous Q12H    atorvastatin (LIPITOR) tablet 20 mg  20 mg Oral QHS       Care Plan discussed with: Patient/Family/Nurse/Cards  Total time spent with patient: 30 minutes.   Ahsan Stanton MD

## 2019-07-10 NOTE — PROGRESS NOTES
Spiritual Care Assessment/Progress Note  [address]      NAME: Brandy Moncada MRN: [id number]  AGE: 47 y.o. SEX: male  Anabaptism Affiliation: Unknown   Language: English     7/10/2019     Total Time (in minutes): 10     Spiritual Assessment begun in SF 3 PROG CARE TELE 2 through conversation with:         [x]Patient        [] Family    [] Friend(s)        Reason for Consult: Initial/Spiritual assessment, patient floor     Spiritual beliefs: (Please include comment if needed)     [] Identifies with a nabor tradition:         [] Supported by a nabor community:            [] Claims no spiritual orientation:           [] Seeking spiritual identity:                [] Adheres to an individual form of spirituality:           [x] Not able to assess:                           Identified resources for coping:      [] Prayer                               [] Music                  [] Guided Imagery     [] Family/friends                 [] Pet visits     [] Devotional reading                         [x] Unknown     [] Other:                                               Interventions offered during this visit: (See comments for more details)    Patient Interventions: Initial/Spiritual assessment, patient floor           Plan of Care:     [] Support spiritual and/or cultural needs    [] Support AMD and/or advance care planning process      [] Support grieving process   [] Coordinate Rites and/or Rituals    [] Coordination with community clergy   [] No spiritual needs identified at this time   [] Detailed Plan of Care below (See Comments)  [] Make referral to Music Therapy  [] Make referral to Pet Therapy     [] Make referral to Addiction services  [] Make referral to [facility]  [] Make referral to Spiritual Care Partner  [] No future visits requested        [x] Follow up visits as needed     Comments:      visited patient, My Villafuerte, for an initial spiritual assessment on the Prog. Care Tele floor. Marcelle Jackson was awake, alert, and sitting up in his recliner when the  came to visit. There were no family present.  introduced herself and extended support through active listening and pastoral presence. Marcelle Jackson briefly discussed his illness journey with CHF and discussed a procedure he is hoping to have done tomorrow.  provided emotional support as Marcelle Jackson discussed his poor quality of life over the past few years and his hopes that this procedure will give him better quality of life once it is done.  affirmed this desire.  inquired how she could best support him during this time. Marcelle Jackson expressed that he had no needs and thanked the  for her visit. He is aware of the 's availability.  will follow up as able and/or needed. Jesus Hoover. Amberly Hanks.      Paging Service: [phone])

## 2019-07-11 ENCOUNTER — HOME HEALTH ADMISSION (OUTPATIENT)
Dept: HOME HEALTH SERVICES | Facility: HOME HEALTH | Age: 49
End: 2019-07-11

## 2019-07-11 LAB
ALBUMIN SERPL-MCNC: 3.5 G/DL (ref 3.5–5)
ALBUMIN/GLOB SERPL: 1.2 {RATIO} (ref 1.1–2.2)
ALP SERPL-CCNC: 74 U/L (ref 45–117)
ALT SERPL-CCNC: 115 U/L (ref 12–78)
ANION GAP SERPL CALC-SCNC: 8 MMOL/L (ref 5–15)
AST SERPL-CCNC: 62 U/L (ref 15–37)
BASOPHILS # BLD: 0.1 K/UL (ref 0–0.1)
BASOPHILS NFR BLD: 1 % (ref 0–1)
BILIRUB SERPL-MCNC: 1.2 MG/DL (ref 0.2–1)
BUN SERPL-MCNC: 26 MG/DL (ref 6–20)
BUN/CREAT SERPL: 15 (ref 12–20)
CALCIUM SERPL-MCNC: 8.3 MG/DL (ref 8.5–10.1)
CHLORIDE SERPL-SCNC: 104 MMOL/L (ref 97–108)
CO2 SERPL-SCNC: 27 MMOL/L (ref 21–32)
CREAT SERPL-MCNC: 1.7 MG/DL (ref 0.7–1.3)
DIFFERENTIAL METHOD BLD: ABNORMAL
EOSINOPHIL # BLD: 0.2 K/UL (ref 0–0.4)
EOSINOPHIL NFR BLD: 2 % (ref 0–7)
ERYTHROCYTE [DISTWIDTH] IN BLOOD BY AUTOMATED COUNT: 14.6 % (ref 11.5–14.5)
GLOBULIN SER CALC-MCNC: 3 G/DL (ref 2–4)
GLUCOSE SERPL-MCNC: 120 MG/DL (ref 65–100)
HCT VFR BLD AUTO: 35.6 % (ref 36.6–50.3)
HGB BLD-MCNC: 11.2 G/DL (ref 12.1–17)
IMM GRANULOCYTES # BLD AUTO: 0 K/UL (ref 0–0.04)
IMM GRANULOCYTES NFR BLD AUTO: 0 % (ref 0–0.5)
INR PPP: 1.9 (ref 0.9–1.1)
LYMPHOCYTES # BLD: 1.9 K/UL (ref 0.8–3.5)
LYMPHOCYTES NFR BLD: 20 % (ref 12–49)
Lab: NORMAL
MAGNESIUM SERPL-MCNC: 2.4 MG/DL (ref 1.6–2.4)
MCH RBC QN AUTO: 29 PG (ref 26–34)
MCHC RBC AUTO-ENTMCNC: 31.5 G/DL (ref 30–36.5)
MCV RBC AUTO: 92.2 FL (ref 80–99)
MONOCYTES # BLD: 1.1 K/UL (ref 0–1)
MONOCYTES NFR BLD: 11 % (ref 5–13)
NEUTS SEG # BLD: 6.3 K/UL (ref 1.8–8)
NEUTS SEG NFR BLD: 66 % (ref 32–75)
NRBC # BLD: 0 K/UL (ref 0–0.01)
NRBC BLD-RTO: 0 PER 100 WBC
PLATELET # BLD AUTO: 225 K/UL (ref 150–400)
PMV BLD AUTO: 11.6 FL (ref 8.9–12.9)
POTASSIUM SERPL-SCNC: 3.5 MMOL/L (ref 3.5–5.1)
PROT SERPL-MCNC: 6.5 G/DL (ref 6.4–8.2)
PROTHROMBIN TIME: 18.6 SEC (ref 9–11.1)
RBC # BLD AUTO: 3.86 M/UL (ref 4.1–5.7)
REFERENCE LAB,REFLB: NORMAL
SAO2 % BLD: 51 % (ref 95–99)
SAO2 % BLD: 94 % (ref 95–99)
SODIUM SERPL-SCNC: 139 MMOL/L (ref 136–145)
TEST DESCRIPTION:,ATST: NORMAL
WBC # BLD AUTO: 9.5 K/UL (ref 4.1–11.1)

## 2019-07-11 PROCEDURE — 65660000000 HC RM CCU STEPDOWN

## 2019-07-11 PROCEDURE — 99152 MOD SED SAME PHYS/QHP 5/>YRS: CPT | Performed by: STUDENT IN AN ORGANIZED HEALTH CARE EDUCATION/TRAINING PROGRAM

## 2019-07-11 PROCEDURE — 83735 ASSAY OF MAGNESIUM: CPT

## 2019-07-11 PROCEDURE — 77030019569 HC BND COMPR RAD TERU -B: Performed by: STUDENT IN AN ORGANIZED HEALTH CARE EDUCATION/TRAINING PROGRAM

## 2019-07-11 PROCEDURE — 4A023N8 MEASUREMENT OF CARDIAC SAMPLING AND PRESSURE, BILATERAL, PERCUTANEOUS APPROACH: ICD-10-PCS | Performed by: STUDENT IN AN ORGANIZED HEALTH CARE EDUCATION/TRAINING PROGRAM

## 2019-07-11 PROCEDURE — C1894 INTRO/SHEATH, NON-LASER: HCPCS | Performed by: STUDENT IN AN ORGANIZED HEALTH CARE EDUCATION/TRAINING PROGRAM

## 2019-07-11 PROCEDURE — 99153 MOD SED SAME PHYS/QHP EA: CPT | Performed by: STUDENT IN AN ORGANIZED HEALTH CARE EDUCATION/TRAINING PROGRAM

## 2019-07-11 PROCEDURE — 80053 COMPREHEN METABOLIC PANEL: CPT

## 2019-07-11 PROCEDURE — 77030029065 HC DRSG HEMO QCLOT ZMED -B

## 2019-07-11 PROCEDURE — 74011250636 HC RX REV CODE- 250/636: Performed by: STUDENT IN AN ORGANIZED HEALTH CARE EDUCATION/TRAINING PROGRAM

## 2019-07-11 PROCEDURE — 74011000250 HC RX REV CODE- 250: Performed by: NURSE PRACTITIONER

## 2019-07-11 PROCEDURE — C1751 CATH, INF, PER/CENT/MIDLINE: HCPCS | Performed by: STUDENT IN AN ORGANIZED HEALTH CARE EDUCATION/TRAINING PROGRAM

## 2019-07-11 PROCEDURE — 76937 US GUIDE VASCULAR ACCESS: CPT | Performed by: STUDENT IN AN ORGANIZED HEALTH CARE EDUCATION/TRAINING PROGRAM

## 2019-07-11 PROCEDURE — B2111ZZ FLUOROSCOPY OF MULTIPLE CORONARY ARTERIES USING LOW OSMOLAR CONTRAST: ICD-10-PCS | Performed by: STUDENT IN AN ORGANIZED HEALTH CARE EDUCATION/TRAINING PROGRAM

## 2019-07-11 PROCEDURE — 93460 R&L HRT ART/VENTRICLE ANGIO: CPT | Performed by: STUDENT IN AN ORGANIZED HEALTH CARE EDUCATION/TRAINING PROGRAM

## 2019-07-11 PROCEDURE — 74011250637 HC RX REV CODE- 250/637: Performed by: STUDENT IN AN ORGANIZED HEALTH CARE EDUCATION/TRAINING PROGRAM

## 2019-07-11 PROCEDURE — 74011250637 HC RX REV CODE- 250/637: Performed by: INTERNAL MEDICINE

## 2019-07-11 PROCEDURE — 74011250636 HC RX REV CODE- 250/636: Performed by: INTERNAL MEDICINE

## 2019-07-11 PROCEDURE — 94760 N-INVAS EAR/PLS OXIMETRY 1: CPT

## 2019-07-11 PROCEDURE — 85025 COMPLETE CBC W/AUTO DIFF WBC: CPT

## 2019-07-11 PROCEDURE — 82375 ASSAY CARBOXYHB QUANT: CPT

## 2019-07-11 PROCEDURE — C1887 CATHETER, GUIDING: HCPCS | Performed by: STUDENT IN AN ORGANIZED HEALTH CARE EDUCATION/TRAINING PROGRAM

## 2019-07-11 PROCEDURE — 85610 PROTHROMBIN TIME: CPT

## 2019-07-11 PROCEDURE — 74011000250 HC RX REV CODE- 250: Performed by: STUDENT IN AN ORGANIZED HEALTH CARE EDUCATION/TRAINING PROGRAM

## 2019-07-11 PROCEDURE — C1769 GUIDE WIRE: HCPCS | Performed by: STUDENT IN AN ORGANIZED HEALTH CARE EDUCATION/TRAINING PROGRAM

## 2019-07-11 PROCEDURE — 74011250636 HC RX REV CODE- 250/636

## 2019-07-11 PROCEDURE — 74011636320 HC RX REV CODE- 636/320: Performed by: STUDENT IN AN ORGANIZED HEALTH CARE EDUCATION/TRAINING PROGRAM

## 2019-07-11 PROCEDURE — 36415 COLL VENOUS BLD VENIPUNCTURE: CPT

## 2019-07-11 PROCEDURE — 77030029065 HC DRSG HEMO QCLOT ZMED -B: Performed by: STUDENT IN AN ORGANIZED HEALTH CARE EDUCATION/TRAINING PROGRAM

## 2019-07-11 PROCEDURE — 74011250637 HC RX REV CODE- 250/637: Performed by: FAMILY MEDICINE

## 2019-07-11 PROCEDURE — 74011250637 HC RX REV CODE- 250/637: Performed by: NURSE PRACTITIONER

## 2019-07-11 RX ORDER — HEPARIN SODIUM 1000 [USP'U]/ML
INJECTION, SOLUTION INTRAVENOUS; SUBCUTANEOUS AS NEEDED
Status: DISCONTINUED | OUTPATIENT
Start: 2019-07-11 | End: 2019-07-11 | Stop reason: HOSPADM

## 2019-07-11 RX ORDER — WARFARIN SODIUM 5 MG/1
5 TABLET ORAL EVERY EVENING
Status: COMPLETED | OUTPATIENT
Start: 2019-07-11 | End: 2019-07-11

## 2019-07-11 RX ORDER — SODIUM CHLORIDE 0.9 % (FLUSH) 0.9 %
5-40 SYRINGE (ML) INJECTION EVERY 8 HOURS
Status: DISCONTINUED | OUTPATIENT
Start: 2019-07-11 | End: 2019-07-15 | Stop reason: HOSPADM

## 2019-07-11 RX ORDER — MIDAZOLAM HYDROCHLORIDE 1 MG/ML
INJECTION, SOLUTION INTRAMUSCULAR; INTRAVENOUS AS NEEDED
Status: DISCONTINUED | OUTPATIENT
Start: 2019-07-11 | End: 2019-07-11 | Stop reason: HOSPADM

## 2019-07-11 RX ORDER — FENTANYL CITRATE 50 UG/ML
INJECTION, SOLUTION INTRAMUSCULAR; INTRAVENOUS AS NEEDED
Status: DISCONTINUED | OUTPATIENT
Start: 2019-07-11 | End: 2019-07-11 | Stop reason: HOSPADM

## 2019-07-11 RX ORDER — POTASSIUM CHLORIDE 750 MG/1
20 TABLET, FILM COATED, EXTENDED RELEASE ORAL
Status: COMPLETED | OUTPATIENT
Start: 2019-07-11 | End: 2019-07-11

## 2019-07-11 RX ORDER — POLYETHYLENE GLYCOL 3350 17 G/17G
17 POWDER, FOR SOLUTION ORAL DAILY
Status: DISCONTINUED | OUTPATIENT
Start: 2019-07-11 | End: 2019-07-15 | Stop reason: HOSPADM

## 2019-07-11 RX ORDER — SODIUM CHLORIDE 0.9 % (FLUSH) 0.9 %
5-40 SYRINGE (ML) INJECTION AS NEEDED
Status: DISCONTINUED | OUTPATIENT
Start: 2019-07-11 | End: 2019-07-15 | Stop reason: HOSPADM

## 2019-07-11 RX ORDER — VERAPAMIL HYDROCHLORIDE 2.5 MG/ML
INJECTION, SOLUTION INTRAVENOUS AS NEEDED
Status: DISCONTINUED | OUTPATIENT
Start: 2019-07-11 | End: 2019-07-11 | Stop reason: HOSPADM

## 2019-07-11 RX ADMIN — AMIODARONE HYDROCHLORIDE 400 MG: 200 TABLET ORAL at 21:17

## 2019-07-11 RX ADMIN — Medication 10 ML: at 21:20

## 2019-07-11 RX ADMIN — Medication 10 ML: at 14:57

## 2019-07-11 RX ADMIN — ENOXAPARIN SODIUM 160 MG: 80 INJECTION SUBCUTANEOUS at 21:26

## 2019-07-11 RX ADMIN — POTASSIUM CHLORIDE 20 MEQ: 750 TABLET, EXTENDED RELEASE ORAL at 14:55

## 2019-07-11 RX ADMIN — BUMETANIDE 2 MG: 0.25 INJECTION INTRAMUSCULAR; INTRAVENOUS at 04:42

## 2019-07-11 RX ADMIN — POLYETHYLENE GLYCOL 3350 17 G: 17 POWDER, FOR SOLUTION ORAL at 14:55

## 2019-07-11 RX ADMIN — ONDANSETRON 4 MG: 2 INJECTION INTRAMUSCULAR; INTRAVENOUS at 08:53

## 2019-07-11 RX ADMIN — AMIODARONE HYDROCHLORIDE 400 MG: 200 TABLET ORAL at 15:50

## 2019-07-11 RX ADMIN — AMIODARONE HYDROCHLORIDE 400 MG: 200 TABLET ORAL at 08:49

## 2019-07-11 RX ADMIN — WARFARIN SODIUM 5 MG: 5 TABLET ORAL at 17:28

## 2019-07-11 RX ADMIN — DOCUSATE SODIUM 100 MG: 100 CAPSULE ORAL at 14:55

## 2019-07-11 RX ADMIN — BUMETANIDE 2 MG: 0.25 INJECTION INTRAMUSCULAR; INTRAVENOUS at 17:28

## 2019-07-11 RX ADMIN — Medication 10 ML: at 04:44

## 2019-07-11 NOTE — PROGRESS NOTES
Chart Reviewed  Cath today   Discharge Plan  1. Home with home health - [facility] as [facility] has accepted the patient.    JANET Reyes

## 2019-07-11 NOTE — PROCEDURES
BRIEF PROCEDURE NOTE    Date of Procedure: 7/11/2019   Preoperative Diagnosis: cardiomyopathy  Postoperative Diagnosis: same    Procedure: Left heart cath, coronary angiography, RHC  Interventional Cardiologist: Carol Ann Serna DO  Anesthesia: local + IV moderate sedation   Estimated Blood Loss: Minimal    Access: right radial artery, 6F   Right IJ vein, 6F  Catheters:  Left coronary: JL 3.5 5F  Right coronary: JR4 5F    Findings:   L Main:  Large, angiographically normal  LAD:Large, angiographically normal  LCx:Large, angiographically normal  RCA:Large, angiographically normal    LVEDP:  35 mmhg    RHC findings:    RAP=    Mean 18  mmHg  RVSP=  35/18   mmHg  PAP=     32/16/21  mmHg  PCWP=    Unable to wedge    Specimens Removed : None    Closure Device: radial TR band    See full cath note. Complications: none    Findings:  1. Angiographically normal coronary arteries  2.  Elevated left and right sided filling pressures    Plan:    Cont medical therapy for cardiomyopathy    DO Jemima Crane DO  [facility] [address], [address] [place] [address] [place]                                       Office [phone],GTD [phone]

## 2019-07-11 NOTE — PROGRESS NOTES
[facility] Dosing Services: 7/11/2019    Consult for Warfarin Dosing by Pharmacy by Dr. Zavala [place] provided for this 47 y.o.  male , for indication of Atrial Fibrillation. Day of Therapy - 1 resumed   Home dose - 10 mg every day except 5 mg on Tuesday and Wednesday   Dose to achieve an INR goal of 2-3    Order entered for  Warfarin  5 (mg) ordered to be given today at 18:00. Enoxaparin bridge  Significant drug interactions: Amiodarone (home warfarin dose reduced 50%)  Previous dose given Last dose reported taken at home  10 mg  - 7/7/19   PT/INR Lab Results   Component Value Date/Time    INR 1.9 (H) 07/11/2019 03:56 PM        Platelets Lab Results   Component Value Date/Time    PLATELET 391 13/97/8732 04:41 AM        H/H Lab Results   Component Value Date/Time    HGB 11.2 (L) 07/11/2019 04:41 AM          Pharmacy to follow daily and will provide subsequent Warfarin dosing based on clinical status.   Heriberto Banegas)  Contact information [phone]

## 2019-07-11 NOTE — PROGRESS NOTES
Cardiology Progress Note                             [address], [place], [address] [facility]                                 Phone [phone]; Fax [phone]        2019 11:38 AM     Admit Date:           2019  Admit Diagnosis:  Acute on chronic systolic CHF (congestive heart failure) ([facility] [address] [I50.23]  :          1970   MRN:          [id number]   ASSESSMENT/RECOMMENDATION:   Acute on chronic HFrEF: okay response to IV diuretics - will hold AM dose, creatinine cont to trend up  -cont BB  - hold losartan for now given low EF/XU. Long term should be on entresto & RAASi  -replete K    Ischemic cardiomyopathy: Recent stress test consistent with prior inferior wall infarction. Plans for cardiac cath this AM- d/w nephrology - okay to proceed- keep NPO af  -holding coumadin for cath- PT INR 2 (7/10)  -EF 30%    CAD: hold statin w elevated LFTs (also on amiodarone)    Persistent afib rate controlled w BB and amiodarone, LFTs up- holding statin- trend LFTS   - avoid dilt due to low ef  - hold warfarin for planned RHC/LHC when able to lay flat, lovenox currently GFR >30- holding AM lovenox dose  -TSh WNL    XU creatinine up at 1.7, baseline creatinine 1-1.2    Morbid obesity Body mass index is 54.22 kg/m². PEPE- CPAP at bedside     Elevated LFTs: holding statin- continue amiodarone. Trend LFTs - will have to d. No intake/output data recorded.     Last 3 Recorded Weights in this Encounter    19 1827 19 0454 07/10/19 0650   Weight: 341 lb 12.8 oz (155 kg) 340 lb (154.2 kg) 335 lb 14.4 oz (152.4 kg)          1901 -  0700  In: 60 [P.O.:60]  Out: 2200 [Urine:2200]    SUBJECTIVE               Misbah Carlos. denies palpitations, irregular heart beat,  chest pain   No lightheadedness or dizziness  Breathing unchanged -   Feels nauseated this AM              Current Facility-Administered Medications   Medication Dose Route Frequency    polyethylene glycol (MIRALAX) packet 17 g  17 g Oral DAILY    potassium chloride SR (KLOR-CON 10) tablet 20 mEq  20 mEq Oral NOW    amiodarone (CORDARONE) tablet 400 mg  400 mg Oral TID    bumetanide (BUMEX) injection 2 mg  2 mg IntraVENous Q12H    ondansetron (ZOFRAN) injection 4 mg  4 mg IntraVENous Q6H PRN    docusate sodium (COLACE) capsule 100 mg  100 mg Oral BID    sodium chloride (NS) flush 5-40 mL  5-40 mL IntraVENous Q8H    sodium chloride (NS) flush 5-40 mL  5-40 mL IntraVENous PRN    metoprolol succinate (TOPROL-XL) XL tablet 50 mg  50 mg Oral QHS    acetaminophen (TYLENOL) tablet 650 mg  650 mg Oral Q6H PRN    enoxaparin (LOVENOX) injection 160 mg  1 mg/kg SubCUTAneous Q12H      OBJECTIVE             No intake or output data in the 24 hours ending 07/11/19 0949    Review of Systems - History obtained from the patient AS PER  HPI    Telemetry AF 90s    PHYSICAL EXAM        Visit Vitals  /68   Pulse 91   Temp 98 °F (36.7 °C)   Resp 20   Ht 5' 6\" (1.676 m)   Wt 335 lb 14.4 oz (152.4 kg)   SpO2 97%   BMI 54.22 kg/m²       Gen: Well-developed, obese, in no acute distress  alert and oriented x 3  HEENT:  Pink conjunctivae, Hearing grossly normal.No scleral icterus or conjunctival, moist mucous membranes  Neck: Supple, unable to appreciate JVD d/t neck size  Resp: No accessory muscle use, diminished bilateral bases   Card: irregular Rate,Rythm, No murmurs, rubs or gallop. No thrills.    GI:          Large/rounded/mildly firm non-tender   MSK: No cyanosis or clubbing, good capillary refill  Skin: No rashes or ulcers, no bruising  Neuro:  Cranial nerves are grossly intact, moving all four extremities, no focal deficit, follows commands appropriately  Psych:  Good insight, oriented to person, place and time, alert, Nml Affect  LE: +1 BLL edema       DATA REVIEW            Laboratory and Imaging have been reviewed by me and are notable for  Recent Labs     07/08/19  1222   TROIQ <0.05     Recent Labs     07/11/19  0441 07/10/19  1045 07/09/19  0206 07/09/19  0204 07/08/19  1222    142 138  --  140   K 3.5 3.3* 4.7  --  5.0   CO2 27 30 24  --  27   BUN 26* 25* 26*  --  23*   CREA 1.70* 1.54* 1.67*  --  1.67*   * 120* 107*  --  101*   PHOS  --   --  4.0  --   --    MG 2.4 2.3 2.5*  --   --    WBC 9.5  --   --  10.5 9.7   HGB 11.2*  --   --  12.3 12.5   HCT 35.6*  --   --  39.2 39.5     --   --  201 66 Tierney Disla, NP

## 2019-07-11 NOTE — PROGRESS NOTES
0730 Bedside shift change report given to [facility] (oncoming nurse) by Aarti Burns (offgoing nurse). Report included the following information SBAR, Kardex, MAR, Recent Results and Cardiac Rhythm a fib rate controlled, Sinus tach. 8669 pt nausea, zofran given. Potassium and other morning meds will be given after cath this morning per Lashae Zamora NP.    0900 Consents signed    1050 pt to cath lab     1415 TRANSFER - IN REPORT: from Cath lab    Verbal report received from ED RN(name) on Teddy Pulliam.  being received from Cath Lab(unit) for routine progression of care      Report consisted of patients Situation, Background, Assessment and   Recommendations(SBAR). Information from the following report(s) SBAR, Kardex, Procedure Summary and Cardiac Rhythm a fib was reviewed with the receiving nurse. Opportunity for questions and clarification was provided. Assessment completed upon patients arrival to unit and care assumed. Vitals taken. Right IJ and Right Radial cath. Hemostasis at 1315. Pt can get up at 1415.    1450 pt eating     1700 pt up in chair visiting with friend and resting    1930 Bedside and Verbal shift change report given to [facility] (oncoming nurse) by [facility] (offgoing nurse). Report included the following information SBAR, Kardex, Intake/Output, MAR, Accordion, Recent Results and Cardiac Rhythm a fib.

## 2019-07-11 NOTE — PROGRESS NOTES
[address] [place] [address]. YOB: 1970          Assessment & Plan:     1 XU/CKD 3   · XU from Cardio renal Syndrome   · Cr up as expected with diuresis  2. CKD2 and microalbuminuria  3. SCHF with acute exacerbation  · EF 26%  · CONT LOOPS  · SOB better  · Daily wts  4. Morbid Obesity   · PEPE on cpap     PLAN  1. Labs post cath  2. Cath and RCN dw pt        Subjective:   CC:arf  HPI: Patient seen  Cr worse but edema not better  On loops  Cath today  ROS:neg for 12 sys except above    Current Facility-Administered Medications   Medication Dose Route Frequency    polyethylene glycol (MIRALAX) packet 17 g  17 g Oral DAILY    potassium chloride SR (KLOR-CON 10) tablet 20 mEq  20 mEq Oral NOW    amiodarone (CORDARONE) tablet 400 mg  400 mg Oral TID    bumetanide (BUMEX) injection 2 mg  2 mg IntraVENous Q12H    ondansetron (ZOFRAN) injection 4 mg  4 mg IntraVENous Q6H PRN    docusate sodium (COLACE) capsule 100 mg  100 mg Oral BID    sodium chloride (NS) flush 5-40 mL  5-40 mL IntraVENous Q8H    sodium chloride (NS) flush 5-40 mL  5-40 mL IntraVENous PRN    metoprolol succinate (TOPROL-XL) XL tablet 50 mg  50 mg Oral QHS    acetaminophen (TYLENOL) tablet 650 mg  650 mg Oral Q6H PRN    enoxaparin (LOVENOX) injection 160 mg  1 mg/kg SubCUTAneous Q12H          Objective:     Vitals:  Blood pressure 105/68, pulse 91, temperature 98 °F (36.7 °C), resp. rate 20, height 5' 6\" (1.676 m), weight 152.5 kg (336 lb 3.2 oz), SpO2 97 %. Temp (24hrs), Av °F (36.7 °C), Min:97.7 °F (36.5 °C), Max:98.4 °F (36.9 °C)      Intake and Output:  No intake/output data recorded.    1901 -  0700  In: 60 [P.O.:60]  Out: 2200 [Urine:2200]    Physical Exam:                Patient is intubated:  no    Physical Examination:   GENERAL ASSESSMENT: NAD  HEENT:Nontraumatic   CHEST: CTA  HEART: S1S2  ABDOMEN: Soft,NT,  :Puri: n  EXTREMITY: EDEMA 1  NEURO:Grossly non focal          ECG/rhythm:    Data Review      No results for input(s): TNIPOC in the last 72 hours. No lab exists for component: ITNL   Recent Labs     07/08/19  1222   TROIQ <0.05     Recent Labs     07/11/19  0441 07/10/19  1045 07/09/19  0206 07/09/19  0204 07/08/19  1222    142 138  --  140   K 3.5 3.3* 4.7  --  5.0    104 105  --  106   CO2 27 30 24  --  27   BUN 26* 25* 26*  --  23*   CREA 1.70* 1.54* 1.67*  --  1.67*   * 120* 107*  --  101*   PHOS  --   --  4.0  --   --    MG 2.4 2.3 2.5*  --   --    CA 8.3* 8.0* 8.8  8.5  --  8.6   ALB 3.5  --  3.6  --  3.6   WBC 9.5  --   --  10.5 9.7   HGB 11.2*  --   --  12.3 12.5   HCT 35.6*  --   --  39.2 39.5     --   --  201 235      Recent Labs     07/10/19  1045 07/08/19  1816   INR 2.0* 2.0*   PTP 19.8*  --      Needs: urine analysis, urine sodium, protein and creatinine  Lab Results   Component Value Date/Time    Sodium,urine random 27 07/09/2019 01:29 AM    Creatinine, urine 86.80 07/09/2019 01:29 AM    Creatinine, urine 87.10 07/09/2019 01:29 AM         Discussed with:  pt    : Keisha Beatty MD  7/11/2019        [facility]:  [email or web address]  [place] office:  [address] [place] [address], [address] [place] [address] [address]  [address]  Phone: [phone]  Fax :     [phone]    [place] office:  [address] [facility], [address] [place] [address] [address]kw[address]  Phone - [phone]  Fax - [phone]

## 2019-07-11 NOTE — PROGRESS NOTES
Daily Progress Note: 7/11/2019  Mac Ricketts. [place]    Assessment/Plan:   Acute on chronic systolic CHF (congestive heart failure) ([facility] (7/8/2019):  Symptoms of increasing SOB and orthopnea. Suggestive of CHF. Recent stress test with EF 24% w/o reversible defect. -- bumex IV/po per Cards and Renal  -- daily weight  -- strict I/O  -- cardiology consulted  -- hold ARB due to SYED and possible adverse effects     Persistent atrial fibrillation with RVR in ED. Likely contributing to exacerbation of CHF. Was recently taken off dilt and started on Toprol. -- resumed BB -- tx per Cards  -- monitor PT/INR  -- pharmacy to dose warfarin     Acute renal insufficiency (7/8/2019): May be from acute CHF exacerbation vs ARB use.   -- hold ARB  -- monitor creatinine with diuresis  -- nephrology consulted     PEPE (obstructive sleep apnea) (6/22/2017):  -- continue CPAP     SupraMorbid obesity with BMI>54;  due to excess calories ([facility] (7/18/2017):  -- would benefit from weight loss; counseled pt.        Problem List:  Problem List as of 7/11/2019 Date Reviewed: 7/8/2019          Codes Class Noted - Resolved    * (Principal) Acute on chronic systolic CHF (congestive heart failure) ([facility] ICD-10-CM: I50.23  ICD-9-CM: 428.23, 428.0  7/8/2019 - Present        Acute renal insufficiency ICD-10-CM: N28.9  ICD-9-CM: 593.9  7/8/2019 - Present        Chronic diastolic heart failure ([facility] ICD-10-CM: I50.32  ICD-9-CM: 428.32  4/25/2018 - Present        Chronic systolic congestive heart failure ([facility] ICD-10-CM: I50.22  ICD-9-CM: 428.22, 428.0  4/25/2018 - Present        Chronic anticoagulation ICD-10-CM: Z79.01  ICD-9-CM: V58.61  10/13/2017 - Present        Persistent atrial fibrillation ([facility] ICD-10-CM: I48.1  ICD-9-CM: 427.31  7/18/2017 - Present        Morbid obesity due to excess calories ([facility] ICD-10-CM: E66.01  ICD-9-CM: 278.01  7/18/2017 - Present        Renal insufficiency ICD-10-CM: N28.9  ICD-9-CM: 593.9 6/22/2017 - Present        PEPE (obstructive sleep apnea) ICD-10-CM: G47.33  ICD-9-CM: 327.23  6/22/2017 - Present        Tobacco use ICD-10-CM: Z72.0  ICD-9-CM: 305.1  6/22/2017 - Present        Chronic back pain ICD-10-CM: M54.9, G89.29  ICD-9-CM: 724.5, 338.29  6/22/2017 - Present        Asthma ICD-10-CM: J45.909  ICD-9-CM: 493.90  6/22/2017 - Present        Elevated BP without diagnosis of hypertension ICD-10-CM: R03.0  ICD-9-CM: 796.2  6/22/2017 - Present        Exertional dyspnea ICD-10-CM: R06.09  ICD-9-CM: 786.09  6/22/2017 - Present        RESOLVED: Atrial fibrillation with RVR (HCC) ICD-10-CM: I48.91  ICD-9-CM: 427.31  6/22/2017 - 7/18/2017        RESOLVED: Obesity ICD-10-CM: E66.9  ICD-9-CM: 278.00  6/22/2017 - 7/18/2017              Subjective:    47 y.o.  male who is admitted with Acute on chronic systolic CHF (congestive heart failure) ([facility] [address] Mr. Allegra Feldman presented to the Emergency Department today complaining of SOB. Increasing for the past two weeks. Worse with supine. Leg edema unchanged. No chest pains. Two weeks ago had po dilt stopped in favor of losartan and Toprol. Has had belching and burping since medication change as well. Has had adjustments to diuretic regimen up and down due to symptoms. However, patient attributes symptoms onset after stress test and changes in medication two weeks ago. Reports compliance with CPAP. (Dr Kellie Moncada)    7/9:  He reports he is breathing marginally better today after some diuresis. He has his CPAP and wore it last night. He reports his wt is up about \"25 pounds from two wks ago. \"  Denies increased fluid intake. EF down to 20s. Discussed/counseled about his SupraMorbid obesity, links to his PEPE and CHF/risks,  but he is in denial and says \"its all fluid\" and \"eat not much at all. \"    7/10:  Breathing better today and speaks in full sentences this AM.  About a liter more out. Right heart cath when volume status is better.   BP better this AM. : He reports he is \"not doing any better with breathing. \"  About 2 liters out. Creat sl up from yesterday. With EF this low it is going to be difficult to get more fluid off. Cath planned for later today. C/O constipation - mirilax added.       Review of Systems:   A comprehensive review of systems was negative except for that written in the HPI. Objective:   Physical Exam:     Visit Vitals  /68   Pulse 91   Temp 98 °F (36.7 °C)   Resp 20   Ht 5' 6\" (1.676 m)   Wt 152.4 kg (335 lb 14.4 oz)   SpO2 97%   BMI 54.22 kg/m²      O2 Device: Room air, CPAP mask    Temp (24hrs), Av °F (36.7 °C), Min:97.7 °F (36.5 °C), Max:98.4 °F (36.9 °C)    No intake/output data recorded.  1901 -  0700  In: 60 [P.O.:60]  Out: 2200 [Urine:2200]    General:  Alert, supramorbidly obese, cooperative, no distress, appears stated age. Head:  Normocephalic, without obvious abnormality, atraumatic. Eyes:  Conjunctivae/corneas clear. PERRL, EOMs intact. Nose: Nares normal. Septum midline. Mucosa normal. No drainage or sinus tenderness. Throat: Lips, mucosa, and tongue moist..   Neck: Supple, symmetrical, trachea midline, no adenopathy, thyroid: no enlargement/tenderness/nodules, no carotid bruit and no JVD. Back:   Symmetric, no curvature. ROM normal. No CVA tenderness. Lungs:   Sounds clear at this moment. Chest wall:  No tenderness or deformity. Heart:  Irreg, rate in low 424C, no murmur, click, rub or gallop. Abdomen:   Soft, non-tender. Large Pannus. Bowel sounds normal. No masses,  No organomegaly. Extremities: no cyanosis. Trace to 1+ LE edema. No calf tenderness or cords. Pulses: 2+ and symmetric all extremities. Skin:  turgor normal   Neurologic: CNII-XII intact. Alert and oriented X 3. Fine motor of hands and fingers normal.   equal.  No cogwheeling or rigidity. Gait not tested at this time. Sensation grossly normal to touch.   Gross motor of extremities normal. Data Review:       Recent Days:  Recent Labs     07/11/19  0441 07/09/19  0204 07/08/19  1222   WBC 9.5 10.5 9.7   HGB 11.2* 12.3 12.5   HCT 35.6* 39.2 39.5    201 235     Recent Labs     07/11/19  0441 07/10/19  1045 07/09/19  0206 07/08/19  1816 07/08/19  1222    142 138  --  140   K 3.5 3.3* 4.7  --  5.0    104 105  --  106   CO2 27 30 24  --  27   * 120* 107*  --  101*   BUN 26* 25* 26*  --  23*   CREA 1.70* 1.54* 1.67*  --  1.67*   CA 8.3* 8.0* 8.8  8.5  --  8.6   MG 2.4 2.3 2.5*  --   --    PHOS  --   --  4.0  --   --    ALB 3.5  --  3.6  --  3.6   TBILI 1.2*  --   --   --  1.0   SGOT 62*  --   --   --  56*   *  --   --   --  70   INR  --  2.0*  --  2.0*  --      No results for input(s): PH, PCO2, PO2, HCO3, FIO2 in the last 72 hours.     24 Hour Results:  Recent Results (from the past 24 hour(s))   PROTHROMBIN TIME + INR    Collection Time: 07/10/19 10:45 AM   Result Value Ref Range    INR 2.0 (H) 0.9 - 1.1      Prothrombin time 19.8 (H) 9.0 - 11.1 sec   MAGNESIUM    Collection Time: 07/10/19 10:45 AM   Result Value Ref Range    Magnesium 2.3 1.6 - 2.4 mg/dL   METABOLIC PANEL, BASIC    Collection Time: 07/10/19 10:45 AM   Result Value Ref Range    Sodium 142 136 - 145 mmol/L    Potassium 3.3 (L) 3.5 - 5.1 mmol/L    Chloride 104 97 - 108 mmol/L    CO2 30 21 - 32 mmol/L    Anion gap 8 5 - 15 mmol/L    Glucose 120 (H) 65 - 100 mg/dL    BUN 25 (H) 6 - 20 MG/DL    Creatinine 1.54 (H) 0.70 - 1.30 MG/DL    BUN/Creatinine ratio 16 12 - 20      GFR est AA 58 (L) >60 ml/min/1.73m2    GFR est non-AA 48 (L) >60 ml/min/1.73m2    Calcium 8.0 (L) 8.5 - 81.9 MG/DL   METABOLIC PANEL, COMPREHENSIVE    Collection Time: 07/11/19  4:41 AM   Result Value Ref Range    Sodium 139 136 - 145 mmol/L    Potassium 3.5 3.5 - 5.1 mmol/L    Chloride 104 97 - 108 mmol/L    CO2 27 21 - 32 mmol/L    Anion gap 8 5 - 15 mmol/L    Glucose 120 (H) 65 - 100 mg/dL    BUN 26 (H) 6 - 20 MG/DL    Creatinine 1.70 (H) 0.70 - 1.30 MG/DL    BUN/Creatinine ratio 15 12 - 20      GFR est AA 52 (L) >60 ml/min/1.73m2    GFR est non-AA 43 (L) >60 ml/min/1.73m2    Calcium 8.3 (L) 8.5 - 10.1 MG/DL    Bilirubin, total 1.2 (H) 0.2 - 1.0 MG/DL    ALT (SGPT) 115 (H) 12 - 78 U/L    AST (SGOT) 62 (H) 15 - 37 U/L    Alk. phosphatase 74 45 - 117 U/L    Protein, total 6.5 6.4 - 8.2 g/dL    Albumin 3.5 3.5 - 5.0 g/dL    Globulin 3.0 2.0 - 4.0 g/dL    A-G Ratio 1.2 1.1 - 2.2     MAGNESIUM    Collection Time: 07/11/19  4:41 AM   Result Value Ref Range    Magnesium 2.4 1.6 - 2.4 mg/dL   CBC WITH AUTOMATED DIFF    Collection Time: 07/11/19  4:41 AM   Result Value Ref Range    WBC 9.5 4.1 - 11.1 K/uL    RBC 3.86 (L) 4.10 - 5.70 M/uL    HGB 11.2 (L) 12.1 - 17.0 g/dL    HCT 35.6 (L) 36.6 - 50.3 %    MCV 92.2 80.0 - 99.0 FL    MCH 29.0 26.0 - 34.0 PG    MCHC 31.5 30.0 - 36.5 g/dL    RDW 14.6 (H) 11.5 - 14.5 %    PLATELET 935 698 - 391 K/uL    MPV 11.6 8.9 - 12.9 FL    NRBC 0.0 0  WBC    ABSOLUTE NRBC 0.00 0.00 - 0.01 K/uL    NEUTROPHILS 66 32 - 75 %    LYMPHOCYTES 20 12 - 49 %    MONOCYTES 11 5 - 13 %    EOSINOPHILS 2 0 - 7 %    BASOPHILS 1 0 - 1 %    IMMATURE GRANULOCYTES 0 0.0 - 0.5 %    ABS. NEUTROPHILS 6.3 1.8 - 8.0 K/UL    ABS. LYMPHOCYTES 1.9 0.8 - 3.5 K/UL    ABS. MONOCYTES 1.1 (H) 0.0 - 1.0 K/UL    ABS. EOSINOPHILS 0.2 0.0 - 0.4 K/UL    ABS. BASOPHILS 0.1 0.0 - 0.1 K/UL    ABS. IMM.  GRANS. 0.0 0.00 - 0.04 K/UL    DF AUTOMATED         Medications reviewed  Current Facility-Administered Medications   Medication Dose Route Frequency    amiodarone (CORDARONE) tablet 400 mg  400 mg Oral TID    bumetanide (BUMEX) injection 2 mg  2 mg IntraVENous Q12H    ondansetron (ZOFRAN) injection 4 mg  4 mg IntraVENous Q6H PRN    docusate sodium (COLACE) capsule 100 mg  100 mg Oral BID    sodium chloride (NS) flush 5-40 mL  5-40 mL IntraVENous Q8H    sodium chloride (NS) flush 5-40 mL  5-40 mL IntraVENous PRN    metoprolol succinate (TOPROL-XL) XL tablet 50 mg  50 mg Oral QHS    acetaminophen (TYLENOL) tablet 650 mg  650 mg Oral Q6H PRN    enoxaparin (LOVENOX) injection 160 mg  1 mg/kg SubCUTAneous Q12H    atorvastatin (LIPITOR) tablet 20 mg  20 mg Oral QHS       Care Plan discussed with: Patient/Family/Nurse/Cards  Total time spent with patient: 30 minutes.   Ernestine Ramos MD

## 2019-07-11 NOTE — PROGRESS NOTES
Bedside and Verbal shift change report given to Chelsi Pickens RN (oncoming nurse) by Jimena Whitney RN (offgoing nurse). Report included the following information SBAR, Kardex, Intake/Output, MAR, Recent Results, Med Rec Status and Cardiac Rhythm A Fib.

## 2019-07-11 NOTE — PROGRESS NOTES
1215    TRANSFER - IN REPORT:    Verbal report received from [facility] cath lab (name) on [facility].  being received from  Cath (unit) for routine progression of care      Report consisted of patients Situation, Background, Assessment and   Recommendations(SBAR). Information from the following report(s) SBAR was reviewed with the receiving nurse. Opportunity for questions and clarification was provided. Assessment completed upon patients arrival to unit and care assumed. 1300    2 ml air released from TR Band. No bleeding or hematoma noted. Radial and Ulnar pulse on right  wrist palpable. Pt tolerated well. Will continue to monitor. 1305    3 ml air released from TR Band. No bleeding or hematoma noted. Radial and Ulnar pulse on right wrist palpable. Pt tolerated well. Will continue to monitor. 1310    3 ml air released from TR Band. No bleeding or hematoma noted. Radial and Ulnar pulse on roght wrist palpable. Pt tolerated well. Will continue to monitor. 1315    3 ml air released from TR Band. No bleeding or hematoma noted. Radial and Ulnar pulse on right wrist palpable. Pt tolerated well. Will continue to monitor. Air release completed. TR Band removed from right wrist. No bleeding or  Hematoma. Dressing applied. Wrist immobilizer in place. Radial and ulnar pulse remain palpable on affected extremity. Pt tolerated well. Instructions given to pt regarding movement and activity restrictions. Pt voiced understanding. 1402    TRANSFER - OUT REPORT:    Verbal report given to [facility] RN [facility] (name) on [facility].  being transferred to  328 (unit) for routine progression of care       Report consisted of patients Situation, Background, Assessment and   Recommendations(SBAR). Information from the following report(s) SBAR was reviewed with the receiving nurse.     Lines:   Peripheral IV 07/08/19 Right Antecubital (Active)   Site Assessment Clean, dry, & intact 7/11/2019 11:05 AM Phlebitis Assessment 0 7/11/2019 11:05 AM   Infiltration Assessment 0 7/11/2019 11:05 AM   Dressing Status Clean, dry, & intact 7/11/2019 11:05 AM   Dressing Type Tape;Transparent 7/11/2019  7:22 AM   Hub Color/Line Status Pink 7/11/2019 11:05 AM   Action Taken Open ports on tubing capped 7/11/2019  7:22 AM   Alcohol Cap Used Yes 7/11/2019  7:22 AM        Opportunity for questions and clarification was provided.       Patient transported with:   Registered Nurse

## 2019-07-12 LAB
ALBUMIN SERPL-MCNC: 3.3 G/DL (ref 3.5–5)
ALBUMIN/GLOB SERPL: 1.3 {RATIO} (ref 1.1–2.2)
ALP SERPL-CCNC: 64 U/L (ref 45–117)
ALT SERPL-CCNC: 776 U/L (ref 12–78)
ANION GAP SERPL CALC-SCNC: 8 MMOL/L (ref 5–15)
AST SERPL-CCNC: 993 U/L (ref 15–37)
BASOPHILS # BLD: 0.1 K/UL (ref 0–0.1)
BASOPHILS NFR BLD: 1 % (ref 0–1)
BILIRUB SERPL-MCNC: 1.5 MG/DL (ref 0.2–1)
BUN SERPL-MCNC: 31 MG/DL (ref 6–20)
BUN/CREAT SERPL: 14 (ref 12–20)
CALCIUM SERPL-MCNC: 8.1 MG/DL (ref 8.5–10.1)
CHLORIDE SERPL-SCNC: 103 MMOL/L (ref 97–108)
CO2 SERPL-SCNC: 27 MMOL/L (ref 21–32)
COMMENT, HOLDF: NORMAL
CREAT SERPL-MCNC: 2.14 MG/DL (ref 0.7–1.3)
DIFFERENTIAL METHOD BLD: ABNORMAL
EOSINOPHIL # BLD: 0.1 K/UL (ref 0–0.4)
EOSINOPHIL NFR BLD: 1 % (ref 0–7)
ERYTHROCYTE [DISTWIDTH] IN BLOOD BY AUTOMATED COUNT: 15.2 % (ref 11.5–14.5)
FERRITIN SERPL-MCNC: 84 NG/ML (ref 26–388)
GLOBULIN SER CALC-MCNC: 2.5 G/DL (ref 2–4)
GLUCOSE SERPL-MCNC: 120 MG/DL (ref 65–100)
HBV SURFACE AG SER QL: <0.1 INDEX
HBV SURFACE AG SER QL: NEGATIVE
HCT VFR BLD AUTO: 33.5 % (ref 36.6–50.3)
HCV AB SERPL QL IA: NONREACTIVE
HCV COMMENT,HCGAC: NORMAL
HGB BLD-MCNC: 10.6 G/DL (ref 12.1–17)
IMM GRANULOCYTES # BLD AUTO: 0.1 K/UL (ref 0–0.04)
IMM GRANULOCYTES NFR BLD AUTO: 1 % (ref 0–0.5)
INR PPP: 1.8 (ref 0.9–1.1)
LYMPHOCYTES # BLD: 2.3 K/UL (ref 0.8–3.5)
LYMPHOCYTES NFR BLD: 23 % (ref 12–49)
MAGNESIUM SERPL-MCNC: 2.4 MG/DL (ref 1.6–2.4)
MCH RBC QN AUTO: 29.4 PG (ref 26–34)
MCHC RBC AUTO-ENTMCNC: 31.6 G/DL (ref 30–36.5)
MCV RBC AUTO: 92.8 FL (ref 80–99)
MONOCYTES # BLD: 1.5 K/UL (ref 0–1)
MONOCYTES NFR BLD: 15 % (ref 5–13)
NEUTS SEG # BLD: 6.1 K/UL (ref 1.8–8)
NEUTS SEG NFR BLD: 59 % (ref 32–75)
NRBC # BLD: 0.04 K/UL (ref 0–0.01)
NRBC BLD-RTO: 0.4 PER 100 WBC
PLATELET # BLD AUTO: 216 K/UL (ref 150–400)
PMV BLD AUTO: 11.3 FL (ref 8.9–12.9)
POTASSIUM SERPL-SCNC: 3.9 MMOL/L (ref 3.5–5.1)
PROT SERPL-MCNC: 5.8 G/DL (ref 6.4–8.2)
PROTHROMBIN TIME: 17.5 SEC (ref 9–11.1)
RBC # BLD AUTO: 3.61 M/UL (ref 4.1–5.7)
SAMPLES BEING HELD,HOLD: NORMAL
SODIUM SERPL-SCNC: 138 MMOL/L (ref 136–145)
TSH SERPL DL<=0.05 MIU/L-ACNC: 2.97 UIU/ML (ref 0.36–3.74)
WBC # BLD AUTO: 10.2 K/UL (ref 4.1–11.1)

## 2019-07-12 PROCEDURE — 74011000250 HC RX REV CODE- 250: Performed by: NURSE PRACTITIONER

## 2019-07-12 PROCEDURE — 80053 COMPREHEN METABOLIC PANEL: CPT

## 2019-07-12 PROCEDURE — 85025 COMPLETE CBC W/AUTO DIFF WBC: CPT

## 2019-07-12 PROCEDURE — 84443 ASSAY THYROID STIM HORMONE: CPT

## 2019-07-12 PROCEDURE — 74011250637 HC RX REV CODE- 250/637: Performed by: STUDENT IN AN ORGANIZED HEALTH CARE EDUCATION/TRAINING PROGRAM

## 2019-07-12 PROCEDURE — 74011250636 HC RX REV CODE- 250/636: Performed by: STUDENT IN AN ORGANIZED HEALTH CARE EDUCATION/TRAINING PROGRAM

## 2019-07-12 PROCEDURE — 83735 ASSAY OF MAGNESIUM: CPT

## 2019-07-12 PROCEDURE — 86803 HEPATITIS C AB TEST: CPT

## 2019-07-12 PROCEDURE — 74011250637 HC RX REV CODE- 250/637: Performed by: INTERNAL MEDICINE

## 2019-07-12 PROCEDURE — 82728 ASSAY OF FERRITIN: CPT

## 2019-07-12 PROCEDURE — 74011250637 HC RX REV CODE- 250/637: Performed by: NURSE PRACTITIONER

## 2019-07-12 PROCEDURE — 87340 HEPATITIS B SURFACE AG IA: CPT

## 2019-07-12 PROCEDURE — 87389 HIV-1 AG W/HIV-1&-2 AB AG IA: CPT

## 2019-07-12 PROCEDURE — 74011250637 HC RX REV CODE- 250/637: Performed by: FAMILY MEDICINE

## 2019-07-12 PROCEDURE — 85610 PROTHROMBIN TIME: CPT

## 2019-07-12 PROCEDURE — 65660000000 HC RM CCU STEPDOWN

## 2019-07-12 PROCEDURE — 36415 COLL VENOUS BLD VENIPUNCTURE: CPT

## 2019-07-12 RX ORDER — TORSEMIDE 20 MG/1
40 TABLET ORAL DAILY
Status: DISCONTINUED | OUTPATIENT
Start: 2019-07-13 | End: 2019-07-13

## 2019-07-12 RX ORDER — WARFARIN SODIUM 5 MG/1
5 TABLET ORAL EVERY EVENING
Status: COMPLETED | OUTPATIENT
Start: 2019-07-12 | End: 2019-07-12

## 2019-07-12 RX ORDER — METOPROLOL TARTRATE 25 MG/1
25 TABLET, FILM COATED ORAL EVERY 6 HOURS
Status: DISCONTINUED | OUTPATIENT
Start: 2019-07-12 | End: 2019-07-12

## 2019-07-12 RX ORDER — METOPROLOL TARTRATE 25 MG/1
12.5 TABLET, FILM COATED ORAL EVERY 6 HOURS
Status: DISCONTINUED | OUTPATIENT
Start: 2019-07-13 | End: 2019-07-15 | Stop reason: HOSPADM

## 2019-07-12 RX ADMIN — Medication 10 ML: at 05:16

## 2019-07-12 RX ADMIN — WARFARIN SODIUM 5 MG: 5 TABLET ORAL at 17:41

## 2019-07-12 RX ADMIN — ENOXAPARIN SODIUM 160 MG: 80 INJECTION SUBCUTANEOUS at 08:35

## 2019-07-12 RX ADMIN — POLYETHYLENE GLYCOL 3350 17 G: 17 POWDER, FOR SOLUTION ORAL at 08:27

## 2019-07-12 RX ADMIN — Medication 10 ML: at 21:28

## 2019-07-12 RX ADMIN — BUMETANIDE 2 MG: 0.25 INJECTION INTRAMUSCULAR; INTRAVENOUS at 05:14

## 2019-07-12 RX ADMIN — DOCUSATE SODIUM 100 MG: 100 CAPSULE ORAL at 08:27

## 2019-07-12 RX ADMIN — Medication 10 ML: at 05:15

## 2019-07-12 RX ADMIN — DOCUSATE SODIUM 100 MG: 100 CAPSULE ORAL at 17:07

## 2019-07-12 RX ADMIN — METOPROLOL TARTRATE 25 MG: 25 TABLET ORAL at 13:09

## 2019-07-12 NOTE — NURSE NAVIGATOR
Met briefly with patient at bedside. Discussed importance of accuracy with diuretics and medication once discharged, importance of early f/u with cardiology after discharge. Patient was agreeable to care transition phone call. Patient had visitors at bedside and was not amenable to additional discussion around HF education.

## 2019-07-12 NOTE — PROGRESS NOTES
Chart Reviewed  Cath yesterday   Discharge Plan  1. Home with home health - [address] [facility] has accepted the patient. 2. Nursing, if patient is discharged over the weekend, please call [facility] to inform them Number is [phone]  3. Patient has transportation home.    JANET Hernandez

## 2019-07-12 NOTE — PROGRESS NOTES
[address] [place] [address]. 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 CKD2 and microalbuminuria  3. SCHF with acute exacerbation  · EF 26%  · S/p cath 7 11 19: no CAD, high pressures  · SOB better  · Daily wts  4. Morbid Obesity   · PEPE on cpap             Subjective:   CC:arf  HPI: Patient seen  Cr worse    Loops on hold due to XU  Cath REV  ROS:neg for 12 sys except above    Current Facility-Administered Medications   Medication Dose Route Frequency    metoprolol tartrate (LOPRESSOR) tablet 25 mg  25 mg Oral Q6H    polyethylene glycol (MIRALAX) packet 17 g  17 g Oral DAILY    sodium chloride (NS) flush 5-40 mL  5-40 mL IntraVENous Q8H    sodium chloride (NS) flush 5-40 mL  5-40 mL IntraVENous PRN    Warfarin - Pharmacy to dose   Other Rx Dosing/Monitoring    ondansetron (ZOFRAN) injection 4 mg  4 mg IntraVENous Q6H PRN    docusate sodium (COLACE) capsule 100 mg  100 mg Oral BID    sodium chloride (NS) flush 5-40 mL  5-40 mL IntraVENous Q8H    sodium chloride (NS) flush 5-40 mL  5-40 mL IntraVENous PRN    acetaminophen (TYLENOL) tablet 650 mg  650 mg Oral Q6H PRN          Objective:     Vitals:  Blood pressure 96/69, pulse 89, temperature 96.9 °F (36.1 °C), resp. rate 20, height 5' 6\" (1.676 m), weight 152.5 kg (336 lb 3.2 oz), SpO2 97 %. Temp (24hrs), Av.1 °F (36.7 °C), Min:96.9 °F (36.1 °C), Max:100.5 °F (38.1 °C)      Intake and Output:  No intake/output data recorded.   07/10 1901 -  0700  In: 340 [P.O.:340]  Out: 1350 [Urine:1350]    Physical Exam:                Patient is intubated:  no    Physical Examination:   GENERAL ASSESSMENT: NAD  HEENT:Nontraumatic   CHEST: CTA  HEART: S1S2  ABDOMEN: Soft,NT,  :Puri: n  EXTREMITY: EDEMA 1  NEURO:Grossly non focal          ECG/rhythm:    Data Review      No results for input(s): TNIPOC in the last 72 hours. No lab exists for component: ITNL   No results for input(s): CPK, CKMB, TROIQ in the last 72 hours. Recent Labs     07/12/19  0623 07/11/19  0441 07/10/19  1045    139 142   K 3.9 3.5 3.3*    104 104   CO2 27 27 30   BUN 31* 26* 25*   CREA 2.14* 1.70* 1.54*   * 120* 120*   MG 2.4 2.4 2.3   CA 8.1* 8.3* 8.0*   ALB 3.3* 3.5  --    WBC 10.2 9.5  --    HGB 10.6* 11.2*  --    HCT 33.5* 35.6*  --     225  --       Recent Labs     07/12/19  0623 07/11/19  1556 07/10/19  1045   INR 1.8* 1.9* 2.0*   PTP 17.5* 18.6* 19.8*     Needs: urine analysis, urine sodium, protein and creatinine  Lab Results   Component Value Date/Time    Sodium,urine random 27 07/09/2019 01:29 AM    Creatinine, urine 86.80 07/09/2019 01:29 AM    Creatinine, urine 87.10 07/09/2019 01:29 AM         Discussed with:  pt    : Juan Diego Barrow MD  7/12/2019        [facility]:  [email or web address]  [facility] office:  [address] [place] [address], [address] [place] [address] [address]  [place], [address] [facility]  Phone: [phone]  Fax :     [phone]    [facility] office:  [address] [facility], [address]  Phone - [phone]  Fax - [phone]

## 2019-07-12 NOTE — PROGRESS NOTES
Initial Nutrition Assessment:    INTERVENTIONS/RECOMMENDATIONS:   · Continue with cardiac diet  · Continue to monitor intake and wt changes    ASSESSMENT:   7/12: Pt admitted with acute on chronic CHF. PMH includes sleep apnea and morbid obesity. RD assessing for LOS. BMI 54, consistent with morbid obesity. Recorded wts show wt gain, likely d/t fluid overload. Pt with 2+ non-pitting edema of the lower extremities. Pt stated wt fluctuations are normal for him and that his UBW is ~310-320 lbs. Recorded intakes ~100%. Currently ordering his own meals and says he likes the food. Reported that he usually eats one meal per day at home but is trying to eat three small meals per day while hospitalized although his appetite is not as good as usual b/c of the fluid. Pt has no questions/concerns at this time. Will continue to monitor for po intake and wt changes. Diet Order: Cardiac  % Eaten:    Patient Vitals for the past 72 hrs:   % Diet Eaten   07/11/19 1415 100 %     Pertinent Medications: [x]Reviewed []Other Colace, Zofran, metoprolol, Miralax, Warfarin  Pertinent Labs: [x]Reviewed []Other: Ha1C 6.4, Glu 120, Ca 8.1, BUN 31, cr 1.4, , , Vit. D 5.8  Food Allergies: [x]None []Other   Last BM: 7/10   [x]Active     []Hyperactive  []Hypoactive       [] Absent BS  Skin:    [x] Intact   [] Incision  [] Breakdown  [x] Other: 2+ non-pitting LE edema    Anthropometrics:   Height: 5' 6\" (167.6 cm) Weight: 152.5 kg (336 lb 3.2 oz)   IBW (%IBW): 64.4 kg (142 lb) ( ) UBW (%UBW):   (  %)   Last Weight Metrics:  Weight Loss Metrics 7/11/2019 7/8/2019 6/26/2019 6/21/2019 6/21/2019 6/7/2019 5/17/2019   Today's Wt 336 lb 3.2 oz - 320 lb 320 lb 320 lb 326 lb 3.2 oz 322 lb 6.4 oz   BMI - 54.26 kg/m2 51.65 kg/m2 51.65 kg/m2 51.65 kg/m2 52.65 kg/m2 52.04 kg/m2       BMI: Body mass index is 54.26 kg/m².     This BMI is indicative of:   []Underweight    []Normal    []Overweight    [] Obesity   [x] Extreme Obesity (BMI>40) Estimated Nutrition Needs (Based on):   5241 Kcals/day(2782 kcal -500 kcal for wt loss) , 122 g(0.8 g/kg BW) Protein  Carbohydrate: At Least 130 g/day  Fluids: 2300 mL/day (1ml/kcal or fluids per MD)    NUTRITION DIAGNOSES:   Problem:  Overweight/obesity      Etiology: related to history of excessive energy intake     Signs/Symptoms: as evidenced by BMI 54 morbid obesity, %236 IBW      NUTRITION INTERVENTIONS:  Meals/Snacks: General/healthful diet                  GOAL:   pt will continue to eat >75% meals next 5-7 days    LEARNING NEEDS (Diet, Food/Nutrient-Drug Interaction):    [x] None Identified   [] Identified and Education Provided/Documented   [] Identified and Pt declined/was not appropriate     Cultureal, Roman Catholic, OR Ethnic Dietary Needs:    [x] None Identified   [] Identified and Addressed     [x] Interdisciplinary Care Plan Reviewed/Documented    [x] Discharge Planning:   Continue Cardiac diet    MONITORING /EVALUATION:   Food/Nutrient Intake Outcomes:  Total energy intake  Physical Signs/Symptoms Outcomes: Weight/weight change    NUTRITION RISK:    [x] Patient At Nutritional Risk    [] Patient Not At Nutritional Risk    PT SEEN FOR:    []  MD Consult: []Calorie Count      []Diabetic Diet Education        []Diet Education     []Electrolyte Management     []General Nutrition Management and Supplements     []Management of Tube Feeding     []TPN Recommendations    []  RN Referral:  []MST score >=2     []Enteral/Parenteral Nutrition PTA     []Pregnant: Gestational DM or Multigestation     []Pressure Ulcer/Wound Care needs        []  Low BMI  [x]  CHRISTIAN Crawford Stains  Pager [phone]  Weekend Pager [phone]

## 2019-07-12 NOTE — PROGRESS NOTES
[facility] Dosing Services: 7/11/2019    Consult for Warfarin Dosing by Pharmacy by Dr. Aaron Estrada provided for this 47 y.o.  male , for indication of Atrial Fibrillation. Day of Therapy - 2 resumed   Home dose - 10 mg every day except 5 mg on Tuesday and Wednesday   Dose to achieve an INR goal of 2-3    Order entered for  Warfarin  5 (mg) ordered to be given today at 18:00. Enoxaparin bridge  Significant drug interactions: Amiodarone (home warfarin dose reduced 50%)  Previous dose given Last dose reported taken at home  10 mg  - 7/7/19   PT/INR Lab Results   Component Value Date/Time    INR 1.8 (H) 07/12/2019 06:23 AM      Platelets Lab Results   Component Value Date/Time    PLATELET 604 80/57/0856 06:23 AM      H/H Lab Results   Component Value Date/Time    HGB 10.6 (L) 07/12/2019 06:23 AM        Pharmacy to follow daily and will provide subsequent Warfarin dosing based on clinical status.   ZEINA Oliveira)  Contact information [phone]

## 2019-07-12 NOTE — PROGRESS NOTES
Patients with low blood pressure, re- taken several times per PCT. Reported to nurse. Nurse retook, B/P 82/62 left upper arm, with patient sitting up in recliner. On-call MD made aware of low blood pressure. Plan is to monitor patients blood pressure. 2100Hour :Metoprolol held d/t low B/P.    2335HR:  Bedside, Verbal and  shift change report given Johny Childs (oncoming nurse) by Paulino Melvin (offgoing nurse). Report included the following information SBAR, Kardex, Intake/Output, MAR, Recent Results and Cardiac Rhythm A Fib controlled rate. Chelle Rivas

## 2019-07-12 NOTE — PROGRESS NOTES
Cardiology Progress Note                             [address], [place], [address] [facility]                                 Phone [phone]; Fax [phone]        2019 11:38 AM     Admit Date:           2019  Admit Diagnosis:  Acute on chronic systolic CHF (congestive heart failure) ([facility] [address] [address] [I50.23]  :          1970   MRN:          [id number]   ASSESSMENT/RECOMMENDATION:   Acute on chronic HFrEF: holding diuretics d/t rise in creatinine - he did have  AM dose. Plans to start torsemide tomorrow morning.   - cont BB  - hold losartan for now given low EF/XU. Long term should be on entresto & RAASi    Non ischemic cardiomyopathy: cardiac cath showing no CAD- normal. High right heart filling pressures.   -resuming coumadin- INR 1.8  -EF 30%    CAD: no CAD per cath. Statin and ASA d/c    Persistent afib rate controlled - change toprol to metoprolol tartrate for easier titration w soft BP, LFTs significantly trending up -hold amiodarone    - avoid dilt due to low ef  - warfarin resumed- INR 1.8- hold lovenox w rise in creatinine   -TSh WNL    XU creatinine up at 2.1- holding meds as above     Morbid obesity Body mass index is 54.26 kg/m². PEPE- CPAP at bedside     Elevated LFTs: holding amiodarone- continue to monitor   - will need to check ferratin/hep b and C/ HIV (consent obtained)                No intake/output data recorded.     Last 3 Recorded Weights in this Encounter    19 0454 07/10/19 0650 19 1004   Weight: 340 lb (154.2 kg) 335 lb 14.4 oz (152.4 kg) 336 lb 3.2 oz (152.5 kg)         07/10 190 -  0700  In: 340 [P.O.:340]  Out: 1350 [Urine:1350]    SUBJECTIVE               Edith Tristan. denies palpitations, irregular heart beat,  chest pain   No lightheadedness or dizziness  Breathing unchanged -             Current Facility-Administered Medications   Medication Dose Route Frequency    metoprolol tartrate (LOPRESSOR) tablet 25 mg  25 mg Oral Q6H    polyethylene glycol (MIRALAX) packet 17 g  17 g Oral DAILY    sodium chloride (NS) flush 5-40 mL  5-40 mL IntraVENous Q8H    sodium chloride (NS) flush 5-40 mL  5-40 mL IntraVENous PRN    Warfarin - Pharmacy to dose   Other Rx Dosing/Monitoring    ondansetron (ZOFRAN) injection 4 mg  4 mg IntraVENous Q6H PRN    docusate sodium (COLACE) capsule 100 mg  100 mg Oral BID    sodium chloride (NS) flush 5-40 mL  5-40 mL IntraVENous Q8H    sodium chloride (NS) flush 5-40 mL  5-40 mL IntraVENous PRN    acetaminophen (TYLENOL) tablet 650 mg  650 mg Oral Q6H PRN      OBJECTIVE               Intake/Output Summary (Last 24 hours) at 7/12/2019 1052  Last data filed at 7/11/2019 2100  Gross per 24 hour   Intake 340 ml   Output 1350 ml   Net -1010 ml       Review of Systems - History obtained from the patient AS PER  HPI    Telemetry AF 90s    PHYSICAL EXAM        Visit Vitals  BP 96/69 (BP 1 Location: Right leg)   Pulse 89   Temp 96.9 °F (36.1 °C)   Resp 20   Ht 5' 6\" (1.676 m)   Wt 336 lb 3.2 oz (152.5 kg)   SpO2 97%   BMI 54.26 kg/m²       Gen: Well-developed, obese, in no acute distress  alert and oriented x 3  HEENT:  Pink conjunctivae, Hearing grossly normal.No scleral icterus or conjunctival, moist mucous membranes  Neck: Supple, unable to appreciate JVD d/t neck size  Resp: No accessory muscle use, diminished bilateral bases   Card: irregular Rate,Rythm, No murmurs, rubs or gallop. No thrills. GI:          Large/rounded/mildly firm non-tender   MSK: No cyanosis or clubbing, good capillary refill  Skin: No rashes or ulcers, no bruising.  Left sided wrist without hematoma/redness or drainage   Neuro:  Cranial nerves are grossly intact, moving all four extremities, no focal deficit, follows commands appropriately  Psych:  Good insight, oriented to person, place and time, alert, Nml Affect  LE: +1 BLL edema       DATA REVIEW Laboratory and Imaging have been reviewed by me and are notable for  No results for input(s): CPK, CKMB, TROIQ in the last 72 hours.   Recent Labs     07/12/19  0623 07/11/19  0441 07/10/19  1045    139 142   K 3.9 3.5 3.3*   CO2 27 27 30   BUN 31* 26* 25*   CREA 2.14* 1.70* 1.54*   * 120* 120*   MG 2.4 2.4 2.3   WBC 10.2 9.5  --    HGB 10.6* 11.2*  --    HCT 33.5* 35.6*  --     225  --              Nicholas Sanders, NP

## 2019-07-12 NOTE — PROGRESS NOTES
1900- Bedside and Verbal shift change report given to Misael Sepulveda  (oncoming nurse) by KARLY Max RN  (offgoing nurse). Report included the following information SBAR, Kardex and Med Rec Status.

## 2019-07-12 NOTE — PROGRESS NOTES
Daily Progress Note: 7/12/2019  Misbah Gonzalez. MENA Saravia    Assessment/Plan:   Acute on chronic systolic CHF (congestive heart failure) ([facility] (7/8/2019)/Cardiomyopathy:  Symptoms of increasing SOB and orthopnea. Suggestive of CHF. Recent stress test with EF 24% w/o reversible defect. -- bumex IV/po per Cards and Renal  -- daily weight  -- strict I/O  -- cardiology consulted and Cath done 7/11 - non-ischemic  -- hold ARB due to SYED and possible adverse effects     Persistent atrial fibrillation with RVR in ED. Likely contributing to exacerbation of CHF. Was recently taken off dilt and started on Toprol XL.  -- tx per Cards  -- monitor PT/INR  -- pharmacy to dose warfarin     Acute renal insufficiency on CKD3 (7/8/2019): May be from acute CHF exacerbation vs ARB use. -- hold ARB  -- monitor creatinine with diuresis  -- nephrology consulted     PEPE (obstructive sleep apnea) (6/22/2017):  -- continue CPAP     SupraMorbid obesity with BMI>54;  due to excess calories ([facility] (7/18/2017):  -- would benefit from weight loss; counseled pt.        Problem List:  Problem List as of 7/12/2019 Date Reviewed: 7/8/2019          Codes Class Noted - Resolved    * (Principal) Acute on chronic systolic CHF (congestive heart failure) (HCC) ICD-10-CM: I50.23  ICD-9-CM: 428.23, 428.0  7/8/2019 - Present        Acute renal insufficiency ICD-10-CM: N28.9  ICD-9-CM: 593.9  7/8/2019 - Present        Chronic diastolic heart failure ([facility] ICD-10-CM: I50.32  ICD-9-CM: 428.32  4/25/2018 - Present        Chronic systolic congestive heart failure ([facility] ICD-10-CM: I50.22  ICD-9-CM: 428.22, 428.0  4/25/2018 - Present        Chronic anticoagulation ICD-10-CM: Z79.01  ICD-9-CM: V58.61  10/13/2017 - Present        Persistent atrial fibrillation (HCC) ICD-10-CM: I48.1  ICD-9-CM: 427.31  7/18/2017 - Present        Morbid obesity due to excess calories ([facility] ICD-10-CM: E66.01  ICD-9-CM: 278.01  7/18/2017 - Present        Renal insufficiency ICD-10-CM: N28.9  ICD-9-CM: 593.9  6/22/2017 - Present        PEPE (obstructive sleep apnea) ICD-10-CM: G47.33  ICD-9-CM: 327.23  6/22/2017 - Present        Tobacco use ICD-10-CM: Z72.0  ICD-9-CM: 305.1  6/22/2017 - Present        Chronic back pain ICD-10-CM: M54.9, G89.29  ICD-9-CM: 724.5, 338.29  6/22/2017 - Present        Asthma ICD-10-CM: J45.909  ICD-9-CM: 493.90  6/22/2017 - Present        Elevated BP without diagnosis of hypertension ICD-10-CM: R03.0  ICD-9-CM: 796.2  6/22/2017 - Present        Exertional dyspnea ICD-10-CM: R06.09  ICD-9-CM: 786.09  6/22/2017 - Present        RESOLVED: Atrial fibrillation with RVR (HCC) ICD-10-CM: I48.91  ICD-9-CM: 427.31  6/22/2017 - 7/18/2017        RESOLVED: Obesity ICD-10-CM: E66.9  ICD-9-CM: 278.00  6/22/2017 - 7/18/2017              Subjective:    47 y.o.  male who is admitted with Acute on chronic systolic CHF (congestive heart failure) ([facility] Mr. Riley Austin presented to the Emergency Department today complaining of SOB. Increasing for the past two weeks. Worse with supine. Leg edema unchanged. No chest pains. Two weeks ago had po dilt stopped in favor of losartan and Toprol. Has had belching and burping since medication change as well. Has had adjustments to diuretic regimen up and down due to symptoms. However, patient attributes symptoms onset after stress test and changes in medication two weeks ago. Reports compliance with CPAP. (Dr Wilfredo Ruiz)    7/9:  He reports he is breathing marginally better today after some diuresis. He has his CPAP and wore it last night. He reports his wt is up about \"25 pounds from two wks ago. \"  Denies increased fluid intake. EF down to 20s. Discussed/counseled about his SupraMorbid obesity, links to his PEPE and CHF/risks,  but he is in denial and says \"its all fluid\" and \"eat not much at all. \"    7/10:  Breathing better today and speaks in full sentences this AM.  About a liter more out.   Right heart cath when volume status is better. BP better this AM.      : He reports he is \"not doing any better with breathing. \"  About 2 liters out. Creat sl up from yesterday. With EF this low it is going to be difficult to get more fluid off. Cath planned for later today. C/O constipation - mirilax added. :  Still reports diff with SOB and FENTON. Cath yesterday with elevated filling pressures and nothing to stent. LFTs up - may be due to episodes of low BP yesterday - had to hold PM dose of BB yesterday. Rate is better this AM (at this moment). About a liter more out. Creat up to 2. 14.     Review of Systems:   A comprehensive review of systems was negative except for that written in the HPI. Objective:   Physical Exam:     Visit Vitals  /67 (BP 1 Location: Right leg, BP Patient Position: At rest)   Pulse 73   Temp 96.9 °F (36.1 °C)   Resp 16   Ht 5' 6\" (1.676 m)   Wt 152.5 kg (336 lb 3.2 oz)   SpO2 93%   BMI 54.26 kg/m²      O2 Device: Room air  Temp (24hrs), Av.4 °F (36.9 °C), Min:96.9 °F (36.1 °C), Max:100.5 °F (38.1 °C)    No intake/output data recorded. 07/10 1901 -  0700  In: 340 [P.O.:340]  Out: 1350 [Urine:1350]  General:  Alert, supramorbidly obese, cooperative, no distress, appears stated age. Head:  Normocephalic, without obvious abnormality, atraumatic. Eyes:  Conjunctivae/corneas clear. PERRL, EOMs intact. Nose: Nares normal. Septum midline. Mucosa normal. No drainage or sinus tenderness. Throat: Lips, mucosa, and tongue moist..   Neck: Supple, symmetrical, trachea midline, no adenopathy, thyroid: no enlargement/tenderness/nodules, no carotid bruit and no JVD. Back:   Symmetric, no curvature. ROM normal. No CVA tenderness. Lungs:   Sounds clear at this moment. Chest wall:  No tenderness or deformity. Heart:  Irreg, rate better, no murmur, click, rub or gallop. Abdomen:   Soft, non-tender. Large Pannus. Bowel sounds normal. No masses,  No organomegaly. Extremities: no cyanosis. Trace to 1+ LE edema. No calf tenderness or cords. Pulses: 2+ and symmetric all extremities. Skin:  turgor normal   Neurologic: CNII-XII intact. Alert and oriented X 3. Fine motor of hands and fingers normal.   equal.  No cogwheeling or rigidity. Gait not tested at this time. Sensation grossly normal to touch. Gross motor of extremities normal.       Data Review:       Recent Days:  Recent Labs     07/12/19 0623 07/11/19 0441   WBC 10.2 9.5   HGB 10.6* 11.2*   HCT 33.5* 35.6*    225     Recent Labs     07/12/19 0623 07/11/19  1556 07/11/19  0441 07/10/19  1045     --  139 142   K 3.9  --  3.5 3.3*     --  104 104   CO2 27  --  27 30   *  --  120* 120*   BUN 31*  --  26* 25*   CREA 2.14*  --  1.70* 1.54*   CA 8.1*  --  8.3* 8.0*   MG 2.4  --  2.4 2.3   ALB 3.3*  --  3.5  --    TBILI 1.5*  --  1.2*  --    SGOT 993*  --  62*  --    *  --  115*  --    INR 1.8* 1.9*  --  2.0*     No results for input(s): PH, PCO2, PO2, HCO3, FIO2 in the last 72 hours.     24 Hour Results:  Recent Results (from the past 24 hour(s))   CARBOXY HEMOGLOBIN    Collection Time: 07/11/19 11:45 AM   Result Value Ref Range    O2 SATURATION 51 (L) 95 - 99 %   CARBOXY HEMOGLOBIN    Collection Time: 07/11/19 11:45 AM   Result Value Ref Range    O2 SATURATION 94 (L) 95 - 99 %   CARDIAC PROCEDURE    Collection Time: 07/11/19 12:22 PM   Result Value Ref Range    EDP 35    PROTHROMBIN TIME + INR    Collection Time: 07/11/19  3:56 PM   Result Value Ref Range    INR 1.9 (H) 0.9 - 1.1      Prothrombin time 18.6 (H) 9.0 - 39.0 sec   METABOLIC PANEL, COMPREHENSIVE    Collection Time: 07/12/19  6:23 AM   Result Value Ref Range    Sodium 138 136 - 145 mmol/L    Potassium 3.9 3.5 - 5.1 mmol/L    Chloride 103 97 - 108 mmol/L    CO2 27 21 - 32 mmol/L    Anion gap 8 5 - 15 mmol/L    Glucose 120 (H) 65 - 100 mg/dL    BUN 31 (H) 6 - 20 MG/DL    Creatinine 2.14 (H) 0.70 - 1.30 MG/DL BUN/Creatinine ratio 14 12 - 20      GFR est AA 40 (L) >60 ml/min/1.73m2    GFR est non-AA 33 (L) >60 ml/min/1.73m2    Calcium 8.1 (L) 8.5 - 10.1 MG/DL    Bilirubin, total 1.5 (H) 0.2 - 1.0 MG/DL    ALT (SGPT) 776 (H) 12 - 78 U/L    AST (SGOT) 993 (H) 15 - 37 U/L    Alk. phosphatase 64 45 - 117 U/L    Protein, total 5.8 (L) 6.4 - 8.2 g/dL    Albumin 3.3 (L) 3.5 - 5.0 g/dL    Globulin 2.5 2.0 - 4.0 g/dL    A-G Ratio 1.3 1.1 - 2.2     MAGNESIUM    Collection Time: 07/12/19  6:23 AM   Result Value Ref Range    Magnesium 2.4 1.6 - 2.4 mg/dL   CBC WITH AUTOMATED DIFF    Collection Time: 07/12/19  6:23 AM   Result Value Ref Range    WBC 10.2 4.1 - 11.1 K/uL    RBC 3.61 (L) 4.10 - 5.70 M/uL    HGB 10.6 (L) 12.1 - 17.0 g/dL    HCT 33.5 (L) 36.6 - 50.3 %    MCV 92.8 80.0 - 99.0 FL    MCH 29.4 26.0 - 34.0 PG    MCHC 31.6 30.0 - 36.5 g/dL    RDW 15.2 (H) 11.5 - 14.5 %    PLATELET 335 941 - 476 K/uL    MPV 11.3 8.9 - 12.9 FL    NRBC 0.4 (H) 0  WBC    ABSOLUTE NRBC 0.04 (H) 0.00 - 0.01 K/uL    NEUTROPHILS 59 32 - 75 %    LYMPHOCYTES 23 12 - 49 %    MONOCYTES 15 (H) 5 - 13 %    EOSINOPHILS 1 0 - 7 %    BASOPHILS 1 0 - 1 %    IMMATURE GRANULOCYTES 1 (H) 0.0 - 0.5 %    ABS. NEUTROPHILS 6.1 1.8 - 8.0 K/UL    ABS. LYMPHOCYTES 2.3 0.8 - 3.5 K/UL    ABS. MONOCYTES 1.5 (H) 0.0 - 1.0 K/UL    ABS. EOSINOPHILS 0.1 0.0 - 0.4 K/UL    ABS. BASOPHILS 0.1 0.0 - 0.1 K/UL    ABS. IMM.  GRANS. 0.1 (H) 0.00 - 0.04 K/UL    DF AUTOMATED     PROTHROMBIN TIME + INR    Collection Time: 07/12/19  6:23 AM   Result Value Ref Range    INR 1.8 (H) 0.9 - 1.1      Prothrombin time 17.5 (H) 9.0 - 11.1 sec       Medications reviewed  Current Facility-Administered Medications   Medication Dose Route Frequency    polyethylene glycol (MIRALAX) packet 17 g  17 g Oral DAILY    sodium chloride (NS) flush 5-40 mL  5-40 mL IntraVENous Q8H    sodium chloride (NS) flush 5-40 mL  5-40 mL IntraVENous PRN    Warfarin - Pharmacy to dose   Other Rx Dosing/Monitoring  amiodarone (CORDARONE) tablet 400 mg  400 mg Oral TID    bumetanide (BUMEX) injection 2 mg  2 mg IntraVENous Q12H    ondansetron (ZOFRAN) injection 4 mg  4 mg IntraVENous Q6H PRN    docusate sodium (COLACE) capsule 100 mg  100 mg Oral BID    sodium chloride (NS) flush 5-40 mL  5-40 mL IntraVENous Q8H    sodium chloride (NS) flush 5-40 mL  5-40 mL IntraVENous PRN    metoprolol succinate (TOPROL-XL) XL tablet 50 mg  50 mg Oral QHS    acetaminophen (TYLENOL) tablet 650 mg  650 mg Oral Q6H PRN    enoxaparin (LOVENOX) injection 160 mg  1 mg/kg SubCUTAneous Q12H     Care Plan discussed with: Patient/Nurse/Cards  Total time spent with patient: 30 minutes.   Bernardo Nuno MD

## 2019-07-12 NOTE — PROGRESS NOTES
Bedside shift change report given to Linda Mak (oncoming nurse) by Nigel Alcantara (offgoing nurse). Report included the following information SBAR, Kardex, OR Summary, Procedure Summary, Intake/Output, MAR and Recent Results.

## 2019-07-13 LAB
ALBUMIN SERPL-MCNC: 3.4 G/DL (ref 3.5–5)
ALBUMIN/GLOB SERPL: 1.1 {RATIO} (ref 1.1–2.2)
ALP SERPL-CCNC: 70 U/L (ref 45–117)
ALT SERPL-CCNC: 602 U/L (ref 12–78)
ANION GAP SERPL CALC-SCNC: 6 MMOL/L (ref 5–15)
AST SERPL-CCNC: 349 U/L (ref 15–37)
BASOPHILS # BLD: 0.1 K/UL (ref 0–0.1)
BASOPHILS NFR BLD: 1 % (ref 0–1)
BILIRUB SERPL-MCNC: 1.4 MG/DL (ref 0.2–1)
BUN SERPL-MCNC: 32 MG/DL (ref 6–20)
BUN/CREAT SERPL: 16 (ref 12–20)
CALCIUM SERPL-MCNC: 8.5 MG/DL (ref 8.5–10.1)
CHLORIDE SERPL-SCNC: 100 MMOL/L (ref 97–108)
CO2 SERPL-SCNC: 30 MMOL/L (ref 21–32)
CREAT SERPL-MCNC: 1.98 MG/DL (ref 0.7–1.3)
DIFFERENTIAL METHOD BLD: ABNORMAL
EOSINOPHIL # BLD: 0.1 K/UL (ref 0–0.4)
EOSINOPHIL NFR BLD: 1 % (ref 0–7)
ERYTHROCYTE [DISTWIDTH] IN BLOOD BY AUTOMATED COUNT: 15 % (ref 11.5–14.5)
GLOBULIN SER CALC-MCNC: 3.1 G/DL (ref 2–4)
GLUCOSE SERPL-MCNC: 124 MG/DL (ref 65–100)
HCT VFR BLD AUTO: 34.7 % (ref 36.6–50.3)
HGB BLD-MCNC: 10.8 G/DL (ref 12.1–17)
HIV 1+2 AB+HIV1 P24 AG SERPL QL IA: NONREACTIVE
HIV12 RESULT COMMENT, HHIVC: NORMAL
IMM GRANULOCYTES # BLD AUTO: 0.1 K/UL (ref 0–0.04)
IMM GRANULOCYTES NFR BLD AUTO: 1 % (ref 0–0.5)
INR PPP: 1.8 (ref 0.9–1.1)
LYMPHOCYTES # BLD: 1.6 K/UL (ref 0.8–3.5)
LYMPHOCYTES NFR BLD: 18 % (ref 12–49)
MAGNESIUM SERPL-MCNC: 2.4 MG/DL (ref 1.6–2.4)
MCH RBC QN AUTO: 29.1 PG (ref 26–34)
MCHC RBC AUTO-ENTMCNC: 31.1 G/DL (ref 30–36.5)
MCV RBC AUTO: 93.5 FL (ref 80–99)
MONOCYTES # BLD: 1.1 K/UL (ref 0–1)
MONOCYTES NFR BLD: 12 % (ref 5–13)
NEUTS SEG # BLD: 5.9 K/UL (ref 1.8–8)
NEUTS SEG NFR BLD: 67 % (ref 32–75)
NRBC # BLD: 0.03 K/UL (ref 0–0.01)
NRBC BLD-RTO: 0.3 PER 100 WBC
PLATELET # BLD AUTO: 214 K/UL (ref 150–400)
PMV BLD AUTO: 11.3 FL (ref 8.9–12.9)
POTASSIUM SERPL-SCNC: 3.4 MMOL/L (ref 3.5–5.1)
PROT SERPL-MCNC: 6.5 G/DL (ref 6.4–8.2)
PROTHROMBIN TIME: 17.8 SEC (ref 9–11.1)
RBC # BLD AUTO: 3.71 M/UL (ref 4.1–5.7)
SODIUM SERPL-SCNC: 136 MMOL/L (ref 136–145)
WBC # BLD AUTO: 8.8 K/UL (ref 4.1–11.1)

## 2019-07-13 PROCEDURE — 65660000000 HC RM CCU STEPDOWN

## 2019-07-13 PROCEDURE — 80053 COMPREHEN METABOLIC PANEL: CPT

## 2019-07-13 PROCEDURE — 74011250637 HC RX REV CODE- 250/637: Performed by: FAMILY MEDICINE

## 2019-07-13 PROCEDURE — 74011250637 HC RX REV CODE- 250/637: Performed by: STUDENT IN AN ORGANIZED HEALTH CARE EDUCATION/TRAINING PROGRAM

## 2019-07-13 PROCEDURE — 36415 COLL VENOUS BLD VENIPUNCTURE: CPT

## 2019-07-13 PROCEDURE — 85610 PROTHROMBIN TIME: CPT

## 2019-07-13 PROCEDURE — 83735 ASSAY OF MAGNESIUM: CPT

## 2019-07-13 PROCEDURE — 85025 COMPLETE CBC W/AUTO DIFF WBC: CPT

## 2019-07-13 RX ADMIN — Medication 10 ML: at 22:26

## 2019-07-13 RX ADMIN — POLYETHYLENE GLYCOL 3350 17 G: 17 POWDER, FOR SOLUTION ORAL at 08:13

## 2019-07-13 RX ADMIN — Medication 10 ML: at 14:00

## 2019-07-13 RX ADMIN — Medication 10 ML: at 22:27

## 2019-07-13 RX ADMIN — WARFARIN SODIUM 7.5 MG: 2.5 TABLET ORAL at 17:49

## 2019-07-13 NOTE — PROGRESS NOTES
BP of 92/   pt sitting up having breakfast  Denies SOB or chest pain,  and refuses BB \" I feel really bad even they reduced the dose to a half\".

## 2019-07-13 NOTE — PROGRESS NOTES
[facility] Dosing Services: 7/13/2019    Consult for Warfarin Dosing by Pharmacy by Dr. Corwin Moreno provided for this 47 y.o.  male , for indication of Atrial Fibrillation. Day of Therapy - 3 resumed   Home dose - 10 mg every day except 5 mg on Tuesday and Wednesday   Dose to achieve an INR goal of 2-3    Order entered for  Warfarin  7.5 (mg) ordered to be given today at 18:00. Significant drug interactions: amiodarone new start 7/9 discontinued 7/12  Previous dose given 7/11,12  5 mg  7/8,9,10  none  7/7 10 mg reported taken at home   PT/INR Lab Results   Component Value Date/Time    INR 1.8 (H) 07/13/2019 10:16 AM        Platelets Lab Results   Component Value Date/Time    PLATELET 369 17/92/0796 10:16 AM        H/H Lab Results   Component Value Date/Time    HGB 10.8 (L) 07/13/2019 10:16 AM          Pharmacy to follow daily and will provide subsequent Warfarin dosing based on clinical status.   Heriberto Banegas)  Contact information [phone]

## 2019-07-13 NOTE — PROGRESS NOTES
2024: notified Dr. Isac Cantu of MEWS score of 4. Pt can get lightheaded upon standing, RN has requested that patient call out before ambulating. Per MD no changes are to be made at this time. RN will continue to monitor. 0730: Bedside shift change report given to Rae Jc RN (oncoming nurse) by Margarito Chris RN (offgoing nurse). Report included the following information SBAR and Kardex.

## 2019-07-13 NOTE — PROGRESS NOTES
Progress Note                                        Taco Boyle MD, [facility] [address], [place], [id number] [place] [address]                                                 Phone [phone]; Fax [phone]        2019 11:09 PM     Admit Date:           2019  Admit Diagnosis:  Acute on chronic systolic CHF (congestive heart failure) ([facility] [address] [I50.23]  :          1970   MRN:          [id number]   ASSESSMENT/RECOMMENDATION:   Acute on chronic HFrEF: holding diuretics d/t rise in creatinine - he did have  AM dose. Plans to start torsemide tomorrow morning.   -Had to discontinue his beta-blocker because he does not like them and causes symptoms. -  Long term should be on entresto & RAASi     Non ischemic cardiomyopathy: cardiac cath showing no CAD- normal. High right heart filling pressures.   -resuming coumadin- INR 1.8  -EF 30%     CAD: no CAD per cath. Statin and ASA d/c     Persistent afib rate controlled - change toprol to metoprolol tartrate for easier titration w soft BP, LFTs significantly trending up -hold amiodarone    -I know there was interested in holding his diltiazem but he feels as though that something to control his rate.   I am not favor going back on the diltiazem at this time and should he continue to have poorly controlled rates his LFTs do not go up upper back on the amiodarone versus AV evangelist ablation.  - warfarin resumed- INR 1.8- hold lovenox w rise in creatinine   -TSh WNL     XU creatinine up at 2.1- holding meds as above      Morbid obesity Body mass index is 54.26 kg/m².     PEPE- CPAP at bedside      Elevated LFTs: holding amiodarone- continue to monitor   - will need to check ferratin/hep b and C/ HIV (consent obtained)    -Long discussion about personal responsibility in taking good care of himself          No intake/output data recorded. Last 3 Recorded Weights in this Encounter    07/10/19 0650 07/11/19 1004 07/13/19 0110   Weight: 335 lb 14.4 oz (152.4 kg) 336 lb 3.2 oz (152.5 kg) 342 lb 13 oz (155.5 kg)         07/11 1901 - 07/13 0700  In: 300 [P.O.:300]  Out: -     SUBJECTIVE         Roman Gay. is a 47 y.o. male w/ hx of CHF (EF 26-30% w/ mod dilated RA/LA/RV), permanent Afib on warfarin, aortic valve sclerosis, PEPE, asthma, spinal stenosis, ETOH abuse, morbid obesity presents with worsening sob, LE edema and weight gain over the past 2 weeks. Pt was seen in June 2019 with repeat ECHO and nuclear stress test on 6/26. His diltiazem for his afib was stopped and changed to Toprol XL 50mg given his EF 26-30%. Since the change, per pt, he says his lasix 40mg BID has not been working and that he has been feeling short of breath. He called the office and his Toprol XL was decrease 25mg per pt. He has gained over 20 lbs since 2 weeks and says he has increased swelling (mostly in his belly and some in his legs). Pt denies cp, palpitation, fever/chills, cough, wheezing, sick contact Pt has been using his CPAP consistently. Pt vapes for the past 2 years and has hx of smoking for 30 years). Cors normal by cath on 7/11    EDP 35 mm hg;PA sat 51%        [facility] none. He does feel he is intolerant to beta-blockers. He wants to go back on Cardizem at this time.   His heart rate seems reasonably controlled so I am going to add anything back at this time    Current Facility-Administered Medications   Medication Dose Route Frequency    torsemide (DEMADEX) tablet 40 mg  40 mg Oral DAILY    metoprolol tartrate (LOPRESSOR) tablet 12.5 mg  12.5 mg Oral Q6H    polyethylene glycol (MIRALAX) packet 17 g  17 g Oral DAILY    sodium chloride (NS) flush 5-40 mL  5-40 mL IntraVENous Q8H    sodium chloride (NS) flush 5-40 mL  5-40 mL IntraVENous PRN    Warfarin - Pharmacy to dose   Other Rx Dosing/Monitoring    ondansetron (ZOFRAN) injection 4 mg  4 mg IntraVENous Q6H PRN    docusate sodium (COLACE) capsule 100 mg  100 mg Oral BID    sodium chloride (NS) flush 5-40 mL  5-40 mL IntraVENous Q8H    sodium chloride (NS) flush 5-40 mL  5-40 mL IntraVENous PRN    acetaminophen (TYLENOL) tablet 650 mg  650 mg Oral Q6H PRN      OBJECTIVE               Intake/Output Summary (Last 24 hours) at 7/13/2019 0724  Last data filed at 7/13/2019 0156  Gross per 24 hour   Intake 200 ml   Output --   Net 200 ml       Review of Systems - History obtained from the patient AS PER  HPI        PHYSICAL EXAM        Visit Vitals  BP 92/58   Pulse 99   Temp 97.9 °F (36.6 °C)   Resp 18   Ht 5' 6\" (1.676 m)   Wt 342 lb 13 oz (155.5 kg)   SpO2 97%   BMI 55.33 kg/m²       Gen: Morbidly obese, well-nourished, in no acute distress  alert and oriented x 3  HEENT:  Pink conjunctivae, Hearing grossly normal.No scleral icterus or conjunctival, moist mucous membranes  Neck: Supple,No JVD, No Carotid Bruit, Thyroid- non tender No cervical lymphadenopathy  Resp: No accessory muscle use, Clear breath sounds, No rales or rhonchi  Card: Regular  MSK: No cyanosis or clubbing, good capillary refill  Skin: No rashes or ulcers, no bruising  Neuro:  Cranial nerves are grossly intact, moving all four extremities, no focal deficit, follows commands appropriately  Psych:  Good insight, oriented to person, place and time, alert, Nml Affect  LE: + edema       DATA REVIEW            Laboratory and Imaging have been reviewed by me and are notable for  No results for input(s): CPK, CKMB, TROIQ in the last 72 hours.   Recent Labs     07/12/19  0623 07/11/19  0441 07/10/19  1045    139 142   K 3.9 3.5 3.3*   CO2 27 27 30   BUN 31* 26* 25*   CREA 2.14* 1.70* 1.54*   * 120* 120*   MG 2.4 2.4 2.3   WBC 10.2 9.5  --    HGB 10.6* 11.2*  --    HCT 33.5* 35.6*  --     225  --              Dennis Tavarez MD

## 2019-07-13 NOTE — PROGRESS NOTES
Daily Progress Note: 7/13/2019  Cassie Melchor. MENA Sanchez    Assessment/Plan:   Acute on chronic systolic CHF (congestive heart failure) ([facility] (7/8/2019)/Cardiomyopathy:  Symptoms of increasing SOB and orthopnea. Suggestive of CHF. Recent stress test with EF 24% w/o reversible defect. -- bumex IV/po per Cards and Renal - switched to Torsemide 7/13  -- daily weight  -- strict I/O  -- cardiology consulted and Cath done 7/11 - non-ischemic  -- hold ARB due to SYED and possible adverse effects     Persistent atrial fibrillation with RVR in ED. Likely contributing to exacerbation of CHF. Was recently taken off dilt and started on Toprol XL.  -- tx per Cards  -- monitor PT/INR  -- pharmacy to dose warfarin     Acute renal insufficiency on CKD3 (7/8/2019): May be from acute CHF exacerbation vs ARB use. -- hold ARB  -- monitor creatinine with diuresis  -- nephrology consulted     PEPE (obstructive sleep apnea) (6/22/2017):  -- continue CPAP     SupraMorbid obesity with BMI>54;  due to excess calories ([facility] (7/18/2017):  -- would benefit from weight loss; counseled pt.        Problem List:  Problem List as of 7/13/2019 Date Reviewed: 7/8/2019          Codes Class Noted - Resolved    * (Principal) Acute on chronic systolic CHF (congestive heart failure) (HCC) ICD-10-CM: I50.23  ICD-9-CM: 428.23, 428.0  7/8/2019 - Present        Acute renal insufficiency ICD-10-CM: N28.9  ICD-9-CM: 593.9  7/8/2019 - Present        Chronic diastolic heart failure ([facility] ICD-10-CM: I50.32  ICD-9-CM: 428.32  4/25/2018 - Present        Chronic systolic congestive heart failure ([facility] ICD-10-CM: I50.22  ICD-9-CM: 428.22, 428.0  4/25/2018 - Present        Chronic anticoagulation ICD-10-CM: Z79.01  ICD-9-CM: V58.61  10/13/2017 - Present        Persistent atrial fibrillation (HCC) ICD-10-CM: I48.1  ICD-9-CM: 427.31  7/18/2017 - Present        Morbid obesity due to excess calories ([facility] ICD-10-CM: E66.01  ICD-9-CM: 278.01  7/18/2017 - Present        Renal insufficiency ICD-10-CM: N28.9  ICD-9-CM: 593.9  6/22/2017 - Present        PEPE (obstructive sleep apnea) ICD-10-CM: G47.33  ICD-9-CM: 327.23  6/22/2017 - Present        Tobacco use ICD-10-CM: Z72.0  ICD-9-CM: 305.1  6/22/2017 - Present        Chronic back pain ICD-10-CM: M54.9, G89.29  ICD-9-CM: 724.5, 338.29  6/22/2017 - Present        Asthma ICD-10-CM: J45.909  ICD-9-CM: 493.90  6/22/2017 - Present        Elevated BP without diagnosis of hypertension ICD-10-CM: R03.0  ICD-9-CM: 796.2  6/22/2017 - Present        Exertional dyspnea ICD-10-CM: R06.09  ICD-9-CM: 786.09  6/22/2017 - Present        RESOLVED: Atrial fibrillation with RVR (HCC) ICD-10-CM: I48.91  ICD-9-CM: 427.31  6/22/2017 - 7/18/2017        RESOLVED: Obesity ICD-10-CM: E66.9  ICD-9-CM: 278.00  6/22/2017 - 7/18/2017              Subjective:    47 y.o.  male who is admitted with Acute on chronic systolic CHF (congestive heart failure) ([facility] [address] Mr. Patty Willard presented to the Emergency Department today complaining of SOB. Increasing for the past two weeks. Worse with supine. Leg edema unchanged. No chest pains. Two weeks ago had po dilt stopped in favor of losartan and Toprol. Has had belching and burping since medication change as well. Has had adjustments to diuretic regimen up and down due to symptoms. However, patient attributes symptoms onset after stress test and changes in medication two weeks ago. Reports compliance with CPAP. (Dr Escobar Khan)    7/9:  He reports he is breathing marginally better today after some diuresis. He has his CPAP and wore it last night. He reports his wt is up about \"25 pounds from two wks ago. \"  Denies increased fluid intake. EF down to 20s. Discussed/counseled about his SupraMorbid obesity, links to his PEPE and CHF/risks,  but he is in denial and says \"its all fluid\" and \"eat not much at all. \"    7/10:  Breathing better today and speaks in full sentences this AM.  About a liter more out. Right heart cath when volume status is better. BP better this AM.      : He reports he is \"not doing any better with breathing. \"  About 2 liters out. Creat sl up from yesterday. With EF this low it is going to be difficult to get more fluid off. Cath planned for later today. C/O constipation - mirilax added. :  Still reports diff with SOB and FENTON. Cath yesterday with elevated filling pressures and nothing to stent. LFTs up - may be due to episodes of low BP yesterday - had to hold PM dose of BB yesterday. Rate is better this AM (at this moment). About a liter more out. Creat up to 2.14.    :  Starting to feel better with much less SOB. He is convinced that the Metoprolol causes him to have SOB - he reports \"as soon as I take it I can't get my breath. \"  He reports he will not take it again. Discussed potential side effects/adverse effects and that it can also be used to tx panic attacks - he is convinced it causes his \"bad breathing spells. \"  Otis Ponds him to discuss with Cardiology potential alternatives. He is to start Torsemide today. Watch at least another day and see how he tolerates the Torsemide. Cont to discuss his supra-morbid obesity but he is convinced it is not a problem. Creat up to 2.14 yesterday - AM labs pending.       Review of Systems:   A comprehensive review of systems was negative except for that written in the HPI. Objective:   Physical Exam:     Visit Vitals  BP 91/72   Pulse (!) 114   Temp 98.1 °F (36.7 °C)   Resp 18   Ht 5' 6\" (1.676 m)   Wt 155.5 kg (342 lb 13 oz)   SpO2 97%   BMI 55.33 kg/m²      O2 Device: Room air  Temp (24hrs), Av.1 °F (36.7 °C), Min:97.5 °F (36.4 °C), Max:99.3 °F (37.4 °C)    No intake/output data recorded.  1901 -  0700  In: 300 [P.O.:300]  Out: -   General:  Alert, supramorbidly obese, cooperative, no distress, appears stated age. Head:  Normocephalic, without obvious abnormality, atraumatic.    Eyes: Conjunctivae/corneas clear. PERRL, EOMs intact. Nose: Nares normal. Septum midline. Mucosa normal. No drainage or sinus tenderness. Throat: Lips, mucosa, and tongue moist..   Neck: Supple, symmetrical, trachea midline, no adenopathy, thyroid: no enlargement/tenderness/nodules, no carotid bruit and no JVD. Back:   Symmetric, no curvature. ROM normal. No CVA tenderness. Lungs:   Sounds clear at this moment. Chest wall:  No tenderness or deformity. Heart:  Irreg, rate better, no murmur, click, rub or gallop. Abdomen:   Soft, non-tender. Large Pannus. Bowel sounds normal. No masses,  No organomegaly. Extremities: no cyanosis. Trace to 1+ LE edema. No calf tenderness or cords. Pulses: 2+ and symmetric all extremities. Skin:  turgor normal   Neurologic: CNII-XII intact. Alert and oriented X 3. Fine motor of hands and fingers normal.   equal.  No cogwheeling or rigidity. Gait not tested at this time. Sensation grossly normal to touch. Gross motor of extremities normal.       Data Review:       Recent Days:  Recent Labs     07/12/19  0623 07/11/19  0441   WBC 10.2 9.5   HGB 10.6* 11.2*   HCT 33.5* 35.6*    225     Recent Labs     07/12/19  0623 07/11/19  1556 07/11/19  0441 07/10/19  1045     --  139 142   K 3.9  --  3.5 3.3*     --  104 104   CO2 27  --  27 30   *  --  120* 120*   BUN 31*  --  26* 25*   CREA 2.14*  --  1.70* 1.54*   CA 8.1*  --  8.3* 8.0*   MG 2.4  --  2.4 2.3   ALB 3.3*  --  3.5  --    TBILI 1.5*  --  1.2*  --    SGOT 993*  --  62*  --    *  --  115*  --    INR 1.8* 1.9*  --  2.0*     No results for input(s): PH, PCO2, PO2, HCO3, FIO2 in the last 72 hours. 24 Hour Results:  Recent Results (from the past 24 hour(s))   FERRITIN    Collection Time: 07/12/19 11:47 AM   Result Value Ref Range    Ferritin 84 26 - 388 NG/ML   HEPATITIS C AB    Collection Time: 07/12/19 11:47 AM   Result Value Ref Range    Hep C  virus Ab Interp.  NONREACTIVE NR Hep C  virus Ab comment Method used is Siemens Advia Centaur     HEP B SURFACE AG    Collection Time: 07/12/19 11:47 AM   Result Value Ref Range    Hepatitis B surface Ag <0.10 Index    Hep B surface Ag Interp. NEGATIVE  NEG     SAMPLES BEING HELD    Collection Time: 07/12/19 11:47 AM   Result Value Ref Range    SAMPLES BEING HELD 2SST     COMMENT        Add-on orders for these samples will be processed based on acceptable specimen integrity and analyte stability, which may vary by analyte. TSH 3RD GENERATION    Collection Time: 07/12/19 11:47 AM   Result Value Ref Range    TSH 2.97 0.36 - 3.74 uIU/mL       Medications reviewed  Current Facility-Administered Medications   Medication Dose Route Frequency    metoprolol tartrate (LOPRESSOR) tablet 12.5 mg  12.5 mg Oral Q6H    polyethylene glycol (MIRALAX) packet 17 g  17 g Oral DAILY    sodium chloride (NS) flush 5-40 mL  5-40 mL IntraVENous Q8H    sodium chloride (NS) flush 5-40 mL  5-40 mL IntraVENous PRN    Warfarin - Pharmacy to dose   Other Rx Dosing/Monitoring    ondansetron (ZOFRAN) injection 4 mg  4 mg IntraVENous Q6H PRN    docusate sodium (COLACE) capsule 100 mg  100 mg Oral BID    sodium chloride (NS) flush 5-40 mL  5-40 mL IntraVENous Q8H    sodium chloride (NS) flush 5-40 mL  5-40 mL IntraVENous PRN    acetaminophen (TYLENOL) tablet 650 mg  650 mg Oral Q6H PRN     Care Plan discussed with: Patient/Nurse/Cards  Total time spent with patient: 30 minutes.   Karan Navarrete MD

## 2019-07-13 NOTE — PROGRESS NOTES
Progress Note                                        Taco Gallo MD, [facility] [address], [place], [address] [place] [address] [place]                                                 Phone [phone]; Fax [phone]        2019 12:13 PM     Admit Date:           2019  Admit Diagnosis:  Acute on chronic systolic CHF (congestive heart failure) ([facility] [address] [I50.23]  :          1970   MRN:          [id number]   ASSESSMENT/RECOMMENDATION:   Acute on chronic HFrEF: holding diuretics d/t rise in creatinine - he did have  AM dose. Plans to start torsemide tomorrow morning.   -Had to discontinue his beta-blocker because he does not like them and causes symptoms. -  Long term should be on entresto & RAASi     Non ischemic cardiomyopathy: cardiac cath showing no CAD- normal. High right heart filling pressures.   -resuming coumadin- INR 1.8  -EF 30%  -ONR 14 BEAT RUN VT. WILL NEED LIFEVEST!!     CAD: no CAD per cath. Statin and ASA d/c     Persistent afib rate controlled - change toprol to metoprolol tartrate for easier titration w soft BP, LFTs significantly trending up -hold amiodarone    -I know there was interested in holding his diltiazem but he feels as though that something to control his rate.   I am not favor going back on the diltiazem at this time and should he continue to have poorly controlled rates his LFTs do not go up upper back on the amiodarone versus AV evangelist ablation.  - warfarin resumed- INR 1.8- hold lovenox w rise in creatinine   -TSH WNL  RATE GOING BACK UP SO HAD TO ADD BACK CARDIZEM 30 MG TID     XU creatinine up at 2.1- holding meds as above      Morbid obesity Body mass index is 54.26 kg/m².     PEPE- CPAP at bedside      Elevated LFTs: holding amiodarone- continue to monitor   -FERRITIN,HEP b AND HIV PENDING  -;EFTS TRENDING DOWN AND NOW ALT  AND AST IS 182     -Long discussion about personal responsibility in taking good care of himself          No intake/output data recorded. Last 3 Recorded Weights in this Encounter    07/11/19 1004 07/13/19 0110 07/14/19 0420   Weight: 336 lb 3.2 oz (152.5 kg) 342 lb 13 oz (155.5 kg) 342 lb (155.1 kg)         07/12 1901 - 07/14 0700  In: 1250 [P.O.:1250]  Out: [address]           Joseph Swan Jr. is a 47 y. o. male w/ hx of CHF (EF 26-30% w/ mod dilated RA/LA/RV), permanent Afib on warfarin, aortic valve sclerosis, PEPE, asthma, spinal stenosis, ETOH abuse, morbid obesity presents with worsening sob, LE edema and weight gain over the past 2 weeks. Pt was seen in June 2019 with repeat ECHO and nuclear stress test on 6/26. His diltiazem for his afib was stopped and changed to Toprol XL 50mg given his EF 26-30%. Since the change, per pt, he says his lasix 40mg BID has not been working and that he has been feeling short of breath. He called the office and his Toprol XL was decrease 25mg per pt. He has gained over 20 lbs since 2 weeks and says he has increased swelling (mostly in his belly and some in his legs). Pt denies cp, palpitation, fever/chills, cough, wheezing, sick contact Pt has been using his CPAP consistently. Pt vapes for the past 2 years and has hx of smoking for 30 years).   Cors normal by cath on 7/11     EDP 35 mm hg;PA sat 51%        Tammi Tahira. reports NO COMPLAINTS.       Current Facility-Administered Medications   Medication Dose Route Frequency    metoprolol tartrate (LOPRESSOR) tablet 12.5 mg  12.5 mg Oral Q6H    polyethylene glycol (MIRALAX) packet 17 g  17 g Oral DAILY    sodium chloride (NS) flush 5-40 mL  5-40 mL IntraVENous Q8H    sodium chloride (NS) flush 5-40 mL  5-40 mL IntraVENous PRN    Warfarin - Pharmacy to dose   Other Rx Dosing/Monitoring    ondansetron (ZOFRAN) injection 4 mg  4 mg IntraVENous Q6H PRN    docusate sodium (COLACE) capsule 100 mg  100 mg Oral BID    sodium chloride (NS) flush 5-40 mL  5-40 mL IntraVENous Q8H    sodium chloride (NS) flush 5-40 mL  5-40 mL IntraVENous PRN    acetaminophen (TYLENOL) tablet 650 mg  650 mg Oral Q6H PRN      OBJECTIVE               Intake/Output Summary (Last 24 hours) at 7/14/2019 0847  Last data filed at 7/14/2019 2039  Gross per 24 hour   Intake 1050 ml   Output 1100 ml   Net -50 ml       Review of Systems - History obtained from the patient AS PER  HPI        PHYSICAL EXAM        Visit Vitals  /87 (BP 1 Location: Left arm, BP Patient Position: At rest)   Pulse 91   Temp 97.7 °F (36.5 °C)   Resp 16   Ht 5' 6\" (1.676 m)   Wt 342 lb (155.1 kg)   SpO2 100%   BMI 55.20 kg/m²       Gen: Well-developed, well-nourished, in no acute distress  alert and oriented x 3  HEENT:  Pink conjunctivae, Hearing grossly normal.No scleral icterus or conjunctival, moist mucous membranes  Neck: Supple,No JVD, No Carotid Bruit, Thyroid- non tender No cervical lymphadenopathy  Resp: No accessory muscle use, Clear breath sounds, No rales or rhonchi  Card: IRREGULAR   MSK: No cyanosis or clubbing, good capillary refill  Skin: No rashes or ulcers, no bruising  Neuro:  Cranial nerves are grossly intact, moving all four extremities, no focal deficit, follows commands appropriately  Psych:  FAIR TO POOR insight, oriented to person, place and time, alert, Nml Affect  LE: + edema       DATA REVIEW            Laboratory and Imaging have been reviewed by me and are notable for  No results for input(s): CPK, CKMB, TROIQ in the last 72 hours.   Recent Labs     07/14/19  0042 07/13/19  1016 07/12/19  0623    136 138   K 3.7 3.4* 3.9   CO2 32 30 27   BUN 30* 32* 31*   CREA 1.76* 1.98* 2.14*   * 124* 120*   MG 2.3 2.4 2.4   WBC 8.0 8.8 10.2   HGB 10.6* 10.8* 10.6*   HCT 33.8* 34.7* 33.5*    214 216             Milena Umaña MD

## 2019-07-13 NOTE — PROGRESS NOTES
[address] [place] [address]. YOB: 1970          Assessment & Plan:     1 XU /CKD 3   · XU from Cardio renal Syndrome and now with contrast   · Cr up as expected  : BP Low, contrast, loops  · Hold loops for now  · Check bmp this am  · Resume loops if cr stable and dc when cr stable, nees ckd visit post dc  2. CKD2 and microalbuminuria  3. SCHF with acute exacerbation  · EF 26%  · S/p cath 7 11 19: no CAD, high pressures  · SOB better  · Daily wts  4. Morbid Obesity   · PEPE on cpap             Subjective:   CC:xu  HPI: Patient seen  NO LABS DONE TODAY  No c/o sob,   No c/o chest pain,   No c/o nausea or vomiting  No c/o  fever. ROS:neg for 12 sys except above    Current Facility-Administered Medications   Medication Dose Route Frequency    metoprolol tartrate (LOPRESSOR) tablet 12.5 mg  12.5 mg Oral Q6H    polyethylene glycol (MIRALAX) packet 17 g  17 g Oral DAILY    sodium chloride (NS) flush 5-40 mL  5-40 mL IntraVENous Q8H    sodium chloride (NS) flush 5-40 mL  5-40 mL IntraVENous PRN    Warfarin - Pharmacy to dose   Other Rx Dosing/Monitoring    ondansetron (ZOFRAN) injection 4 mg  4 mg IntraVENous Q6H PRN    docusate sodium (COLACE) capsule 100 mg  100 mg Oral BID    sodium chloride (NS) flush 5-40 mL  5-40 mL IntraVENous Q8H    sodium chloride (NS) flush 5-40 mL  5-40 mL IntraVENous PRN    acetaminophen (TYLENOL) tablet 650 mg  650 mg Oral Q6H PRN          Objective:     Vitals:  Blood pressure 92/58, pulse 99, temperature 97.9 °F (36.6 °C), resp. rate 18, height 5' 6\" (1.676 m), weight 155.5 kg (342 lb 13 oz), SpO2 97 %. Temp (24hrs), Av.1 °F (36.7 °C), Min:97.5 °F (36.4 °C), Max:99.3 °F (37.4 °C)      Intake and Output:  No intake/output data recorded.    1901 -  0700  In: 300 [P.O.:300]  Out: -     Physical Exam:                Patient is intubated:  no    Physical Examination:   GEN:  NAD  NECK:  Supple, no thyromegaly  RESP: CTA  b/l, no  wheezing,   CVS: RRR,S1,S2   ABDO:  soft , non tender, No mass  NEURO: non focal, normal speech  EXT: Edema +nt     NO AYALA          ECG/rhythm:    Data Review      No results for input(s): TNIPOC in the last 72 hours. No lab exists for component: ITNL   No results for input(s): CPK, CKMB, TROIQ in the last 72 hours. Recent Labs     07/12/19  0623 07/11/19  0441 07/10/19  1045    139 142   K 3.9 3.5 3.3*    104 104   CO2 27 27 30   BUN 31* 26* 25*   CREA 2.14* 1.70* 1.54*   * 120* 120*   MG 2.4 2.4 2.3   CA 8.1* 8.3* 8.0*   ALB 3.3* 3.5  --    WBC 10.2 9.5  --    HGB 10.6* 11.2*  --    HCT 33.5* 35.6*  --     225  --       Recent Labs     07/12/19  0623 07/11/19  1556 07/10/19  1045   INR 1.8* 1.9* 2.0*   PTP 17.5* 18.6* 19.8*     Needs: urine analysis, urine sodium, protein and creatinine  Lab Results   Component Value Date/Time    Sodium,urine random 27 07/09/2019 01:29 AM    Creatinine, urine 86.80 07/09/2019 01:29 AM    Creatinine, urine 87.10 07/09/2019 01:29 AM         Discussed with:  pt    : Tanner Ewing MD  7/13/2019        [facility]:  [email or web address]  [facility] office:  [address] [place] [address] [address]  [place], [address] [facility]  Phone: [phone]  Fax :     [phone]    [place] office:  [address] [facility]  [facility]  Phone - [phone]  Fax - [phone]

## 2019-07-14 LAB
ALBUMIN SERPL-MCNC: 3.3 G/DL (ref 3.5–5)
ALBUMIN/GLOB SERPL: 1.1 {RATIO} (ref 1.1–2.2)
ALP SERPL-CCNC: 86 U/L (ref 45–117)
ALT SERPL-CCNC: 475 U/L (ref 12–78)
ANION GAP SERPL CALC-SCNC: 4 MMOL/L (ref 5–15)
AST SERPL-CCNC: 182 U/L (ref 15–37)
BASOPHILS # BLD: 0 K/UL (ref 0–0.1)
BASOPHILS NFR BLD: 1 % (ref 0–1)
BILIRUB SERPL-MCNC: 1 MG/DL (ref 0.2–1)
BUN SERPL-MCNC: 30 MG/DL (ref 6–20)
BUN/CREAT SERPL: 17 (ref 12–20)
CALCIUM SERPL-MCNC: 8.2 MG/DL (ref 8.5–10.1)
CHLORIDE SERPL-SCNC: 101 MMOL/L (ref 97–108)
CO2 SERPL-SCNC: 32 MMOL/L (ref 21–32)
CREAT SERPL-MCNC: 1.76 MG/DL (ref 0.7–1.3)
DIFFERENTIAL METHOD BLD: ABNORMAL
EOSINOPHIL # BLD: 0.2 K/UL (ref 0–0.4)
EOSINOPHIL NFR BLD: 2 % (ref 0–7)
ERYTHROCYTE [DISTWIDTH] IN BLOOD BY AUTOMATED COUNT: 14.8 % (ref 11.5–14.5)
GLOBULIN SER CALC-MCNC: 3 G/DL (ref 2–4)
GLUCOSE SERPL-MCNC: 107 MG/DL (ref 65–100)
HCT VFR BLD AUTO: 33.8 % (ref 36.6–50.3)
HGB BLD-MCNC: 10.6 G/DL (ref 12.1–17)
IMM GRANULOCYTES # BLD AUTO: 0 K/UL (ref 0–0.04)
IMM GRANULOCYTES NFR BLD AUTO: 0 % (ref 0–0.5)
INR PPP: 1.8 (ref 0.9–1.1)
LYMPHOCYTES # BLD: 1.8 K/UL (ref 0.8–3.5)
LYMPHOCYTES NFR BLD: 22 % (ref 12–49)
MAGNESIUM SERPL-MCNC: 2.3 MG/DL (ref 1.6–2.4)
MCH RBC QN AUTO: 29.6 PG (ref 26–34)
MCHC RBC AUTO-ENTMCNC: 31.4 G/DL (ref 30–36.5)
MCV RBC AUTO: 94.4 FL (ref 80–99)
MONOCYTES # BLD: 1 K/UL (ref 0–1)
MONOCYTES NFR BLD: 12 % (ref 5–13)
NEUTS SEG # BLD: 5 K/UL (ref 1.8–8)
NEUTS SEG NFR BLD: 63 % (ref 32–75)
NRBC # BLD: 0.02 K/UL (ref 0–0.01)
NRBC BLD-RTO: 0.2 PER 100 WBC
PLATELET # BLD AUTO: 198 K/UL (ref 150–400)
PMV BLD AUTO: 11.7 FL (ref 8.9–12.9)
POTASSIUM SERPL-SCNC: 3.7 MMOL/L (ref 3.5–5.1)
PROT SERPL-MCNC: 6.3 G/DL (ref 6.4–8.2)
PROTHROMBIN TIME: 17.7 SEC (ref 9–11.1)
RBC # BLD AUTO: 3.58 M/UL (ref 4.1–5.7)
SODIUM SERPL-SCNC: 137 MMOL/L (ref 136–145)
WBC # BLD AUTO: 8 K/UL (ref 4.1–11.1)

## 2019-07-14 PROCEDURE — 74011250637 HC RX REV CODE- 250/637: Performed by: STUDENT IN AN ORGANIZED HEALTH CARE EDUCATION/TRAINING PROGRAM

## 2019-07-14 PROCEDURE — 36415 COLL VENOUS BLD VENIPUNCTURE: CPT

## 2019-07-14 PROCEDURE — 83735 ASSAY OF MAGNESIUM: CPT

## 2019-07-14 PROCEDURE — 65660000000 HC RM CCU STEPDOWN

## 2019-07-14 PROCEDURE — 74011250637 HC RX REV CODE- 250/637: Performed by: INTERNAL MEDICINE

## 2019-07-14 PROCEDURE — 74011250637 HC RX REV CODE- 250/637: Performed by: FAMILY MEDICINE

## 2019-07-14 PROCEDURE — 94760 N-INVAS EAR/PLS OXIMETRY 1: CPT

## 2019-07-14 PROCEDURE — 85610 PROTHROMBIN TIME: CPT

## 2019-07-14 PROCEDURE — 74011250637 HC RX REV CODE- 250/637: Performed by: SPECIALIST

## 2019-07-14 PROCEDURE — 85025 COMPLETE CBC W/AUTO DIFF WBC: CPT

## 2019-07-14 PROCEDURE — 80053 COMPREHEN METABOLIC PANEL: CPT

## 2019-07-14 RX ORDER — DILTIAZEM HYDROCHLORIDE 120 MG/1
120 CAPSULE, COATED, EXTENDED RELEASE ORAL DAILY
Status: DISCONTINUED | OUTPATIENT
Start: 2019-07-14 | End: 2019-07-15

## 2019-07-14 RX ORDER — WARFARIN SODIUM 5 MG/1
10 TABLET ORAL EVERY EVENING
Status: COMPLETED | OUTPATIENT
Start: 2019-07-14 | End: 2019-07-14

## 2019-07-14 RX ORDER — TORSEMIDE 20 MG/1
20 TABLET ORAL DAILY
Status: DISCONTINUED | OUTPATIENT
Start: 2019-07-14 | End: 2019-07-15

## 2019-07-14 RX ADMIN — Medication 10 ML: at 06:36

## 2019-07-14 RX ADMIN — WARFARIN SODIUM 10 MG: 5 TABLET ORAL at 17:32

## 2019-07-14 RX ADMIN — Medication 10 ML: at 14:00

## 2019-07-14 RX ADMIN — Medication 10 ML: at 21:51

## 2019-07-14 RX ADMIN — DOCUSATE SODIUM 100 MG: 100 CAPSULE ORAL at 17:32

## 2019-07-14 RX ADMIN — DILTIAZEM HYDROCHLORIDE 120 MG: 120 CAPSULE, COATED, EXTENDED RELEASE ORAL at 11:31

## 2019-07-14 RX ADMIN — TORSEMIDE 20 MG: 20 TABLET ORAL at 11:38

## 2019-07-14 RX ADMIN — DOCUSATE SODIUM 100 MG: 100 CAPSULE ORAL at 08:48

## 2019-07-14 RX ADMIN — POLYETHYLENE GLYCOL 3350 17 G: 17 POWDER, FOR SOLUTION ORAL at 08:48

## 2019-07-14 NOTE — PROGRESS NOTES
Bedside and Verbal shift change report given to Darya Weinberg (oncoming nurse) by Angela Chowdhury RN (offgoing nurse). Report included the following information SBAR, Kardex, ED Summary, Procedure Summary, Intake/Output, Recent Results, Med Rec Status and Cardiac Rhythm A-Fib.     2300: Pt sitting in chair watching TV wearing C-Pap.     01:10: Notified by [employer] of pt experiencing 15 run of unsustained V-Tach. Pt ambulated to bathroom; denies chest pain. 0115: Notified Dr. Danny De Dios answering service of pt status. Awaiting return call. Bedside and Verbal shift change report given to Angela Chowdhury RN (oncoming nurse) by Darya Weinberg (offgoing nurse). Report included the following information SBAR, Kardex, ED Summary, Procedure Summary, Intake/Output, Recent Results, Med Rec Status and Cardiac Rhythm A-Fib/A-Flutter.

## 2019-07-14 NOTE — PROGRESS NOTES
[facility] Dosing Services: 7/14/2019     Consult for Warfarin Dosing by Pharmacy by Dr. Sarah Doherty provided for this 47 y.o.  male , for indication of Atrial Fibrillation. Day of Therapy - 3 resumed   Home dose - 10 mg every day except 5 mg on Tuesday and Wednesday   Dose to achieve an INR goal of 2-3     Order entered for  Warfarin  10 (mg) ordered to be given today at 18:00.      Significant drug interactions: amiodarone new start 7/9 discontinued 7/12  Previous dose given 7.5 mg   PT/INR        Lab Results    Component Value Date/Time     INR 1.8 (H) 07/14/2019 12:42 AM          Platelets        Lab Results   Component Value Date/Time      PLATELET 651 10/84/1638 12:42 AM           H/H       Lab Results   Component Value Date/Time     HGB 10.6 (L) 07/14/2019 12:42 AM             Pharmacy to follow daily and will provide subsequent Warfarin dosing based on clinical status.   Heriberto Carmona [facility])          Contact information [phone]

## 2019-07-14 NOTE — PROGRESS NOTES
Daily Progress Note: 7/14/2019  Hmamad MENA Ba    Assessment/Plan:   Acute on chronic systolic CH[facility] (congest[facility] (7/8/2019)/Cardiomyopathy:  Symptoms of increasing SOB and orthopnea. Suggestive of CHF. Recent stress test with EF 24% w/o reversible defect. -- bumex IV/po per Cards and Renal - poss switch to Torsemide per Cards  -- daily weight  -- strict I/O  -- cardiology consulted and Cath done 7/11 - non-ischemic  -- hold ARB due to SYED and possible adverse effects     Persistent atrial fibrillation with RVR in ED. Likely contributing to exacerbation of CHF. Was recently taken off dilt and started on Toprol XL now stopped due to perceived side effects  -- tx per Cards  -- monitor PT/INR  -- pharmacy to dose warfarin     Acute renal insufficiency on CKD3 (7/8/2019): May be from acute CHF exacerbation vs ARB use. -- hold ARB  -- monitor creatinine with diuresis  -- nephrology consulted     PEPE (obstructive sleep apnea) (6/22/2017):  -- continue CPAP     SupraMorbid obesity with BMI>54;  due to excess calories ([facility] (7/18/2017):  -- would benefit from weight loss; counseled pt.        Problem List:  Problem List as of 7/14/2019 Date Reviewed: 7/8/2019          Codes Class Noted - Resolved    * (Principal) Acute on chronic systolic CHF (congestive heart failure) (HCC) ICD-10-CM: I50.23  ICD-9-CM: 428.23, 428.0  7/8/2019 - Present        Acute renal insufficiency ICD-10-CM: N28.9  ICD-9-CM: 593.9  7/8/2019 - Present        Chronic diastolic heart failure ([facility] ICD-10-CM: I50.32  ICD-9-CM: 428.32  4/25/2018 - Present        Chronic systolic congestive heart failure ([facility] ICD-10-CM: I50.22  ICD-9-CM: 428.22, 428.0  4/25/2018 - Present        Chronic anticoagulation ICD-10-CM: Z79.01  ICD-9-CM: V58.61  10/13/2017 - Present        Persistent atrial fibrillation (HCC) ICD-10-CM: I48.1  ICD-9-CM: 427.31  7/18/2017 - Present        Morbid obesity due to excess calories [facility]) ICD-10-CM: E66.01  ICD-9-CM: 278.01  7/18/2017 - Present        Renal insufficiency ICD-10-CM: N28.9  ICD-9-CM: 593.9  6/22/2017 - Present        PEPE (obstructive sleep apnea) ICD-10-CM: G47.33  ICD-9-CM: 327.23  6/22/2017 - Present        Tobacco use ICD-10-CM: Z72.0  ICD-9-CM: 305.1  6/22/2017 - Present        Chronic back pain ICD-10-CM: M54.9, G89.29  ICD-9-CM: 724.5, 338.29  6/22/2017 - Present        Asthma ICD-10-CM: J45.909  ICD-9-CM: 493.90  6/22/2017 - Present        Elevated BP without diagnosis of hypertension ICD-10-CM: R03.0  ICD-9-CM: 796.2  6/22/2017 - Present        Exertional dyspnea ICD-10-CM: R06.09  ICD-9-CM: 786.09  6/22/2017 - Present        RESOLVED: Atrial fibrillation with RVR (HCC) ICD-10-CM: I48.91  ICD-9-CM: 427.31  6/22/2017 - 7/18/2017        RESOLVED: Obesity ICD-10-CM: E66.9  ICD-9-CM: 278.00  6/22/2017 - 7/18/2017              Subjective:    47 y.o.  male who is admitted with Acute on chronic systolic CHF (congestive heart failure) ([facility] Mr. Mahesh Sahni presented to the Emergency Department today complaining of SOB. Increasing for the past two weeks. Worse with supine. Leg edema unchanged. No chest pains. Two weeks ago had po dilt stopped in favor of losartan and Toprol. Has had belching and burping since medication change as well. Has had adjustments to diuretic regimen up and down due to symptoms. However, patient attributes symptoms onset after stress test and changes in medication two weeks ago. Reports compliance with CPAP. (Dr Kentrell Hutson)    7/9:  He reports he is breathing marginally better today after some diuresis. He has his CPAP and wore it last night. He reports his wt is up about \"25 pounds from two wks ago. \"  Denies increased fluid intake. EF down to 20s. Discussed/counseled about his SupraMorbid obesity, links to his PEPE and CHF/risks,  but he is in denial and says \"its all fluid\" and \"eat not much at all. \"    7/10:  Breathing better today and speaks in full sentences this AM.  About a liter more out. Right heart cath when volume status is better. BP better this AM.      7/11: He reports he is \"not doing any better with breathing. \"  About 2 liters out. Creat sl up from yesterday. With EF this low it is going to be difficult to get more fluid off. Cath planned for later today. C/O constipation - mirilax added. 7/12:  Still reports diff with SOB and FENTON. Cath yesterday with elevated filling pressures and nothing to stent. LFTs up - may be due to episodes of low BP yesterday - had to hold PM dose of BB yesterday. Rate is better this AM (at this moment). About a liter more out. Creat up to 2.14.    7/13:  Starting to feel better with much less SOB. He is convinced that the Metoprolol causes him to have SOB - he reports \"as soon as I take it I can't get my breath. \"  He reports he will not take it again. Discussed potential side effects/adverse effects and that it can also be used to tx panic attacks - he is convinced it causes his \"bad breathing spells. \"  Ciro Mews him to discuss with Cardiology potential alternatives. He is to start Torsemide today. Watch at least another day and see how he tolerates the Torsemide. Cont to discuss his supra-morbid obesity but he is convinced it is not a problem. Creat up to 2.14 yesterday - AM labs pending. 7/14:  Reports feeling better and wants to be up more. Much less SOB at rest and better FENTON. 15 beat run of asymptomatic VT yesterday when up - poss need for life vest???  Pt refusing Metoprolol. Cards did not order the Torsemide yesterday - ? Dose to start. I/O about equal today. LFTs grad improving.       Review of Systems:   A comprehensive review of systems was negative except for that written in the HPI.     Objective:   Physical Exam:     Visit Vitals  /87 (BP 1 Location: Left arm, BP Patient Position: At rest)   Pulse 91   Temp 97.7 °F (36.5 °C)   Resp 16   Ht 5' 6\" (1.676 m)   Wt 155.1 kg (342 lb)   SpO2 100%   BMI 55.20 kg/m²      O2 Device: Room air  Temp (24hrs), Av.7 °F (36.5 °C), Min:97.4 °F (36.3 °C), Max:98.2 °F (36.8 °C)    No intake/output data recorded. 1901 -  0700  In: 1250 [P.O.:1250]  Out: 1100 [Urine:1100]  General:  Alert, supramorbidly obese, cooperative, no distress, appears stated age. Head:  Normocephalic, without obvious abnormality, atraumatic. Eyes:  Conjunctivae/corneas clear. PERRL, EOMs intact. Nose: Nares normal. Septum midline. Mucosa normal. No drainage or sinus tenderness. Throat: Lips, mucosa, and tongue moist..   Neck: Supple, symmetrical, trachea midline, no adenopathy, thyroid: no enlargement/tenderness/nodules, no carotid bruit and no JVD. Lungs:   Sounds clear at this moment. Chest wall:  No tenderness or deformity. Heart:  Irreg, rate better, no murmur, click, rub or gallop. Abdomen:   Soft, non-tender. Large Pannus. Bowel sounds normal. No masses,  No organomegaly. Extremities: no cyanosis. 1+ LE edema. No calf tenderness or cords. Pulses: 2+ and symmetric all extremities. Skin:  turgor normal   Neurologic: CNII-XII intact. Alert and oriented X 3. Fine motor of hands and fingers normal.   equal.  No cogwheeling or rigidity. Gait not tested at this time. Sensation grossly normal to touch.   Gross motor of extremities normal.       Data Review:       Recent Days:  Recent Labs     19  0042 19  1016 19  0623   WBC 8.0 8.8 10.2   HGB 10.6* 10.8* 10.6*   HCT 33.8* 34.7* 33.5*    214 216     Recent Labs     19  0042 19  1016 19  0623    136 138   K 3.7 3.4* 3.9    100 103   CO2 32 30 27   * 124* 120*   BUN 30* 32* 31*   CREA 1.76* 1.98* 2.14*   CA 8.2* 8.5 8.1*   MG 2.3 2.4 2.4   ALB 3.3* 3.4* 3.3*   TBILI 1.0 1.4* 1.5*   SGOT 182* 349* 993*   * 602* 776*   INR 1.8* 1.8* 1.8*     No results for input(s): PH, PCO2, PO2, HCO3, FIO2 in the last 72 hours. 24 Hour Results:  Recent Results (from the past 24 hour(s))   MAGNESIUM    Collection Time: 07/13/19 10:16 AM   Result Value Ref Range    Magnesium 2.4 1.6 - 2.4 mg/dL   PROTHROMBIN TIME + INR    Collection Time: 07/13/19 10:16 AM   Result Value Ref Range    INR 1.8 (H) 0.9 - 1.1      Prothrombin time 17.8 (H) 9.0 - 11.1 sec   CBC WITH AUTOMATED DIFF    Collection Time: 07/13/19 10:16 AM   Result Value Ref Range    WBC 8.8 4.1 - 11.1 K/uL    RBC 3.71 (L) 4.10 - 5.70 M/uL    HGB 10.8 (L) 12.1 - 17.0 g/dL    HCT 34.7 (L) 36.6 - 50.3 %    MCV 93.5 80.0 - 99.0 FL    MCH 29.1 26.0 - 34.0 PG    MCHC 31.1 30.0 - 36.5 g/dL    RDW 15.0 (H) 11.5 - 14.5 %    PLATELET 445 061 - 654 K/uL    MPV 11.3 8.9 - 12.9 FL    NRBC 0.3 (H) 0  WBC    ABSOLUTE NRBC 0.03 (H) 0.00 - 0.01 K/uL    NEUTROPHILS 67 32 - 75 %    LYMPHOCYTES 18 12 - 49 %    MONOCYTES 12 5 - 13 %    EOSINOPHILS 1 0 - 7 %    BASOPHILS 1 0 - 1 %    IMMATURE GRANULOCYTES 1 (H) 0.0 - 0.5 %    ABS. NEUTROPHILS 5.9 1.8 - 8.0 K/UL    ABS. LYMPHOCYTES 1.6 0.8 - 3.5 K/UL    ABS. MONOCYTES 1.1 (H) 0.0 - 1.0 K/UL    ABS. EOSINOPHILS 0.1 0.0 - 0.4 K/UL    ABS. BASOPHILS 0.1 0.0 - 0.1 K/UL    ABS. IMM. GRANS. 0.1 (H) 0.00 - 0.04 K/UL    DF AUTOMATED     METABOLIC PANEL, COMPREHENSIVE    Collection Time: 07/13/19 10:16 AM   Result Value Ref Range    Sodium 136 136 - 145 mmol/L    Potassium 3.4 (L) 3.5 - 5.1 mmol/L    Chloride 100 97 - 108 mmol/L    CO2 30 21 - 32 mmol/L    Anion gap 6 5 - 15 mmol/L    Glucose 124 (H) 65 - 100 mg/dL    BUN 32 (H) 6 - 20 MG/DL    Creatinine 1.98 (H) 0.70 - 1.30 MG/DL    BUN/Creatinine ratio 16 12 - 20      GFR est AA 44 (L) >60 ml/min/1.73m2    GFR est non-AA 36 (L) >60 ml/min/1.73m2    Calcium 8.5 8.5 - 10.1 MG/DL    Bilirubin, total 1.4 (H) 0.2 - 1.0 MG/DL    ALT (SGPT) 602 (H) 12 - 78 U/L    AST (SGOT) 349 (H) 15 - 37 U/L    Alk.  phosphatase 70 45 - 117 U/L    Protein, total 6.5 6.4 - 8.2 g/dL    Albumin 3.4 (L) 3.5 - 5.0 g/dL Globulin 3.1 2.0 - 4.0 g/dL    A-G Ratio 1.1 1.1 - 2.2     MAGNESIUM    Collection Time: 07/14/19 12:42 AM   Result Value Ref Range    Magnesium 2.3 1.6 - 2.4 mg/dL   PROTHROMBIN TIME + INR    Collection Time: 07/14/19 12:42 AM   Result Value Ref Range    INR 1.8 (H) 0.9 - 1.1      Prothrombin time 17.7 (H) 9.0 - 11.1 sec   CBC WITH AUTOMATED DIFF    Collection Time: 07/14/19 12:42 AM   Result Value Ref Range    WBC 8.0 4.1 - 11.1 K/uL    RBC 3.58 (L) 4.10 - 5.70 M/uL    HGB 10.6 (L) 12.1 - 17.0 g/dL    HCT 33.8 (L) 36.6 - 50.3 %    MCV 94.4 80.0 - 99.0 FL    MCH 29.6 26.0 - 34.0 PG    MCHC 31.4 30.0 - 36.5 g/dL    RDW 14.8 (H) 11.5 - 14.5 %    PLATELET 968 181 - 125 K/uL    MPV 11.7 8.9 - 12.9 FL    NRBC 0.2 (H) 0  WBC    ABSOLUTE NRBC 0.02 (H) 0.00 - 0.01 K/uL    NEUTROPHILS 63 32 - 75 %    LYMPHOCYTES 22 12 - 49 %    MONOCYTES 12 5 - 13 %    EOSINOPHILS 2 0 - 7 %    BASOPHILS 1 0 - 1 %    IMMATURE GRANULOCYTES 0 0.0 - 0.5 %    ABS. NEUTROPHILS 5.0 1.8 - 8.0 K/UL    ABS. LYMPHOCYTES 1.8 0.8 - 3.5 K/UL    ABS. MONOCYTES 1.0 0.0 - 1.0 K/UL    ABS. EOSINOPHILS 0.2 0.0 - 0.4 K/UL    ABS. BASOPHILS 0.0 0.0 - 0.1 K/UL    ABS. IMM. GRANS. 0.0 0.00 - 0.04 K/UL    DF AUTOMATED     METABOLIC PANEL, COMPREHENSIVE    Collection Time: 07/14/19 12:42 AM   Result Value Ref Range    Sodium 137 136 - 145 mmol/L    Potassium 3.7 3.5 - 5.1 mmol/L    Chloride 101 97 - 108 mmol/L    CO2 32 21 - 32 mmol/L    Anion gap 4 (L) 5 - 15 mmol/L    Glucose 107 (H) 65 - 100 mg/dL    BUN 30 (H) 6 - 20 MG/DL    Creatinine 1.76 (H) 0.70 - 1.30 MG/DL    BUN/Creatinine ratio 17 12 - 20      GFR est AA 50 (L) >60 ml/min/1.73m2    GFR est non-AA 41 (L) >60 ml/min/1.73m2    Calcium 8.2 (L) 8.5 - 10.1 MG/DL    Bilirubin, total 1.0 0.2 - 1.0 MG/DL    ALT (SGPT) 475 (H) 12 - 78 U/L    AST (SGOT) 182 (H) 15 - 37 U/L    Alk.  phosphatase 86 45 - 117 U/L    Protein, total 6.3 (L) 6.4 - 8.2 g/dL    Albumin 3.3 (L) 3.5 - 5.0 g/dL    Globulin 3.0 2.0 - 4.0 g/dL    A-G Ratio 1.1 1.1 - 2.2         Medications reviewed  Current Facility-Administered Medications   Medication Dose Route Frequency    metoprolol tartrate (LOPRESSOR) tablet 12.5 mg  12.5 mg Oral Q6H    polyethylene glycol (MIRALAX) packet 17 g  17 g Oral DAILY    sodium chloride (NS) flush 5-40 mL  5-40 mL IntraVENous Q8H    sodium chloride (NS) flush 5-40 mL  5-40 mL IntraVENous PRN    Warfarin - Pharmacy to dose   Other Rx Dosing/Monitoring    ondansetron (ZOFRAN) injection 4 mg  4 mg IntraVENous Q6H PRN    docusate sodium (COLACE) capsule 100 mg  100 mg Oral BID    sodium chloride (NS) flush 5-40 mL  5-40 mL IntraVENous Q8H    sodium chloride (NS) flush 5-40 mL  5-40 mL IntraVENous PRN    acetaminophen (TYLENOL) tablet 650 mg  650 mg Oral Q6H PRN     Care Plan discussed with: Patient/Nurse/Cards  Total time spent with patient: 30 minutes.   Sherman Polanco MD

## 2019-07-15 ENCOUNTER — APPOINTMENT (OUTPATIENT)
Dept: ULTRASOUND IMAGING | Age: 49
DRG: 286 | End: 2019-07-15
Attending: FAMILY MEDICINE
Payer: COMMERCIAL

## 2019-07-15 VITALS
WEIGHT: 315 LBS | BODY MASS INDEX: 50.62 KG/M2 | RESPIRATION RATE: 19 BRPM | HEART RATE: 98 BPM | OXYGEN SATURATION: 97 % | HEIGHT: 66 IN | SYSTOLIC BLOOD PRESSURE: 104 MMHG | DIASTOLIC BLOOD PRESSURE: 64 MMHG | TEMPERATURE: 98 F

## 2019-07-15 LAB
ALBUMIN SERPL-MCNC: 3.3 G/DL (ref 3.5–5)
ALBUMIN/GLOB SERPL: 1.1 {RATIO} (ref 1.1–2.2)
ALP SERPL-CCNC: 93 U/L (ref 45–117)
ALT SERPL-CCNC: 353 U/L (ref 12–78)
ANION GAP SERPL CALC-SCNC: 6 MMOL/L (ref 5–15)
AST SERPL-CCNC: 87 U/L (ref 15–37)
BASOPHILS # BLD: 0 K/UL (ref 0–0.1)
BASOPHILS NFR BLD: 0 % (ref 0–1)
BILIRUB SERPL-MCNC: 1 MG/DL (ref 0.2–1)
BUN SERPL-MCNC: 23 MG/DL (ref 6–20)
BUN/CREAT SERPL: 15 (ref 12–20)
CALCIUM SERPL-MCNC: 8.2 MG/DL (ref 8.5–10.1)
CHLORIDE SERPL-SCNC: 103 MMOL/L (ref 97–108)
CO2 SERPL-SCNC: 30 MMOL/L (ref 21–32)
CREAT SERPL-MCNC: 1.52 MG/DL (ref 0.7–1.3)
DIFFERENTIAL METHOD BLD: ABNORMAL
EOSINOPHIL # BLD: 0.2 K/UL (ref 0–0.4)
EOSINOPHIL NFR BLD: 3 % (ref 0–7)
ERYTHROCYTE [DISTWIDTH] IN BLOOD BY AUTOMATED COUNT: 15 % (ref 11.5–14.5)
GLOBULIN SER CALC-MCNC: 3.1 G/DL (ref 2–4)
GLUCOSE SERPL-MCNC: 100 MG/DL (ref 65–100)
HCT VFR BLD AUTO: 34.7 % (ref 36.6–50.3)
HGB BLD-MCNC: 10.8 G/DL (ref 12.1–17)
IMM GRANULOCYTES # BLD AUTO: 0.1 K/UL (ref 0–0.04)
IMM GRANULOCYTES NFR BLD AUTO: 1 % (ref 0–0.5)
INR PPP: 1.6 (ref 0.9–1.1)
LYMPHOCYTES # BLD: 1.7 K/UL (ref 0.8–3.5)
LYMPHOCYTES NFR BLD: 20 % (ref 12–49)
MAGNESIUM SERPL-MCNC: 2.4 MG/DL (ref 1.6–2.4)
MCH RBC QN AUTO: 29.2 PG (ref 26–34)
MCHC RBC AUTO-ENTMCNC: 31.1 G/DL (ref 30–36.5)
MCV RBC AUTO: 93.8 FL (ref 80–99)
MONOCYTES # BLD: 1 K/UL (ref 0–1)
MONOCYTES NFR BLD: 12 % (ref 5–13)
NEUTS SEG # BLD: 5.4 K/UL (ref 1.8–8)
NEUTS SEG NFR BLD: 64 % (ref 32–75)
NRBC # BLD: 0.02 K/UL (ref 0–0.01)
NRBC BLD-RTO: 0.2 PER 100 WBC
PLATELET # BLD AUTO: 211 K/UL (ref 150–400)
PMV BLD AUTO: 11.2 FL (ref 8.9–12.9)
POTASSIUM SERPL-SCNC: 3.6 MMOL/L (ref 3.5–5.1)
PROT SERPL-MCNC: 6.4 G/DL (ref 6.4–8.2)
PROTHROMBIN TIME: 16.1 SEC (ref 9–11.1)
RBC # BLD AUTO: 3.7 M/UL (ref 4.1–5.7)
SODIUM SERPL-SCNC: 139 MMOL/L (ref 136–145)
WBC # BLD AUTO: 8.4 K/UL (ref 4.1–11.1)

## 2019-07-15 PROCEDURE — 74011250637 HC RX REV CODE- 250/637: Performed by: INTERNAL MEDICINE

## 2019-07-15 PROCEDURE — 74011250637 HC RX REV CODE- 250/637: Performed by: SPECIALIST

## 2019-07-15 PROCEDURE — 36415 COLL VENOUS BLD VENIPUNCTURE: CPT

## 2019-07-15 PROCEDURE — 74011250637 HC RX REV CODE- 250/637: Performed by: STUDENT IN AN ORGANIZED HEALTH CARE EDUCATION/TRAINING PROGRAM

## 2019-07-15 PROCEDURE — 85025 COMPLETE CBC W/AUTO DIFF WBC: CPT

## 2019-07-15 PROCEDURE — 76700 US EXAM ABDOM COMPLETE: CPT

## 2019-07-15 PROCEDURE — 83735 ASSAY OF MAGNESIUM: CPT

## 2019-07-15 PROCEDURE — 74011250637 HC RX REV CODE- 250/637: Performed by: FAMILY MEDICINE

## 2019-07-15 PROCEDURE — 74011250637 HC RX REV CODE- 250/637: Performed by: NURSE PRACTITIONER

## 2019-07-15 PROCEDURE — 80053 COMPREHEN METABOLIC PANEL: CPT

## 2019-07-15 PROCEDURE — 74011250636 HC RX REV CODE- 250/636: Performed by: NURSE PRACTITIONER

## 2019-07-15 PROCEDURE — 85610 PROTHROMBIN TIME: CPT

## 2019-07-15 PROCEDURE — 94760 N-INVAS EAR/PLS OXIMETRY 1: CPT

## 2019-07-15 RX ORDER — TORSEMIDE 20 MG/1
40 TABLET ORAL DAILY
Status: DISCONTINUED | OUTPATIENT
Start: 2019-07-15 | End: 2019-07-15 | Stop reason: HOSPADM

## 2019-07-15 RX ORDER — ENOXAPARIN SODIUM 100 MG/ML
1 INJECTION SUBCUTANEOUS EVERY 12 HOURS
Status: DISCONTINUED | OUTPATIENT
Start: 2019-07-15 | End: 2019-07-15 | Stop reason: HOSPADM

## 2019-07-15 RX ORDER — TORSEMIDE 20 MG/1
40 TABLET ORAL DAILY
Qty: 60 TAB | Refills: 1 | Status: SHIPPED | OUTPATIENT
Start: 2019-07-16 | End: 2019-08-08 | Stop reason: SDUPTHER

## 2019-07-15 RX ORDER — DILTIAZEM HYDROCHLORIDE 180 MG/1
180 CAPSULE, COATED, EXTENDED RELEASE ORAL DAILY
Qty: 30 CAP | Refills: 1 | Status: SHIPPED | OUTPATIENT
Start: 2019-07-16 | End: 2019-08-05 | Stop reason: SDUPTHER

## 2019-07-15 RX ORDER — DILTIAZEM HYDROCHLORIDE 180 MG/1
180 CAPSULE, COATED, EXTENDED RELEASE ORAL DAILY
Status: DISCONTINUED | OUTPATIENT
Start: 2019-07-16 | End: 2019-07-15 | Stop reason: HOSPADM

## 2019-07-15 RX ORDER — DILTIAZEM HYDROCHLORIDE 30 MG/1
30 TABLET, FILM COATED ORAL ONCE
Status: COMPLETED | OUTPATIENT
Start: 2019-07-15 | End: 2019-07-15

## 2019-07-15 RX ORDER — WARFARIN SODIUM 5 MG/1
10 TABLET ORAL EVERY EVENING
Status: COMPLETED | OUTPATIENT
Start: 2019-07-15 | End: 2019-07-15

## 2019-07-15 RX ADMIN — Medication 10 ML: at 06:53

## 2019-07-15 RX ADMIN — TORSEMIDE 40 MG: 20 TABLET ORAL at 08:56

## 2019-07-15 RX ADMIN — DILTIAZEM HYDROCHLORIDE 30 MG: 30 TABLET, FILM COATED ORAL at 15:46

## 2019-07-15 RX ADMIN — DOCUSATE SODIUM 100 MG: 100 CAPSULE ORAL at 09:00

## 2019-07-15 RX ADMIN — DILTIAZEM HYDROCHLORIDE 120 MG: 120 CAPSULE, COATED, EXTENDED RELEASE ORAL at 08:56

## 2019-07-15 RX ADMIN — ENOXAPARIN SODIUM 160 MG: 80 INJECTION SUBCUTANEOUS at 12:25

## 2019-07-15 RX ADMIN — METOPROLOL TARTRATE 12.5 MG: 25 TABLET ORAL at 17:33

## 2019-07-15 RX ADMIN — WARFARIN SODIUM 10 MG: 5 TABLET ORAL at 17:32

## 2019-07-15 RX ADMIN — DOCUSATE SODIUM 100 MG: 100 CAPSULE ORAL at 17:33

## 2019-07-15 RX ADMIN — Medication 10 ML: at 15:47

## 2019-07-15 RX ADMIN — POLYETHYLENE GLYCOL 3350 17 G: 17 POWDER, FOR SOLUTION ORAL at 08:56

## 2019-07-15 NOTE — PROGRESS NOTES
[facility] Pharmacy Dosing Services: 7/15/2019    Consult for Warfarin Dosing by Pharmacy by Dr. Isai Corcoran provided for this 47 y.o.  male , for indication of Atrial Fibrillation. Day of Therapy - 3 resumed   Home dose - 10 mg every day except 5 mg on Tuesday and Wednesday   Dose to achieve an INR goal of 2-3    Order entered for  Warfarin  10 (mg) ordered to be given today at 18:00. Significant drug interactions: amiodarone new start 7/9 discontinued 7/12  Previous dose given 10 mg   PT/INR Lab Results   Component Value Date/Time    INR 1.6 (H) 07/15/2019 01:46 AM      Platelets Lab Results   Component Value Date/Time    PLATELET 358 25/40/4814 01:46 AM      H/H Lab Results   Component Value Date/Time    HGB 10.8 (L) 07/15/2019 01:46 AM        Pharmacy to follow daily and will provide subsequent Warfarin dosing based on clinical status.   [facility] [place])  Contact information [phone]

## 2019-07-15 NOTE — DISCHARGE INSTRUCTIONS
Patient Education        Avoiding Triggers With Heart Failure: Care Instructions  Your Care Instructions    Triggers are anything that make your heart failure flare up. A flare-up is also called \"sudden heart failure\" or \"acute heart failure. \" When you have a flare-up, fluid builds up in your lungs, and you have problems breathing. You might need to go to the hospital. By watching for changes in your condition and avoiding triggers, you can prevent heart failure flare-ups. Follow-up care is a key part of your treatment and safety. Be sure to make and go to all appointments, and call your doctor if you are having problems. It's also a good idea to know your test results and keep a list of the medicines you take. How can you care for yourself at home? Watch for changes in your weight and condition  · Weigh yourself without clothing at the same time each day. Record your weight. Call your doctor if you gain 3 pounds or more in 24 hrs or 5 pounds in one week. A sudden weight gain may mean that your heart failure is getting worse. · Keep a daily record of your symptoms. Write down any changes in how you feel, such as new shortness of breath, cough, or problems eating. Also record if your ankles are more swollen than usual and if you have to urinate in the night more often. Note anything that you ate or did that could have triggered these changes. Limit sodium  Sodium causes your body to hold on to water, making it harder for your heart to pump. People get most of their sodium from processed foods. Fast food and restaurant meals also tend to be very high in sodium. · Your doctor may suggest that you limit sodium to 1,500 milligrams (mg) a day. That is less than 1 teaspoon of salt a day, including all the salt you eat in cooking or in packaged foods. · Read food labels on cans and food packages. They tell you how much sodium you get in one serving. Check the serving size.  If you eat more than one serving, you are getting more sodium. · Be aware that sodium can come in forms other than salt, including monosodium glutamate (MSG), sodium citrate, and sodium bicarbonate (baking soda). MSG is often added to Asian food. You can sometimes ask for food without MSG or salt. · Slowly reducing salt will help you adjust to the taste. Take the salt shaker off the table. · Flavor your food with garlic, lemon juice, onion, vinegar, herbs, and spices instead of salt. Do not use soy sauce, steak sauce, onion salt, garlic salt, mustard, or ketchup on your food, unless it is labeled \"low-sodium\" or \"low-salt. \"  · Make your own salad dressings, sauces, and ketchup without adding salt. · Use fresh or frozen ingredients, instead of canned ones, whenever you can. Choose low-sodium canned goods. · Eat less processed food and food from restaurants, including fast food. Exercise as directed  Moderate, regular exercise is very good for your heart. It improves your blood flow and helps control your weight. But too much exercise can stress your heart and cause a heart failure flare-up. · Check with your doctor before you start an exercise program.  · Walking is an easy way to get exercise. Start out slowly. Gradually increase the length and pace of your walk. Swimming, riding a bike, and using a treadmill are also good forms of exercise. · When you exercise, watch for signs that your heart is working too hard. You are pushing yourself too hard if you cannot talk while you are exercising. If you become short of breath or dizzy or have chest pain, stop, sit down, and rest.  · Do not exercise when you do not feel well. Take medicines correctly  · Take your medicines exactly as prescribed. Call your doctor if you think you are having a problem with your medicine. · Make a list of all the medicines you take. Include those prescribed to you by other doctors and any over-the-counter medicines, vitamins, or supplements you take.  Take this list with you when you go to any doctor. · Take your medicines at the same time every day. It may help you to post a list of all the medicines you take every day and what time of day you take them. · Make taking your medicine as simple as you can. Plan times to take your medicines when you are doing other things, such as eating a meal or getting ready for bed. This will make it easier to remember to take your medicines. · Get organized. Use helpful tools, such as daily or weekly pill containers. When should you call for help? Call 911 if you have symptoms of sudden heart failure such as:  · You have severe trouble breathing. · You cough up pink, foamy mucus. · You have a new irregular or rapid heartbeat. Call your doctor now or seek immediate medical care if:  · You have new or increased shortness of breath. · You are dizzy or lightheaded, or you feel like you may faint. · You have sudden weight gain, such as 3 pounds in 24 hours, or 5 pounds in one week. · You have increased swelling in your legs, ankles, or feet. · You are suddenly so tired or weak that you cannot do your usual activities. Watch closely for changes in your health, and be sure to contact your doctor if you develop new symptoms. Where can you learn more? Go to http://cleo-elton.info/  Enter V089 in the search box to learn more about \"Avoiding Triggers With Heart Failure: Care Instructions. \"  © 3894-2412 [employer]. Care instructions adapted under license by [employer] (which disclaims liability or warranty for this information). This care instruction is for use with your licensed healthcare professional. If you have questions about a medical condition or this instruction, always ask your healthcare professional. [address] any warranty or liability for your use of this information. Content Version: 74.5.597376; Current as of: January 27, 2016 (modified 2/9/18). Patient Discharge Instructions    Harinder Okeefe / [id number] : 1970    Admitted 2019 Discharged: 7/15/2019 5:46 PM     ACUTE DIAGNOSES:  Acute on chronic systolic CHF (congestive heart failure) ([facility] [I50.23]    CHRONIC MEDICAL DIAGNOSES:  Problem List as of 7/15/2019 Date Reviewed: 2019          Codes Class Noted - Resolved    * (Principal) Acute on chronic systolic CHF (congestive heart failure) ([facility] ICD-10-CM: I50.23  ICD-9-CM: 428.23, 428.0  2019 - Present        Acute renal insufficiency ICD-10-CM: N28.9  ICD-9-CM: 593.9  2019 - Present        Chronic diastolic heart failure ([facility] ICD-10-CM: I50.32  ICD-9-CM: 428.32  2018 - Present        Chronic systolic congestive heart failure (HCC) ICD-10-CM: I50.22  ICD-9-CM: 428.22, 428.0  2018 - Present        Chronic anticoagulation ICD-10-CM: Z79.01  ICD-9-CM: V58.61  10/13/2017 - Present        Persistent atrial fibrillation (HCC) ICD-10-CM: I48.1  ICD-9-CM: 427.31  2017 - Present        Morbid obesity due to excess calories ([facility] ICD-10-CM: E66.01  ICD-9-CM: 278.01  2017 - Present        Renal insufficiency ICD-10-CM: N28.9  ICD-9-CM: 593.9  2017 - Present        PEPE (obstructive sleep apnea) ICD-10-CM: G47.33  ICD-9-CM: 327.23  2017 - Present        Tobacco use ICD-10-CM: Z72.0  ICD-9-CM: 305.1  2017 - Present        Chronic back pain ICD-10-CM: M54.9, G89.29  ICD-9-CM: 724.5, 338.29  2017 - Present        Asthma ICD-10-CM: J45.909  ICD-9-CM: 493.90  2017 - Present        Elevated BP without diagnosis of hypertension ICD-10-CM: R03.0  ICD-9-CM: 796.2  2017 - Present        Exertional dyspnea ICD-10-CM: R06.09  ICD-9-CM: 786.09  2017 - Present        RESOLVED: Atrial fibrillation with RVR (HCC) ICD-10-CM: I48.91  ICD-9-CM: 427.31  2017 - 2017        RESOLVED: Obesity ICD-10-CM: E66.9  ICD-9-CM: 278.00  2017 - 2017              DISCHARGE MEDICATIONS:         · It is important that you take the medication exactly as they are prescribed. · Keep your medication in the bottles provided by the pharmacist and keep a list of the medication names, dosages, and times to be taken in your wallet. · Do not take other medications without consulting your doctor. DIET:  Cardiac Diet and Low fat, Low cholesterol    ACTIVITY: Activity as tolerated    ADDITIONAL INFORMATION: If you experience any of the following symptoms then please call your primary care physician or return to the emergency room if you cannot get hold of your doctor: Fever, chills, nausea, vomiting, diarrhea, change in mentation, falling, bleeding, shortness of breath. FOLLOW UP CARE:  Dr. Rolando Parra, PA  you are to call and set up an appointment to see them with in 1 week. Follow-up with specialists at directed by them      Information obtained by :  I understand that if any problems occur once I am at home I am to contact my physician. I understand and acknowledge receipt of the instructions indicated above.                                                                                                                                            Physician's or R.N.'s Signature                                                                  Date/Time                                                                                                                                              Patient or Representative Signature                                                          Date/Time

## 2019-07-15 NOTE — PROGRESS NOTES
0700- Bedside and Verbal shift change report given to VIANEY Whittington (oncoming nurse) by Karlos Stroud RN (offgoing nurse). Report included the following information SBAR, Kardex, Intake/Output, MAR, Med Rec Status and Cardiac Rhythm Afib.     1120- Pt refused metoprolol dose. 1750- Patient being fit for life vest.     1800- Spoke with Dr Manuel Jackson, notified that life vest is at bedside. Telephone order received to place discharge order since Dr Jazmine Hilton has completed discharge paperwork. DC was pending vest fitting.

## 2019-07-15 NOTE — PROGRESS NOTES
Problem: Falls - Risk of  Goal: *Absence of Falls  Description  Document Kristie Castro Fall Risk and appropriate interventions in the flowsheet.   Outcome: Progressing Towards Goal     Problem: Patient Education: Go to Patient Education Activity  Goal: Patient/Family Education  Outcome: Progressing Towards Goal     Problem: Heart Failure: Discharge Outcomes  Goal: *Demonstrates ability to perform prescribed activity without shortness of breath or discomfort  Outcome: Progressing Towards Goal  Goal: *Left ventricular function assessment completed prior to or during stay, or planned for post-discharge  Outcome: Progressing Towards Goal  Goal: *Verbalizes understanding/describes prescribed medications  Outcome: Progressing Towards Goal  Goal: *Describes available resources and support systems  Description  (eg: Home Health, Palliative Care, Advanced Medical Directive)  Outcome: Progressing Towards Goal  Goal: *Understands and describes signs and symptoms to report to providers(Stroke Metric)  Outcome: Progressing Towards Goal  Goal: *Describes/verbalizes understanding of follow-up/return appt  Description  (eg: to physicians, diabetes treatment coordinator, and other resources  Outcome: Progressing Towards Goal  Goal: *Describes importance of continuing daily weights and changes to report to physician  Outcome: Progressing Towards Goal

## 2019-07-15 NOTE — PROGRESS NOTES
Cardiology Progress Note                             [facility]. [address], [place], [address] [facility]                                 Phone [phone]; Fax [phone]        7/15/2019 11:38 AM     Admit Date:           2019  Admit Diagnosis:  Acute on chronic systolic CHF (congestive heart failure) ([facility] [I50.23]  :          1970   MRN:          [id number]   ASSESSMENT/RECOMMENDATION:   Acute on chronic HFrEF: continue PO torsemide, creatinine stable   - patient intolerant of BB, feels better when not taking it. No taking CCB -   - hold losartan d/t hypotension. Long term should be on entresto & RAASi  -EF 30%    Non ischemic cardiomyopathy: cardiac cath showing no CAD- normal. High right heart filling pressures.   -resuming coumadin- INR 1.6  -EF 30%    NSVT - 13 and 7 beats of NSVT over the weekend  -EF 30%  -reviewed indication for LV and rationale - he agreeable to wear it    CAD: no CAD per cath. Statin and ASA d/c    Persistent afib rate controlled - now on diltiazem v rate 100-130's. Increase cardizem 180 mg- watch BP    LFTs trending down- amiodarone d/c  - warfarin resumed- INR 1.6- can resume lovenox w improved creatinine d/c once in therapeutic range (2-3)  -TSh WNL    XU creatinine trending down. Med adjustments as above     Morbid obesity Body mass index is 56.15 kg/m². PEPE- CPAP at bedside     Elevated LFTs: holding amiodarone- continue to monitor   -  Ferratin WNL  -hep b and C/ HIV -negative  Abdominal US pending         Pt personally seen and examined. Chart reviewed. Agree with advanced NP's history, exam and  A/P with changes/additons. Lifevest being arranged    Discussed with patient/nursing/family    Diego Hancock MD, [facility]          No intake/output data recorded.     Last 3 Recorded Weights in this Encounter    19 0110 19 0420 07/15/19 0454   Weight: 342 lb 13 oz (155.5 kg) 342 lb (155.1 kg) 347 lb 14.2 oz (157.8 kg)         07/13 1901 - 07/15 0700  In: 1285 [P.O.:1285]  Out: 3450 [Urine:3450]    SUBJECTIVE               [facility]. denies palpitations, irregular heart beat,  chest pain   No lightheadedness or dizziness  Breathing better since d/c BB  Refuses to take a BB           Current Facility-Administered Medications   Medication Dose Route Frequency    torsemide (DEMADEX) tablet 40 mg  40 mg Oral DAILY    warfarin (COUMADIN) tablet 10 mg  10 mg Oral QPM    dilTIAZem CD (CARDIZEM CD) capsule 120 mg  120 mg Oral DAILY    metoprolol tartrate (LOPRESSOR) tablet 12.5 mg  12.5 mg Oral Q6H    polyethylene glycol (MIRALAX) packet 17 g  17 g Oral DAILY    sodium chloride (NS) flush 5-40 mL  5-40 mL IntraVENous Q8H    sodium chloride (NS) flush 5-40 mL  5-40 mL IntraVENous PRN    Warfarin - Pharmacy to dose   Other Rx Dosing/Monitoring    ondansetron (ZOFRAN) injection 4 mg  4 mg IntraVENous Q6H PRN    docusate sodium (COLACE) capsule 100 mg  100 mg Oral BID    sodium chloride (NS) flush 5-40 mL  5-40 mL IntraVENous Q8H    sodium chloride (NS) flush 5-40 mL  5-40 mL IntraVENous PRN    acetaminophen (TYLENOL) tablet 650 mg  650 mg Oral Q6H PRN      OBJECTIVE               Intake/Output Summary (Last 24 hours) at 7/15/2019 1116  Last data filed at 7/15/2019 0555  Gross per 24 hour   Intake 110 ml   Output 2350 ml   Net -2240 ml       Review of Systems - History obtained from the patient AS PER  HPI    Telemetry AF 's burst 120-130    PHYSICAL EXAM        Visit Vitals  /81   Pulse 92   Temp 98 °F (36.7 °C)   Resp 19   Ht 5' 6\" (1.676 m)   Wt 347 lb 14.2 oz (157.8 kg)   SpO2 98%   BMI 56.15 kg/m²       Gen: Well-developed, obese, in no acute distress  alert and oriented x 3  HEENT:  Pink conjunctivae, Hearing grossly normal.No scleral icterus or conjunctival, moist mucous membranes  Neck: Supple, unable to appreciate JVD d/t neck size  Resp: No accessory muscle use, diminished bilateral bases   Card: irregular Rate,Rythm, No murmurs, rubs or gallop. No thrills. GI:          Large/rounded/mildly firm non-tender   MSK: No cyanosis or clubbing, good capillary refill  Skin: No rashes or ulcers, no bruising. Left sided wrist without hematoma/redness or drainage   Neuro:  Cranial nerves are grossly intact, moving all four extremities, no focal deficit, follows commands appropriately  Psych:  Good insight, oriented to person, place and time, alert, Nml Affect  LE: +1 BLL edema       DATA REVIEW            Laboratory and Imaging have been reviewed by me and are notable for  No results for input(s): CPK, CKMB, TROIQ in the last 72 hours.   Recent Labs     07/15/19  0146 07/14/19  0042 07/13/19  1016    137 136   K 3.6 3.7 3.4*   CO2 30 32 30   BUN 23* 30* 32*   CREA 1.52* 1.76* 1.98*    107* 124*   MG 2.4 2.3 2.4   WBC 8.4 8.0 8.8   HGB 10.8* 10.6* 10.8*   HCT 34.7* 33.8* 34.7*    [address], NP                         Dorcas Snellen NP  [phone]  [facility]

## 2019-07-15 NOTE — PROGRESS NOTES
[address] [place] [address]. YOB: 1970          Assessment & Plan:   XU/CKD, better  CHF  HF rEF  PEPE  Obesity    Rec:  Continue torsemide  Avoid nephrotoxins  Follow up with us in the office       Subjective:   CC: f/u XU  HPI: Creat down to 1.5. Diuresing well with torsemide  ROS: no sob/n/v, good uop  Current Facility-Administered Medications   Medication Dose Route Frequency    torsemide (DEMADEX) tablet 40 mg  40 mg Oral DAILY    warfarin (COUMADIN) tablet 10 mg  10 mg Oral QPM    [START ON 2019] dilTIAZem CD (CARDIZEM CD) capsule 180 mg  180 mg Oral DAILY    enoxaparin (LOVENOX) injection 160 mg  1 mg/kg SubCUTAneous Q12H    metoprolol tartrate (LOPRESSOR) tablet 12.5 mg  12.5 mg Oral Q6H    polyethylene glycol (MIRALAX) packet 17 g  17 g Oral DAILY    sodium chloride (NS) flush 5-40 mL  5-40 mL IntraVENous Q8H    sodium chloride (NS) flush 5-40 mL  5-40 mL IntraVENous PRN    Warfarin - Pharmacy to dose   Other Rx Dosing/Monitoring    ondansetron (ZOFRAN) injection 4 mg  4 mg IntraVENous Q6H PRN    docusate sodium (COLACE) capsule 100 mg  100 mg Oral BID    sodium chloride (NS) flush 5-40 mL  5-40 mL IntraVENous Q8H    sodium chloride (NS) flush 5-40 mL  5-40 mL IntraVENous PRN    acetaminophen (TYLENOL) tablet 650 mg  650 mg Oral Q6H PRN          Objective:     Vitals:  Blood pressure 117/81, pulse 92, temperature 98 °F (36.7 °C), resp. rate 19, height 5' 6\" (1.676 m), weight 157.8 kg (347 lb 14.2 oz), SpO2 98 %.   Temp (24hrs), Av.1 °F (36.7 °C), Min:97.8 °F (36.6 °C), Max:98.3 °F (36.8 °C)      Intake and Output:  07/15 07 - 07/15 1900  In: 240 [P.O.:240]  Out: 900 [Urine:900]  1901 - 07/15 0700  In: 7966 [P.O.:1285]  Out: 8467 [Urine:3450]    Physical Exam:               GENERAL ASSESSMENT: NAD  CHEST: CTA  HEART: S1S2  ABDOMEN: Soft,NT  EXTREMITY: ++EDEMA          ECG/rhythm:    Data Review      No results for input(s): TNIPOC in the last 72 hours. No lab exists for component: ITNL   No results for input(s): CPK, CKMB, TROIQ in the last 72 hours. Recent Labs     07/15/19  0146 07/14/19  0042 07/13/19  1016    137 136   K 3.6 3.7 3.4*    101 100   CO2 30 32 30   BUN 23* 30* 32*   CREA 1.52* 1.76* 1.98*    107* 124*   MG 2.4 2.3 2.4   CA 8.2* 8.2* 8.5   ALB 3.3* 3.3* 3.4*   WBC 8.4 8.0 8.8   HGB 10.8* 10.6* 10.8*   HCT 34.7* 33.8* 34.7*    198 214      Recent Labs     07/15/19  0146 07/14/19  0042 07/13/19  1016   INR 1.6* 1.8* 1.8*   PTP 16.1* 17.7* 17.8*     Needs: urine analysis, urine sodium, protein and creatinine  Lab Results   Component Value Date/Time    Sodium,urine random 27 07/09/2019 01:29 AM    Creatinine, urine 86.80 07/09/2019 01:29 AM    Creatinine, urine 87.10 07/09/2019 01:29 AM           : Aniya Tabor MD  7/15/2019        [facility]:  [email or web address]  José Miguel Bunch office:  [address] [place] [address], [address] [place] [address],[address]  [address]  Phone: [phone]  Fax :     [phone]    [facility] office:  [address] [facility]  [facility], [address]  Phone - [phone]  Fax - [phone]

## 2019-07-15 NOTE — PROGRESS NOTES
Daily Progress Note and Discharge Note: 7/15/2019  Ludy Chris. MENA Carrion    Assessment/Plan:   Acute on chronic systolic CHF (congestive heart failure) ([facility].) (7/8/2019)/Cardiomyopathy:  Symptoms of increasing SOB and orthopnea. Suggestive of CHF. Recent stress test with EF 24% w/o reversible defect. -- bumex IV/po per Cards and Renal - poss switch to Torsemide per Cards  -- daily weight  -- strict I/O  -- cardiology consulted and Cath done 7/11 - non-ischemic  -- hold ARB due to SYED and possible adverse effects     Persistent atrial fibrillation with RVR in ED. Likely contributing to exacerbation of CHF. Was recently taken off dilt and started on Toprol XL now stopped due to perceived side effects  -- tx per Cards  -- monitor PT/INR  -- pharmacy to dose warfarin     Acute renal insufficiency on CKD3 (7/8/2019): May be from acute CHF exacerbation vs ARB use. -- hold ARB  -- monitor creatinine with diuresis  -- nephrology consulted     PEPE (obstructive sleep apnea) (6/22/2017):  -- continue CPAP     SupraMorbid obesity with BMI>54;  due to excess calories ([facility] (7/18/2017):  -- would benefit from weight loss; counseled pt.     Elevated LFTs  -- amiodarone held  -- Hepatitis B & C neg   -- HIV neg  -- Get US    Run of VT  -- Will need Life Vest per cards       Problem List:  Problem List as of 7/15/2019 Date Reviewed: 7/8/2019          Codes Class Noted - Resolved    * (Principal) Acute on chronic systolic CHF (congestive heart failure) (HCC) ICD-10-CM: I50.23  ICD-9-CM: 428.23, 428.0  7/8/2019 - Present        Acute renal insufficiency ICD-10-CM: N28.9  ICD-9-CM: 593.9  7/8/2019 - Present        Chronic diastolic heart failure ([facility] ICD-10-CM: I50.32  ICD-9-CM: 428.32  4/25/2018 - Present        Chronic systolic congestive heart failure ([facility] ICD-10-CM: I50.22  ICD-9-CM: 428.22, 428.0  4/25/2018 - Present        Chronic anticoagulation ICD-10-CM: Z79.01  ICD-9-CM: V58.61  10/13/2017 - Present        Persistent atrial fibrillation (HCC) ICD-10-CM: I48.1  ICD-9-CM: 427.31  7/18/2017 - Present        Morbid obesity due to excess calories ([facility] ICD-10-CM: E66.01  ICD-9-CM: 278.01  7/18/2017 - Present        Renal insufficiency ICD-10-CM: N28.9  ICD-9-CM: 593.9  6/22/2017 - Present        PEPE (obstructive sleep apnea) ICD-10-CM: G47.33  ICD-9-CM: 327.23  6/22/2017 - Present        Tobacco use ICD-10-CM: Z72.0  ICD-9-CM: 305.1  6/22/2017 - Present        Chronic back pain ICD-10-CM: M54.9, G89.29  ICD-9-CM: 724.5, 338.29  6/22/2017 - Present        Asthma ICD-10-CM: J45.909  ICD-9-CM: 493.90  6/22/2017 - Present        Elevated BP without diagnosis of hypertension ICD-10-CM: R03.0  ICD-9-CM: 796.2  6/22/2017 - Present        Exertional dyspnea ICD-10-CM: R06.09  ICD-9-CM: 786.09  6/22/2017 - Present        RESOLVED: Atrial fibrillation with RVR (HCC) ICD-10-CM: I48.91  ICD-9-CM: 427.31  6/22/2017 - 7/18/2017        RESOLVED: Obesity ICD-10-CM: E66.9  ICD-9-CM: 278.00  6/22/2017 - 7/18/2017              Subjective:    47 y.o.  male who is admitted with Acute on chronic systolic CHF (congestive heart failure) ([facility] Mr. Kenneth Salinas presented to the Emergency Department today complaining of SOB. Increasing for the past two weeks. Worse with supine. Leg edema unchanged. No chest pains. Two weeks ago had po dilt stopped in favor of losartan and Toprol. Has had belching and burping since medication change as well. Has had adjustments to diuretic regimen up and down due to symptoms. However, patient attributes symptoms onset after stress test and changes in medication two weeks ago. Reports compliance with CPAP. (Dr Kristine Presley)    7/9:  He reports he is breathing marginally better today after some diuresis. He has his CPAP and wore it last night. He reports his wt is up about \"25 pounds from two wks ago. \"  Denies increased fluid intake. EF down to 20s.   Discussed/counseled about his SupraMorbid obesity, links to his PEPE and CHF/risks,  but he is in denial and says \"its all fluid\" and \"eat not much at all. \"    7/10:  Breathing better today and speaks in full sentences this AM.  About a liter more out. Right heart cath when volume status is better. BP better this AM.      7/11: He reports he is \"not doing any better with breathing. \"  About 2 liters out. Creat sl up from yesterday. With EF this low it is going to be difficult to get more fluid off. Cath planned for later today. C/O constipation - mirilax added. 7/12:  Still reports diff with SOB and FENTON. Cath yesterday with elevated filling pressures and nothing to stent. LFTs up - may be due to episodes of low BP yesterday - had to hold PM dose of BB yesterday. Rate is better this AM (at this moment). About a liter more out. Creat up to 2.14.    7/13:  Starting to feel better with much less SOB. He is convinced that the Metoprolol causes him to have SOB - he reports \"as soon as I take it I can't get my breath. \"  He reports he will not take it again. Discussed potential side effects/adverse effects and that it can also be used to tx panic attacks - he is convinced it causes his \"bad breathing spells. \"  Adriana Adamsyle him to discuss with Cardiology potential alternatives. He is to start Torsemide today. Watch at least another day and see how he tolerates the Torsemide. Cont to discuss his supra-morbid obesity but he is convinced it is not a problem. Creat up to 2.14 yesterday - AM labs pending. 7/14:  Reports feeling better and wants to be up more. Much less SOB at rest and better FENTON. 15 beat run of asymptomatic VT yesterday when up - poss need for life vest???  Pt refusing Metoprolol. Cards did not order the Torsemide yesterday - ? Dose to start. I/O about equal today. LFTs grad improving.      7/15: Feeling better and was up in mejia yesterday briefly. Less FENTON. About a liter out.   LFTs a little better - possibly was due to episode of hypotension and/or amiodarone toxicity . Hep and HIV Negative. Will get US Abd today. Poss life vest vrs defib/pacer due to episode of VT.    530PM: Getting fitted for life vest this PM - afterwards wants to go home. Cards has cleared for DC. F/u with them as they direct. F/U with PCP later this wk.       Review of Systems:   A comprehensive review of systems was negative except for that written in the HPI. Objective:   Physical Exam:     Visit Vitals  /68 (BP 1 Location: Left arm, BP Patient Position: At rest)   Pulse 96   Temp 97.8 °F (36.6 °C)   Resp 19   Ht 5' 6\" (1.676 m)   Wt 347 lb 14.2 oz (157.8 kg)   SpO2 98%   BMI 56.15 kg/m²      O2 Device: Room air, CPAP mask  Temp (24hrs), Av °F (36.7 °C), Min:97.7 °F (36.5 °C), Max:98.3 °F (36.8 °C)    1901 - 07/15 0700  In: -   Out: 1862 [WJRAW:0120]    07 -  190  In: 9264 [P.O.:1285]  Out: 2350 [Urine:2350]  General:  Alert, supramorbidly obese, cooperative, no distress, appears stated age. Head:  Normocephalic, without obvious abnormality, atraumatic. Eyes:  Conjunctivae/corneas clear. PERRL, EOMs intact. Nose: Nares normal. Septum midline. Mucosa normal. No drainage or sinus tenderness. Throat: Lips, mucosa, and tongue moist..   Neck: Supple, symmetrical, trachea midline, no adenopathy, thyroid: no enlargement/tenderness/nodules, no carotid bruit and no JVD. Lungs:   Sounds clear at this moment. Chest wall:  No tenderness or deformity. Heart:  Irreg, rate better, no murmur, click, rub or gallop. Abdomen:   Soft, non-tender. Large Pannus. Bowel sounds normal. No masses,  No organomegaly. Extremities: no cyanosis. 1+ LE edema. No calf tenderness or cords. Pulses: 2+ and symmetric all extremities. Skin:  turgor normal   Neurologic: CNII-XII intact. Alert and oriented X 3. Fine motor of hands and fingers normal.   equal.  No cogwheeling or rigidity. Gait not tested at this time.   Sensation grossly normal to touch. Gross motor of extremities normal.       Data Review:       Recent Days:  Recent Labs     07/15/19  0146 07/14/19  0042 07/13/19  1016   WBC 8.4 8.0 8.8   HGB 10.8* 10.6* 10.8*   HCT 34.7* 33.8* 34.7*    198 214     Recent Labs     07/15/19  0146 07/14/19  0042 07/13/19  1016    137 136   K 3.6 3.7 3.4*    101 100   CO2 30 32 30    107* 124*   BUN 23* 30* 32*   CREA 1.52* 1.76* 1.98*   CA 8.2* 8.2* 8.5   MG 2.4 2.3 2.4   ALB 3.3* 3.3* 3.4*   TBILI 1.0 1.0 1.4*   SGOT 87* 182* 349*   * 475* 602*   INR 1.6* 1.8* 1.8*     No results for input(s): PH, PCO2, PO2, HCO3, FIO2 in the last 72 hours. 24 Hour Results:  Recent Results (from the past 24 hour(s))   MAGNESIUM    Collection Time: 07/15/19  1:46 AM   Result Value Ref Range    Magnesium 2.4 1.6 - 2.4 mg/dL   PROTHROMBIN TIME + INR    Collection Time: 07/15/19  1:46 AM   Result Value Ref Range    INR 1.6 (H) 0.9 - 1.1      Prothrombin time 16.1 (H) 9.0 - 11.1 sec   CBC WITH AUTOMATED DIFF    Collection Time: 07/15/19  1:46 AM   Result Value Ref Range    WBC 8.4 4.1 - 11.1 K/uL    RBC 3.70 (L) 4.10 - 5.70 M/uL    HGB 10.8 (L) 12.1 - 17.0 g/dL    HCT 34.7 (L) 36.6 - 50.3 %    MCV 93.8 80.0 - 99.0 FL    MCH 29.2 26.0 - 34.0 PG    MCHC 31.1 30.0 - 36.5 g/dL    RDW 15.0 (H) 11.5 - 14.5 %    PLATELET 511 932 - 904 K/uL    MPV 11.2 8.9 - 12.9 FL    NRBC 0.2 (H) 0  WBC    ABSOLUTE NRBC 0.02 (H) 0.00 - 0.01 K/uL    NEUTROPHILS 64 32 - 75 %    LYMPHOCYTES 20 12 - 49 %    MONOCYTES 12 5 - 13 %    EOSINOPHILS 3 0 - 7 %    BASOPHILS 0 0 - 1 %    IMMATURE GRANULOCYTES 1 (H) 0.0 - 0.5 %    ABS. NEUTROPHILS 5.4 1.8 - 8.0 K/UL    ABS. LYMPHOCYTES 1.7 0.8 - 3.5 K/UL    ABS. MONOCYTES 1.0 0.0 - 1.0 K/UL    ABS. EOSINOPHILS 0.2 0.0 - 0.4 K/UL    ABS. BASOPHILS 0.0 0.0 - 0.1 K/UL    ABS. IMM.  GRANS. 0.1 (H) 0.00 - 0.04 K/UL    DF AUTOMATED     METABOLIC PANEL, COMPREHENSIVE    Collection Time: 07/15/19  1:46 AM   Result Value Ref Range    Sodium 139 136 - 145 mmol/L    Potassium 3.6 3.5 - 5.1 mmol/L    Chloride 103 97 - 108 mmol/L    CO2 30 21 - 32 mmol/L    Anion gap 6 5 - 15 mmol/L    Glucose 100 65 - 100 mg/dL    BUN 23 (H) 6 - 20 MG/DL    Creatinine 1.52 (H) 0.70 - 1.30 MG/DL    BUN/Creatinine ratio 15 12 - 20      GFR est AA 59 (L) >60 ml/min/1.73m2    GFR est non-AA 49 (L) >60 ml/min/1.73m2    Calcium 8.2 (L) 8.5 - 10.1 MG/DL    Bilirubin, total 1.0 0.2 - 1.0 MG/DL    ALT (SGPT) 353 (H) 12 - 78 U/L    AST (SGOT) 87 (H) 15 - 37 U/L    Alk. phosphatase 93 45 - 117 U/L    Protein, total 6.4 6.4 - 8.2 g/dL    Albumin 3.3 (L) 3.5 - 5.0 g/dL    Globulin 3.1 2.0 - 4.0 g/dL    A-G Ratio 1.1 1.1 - 2.2         Medications reviewed  Current Facility-Administered Medications   Medication Dose Route Frequency    dilTIAZem CD (CARDIZEM CD) capsule 120 mg  120 mg Oral DAILY    torsemide (DEMADEX) tablet 20 mg  20 mg Oral DAILY    metoprolol tartrate (LOPRESSOR) tablet 12.5 mg  12.5 mg Oral Q6H    polyethylene glycol (MIRALAX) packet 17 g  17 g Oral DAILY    sodium chloride (NS) flush 5-40 mL  5-40 mL IntraVENous Q8H    sodium chloride (NS) flush 5-40 mL  5-40 mL IntraVENous PRN    Warfarin - Pharmacy to dose   Other Rx Dosing/Monitoring    ondansetron (ZOFRAN) injection 4 mg  4 mg IntraVENous Q6H PRN    docusate sodium (COLACE) capsule 100 mg  100 mg Oral BID    sodium chloride (NS) flush 5-40 mL  5-40 mL IntraVENous Q8H    sodium chloride (NS) flush 5-40 mL  5-40 mL IntraVENous PRN    acetaminophen (TYLENOL) tablet 650 mg  650 mg Oral Q6H PRN     Care Plan discussed with: Patient/Nurse/Cards  Total time spent with patient: 30 minutes.     Jorge Bliss MD

## 2019-07-15 NOTE — PROGRESS NOTES
Life vest order form filled out and supporting documentation printed  Packet given to Gray Marlow to be sent to [facility]  Notified [facility] rep of incoming order

## 2019-07-15 NOTE — DISCHARGE SUMMARY
Physician Discharge Summary     Patient ID:    Claudene Sis  [id number]  47 y.o.  1970  MENA Schulz    Admit date: 7/8/2019  Discharge date and time: 7/15/2019  Admission Diagnoses: Acute on chronic systolic CHF (congestive heart failure) ([facility] [I50.23], Cardiomyopathy; episode of VTach.     Chronic Diagnoses:    Problem List as of 7/15/2019 Date Reviewed: 7/8/2019          Codes Class Noted - Resolved    * (Principal) Acute on chronic systolic CHF (congestive heart failure) (HCC) ICD-10-CM: I50.23  ICD-9-CM: 428.23, 428.0  7/8/2019 - Present        Acute renal insufficiency ICD-10-CM: N28.9  ICD-9-CM: 593.9  7/8/2019 - Present        Chronic diastolic heart failure ([facility] ICD-10-CM: I50.32  ICD-9-CM: 428.32  4/25/2018 - Present        Chronic systolic congestive heart failure (HCC) ICD-10-CM: I50.22  ICD-9-CM: 428.22, 428.0  4/25/2018 - Present        Chronic anticoagulation ICD-10-CM: Z79.01  ICD-9-CM: V58.61  10/13/2017 - Present        Persistent atrial fibrillation (HCC) ICD-10-CM: I48.1  ICD-9-CM: 427.31  7/18/2017 - Present        Morbid obesity due to excess calories ([facility] ICD-10-CM: E66.01  ICD-9-CM: 278.01  7/18/2017 - Present        Renal insufficiency ICD-10-CM: N28.9  ICD-9-CM: 593.9  6/22/2017 - Present        PEPE (obstructive sleep apnea) ICD-10-CM: G47.33  ICD-9-CM: 327.23  6/22/2017 - Present        Tobacco use ICD-10-CM: Z72.0  ICD-9-CM: 305.1  6/22/2017 - Present        Chronic back pain ICD-10-CM: M54.9, G89.29  ICD-9-CM: 724.5, 338.29  6/22/2017 - Present        Asthma ICD-10-CM: J45.909  ICD-9-CM: 493.90  6/22/2017 - Present        Elevated BP without diagnosis of hypertension ICD-10-CM: R03.0  ICD-9-CM: 796.2  6/22/2017 - Present        Exertional dyspnea ICD-10-CM: R06.09  ICD-9-CM: 786.09  6/22/2017 - Present        RESOLVED: Atrial fibrillation with RVR ([facility] ICD-10-CM: I48.91  ICD-9-CM: 427.31  6/22/2017 - 7/18/2017        RESOLVED: Obesity ICD-10-CM: E66.9  ICD-9-CM: 278.00 6/22/2017 - 7/18/2017            Discharge Medications:   Current Discharge Medication List      START taking these medications    Details   dilTIAZem CD (CARDIZEM CD) 180 mg ER capsule Take 1 Cap by mouth daily. Qty: 30 Cap, Refills: 1      torsemide (DEMADEX) 20 mg tablet Take 2 Tabs by mouth daily. Qty: 60 Tab, Refills: 1         CONTINUE these medications which have NOT CHANGED    Details   !! warfarin (COUMADIN) 5 mg tablet Take 5 mg by mouth two (2) days a week. Patient takes 5 mg on Tuesday and Wednesday      potassium chloride SR (KLOR-CON 10) 10 mEq tablet Take 1 Tab by mouth daily. Qty: 90 Tab, Refills: 3    Comments: NEEDS SEPT APPT FOR MORE/WAS TO RETURN 3 MOS FROM April 2018      psyllium husk-aspartame (METAMUCIL FIBER SINGLES) 3.4 gram pwpk packet Take 1 Packet by mouth daily. !! warfarin (COUMADIN) 5 mg tablet Take 10 mg by mouth five (5) days a week. Patient takes 10 mg on Monday, Thursday, Friday, Saturday, Sunday      MULTIVITAMIN (MULTIPLE VITAMIN PO) Take 1 Tab by mouth daily. Associated Diagnoses: Persistent atrial fibrillation (HCC)            STOP taking these medications       metoprolol succinate (TOPROL-XL) 50 mg XL tablet Comments:   Reason for Stopping:         losartan (COZAAR) 25 mg tablet Comments:   Reason for Stopping:         furosemide (LASIX) 40 mg tablet Comments:   Reason for Stopping: Follow up Care:    1. MENA Montes De Oca with in 1 weeks  2.  specialists as directed. Diet:  Cardiac Diet and Low fat, Low cholesterol  Disposition:  Home.   Advanced Directive:  Discharge Exam:  [See today's progress note.]  CONSULTATIONS: Cardiology and Nephrology    Significant Diagnostic Studies:   Recent Labs     07/15/19  0146 07/14/19  0042   WBC 8.4 8.0   HGB 10.8* 10.6*   HCT 34.7* 33.8*    198     Recent Labs     07/15/19  0146 07/14/19  0042 07/13/19  1016    137 136   K 3.6 3.7 3.4*    101 100   CO2 30 32 30   BUN 23* 30* 32*   CREA 1.52* 1.76* 1.98*    107* 124*   CA 8.2* 8.2* 8.5   MG 2.4 2.3 2.4     Recent Labs     07/15/19  0146 07/14/19  0042 07/13/19  1016   SGOT 87* 182* 349*   * 475* 602*   AP 93 86 70   TBILI 1.0 1.0 1.4*   TP 6.4 6.3* 6.5   ALB 3.3* 3.3* 3.4*   GLOB 3.1 3.0 3.1     Recent Labs     07/15/19  0146 07/14/19  0042 07/13/19  1016   INR 1.6* 1.8* 1.8*   PTP 16.1* 17.7* 17.8*      Lab Results   Component Value Date/Time    TSH 2.97 07/12/2019 11:47 AM     HOSPITAL COURSE & TREATMENT RENDERED:   1. See today's progress note:    Daily Progress Note and Discharge Note: 7/15/2019  Ginny Sanchez. Domenico Bolañosma         Assessment/Plan:   Acute on chronic systolic CHF (congestive heart failure) (HCC) (7/8/2019)/Cardiomyopathy:  Symptoms of increasing SOB and orthopnea.  Suggestive of CHF.  Recent stress test with EF 24% w/o reversible defect. -- bumex IV/po per Cards and Renal - poss switch to Torsemide per Cards  -- daily weight  -- strict I/O  -- cardiology consulted and Cath done 7/11 - non-ischemic  -- hold ARB due to SYED and possible adverse effects     Persistent atrial fibrillation with RVR in ED. Berto Hernandez contributing to exacerbation of CHF.  Was recently taken off dilt and started on Toprol XL now stopped due to perceived side effects  -- tx per Cards  -- monitor PT/INR  -- pharmacy to dose warfarin     Acute renal insufficiency on CKD3 (7/8/2019):  May be from acute CHF exacerbation vs ARB use.   -- hold ARB  -- monitor creatinine with diuresis  -- nephrology consulted     PEPE (obstructive sleep apnea) (6/22/2017):  -- continue CPAP     SupraMorbid obesity with BMI>54;  due to excess calories ([address] (7/18/2017):  -- would benefit from weight loss; counseled pt.     Elevated LFTs  -- amiodarone held  -- Hepatitis B & C neg   -- HIV neg  -- Get US     Run of VT  -- Will need Life Vest per cards         Subjective:    47 y.o.   male who is admitted with Acute on chronic systolic CHF (congestive heart failure) ([facility]).  Mr. Jordan presented to the Emergency Department today complaining of SOB.  Increasing for the past two weeks.  Worse with supine.  Leg edema unchanged.  No chest pains.  Two weeks ago had po dilt stopped in favor of losartan and Toprol.  Has had belching and burping since medication change as well.  Has had adjustments to diuretic regimen up and down due to symptoms.  However, patient attributes symptoms onset after stress test and changes in medication two weeks ago.  Reports compliance with CPAP. (Dr Yenny Ramos)     7/9:  He reports he is breathing marginally better today after some diuresis. He has his CPAP and wore it last night. He reports his wt is up about \"25 pounds from two wks ago. \"  Denies increased fluid intake. EF down to 20s. Discussed/counseled about his SupraMorbid obesity, links to his PEPE and CHF/risks,  but he is in denial and says \"its all fluid\" and \"eat not much at all. \"     7/10:  Breathing better today and speaks in full sentences this AM.  About a liter more out. Right heart cath when volume status is better. BP better this AM.       7/11: He reports he is \"not doing any better with breathing. \"  About 2 liters out. Creat sl up from yesterday. With EF this low it is going to be difficult to get more fluid off. Cath planned for later today. C/O constipation - mirilax added.       7/12:  Still reports diff with SOB and FENTON. Cath yesterday with elevated filling pressures and nothing to stent. LFTs up - may be due to episodes of low BP yesterday - had to hold PM dose of BB yesterday. Rate is better this AM (at this moment). About a liter more out. Creat up to 2. 14.     7/13:  Starting to feel better with much less SOB. He is convinced that the Metoprolol causes him to have SOB - he reports \"as soon as I take it I can't get my breath. \"  He reports he will not take it again.   Discussed potential side effects/adverse effects and that it can also be used to tx panic attacks - he is convinced it causes his \"bad breathing spells. \"  Frandy Porsche him to discuss with Cardiology potential alternatives. He is to start Torsemide today. Watch at least another day and see how he tolerates the Torsemide. Cont to discuss his supra-morbid obesity but he is convinced it is not a problem. Creat up to 2.14 yesterday - AM labs pending.       :  Reports feeling better and wants to be up more. Much less SOB at rest and better FENTON. 15 beat run of asymptomatic VT yesterday when up - poss need for life vest???  Pt refusing Metoprolol. Cards did not order the Torsemide yesterday - ? Dose to start. I/O about equal today. LFTs grad improving.       7/15: Feeling better and was up in mejia yesterday briefly. Less FENTON. About a liter out. LFTs a little better - possibly was due to episode of hypotension and/or amiodarone toxicity . Hep and HIV Negative. Will get US Abd today. Poss life vest vrs defib/pacer due to episode of VT.    530PM: Getting fitted for life vest this PM - afterwards wants to go home. Cards has cleared for DC. F/u with them as they direct. F/U with PCP later this wk.       Review of Systems:   A comprehensive review of systems was negative except for that written in the HPI.     Objective:   Physical Exam:      Visit Vitals  /68 (BP 1 Location: Left arm, BP Patient Position: At rest)   Pulse 96   Temp 97.8 °F (36.6 °C)   Resp 19   Ht 5' 6\" (1.676 m)   Wt 347 lb 14.2 oz (157.8 kg)   SpO2 98%   BMI 56.15 kg/m²      O2 Device: Room air, CPAP mask  Temp (24hrs), Av °F (36.7 °C), Min:97.7 °F (36.5 °C), Max:98.3 °F (36.8 °C)    1901 - 07/15 0700  In: -   Out: 1100 [Urine:1100]    07 -  190  In: 1285 [P.O.:1285]  Out: 2350 [Urine:2350]  General:  Alert, supramorbidly obese, cooperative, no distress, appears stated age. Head:  Normocephalic, without obvious abnormality, atraumatic. Eyes:  Conjunctivae/corneas clear. PERRL, EOMs intact.    Nose: Nares normal. Septum midline. Mucosa normal. No drainage or sinus tenderness. Throat: Lips, mucosa, and tongue moist..   Neck: Supple, symmetrical, trachea midline, no adenopathy, thyroid: no enlargement/tenderness/nodules, no carotid bruit and no JVD. Lungs:   Sounds clear at this moment. Chest wall:  No tenderness or deformity. Heart:  Irreg, rate better, no murmur, click, rub or gallop. Abdomen:   Soft, non-tender. Large Pannus. Bowel sounds normal. No masses,  No organomegaly. Extremities: no cyanosis. 1+ LE edema. No calf tenderness or cords. Pulses: 2+ and symmetric all extremities. Skin:  turgor normal   Neurologic: CNII-XII intact. Alert and oriented X 3. Fine motor of hands and fingers normal.   equal.  No cogwheeling or rigidity. Gait not tested at this time. Sensation grossly normal to touch. Gross motor of extremities normal.       06/21/19   ECHO ADULT COMPLETE 06/23/2019 6/23/2019    Narrative · Left Ventricle: Moderately dilated left ventricle. Severe systolic   dysfunction. Estimated left ventricular ejection fraction is 26 - 30%. Visually measured ejection fraction. · Left Atrium: Moderately dilated left atrium. · Right Atrium: Dilated right atrium. · Right Ventricle: Dilated right ventricle. · Aortic Valve: Aortic valve sclerosis. · Mitral Valve: Mild to moderate mitral valve regurgitation. · Tricuspid Valve: Mild to moderate tricuspid valve regurgitation is   present. · IVC/Hepatic Veins: Severely elevated central venous pressure (15+ mmHg);   IVC diameter is larger than 21 mm and collapses less than 50% with   respiration. · Pulmonary Artery: Moderate pulmonary hypertension. Tachycardia throughout the exam.        Signed by: Ruth Padilla MD     Cardiac Cath 7/11/19  Coronary Findings   Diagnostic   Dominance: Right   Left Main   The vessel is large. The vessel is angiographically normal.   Left Anterior Descending   The vessel is large.  The vessel is angiographically normal.   First Diagonal Branch   The vessel is moderate in size. The vessel is angiographically normal.   Second Diagonal Branch   The vessel is small. The vessel is angiographically normal.   Third Diagonal Branch   The vessel is small. The vessel is angiographically normal.   Ramus Intermedius   The vessel is large. The vessel is angiographically normal.   Left Circumflex   First Obtuse Marginal Branch   The vessel is small. The vessel is angiographically normal.   Right Coronary Artery   The vessel is large. The vessel exhibits minimal luminal irregularities. Right Posterior Descending Artery   The vessel is moderate in size. The vessel exhibits minimal luminal irregularities. Right Posterior Atrioventricular Branch   The vessel is small. The vessel exhibits minimal luminal irregularities.        Signed:  Eileen Alvarez MD  7/15/2019  3:14 PM

## 2019-07-15 NOTE — PROGRESS NOTES
7- CASE MANAGEMENT NOTE:  Per request from the cardiac NP and with the patient's consent, I faxed an order and supporting documentation to [employer] for a Life Vest. I spoke to their liaison who will come to the hospital today to fit the vest. Ailyn Holliday, DAMARIS, CM

## 2019-07-15 NOTE — PROGRESS NOTES
Bedside and Verbal shift change report given to Shirley Wilkins (oncoming nurse) by Ada Covarrubias (offgoing nurse). Report included the following information SBAR, Kardex, ED Summary, Intake/Output, Recent Results, Med Rec Status and Cardiac Rhythm A-Fib/Flutter. 2230: Pt alert, oriented, denies pain. Pt ambulating in hallway, denies SOB, denies dizziness. HR elevated to low 100's during ambulation. Tolerated well. 0745: Pt transported to radiology vie transport services for [address] of [facility]. Bedside and Verbal shift change report given to Ana Stewart RN (oncoming nurse) by Shirley Wilkins (offgoing nurse). Report included the following information SBAR, Kardex, ED Summary, Procedure Summary, Intake/Output, Recent Results, Med Rec Status and Cardiac Rhythm A-Fib/flutter.

## 2019-07-17 LAB — END DIASTOLIC PRESSURE: 35

## 2019-07-18 ENCOUNTER — CLINICAL SUPPORT (OUTPATIENT)
Dept: CARDIOLOGY CLINIC | Age: 49
End: 2019-07-18

## 2019-07-18 ENCOUNTER — OFFICE VISIT (OUTPATIENT)
Dept: CARDIOLOGY CLINIC | Age: 49
End: 2019-07-18

## 2019-07-18 VITALS
DIASTOLIC BLOOD PRESSURE: 62 MMHG | SYSTOLIC BLOOD PRESSURE: 108 MMHG | HEIGHT: 66 IN | RESPIRATION RATE: 18 BRPM | BODY MASS INDEX: 50.62 KG/M2 | OXYGEN SATURATION: 96 % | HEART RATE: 92 BPM | WEIGHT: 315 LBS

## 2019-07-18 DIAGNOSIS — Z79.01 CHRONIC ANTICOAGULATION: ICD-10-CM

## 2019-07-18 DIAGNOSIS — E66.01 MORBID OBESITY (HCC): ICD-10-CM

## 2019-07-18 DIAGNOSIS — R06.09 DYSPNEA ON EXERTION: ICD-10-CM

## 2019-07-18 DIAGNOSIS — I48.21 PERMANENT ATRIAL FIBRILLATION (HCC): ICD-10-CM

## 2019-07-18 DIAGNOSIS — G47.33 OSA (OBSTRUCTIVE SLEEP APNEA): ICD-10-CM

## 2019-07-18 DIAGNOSIS — Z79.01 ANTICOAGULATED ON COUMADIN: Primary | ICD-10-CM

## 2019-07-18 DIAGNOSIS — I50.22 CHRONIC SYSTOLIC CONGESTIVE HEART FAILURE (HCC): Primary | ICD-10-CM

## 2019-07-18 LAB
INR BLD: 1.7
INR, EXTERNAL: 1.7 (ref 2–3)
PT POC: 21 SECONDS
VALID INTERNAL CONTROL?: YES

## 2019-07-18 NOTE — LETTER
7/18/19 Patient: Yue Cornell. YOB: 1970 Date of Visit: 7/18/2019 Drea Cosby, [address] [facility] [address] [facility] [phone] [id number] VIA Facsimile: [phone] Dear MENA Wild, Thank you for referring Mr. Domingo Chen to [address] for evaluation. My notes for this consultation are attached. If you have questions, please do not hesitate to call me. I look forward to following your patient along with you. Sincerely, Miko Rodrigez MD

## 2019-07-18 NOTE — PROGRESS NOTES
Chief Complaint   Patient presents with    Shortness of Breath    Dot 23 Follow Up     7/8-7/15     1. Have you been to the ER, urgent care clinic since your last visit? Hospitalized since your last visit? Yes When: 7/8-7/15 Where: [address] [place] [address] Reason for visit: CHf    2. Have you seen or consulted any other health care providers outside of the [address] [place] [address] since your last visit? Include any pap smears or colon screening. No    3) Do you have an Advance Directive on file?  No  Visit Vitals  /62 (BP 1 Location: Right arm, BP Patient Position: Sitting)   Pulse 92   Resp 18   Ht 5' 6\" (1.676 m)   Wt 346 lb (156.9 kg)   SpO2 96%   BMI 55.85 kg/m²

## 2019-07-18 NOTE — PROGRESS NOTES
Sebastien Alcantara MD    [address] [facility] [place], [address] [facility]    Office [phone],[place] [phone]  Pager [phone]    Ananth Thurman. is a 47 y.o. male is here for f/u visit. Primary care physician:MENA Abarca    Patient Active Problem List   Diagnosis Code    Renal insufficiency N28.9    PEPE (obstructive sleep apnea) G47.33    Tobacco use Z72.0    Chronic back pain M54.9, G89.29    Asthma J45.909    Elevated BP without diagnosis of hypertension R03.0    Exertional dyspnea R06.09    Persistent atrial fibrillation (HCC) I48.1    Morbid obesity due to excess calories (HCC) E66.01    Chronic anticoagulation Z79.01    Chronic diastolic heart failure (HCC) I50.32    Chronic systolic congestive heart failure (HCC) I50.22    Acute on chronic systolic CHF (congestive heart failure) (HCC) I50.23    Acute renal insufficiency N28.9       Dear  Sheryl Camdiego had the pleasure of seeing Mr. Ananth Thurman. in the office today. Chief complaint:  Chief Complaint   Patient presents with    Shortness of Breath    CHF   [facility] Follow Up     7/8-7/15       Assessment:   Chronic SHF - EF 30%. [facility] admit 7/8/19 Acute on chronic SHF. Underwent RHC/LHC - no sig CAD  A fib with CVR   Chronic anticoagulation - INR followed by PCP  Morbid obesity   Smoker - quit smoking/not chewing tobacco  Hx of Excessive EtOH  -- occ drinks beer now        Plan:     Currently on Toresemide 20 mg 2 tab daily. K - 10meq 1 tab daily. Take extra toresemide 20mg for next 2-3 days. Take extra K if needed  Continue other meds. Discussed with him about starting Mckayla Floss which will be done in the next visit. Continues to wear LifeVest.  No discharges. Aggressive CV risk factor modification            Weighs himself daily. Increase his dose based on his weight. On anticoagulation    Follow up in 4 weeeks  or earlier as needed. Patient understands the plan.  All questions were answered to the patient's satisfaction. Medication Side Effects and Warnings were discussed with patient: yes  Patient Labs were reviewed and or requested:  yes  Patient Past Records were reviewed and or requested: yes    I appreciate the opportunity to be involved in . See note below for details. Please do not hesitate to contact us with questions or concerns. Oxana Joy MD    Cardiac Testing/ Procedures: A. Cardiac Cath/PCI: 7/1/19 - LHC/RHC - No sig CAD; Mean PA pressure 21 mm Hg    B.ECHO/EVITA: 6/21/19 - EF 26-30%; Mod LAE; DIDIER: Mild to Mod MR; Mild to Mod TR; Mod Pulm HTN    6/22/2017 Left ventricle: Systolic function was mildly reduced. Ejection fraction was estimated to be 45 %. There were no regional wall motion abnormalities. Tricuspid valve: There was mild regurgitation. C.StressNuclear/Stress ECHO/Stress test: 6/26/19 - Abnormal ledy nuc; EF 24%; Fixed defect( inf/apical)    D. Vascular:    E. EP:    F. Miscellaneous:    Subjective:  Edith Tristan. is a 47 y.o. male who returns for follow up visit. [facility] admit 7/8/19 Acute on chronic SHF. Underwent RHC/LHC - no sig CAD  Since DC -  Chronic FENTON; Chronic swelling LE No palpitations. No chest pain. Has gained 1lb since [facility]    Tries to eat low-salt/heart healthy diet. Has difficulty losing weight. ROS:  (bold if positive, if negative)             Medications before admission:    Current Outpatient Medications   Medication Sig Dispense    dilTIAZem CD (CARDIZEM CD) 180 mg ER capsule Take 1 Cap by mouth daily. 30 Cap    torsemide (DEMADEX) 20 mg tablet Take 2 Tabs by mouth daily. 60 Tab    warfarin (COUMADIN) 5 mg tablet Take 5 mg by mouth two (2) days a week. Patient takes 5 mg on Thursday and Wednesday     potassium chloride SR (KLOR-CON 10) 10 mEq tablet Take 1 Tab by mouth daily. 90 Tab    psyllium husk-aspartame (METAMUCIL FIBER SINGLES) 3.4 gram pwpk packet Take 1 Packet by mouth daily.      warfarin (COUMADIN) 5 mg tablet Take 10 mg by mouth five (5) days a week. Patient takes 10 mg on Monday, tuesday, Friday, Saturday, Sunday     MULTIVITAMIN (MULTIPLE VITAMIN PO) Take 1 Tab by mouth daily. No current facility-administered medications for this visit.         Family History of CAD:    Yes    Social History:  Current  Smoker  No    Physical Exam:  Visit Vitals  /62 (BP 1 Location: Right arm, BP Patient Position: Sitting)   Pulse 92   Resp 18   Ht 5' 6\" (1.676 m)   Wt 346 lb (156.9 kg)   SpO2 96%   BMI 55.85 kg/m²          Gen: Well-developed, well-nourished, morbid obesity  Neck: Supple,thick neck, unable to appreciate JVD  Resp: No accessory muscle use, Clear breath sounds, No rales or rhonchi  Card: Irregular Rate,Rythm,Normal S1, S2, No murmurs  Abd:  Soft,morbidly obese, Abd wall - indurated/erythema+  MSK: No cyanosis  Neuro: moving all four extremities , follows commands appropriately  Psych:  Good insight, oriented to person, place , alert, Nml Affect  LE: 1+ edema;     EKG:       LABS:        Lab Results   Component Value Date/Time    WBC 8.4 07/15/2019 01:46 AM    HGB 10.8 (L) 07/15/2019 01:46 AM    HCT 34.7 (L) 07/15/2019 01:46 AM    PLATELET 928 06/20/7382 01:46 AM     Lab Results   Component Value Date/Time    Sodium 139 07/15/2019 01:46 AM    Potassium 3.6 07/15/2019 01:46 AM    Chloride 103 07/15/2019 01:46 AM    CO2 30 07/15/2019 01:46 AM    Anion gap 6 07/15/2019 01:46 AM    Glucose 100 07/15/2019 01:46 AM    BUN 23 (H) 07/15/2019 01:46 AM    Creatinine 1.52 (H) 07/15/2019 01:46 AM    BUN/Creatinine ratio 15 07/15/2019 01:46 AM    GFR est AA 59 (L) 07/15/2019 01:46 AM    GFR est non-AA 49 (L) 07/15/2019 01:46 AM    Calcium 8.2 (L) 07/15/2019 01:46 AM       Lab Results   Component Value Date/Time    aPTT 28.0 06/22/2017 12:54 PM     Lab Results   Component Value Date/Time    INR 1.6 (H) 07/15/2019 01:46 AM    INR 1.8 (H) 07/14/2019 12:42 AM    INR 1.8 (H) 07/13/2019 10:16 AM Prothrombin time 16.1 (H) 07/15/2019 01:46 AM    Prothrombin time 17.7 (H) 07/14/2019 12:42 AM    Prothrombin time 17.8 (H) 07/13/2019 10:16 AM     No components found for: Afshan Ortiz MD

## 2019-07-19 ENCOUNTER — HOME CARE VISIT (OUTPATIENT)
Dept: SCHEDULING | Facility: HOME HEALTH | Age: 49
End: 2019-07-19

## 2019-07-19 PROCEDURE — G0299 HHS/HOSPICE OF RN EA 15 MIN: HCPCS

## 2019-07-26 ENCOUNTER — TELEPHONE (OUTPATIENT)
Dept: CARDIOLOGY CLINIC | Age: 49
End: 2019-07-26

## 2019-07-26 NOTE — TELEPHONE ENCOUNTER
Patient stated that he was having side effects of torsemide 20mg so he quit taking it. He would like to discuss these issues with you at your earliest convenience. Please advise.     Phone #: [phone]  Thanks

## 2019-07-29 ENCOUNTER — TELEPHONE (OUTPATIENT)
Dept: CARDIOLOGY CLINIC | Age: 49
End: 2019-07-29

## 2019-07-29 ENCOUNTER — HOME CARE VISIT (OUTPATIENT)
Dept: SCHEDULING | Facility: HOME HEALTH | Age: 49
End: 2019-07-29

## 2019-07-29 PROCEDURE — G0299 HHS/HOSPICE OF RN EA 15 MIN: HCPCS

## 2019-07-29 NOTE — TELEPHONE ENCOUNTER
Received a call from [facility] [place] nurse she spoke with patient and patient thought he was allergic to Torsemide due to some itching he was experiencing last week. He stopped taking it for a couple of days but then realized after washing his Life Vest in [place] (he received Tide in the package when he received the vest) that it is the Tide he is allergic too. Patient has gained 15 pounds, did restart taking the Torsemide 20 mg BID and is taking other medications as prescribed and is wearing his Life Vest.    Patient had a L&RHC 7/11/19 and is scheduled for a follow up visit on 8/23/19. Please advise.

## 2019-07-30 NOTE — TELEPHONE ENCOUNTER
After discussing the below information with Dr Rose Resendiz he would like patient to increase the Torsemide 20 mg to 3 Tablets daily for one week and make a follow up appointment this week to see Dr Rose Resendiz or YONIS Edmondson. Called reviewed the above message with patient he will increase the Torsemide to 3 Tablets daily for one week.  Patient said he was unable to be seen this week no transportation but could come Monday, August 5th at 1:40 pm.

## 2019-08-05 ENCOUNTER — OFFICE VISIT (OUTPATIENT)
Dept: CARDIOLOGY CLINIC | Age: 49
End: 2019-08-05

## 2019-08-05 VITALS
HEIGHT: 66 IN | HEART RATE: 82 BPM | OXYGEN SATURATION: 94 % | BODY MASS INDEX: 50.62 KG/M2 | SYSTOLIC BLOOD PRESSURE: 136 MMHG | RESPIRATION RATE: 20 BRPM | DIASTOLIC BLOOD PRESSURE: 82 MMHG | WEIGHT: 315 LBS

## 2019-08-05 DIAGNOSIS — I50.22 CHRONIC SYSTOLIC CONGESTIVE HEART FAILURE (HCC): Primary | ICD-10-CM

## 2019-08-05 DIAGNOSIS — G47.33 OSA (OBSTRUCTIVE SLEEP APNEA): ICD-10-CM

## 2019-08-05 DIAGNOSIS — Z79.01 CHRONIC ANTICOAGULATION: ICD-10-CM

## 2019-08-05 DIAGNOSIS — E66.01 MORBID OBESITY (HCC): ICD-10-CM

## 2019-08-05 RX ORDER — POLYETHYLENE GLYCOL 3350 17 G/17G
17 POWDER, FOR SOLUTION ORAL DAILY
COMMUNITY
End: 2021-08-19

## 2019-08-05 RX ORDER — DILTIAZEM HYDROCHLORIDE 180 MG/1
180 CAPSULE, COATED, EXTENDED RELEASE ORAL DAILY
Qty: 180 CAP | Refills: 3 | Status: SHIPPED | OUTPATIENT
Start: 2019-08-05 | End: 2019-12-26 | Stop reason: ALTCHOICE

## 2019-08-05 NOTE — LETTER
8/5/19 Patient: Ivan Hadny. YOB: 1970 Date of Visit: 8/5/2019 Timothy Beckford, [address] [facility] [address] [facility] [phone] VIA Facsimile: [phone] Dear MENA Moralez, Thank you for referring Mr. Felicitas Chandra to [address] for evaluation. My notes for this consultation are attached. If you have questions, please do not hesitate to call me. I look forward to following your patient along with you. Sincerely, Leverette Cogan, MD

## 2019-08-05 NOTE — PROGRESS NOTES
Oxana Joy MD    [id number] [place], [address] [facility]    Office [phone],ZULEIKA [phone]  Pager [phone]    Edith Tristan. is a 47 y.o. male is here for f/u visit. Primary care physician:MENA Mcdonald    Patient Active Problem List   Diagnosis Code    Renal insufficiency N28.9    PEPE (obstructive sleep apnea) G47.33    Tobacco use Z72.0    Chronic back pain M54.9, G89.29    Asthma J45.909    Elevated BP without diagnosis of hypertension R03.0    Exertional dyspnea R06.09    Persistent atrial fibrillation (HCC) I48.1    Morbid obesity due to excess calories (HCC) E66.01    Chronic anticoagulation Z79.01    Chronic diastolic heart failure (HCC) I50.32    Chronic systolic congestive heart failure (HCC) I50.22    Acute on chronic systolic CHF (congestive heart failure) (HCC) I50.23    Acute renal insufficiency N28.9       Dear Mr. Jamila Zheng had the pleasure of seeing Mr. Edith Tristan. in the office today. Chief complaint:  Chief Complaint   Patient presents with    Follow-up     1 mo f/u     CHF    Irregular Heart Beat     Afib    Breathing Problem       Assessment:   Chronic SHF - EF 30%. [facility] admit 7/8/19 Acute on chronic SHF. Underwent RHC/LHC - no sig CAD  A fib with CVR   Chronic anticoagulation - INR followed by PCP  Morbid obesity   Smoker - quit smoking/not chewing tobacco  Hx of Excessive EtOH  -- occ drinks beer now        Plan:     7/30/19 - Torsemide 20mg inc to 3 tab with f/u visit today for wt gain. Continue 3 tablets for now. On K supplements. Taking 1 tablet twice daily. BMP/NTproBNP  Continue other meds. Have discussed with him about starting Entresto previously. If his renal function is stable we will start him on Entresto. Aggressive CV risk factor modification  Weighs himself daily. Increase his dose based on his weight. On anticoagulation    Follow up in 2 weeeks  or earlier as needed. Patient understands the plan. All questions were answered to the patient's satisfaction. Medication Side Effects and Warnings were discussed with patient: yes  Patient Labs were reviewed and or requested:  yes  Patient Past Records were reviewed and or requested: yes    I appreciate the opportunity to be involved in . See note below for details. Please do not hesitate to contact us with questions or concerns. Julio Bell MD    Cardiac Testing/ Procedures: A. Cardiac Cath/PCI: 7/1/19 - LHC/RHC - No sig CAD; Mean PA pressure 21 mm Hg    B.ECHO/EVITA: 6/21/19 - EF 26-30%; Mod LAE; DIDIER: Mild to Mod MR; Mild to Mod TR; Mod Pulm HTN    6/22/2017 Left ventricle: Systolic function was mildly reduced. Ejection fraction was estimated to be 45 %. There were no regional wall motion abnormalities. Tricuspid valve: There was mild regurgitation. C.StressNuclear/Stress ECHO/Stress test: 6/26/19 - Abnormal ledy nuc; EF 24%; Fixed defect( inf/apical)    D. Vascular:    E. EP:    F. Miscellaneous:    Subjective:  Bert Delgado is a 47 y.o. male who returns for follow up visit. His wt went up because he stopped his torsemide thinking that it was causing a rash. However his weight started going up and he went back on the torsemide. His weight is now back to his last visit weight. Continues to have fluid in his lower abdomen as well as lower extremities. [facility] admit 7/8/19 Acute on chronic SHF. Underwent RHC/LHC - no sig CAD    Chronic FENTON; Chronic swelling LE No palpitations. No chest pain. Recently his diuretic dose was increased because of weight gain. He is back to his baseline weight. Tries to eat low-salt/heart healthy diet. Has difficulty losing weight. ROS:  (bold if positive, if negative)             Medications before admission:    Current Outpatient Medications   Medication Sig Dispense    polyethylene glycol (MIRALAX) 17 gram/dose powder Take 17 g by mouth daily.  dilTIAZem CD (CARDIZEM CD) 180 mg ER capsule Take 1 Cap by mouth daily. 30 Cap    torsemide (DEMADEX) 20 mg tablet Take 2 Tabs by mouth daily. (Patient taking differently: Take 60 mg by mouth daily.) 60 Tab    warfarin (COUMADIN) 5 mg tablet Take 5 mg by mouth two (2) days a week. Patient takes 5 mg on Thursday and Wednesday     potassium chloride SR (KLOR-CON 10) 10 mEq tablet Take 1 Tab by mouth daily. 90 Tab    psyllium husk-aspartame (METAMUCIL FIBER SINGLES) 3.4 gram pwpk packet Take 1 Packet by mouth daily.  warfarin (COUMADIN) 5 mg tablet Take 10 mg by mouth five (5) days a week. Patient takes 10 mg on Monday, tuesday, Friday, Saturday, Sunday     MULTIVITAMIN (MULTIPLE VITAMIN PO) Take 1 Tab by mouth daily. No current facility-administered medications for this visit.         Family History of CAD:    Yes    Social History:  Current  Smoker  No    Physical Exam:  Visit Vitals  /82 (BP 1 Location: Left arm, BP Patient Position: Sitting)   Pulse 82   Resp 20   Ht 5' 6\" (1.676 m)   Wt 346 lb 12.8 oz (157.3 kg)   SpO2 94%   BMI 55.97 kg/m²        Weight similar to last visit    Gen: Well-developed, well-nourished, morbid obesity  Neck: Supple,thick neck, unable to appreciate JVD  Resp: No accessory muscle use, Clear breath sounds, No rales or rhonchi  Card: Irregular Rate,Rythm,Normal S1, S2, No murmurs  Abd:  Soft,morbidly obese, Abd wall - indurated skin  MSK: No cyanosis  Neuro: moving all four extremities , follows commands appropriately  Psych:  Good insight, oriented to person, place , alert, Nml Affect  LE: 1+ edema;     EKG:       LABS:        Lab Results   Component Value Date/Time    WBC 8.4 07/15/2019 01:46 AM    HGB 10.8 (L) 07/15/2019 01:46 AM    HCT 34.7 (L) 07/15/2019 01:46 AM    PLATELET 994 39/03/4935 01:46 AM     Lab Results   Component Value Date/Time    Sodium 139 07/15/2019 01:46 AM    Potassium 3.6 07/15/2019 01:46 AM    Chloride 103 07/15/2019 01:46 AM    CO2 30 07/15/2019 01:46 AM    Anion gap 6 07/15/2019 01:46 AM    Glucose 100 07/15/2019 01:46 AM    BUN 23 (H) 07/15/2019 01:46 AM    Creatinine 1.52 (H) 07/15/2019 01:46 AM    BUN/Creatinine ratio 15 07/15/2019 01:46 AM    GFR est AA 59 (L) 07/15/2019 01:46 AM    GFR est non-AA 49 (L) 07/15/2019 01:46 AM    Calcium 8.2 (L) 07/15/2019 01:46 AM       Lab Results   Component Value Date/Time    aPTT 28.0 06/22/2017 12:54 PM     Lab Results   Component Value Date/Time    INR 1.6 (H) 07/15/2019 01:46 AM    INR 1.8 (H) 07/14/2019 12:42 AM    INR 1.8 (H) 07/13/2019 10:16 AM    INR, External 1.7 (A) 07/18/2019    INR POC 1.7 07/18/2019 03:22 PM    Prothrombin time 16.1 (H) 07/15/2019 01:46 AM    Prothrombin time 17.7 (H) 07/14/2019 12:42 AM    Prothrombin time 17.8 (H) 07/13/2019 10:16 AM     No components found for: Ayde Lubin MD

## 2019-08-05 NOTE — PROGRESS NOTES
Ning Price. is a 47 y.o. male    Chief Complaint   Patient presents with    Follow-up     1 mo f/u     CHF    Irregular Heart Beat     Afib    Breathing Problem       Chest pain NO  SOB Pt states having SOB with exertion. Dizziness NO  Swelling Pt states he is losing some of the swelling but not much. Pt states he hasn't really been going to the bathroom as much as he thinks he should be. Recent hospital visit NO  Refills NO      Visit Vitals  /82 (BP 1 Location: Left arm, BP Patient Position: Sitting)   Pulse 82   Resp 20   Ht 5' 6\" (1.676 m)   Wt 346 lb 12.8 oz (157.3 kg)   SpO2 94%   BMI 55.97 kg/m²       1. Have you been to the ER, urgent care clinic since your last visit? Hospitalized since your last visit? No    2. Have you seen or consulted any other health care providers outside of the [address] [place] [address] since your last visit? Include any pap smears or colon screening.  No

## 2019-08-06 ENCOUNTER — TELEPHONE (OUTPATIENT)
Dept: CARDIOLOGY CLINIC | Age: 49
End: 2019-08-06

## 2019-08-06 LAB
BUN SERPL-MCNC: 13 MG/DL (ref 6–24)
BUN/CREAT SERPL: 10 (ref 9–20)
CALCIUM SERPL-MCNC: 9 MG/DL (ref 8.7–10.2)
CHLORIDE SERPL-SCNC: 101 MMOL/L (ref 96–106)
CO2 SERPL-SCNC: 24 MMOL/L (ref 20–29)
CREAT SERPL-MCNC: 1.27 MG/DL (ref 0.76–1.27)
GLUCOSE SERPL-MCNC: 105 MG/DL (ref 65–99)
NT-PROBNP SERPL-MCNC: 1793 PG/ML (ref 0–121)
POTASSIUM SERPL-SCNC: 4.1 MMOL/L (ref 3.5–5.2)
SODIUM SERPL-SCNC: 144 MMOL/L (ref 134–144)

## 2019-08-06 NOTE — TELEPHONE ENCOUNTER
Called patient reviewed below results in details. Patient verbalized understanding. BMP-lab results-kidney function-doing okay.  Continue current dose of diuretic as advised during his office visit recently.

## 2019-08-06 NOTE — PROGRESS NOTES
Called patient reviewed below results in detail verbalized understanding. BMP-lab results-kidney function-doing okay.  Continue current dose of diuretic as advised during his office visit recently.

## 2019-08-08 ENCOUNTER — TELEPHONE (OUTPATIENT)
Dept: CARDIOLOGY CLINIC | Age: 49
End: 2019-08-08

## 2019-08-08 RX ORDER — TORSEMIDE 20 MG/1
60 TABLET ORAL DAILY
Qty: 90 TAB | Refills: 0 | Status: SHIPPED | OUTPATIENT
Start: 2019-08-08 | End: 2019-08-23 | Stop reason: SDUPTHER

## 2019-08-08 NOTE — TELEPHONE ENCOUNTER
Patient states that his medication torsemide needs to be refilled, but the pharmacy will not refill it early. Patient states that his prescription states that he takes 2 pills daily, patient states that Dr Jose F Jay told him to start taking 3 pills a day about two months ago. Because of this, patient would like to know if he can get a new prescription that states that he takes 3 pills a day so that he can get refills without trouble. Pharmacy is confirmed. Patient states that he is out of the medication.     Phone: [phone]

## 2019-08-08 NOTE — TELEPHONE ENCOUNTER
Medication refill per Dr Lee Cueto office visit notes on 8/5/19 to increase to 3 tablets daily. Requested Prescriptions     Pending Prescriptions Disp Refills    torsemide (DEMADEX) 20 mg tablet 90 Tab 0     Sig: Take 3 Tabs by mouth daily.

## 2019-08-23 ENCOUNTER — OFFICE VISIT (OUTPATIENT)
Dept: CARDIOLOGY CLINIC | Age: 49
End: 2019-08-23

## 2019-08-23 VITALS
OXYGEN SATURATION: 97 % | WEIGHT: 315 LBS | DIASTOLIC BLOOD PRESSURE: 84 MMHG | HEART RATE: 68 BPM | BODY MASS INDEX: 50.62 KG/M2 | SYSTOLIC BLOOD PRESSURE: 120 MMHG | RESPIRATION RATE: 24 BRPM | HEIGHT: 66 IN

## 2019-08-23 DIAGNOSIS — I48.21 PERMANENT ATRIAL FIBRILLATION (HCC): ICD-10-CM

## 2019-08-23 DIAGNOSIS — G47.33 OSA (OBSTRUCTIVE SLEEP APNEA): ICD-10-CM

## 2019-08-23 DIAGNOSIS — I50.22 CHRONIC SYSTOLIC CONGESTIVE HEART FAILURE (HCC): Primary | ICD-10-CM

## 2019-08-23 DIAGNOSIS — Z79.01 CHRONIC ANTICOAGULATION: ICD-10-CM

## 2019-08-23 DIAGNOSIS — N28.9 RENAL INSUFFICIENCY: ICD-10-CM

## 2019-08-23 DIAGNOSIS — E66.01 MORBID OBESITY (HCC): ICD-10-CM

## 2019-08-23 RX ORDER — POTASSIUM CHLORIDE 750 MG/1
10 TABLET, FILM COATED, EXTENDED RELEASE ORAL 2 TIMES DAILY
Qty: 60 TAB | Refills: 3 | Status: SHIPPED | OUTPATIENT
Start: 2019-08-23 | End: 2019-09-25 | Stop reason: SDUPTHER

## 2019-08-23 RX ORDER — TORSEMIDE 20 MG/1
40 TABLET ORAL 2 TIMES DAILY
Qty: 120 TAB | Refills: 3 | Status: SHIPPED | OUTPATIENT
Start: 2019-08-23 | End: 2019-12-27

## 2019-08-23 RX ORDER — METOLAZONE 5 MG/1
5 TABLET ORAL DAILY
Qty: 30 TAB | Refills: 0 | Status: SHIPPED | OUTPATIENT
Start: 2019-08-23 | End: 2019-08-29 | Stop reason: ALTCHOICE

## 2019-08-23 NOTE — PROGRESS NOTES
Nae Ham. is a 47 y.o. male    Chief Complaint   Patient presents with    Follow-up     2 wk    Anticoagulation    CHF       Chest pain no    SOB Patient having sob at all times    Dizziness no    Swelling ankles, feet, legs and abdomen     Refills no    Visit Vitals  /84 (BP 1 Location: Left arm, BP Patient Position: Sitting)   Pulse 68   Resp 24   Ht 5' 6\" (1.676 m)   Wt (!) 359 lb (162.8 kg)   SpO2 97%   BMI 57.94 kg/m²       1. Have you been to the ER, urgent care clinic since your last visit? Hospitalized since your last visit?no    2. Have you seen or consulted any other health care providers outside of the [address] [place] [address] since your last visit? Include any pap smears or colon screening.  no

## 2019-08-23 NOTE — PROGRESS NOTES
Sebastien Alcantara MD    [address] [place], [address] [facility]    Office [phone],[place]LE[place] [phone]  Pager [phone]    Ananth Thurman. is a 47 y.o. male is here for f/u visit. Primary care physician:MENA Abarca    Patient Active Problem List   Diagnosis Code    Renal insufficiency N28.9    PEPE (obstructive sleep apnea) G47.33    Tobacco use Z72.0    Chronic back pain M54.9, G89.29    Asthma J45.909    Elevated BP without diagnosis of hypertension R03.0    Exertional dyspnea R06.09    Persistent atrial fibrillation (HCC) I48.1    Morbid obesity due to excess calories (HCC) E66.01    Chronic anticoagulation Z79.01    Chronic diastolic heart failure (HCC) I50.32    Chronic systolic congestive heart failure (HCC) I50.22    Acute on chronic systolic CHF (congestive heart failure) (HCC) I50.23    Acute renal insufficiency N28.9       Dear Mr. Sheryl Black had the pleasure of seeing Mr. Ananth Thurman. in the office today. Chief complaint:  Chief Complaint   Patient presents with    Follow-up     2 wk    Anticoagulation    CHF       Assessment:  Chronic SHF - EF 30%. [facility] admit 7/8/19 Acute on chronic SHF. Underwent RHC/LHC - no sig CAD  A fib with CVR   Chronic anticoagulation - INR followed by PCP  Morbid obesity   Smoker - quit smoking/not chewing tobacco  Hx of Excessive EtOH  -- occ drinks beer now        Plan:     7/30/19 - Torsemide 20mg inc to 3 tab with f/u visit today for wt gain. Will increase torsemide 20 mg to 2 tablets twice daily. Admit is alone 5 mg daily half an hour prior to the torsemide in the a.m. Also increase KCl to 10 mg twice daily. For now he will continue the Cardizem  mg daily. If after increasing his torsemide dose, his weight starts trending down as well as his blood pressure remained stable-will consider increasing Cardizem CD to 300 mg daily. Sneha Levin BMP/NTproBNP next visit. Continue other meds.   Have discussed with him about starting Entresto previously. If his renal function is stable we will start him on Entresto. Previously had difficulty with getting medications secondary to financial issues but states that he has a new insurance and try to get the medications that was not expensive. Aggressive CV risk factor modification    On anticoagulation on Coumadin/INR followed by Coumadin clinic    Follow-up next week with ANP. Follow-up with me in 2 to 3 weeks         Patient understands the plan. All questions were answered to the patient's satisfaction. Medication Side Effects and Warnings were discussed with patient: yes  Patient Labs were reviewed and or requested:  yes  Patient Past Records were reviewed and or requested: yes    I appreciate the opportunity to be involved in . See note below for details. Please do not hesitate to contact us with questions or concerns. Belgica Dupree MD    Cardiac Testing/ Procedures: A. Cardiac Cath/PCI: 7/1/19 - LHC/RHC - No sig CAD; Mean PA pressure 21 mm Hg    B.ECHO/EIVTA: 6/21/19 - EF 26-30%; Mod LAE; DIDIER: Mild to Mod MR; Mild to Mod TR; Mod Pulm HTN    6/22/2017 Left ventricle: Systolic function was mildly reduced. Ejection fraction was estimated to be 45 %. There were no regional wall motion abnormalities. Tricuspid valve: There was mild regurgitation. C.StressNuclear/Stress ECHO/Stress test: 6/26/19 - Abnormal ledy nuc; EF 24%; Fixed defect( inf/apical)    D. Vascular:    E. EP:    F. Miscellaneous:    Subjective:  Ning Price. is a 47 y.o. male who returns for follow up visit. Patient has gained weight progressively since his last visit. He has increased his torsemide 20 mg to 3 tablets daily. No chest pain. Chronic Dyspnea on exertion which has not changed significantly from prior visit. He feels that the Cardizem CD dose change which was done while he was in the hospital is not working well for him.   He is currently on 180 mg daily. He was started on beta-blockers previously but he states that the medication did  not \"agree with him\" and he does not want to be on beta-blockers. He feels that when he was on the Cardizem  mg daily he was doing well. Will try to increase the dose to Cardizem  mg at some point, if tolerated. [facility] admit 7/8/19 Acute on chronic SHF. Underwent RHC/LHC - no sig CAD    Tries to eat low-salt/heart healthy diet. Has difficulty losing weight. ROS:  (bold if positive, if negative)             Medications before admission:    Current Outpatient Medications   Medication Sig Dispense    torsemide (DEMADEX) 20 mg tablet Take 2 Tabs by mouth two (2) times a day. 120 Tab    potassium chloride SR (KLOR-CON 10) 10 mEq tablet Take 1 Tab by mouth two (2) times a day. 60 Tab    metOLazone (ZAROXOLYN) 5 mg tablet Take 1 Tab by mouth daily. 30 Tab    polyethylene glycol (MIRALAX) 17 gram/dose powder Take 17 g by mouth daily.  dilTIAZem CD (CARDIZEM CD) 180 mg ER capsule Take 1 Cap by mouth daily. 180 Cap    warfarin (COUMADIN) 5 mg tablet Take 5 mg by mouth two (2) days a week. Patient takes 5 mg on Thursday and Wednesday     warfarin (COUMADIN) 5 mg tablet Take 10 mg by mouth five (5) days a week. Patient takes 10 mg on Monday, tuesday, Friday, Saturday, Sunday     MULTIVITAMIN (MULTIPLE VITAMIN PO) Take 1 Tab by mouth daily.  psyllium husk-aspartame (METAMUCIL FIBER SINGLES) 3.4 gram pwpk packet Take 1 Packet by mouth daily. No current facility-administered medications for this visit.         Family History of CAD:    Yes    Social History:  Current  Smoker  No    Physical Exam:  Visit Vitals  /84 (BP 1 Location: Left arm, BP Patient Position: Sitting)   Pulse 68   Resp 24   Ht 5' 6\" (1.676 m)   Wt (!) 359 lb (162.8 kg)   SpO2 97%   BMI 57.94 kg/m²         Gen: Well-developed, well-nourished, morbid obesity  Neck: Supple,thick neck, unable to appreciate JVD  Resp: No accessory muscle use, Clear breath sounds, No rales or rhonchi  Card: Irregular Rate,Rythm,Normal S1, S2, No murmurs  Abd:  Soft,morbidly obese, Abd wall - indurated skin  MSK: No cyanosis  Neuro: moving all four extremities , follows commands appropriately  Psych:  Good insight, oriented to person, place , alert, Nml Affect  LE: 2+ edema;     EKG:       LABS:        Lab Results   Component Value Date/Time    WBC 8.4 07/15/2019 01:46 AM    HGB 10.8 (L) 07/15/2019 01:46 AM    HCT 34.7 (L) 07/15/2019 01:46 AM    PLATELET 978 95/98/3895 01:46 AM     Lab Results   Component Value Date/Time    Sodium 144 08/05/2019 02:54 PM    Potassium 4.1 08/05/2019 02:54 PM    Chloride 101 08/05/2019 02:54 PM    CO2 24 08/05/2019 02:54 PM    Anion gap 6 07/15/2019 01:46 AM    Glucose 105 (H) 08/05/2019 02:54 PM    BUN 13 08/05/2019 02:54 PM    Creatinine 1.27 08/05/2019 02:54 PM    BUN/Creatinine ratio 10 08/05/2019 02:54 PM    GFR est AA 76 08/05/2019 02:54 PM    GFR est non-AA 65 08/05/2019 02:54 PM    Calcium 9.0 08/05/2019 02:54 PM       Lab Results   Component Value Date/Time    aPTT 28.0 06/22/2017 12:54 PM     Lab Results   Component Value Date/Time    INR 1.6 (H) 07/15/2019 01:46 AM    INR 1.8 (H) 07/14/2019 12:42 AM    INR 1.8 (H) 07/13/2019 10:16 AM    INR, External 1.7 (A) 07/18/2019    INR POC 1.7 07/18/2019 03:22 PM    Prothrombin time 16.1 (H) 07/15/2019 01:46 AM    Prothrombin time 17.7 (H) 07/14/2019 12:42 AM    Prothrombin time 17.8 (H) 07/13/2019 10:16 AM     No components found for: Beka Mart MD

## 2019-08-26 ENCOUNTER — TELEPHONE (OUTPATIENT)
Dept: CARDIOLOGY CLINIC | Age: 49
End: 2019-08-26

## 2019-08-26 NOTE — TELEPHONE ENCOUNTER
Called patient advised new script was sent to [facility] on file for the Torsemide 20 mg (2) Tablets BID patient's insurance will cover this change effective 8/31/19. Patient can use the [employer] card and get 30 tablets with cash price of $9.00 to hold him until 8/31/19. Patient stated that is what he will do.

## 2019-08-29 ENCOUNTER — OFFICE VISIT (OUTPATIENT)
Dept: CARDIOLOGY CLINIC | Age: 49
End: 2019-08-29

## 2019-08-29 VITALS
RESPIRATION RATE: 20 BRPM | WEIGHT: 304.4 LBS | HEIGHT: 66 IN | BODY MASS INDEX: 48.92 KG/M2 | SYSTOLIC BLOOD PRESSURE: 116 MMHG | OXYGEN SATURATION: 97 % | HEART RATE: 75 BPM | DIASTOLIC BLOOD PRESSURE: 76 MMHG

## 2019-08-29 DIAGNOSIS — I42.8 NICM (NONISCHEMIC CARDIOMYOPATHY) (HCC): ICD-10-CM

## 2019-08-29 DIAGNOSIS — I50.22 CHRONIC SYSTOLIC CONGESTIVE HEART FAILURE (HCC): Primary | ICD-10-CM

## 2019-08-29 DIAGNOSIS — Z79.01 CHRONIC ANTICOAGULATION: ICD-10-CM

## 2019-08-29 DIAGNOSIS — F10.11 H/O ETOH ABUSE: ICD-10-CM

## 2019-08-29 DIAGNOSIS — G47.33 OSA (OBSTRUCTIVE SLEEP APNEA): ICD-10-CM

## 2019-08-29 DIAGNOSIS — E66.01 MORBID OBESITY (HCC): ICD-10-CM

## 2019-08-29 DIAGNOSIS — I48.20 CHRONIC ATRIAL FIBRILLATION (HCC): ICD-10-CM

## 2019-08-29 NOTE — PROGRESS NOTES
Marcial Calderon, JC  [address]  [place], [address] [facility]    Office [phone]  Fax [phone]      Cece Napier is a 47 y.o. male is here for f/u of CHF. Primary care physician:MENA Rivas    Patient Active Problem List   Diagnosis Code    Renal insufficiency N28.9    PEPE (obstructive sleep apnea) G47.33    Tobacco use Z72.0    Chronic back pain M54.9, G89.29    Asthma J45.909    Elevated BP without diagnosis of hypertension R03.0    Exertional dyspnea R06.09    Persistent atrial fibrillation (HCC) I48.1    Morbid obesity due to excess calories (HCC) E66.01    Chronic anticoagulation Z79.01    Chronic diastolic heart failure (HCC) I50.32    Chronic systolic congestive heart failure (HCC) I50.22    Acute on chronic systolic CHF (congestive heart failure) (HCC) I50.23    Acute renal insufficiency N28.9       Dear Mr. Phelpsjuan manuel Crigler had the pleasure of seeing Mr. Cece Napier in the office today. Chief complaint:  Chief Complaint   Patient presents with    CHF    Irregular Heart Beat    Breathing Problem    Anticoagulation     Assessment:  NICM, Acute on Chronic HFrEF - EF 30%.     A fib   Chronic anticoagulation   Morbid obesity   Smoker - quit smoking/not chewing tobacco  Hx of Excessive EtOH  -- occ drinks beer now    Plan:   ~55lb weight loss in last week using metolazone 5mg per day in addition to Torsemide 40mg BID; vol sig improved  - check updated BMP, Mg, and pBNP today  - hold metolazone for now; decrease torsemide to 40mg/d until review of labs tmw afternoon; suspect he will go on BID dosing for maintenance  - cont cardizem 180mg/d; prefer BB to CCB given HF; however, pt did poorly on BB previously; suspect this was d/t decompensated HF / vol overload; consider re-trail in future  - not on acei/arb; if his renal function is stable we will start him on Entresto  - cont aggressive CV risk factor modification  - cont anticoagulation on Coumadin/INR followed by PCP  - cardiac rehab    Follow-up next already schedule in 2-3 with Dr Mary Ann Casillas for further HF med titration  Phone f/u tmw with lab results / med changes. Patient understands the plan. All questions were answered to the patient's satisfaction. Medication Side Effects and Warnings were discussed with patient: yes  Patient Labs were reviewed and or requested:  yes  Patient Past Records were reviewed and or requested: yes    I appreciate the opportunity to be involved in . See note below for details. Please do not hesitate to contact us with questions or concerns. Geni Seran NP    Cardiac Testing/ Procedures: A. Cardiac Cath/PCI: 7/1/19 - LHC/RHC - No sig CAD; Mean PA pressure 21 mm Hg    B.ECHO/EVITA: 6/21/19 - EF 26-30%; Mod LAE; DIDIER: Mild to Mod MR; Mild to Mod TR; Mod Pulm HTN    6/22/2017 Left ventricle: Systolic function was mildly reduced. Ejection fraction was estimated to be 45 %. There were no regional wall motion abnormalities. Tricuspid valve: There was mild regurgitation. C.StressNuclear/Stress ECHO/Stress test: 6/26/19 - Abnormal ledy nuc; EF 24%; Fixed defect( inf/apical)    D. Vascular:    E. EP:    F. Miscellaneous:    Subjective:  Harley Bradley. is a 47 y.o. male who returns for follow up visit. Pt feels great; has lost ~55lb in the past week. Was taking metolazone daily as prescribed; took last dose on Tuesday as swelling in legs basically gone. Hasn't taken torsemide today but has been using 40mg BID. Patient denies any exertional chest pain, dyspnea, palpitations, syncope, orthopnea, edema, or paroxysmal nocturnal dyspnea. Has more energy and feels interested in rehab. Tries to eat low-salt/heart healthy diet. Has difficulty losing weight; states this is the lowest he has been in a long time. Hesitant to try new med Gage Graft) given he feels so well. Interested in cardiac rehab.        ROS:  Constitutional: Negative for fever, chills, malaise/fatigue and diaphoresis. Respiratory: Negative for cough, hemoptysis, sputum production, shortness of breath and wheezing. Cardiovascular: Negative for chest pain, palpitations, orthopnea, claudication, leg swelling and PND. Gastrointestinal: Negative for heartburn, nausea, vomiting, blood in stool and melena. Genitourinary: Negative for dysuria and flank pain. Neurological: Negative for focal weakness, seizures, loss of consciousness, weakness and headaches. Endo/Heme/Allergies: Negative for abnormal bleeding. Psychiatric/Behavioral: Negative for memory loss. Medications before admission:    Current Outpatient Medications   Medication Sig Dispense    torsemide (DEMADEX) 20 mg tablet Take 2 Tabs by mouth two (2) times a day. 120 Tab    potassium chloride SR (KLOR-CON 10) 10 mEq tablet Take 1 Tab by mouth two (2) times a day. 60 Tab    polyethylene glycol (MIRALAX) 17 gram/dose powder Take 17 g by mouth daily.  dilTIAZem CD (CARDIZEM CD) 180 mg ER capsule Take 1 Cap by mouth daily. 180 Cap    warfarin (COUMADIN) 5 mg tablet Take 5 mg by mouth two (2) days a week. Patient takes 5 mg on Thursday and Wednesday     warfarin (COUMADIN) 5 mg tablet Take 10 mg by mouth five (5) days a week. Patient takes 10 mg on Monday, tuesday, Friday, Saturday, Sunday     MULTIVITAMIN (MULTIPLE VITAMIN PO) Take 1 Tab by mouth daily. No current facility-administered medications for this visit.         Family History of CAD:    Yes    Social History:  Current  Smoker  No    Physical Exam:  Visit Vitals  /76 (BP 1 Location: Left arm, BP Patient Position: Sitting)   Pulse 75   Resp 20   Ht 5' 6\" (1.676 m)   Wt 304 lb 6.4 oz (138.1 kg)   SpO2 97%   BMI 49.13 kg/m²     Wt Readings from Last 3 Encounters:   08/29/19 304 lb 6.4 oz (138.1 kg)   08/23/19 (!) 359 lb (162.8 kg)   08/05/19 346 lb 12.8 oz (157.3 kg)       General - well developed well nourished  Neck - unable to appreciate JVP 2/2 thick neck  Cardiac - RRR - distant heart sounds  Vascular - radials and pedal pulses equal bilateral  Lungs - clear to auscultation bilaterals, no rales, wheezing or rhonchi  Abd - protuberant, soft, non-distended, +BS  Extremities - trace LE edema, warm, well perfused  Neuro - nonfocal  Psych - normal mood and affect    LABS:    Lab Results   Component Value Date/Time    WBC 8.4 07/15/2019 01:46 AM    HGB 10.8 (L) 07/15/2019 01:46 AM    HCT 34.7 (L) 07/15/2019 01:46 AM    PLATELET 100 49/59/5763 01:46 AM     Lab Results   Component Value Date/Time    Sodium 142 08/29/2019 01:12 PM    Potassium 3.4 (L) 08/29/2019 01:12 PM    Chloride 86 (L) 08/29/2019 01:12 PM    CO2 32 (H) 08/29/2019 01:12 PM    Anion gap 6 07/15/2019 01:46 AM    Glucose 107 (H) 08/29/2019 01:12 PM    BUN 30 (H) 08/29/2019 01:12 PM    Creatinine 1.63 (H) 08/29/2019 01:12 PM    BUN/Creatinine ratio 18 08/29/2019 01:12 PM    GFR est AA 56 (L) 08/29/2019 01:12 PM    GFR est non-AA 48 (L) 08/29/2019 01:12 PM    Calcium 10.3 (H) 08/29/2019 01:12 PM       Lab Results   Component Value Date/Time    aPTT 28.0 06/22/2017 12:54 PM     Lab Results   Component Value Date/Time    INR 1.6 (H) 07/15/2019 01:46 AM    INR 1.8 (H) 07/14/2019 12:42 AM    INR 1.8 (H) 07/13/2019 10:16 AM    INR, External 1.7 (A) 07/18/2019    INR POC 1.7 07/18/2019 03:22 PM    Prothrombin time 16.1 (H) 07/15/2019 01:46 AM    Prothrombin time 17.7 (H) 07/14/2019 12:42 AM    Prothrombin time 17.8 (H) 07/13/2019 10:16 AM     No components found for: LAST LIPIDS      Kristen Magallanes NP

## 2019-08-29 NOTE — PATIENT INSTRUCTIONS
Continue Torsemide 40mg per day; hold metolazone  Get labs on the 3rd floor     I will call you tmw with lab results and med changes    Keep follow-up with MD.

## 2019-08-29 NOTE — PROGRESS NOTES
Consuelo Fraire is a 47 y.o. male    Chief Complaint   Patient presents with    CHF    Irregular Heart Beat    Breathing Problem    Anticoagulation         Visit Vitals  /76 (BP 1 Location: Left arm, BP Patient Position: Sitting)   Pulse 75   Resp 20   Ht 5' 6\" (1.676 m)   Wt 304 lb 6.4 oz (138.1 kg)   SpO2 97%   BMI 49.13 kg/m²       1. Have you been to the ER, urgent care clinic since your last visit? Hospitalized since your last visit? no    2. Have you seen or consulted any other health care providers outside of the [address] [place] [address] since your last visit? Include any pap smears or colon screening.  no

## 2019-08-29 NOTE — LETTER
9/11/19 Patient: Chris Cerda. YOB: 1970 Date of Visit: 8/29/2019 [place] Guerre[place], [address] [facility] [address] [facility] [address] [facility] [address] [id number] V[address] Facsimile: [phone] Dear MENA García, Thank you for referring Mr. Patrice Alegria to [address] for evaluation. My notes for this consultation are attached. If you have questions, please do not hesitate to call me. I look forward to following your patient along with you.  
 
 
Sincerely, 
 
Merlene Foster NP

## 2019-08-30 ENCOUNTER — TELEPHONE (OUTPATIENT)
Dept: CARDIOLOGY CLINIC | Age: 49
End: 2019-08-30

## 2019-08-30 LAB
BUN SERPL-MCNC: 30 MG/DL (ref 6–24)
BUN/CREAT SERPL: 18 (ref 9–20)
CALCIUM SERPL-MCNC: 10.3 MG/DL (ref 8.7–10.2)
CHLORIDE SERPL-SCNC: 86 MMOL/L (ref 96–106)
CO2 SERPL-SCNC: 32 MMOL/L (ref 20–29)
CREAT SERPL-MCNC: 1.63 MG/DL (ref 0.76–1.27)
GLUCOSE SERPL-MCNC: 107 MG/DL (ref 65–99)
INTERPRETATION: NORMAL
MAGNESIUM SERPL-MCNC: 2.5 MG/DL (ref 1.6–2.3)
NT-PROBNP SERPL-MCNC: 995 PG/ML (ref 0–121)
POTASSIUM SERPL-SCNC: 3.4 MMOL/L (ref 3.5–5.2)
SODIUM SERPL-SCNC: 142 MMOL/L (ref 134–144)

## 2019-08-30 NOTE — TELEPHONE ENCOUNTER
Labs with XU and low potassium. pBNP improved. Mag mildly elevated. Stop metolazone. Continue Torsemide 40mg BID. Take potassium 20mEq BID x2 days then go back to 10mEq BID. Keep f/u as scheduled.         Component      Latest Ref Rng & Units 8/29/2019 8/29/2019 8/29/2019           1:12 PM  1:12 PM  1:12 PM   Glucose      65 - 99 mg/dL   107 (H)   BUN      6 - 24 mg/dL   30 (H)   Creatinine      0.76 - 1.27 mg/dL   1.63 (H)   GFR est non-AA      >59 mL/min/1.73   48 (L)   GFR est AA      >59 mL/min/1.73   56 (L)   BUN/Creatinine ratio      9 - 20   18   Sodium      134 - 144 mmol/L   142   Potassium      3.5 - 5.2 mmol/L   3.4 (L)   Chloride      96 - 106 mmol/L   86 (L)   CO2      20 - 29 mmol/L   32 (H)   Calcium      8.7 - 10.2 mg/dL   10.3 (H)   Magnesium      1.6 - 2.3 mg/dL  2.5 (H)    NT pro-BNP      0 - 121 pg/mL 995 (H)

## 2019-09-11 NOTE — PROGRESS NOTES
Addendum 8/30/19:   Labs with XU and low potassium. pBNP improved. Mag mildly elevated.     Stop metolazone. Continue Torsemide 40mg BID. Take potassium 20mEq BID x2 days then go back to 10mEq BID.   Keep f/u as scheduled - discuss Entresto at that time

## 2019-09-12 ENCOUNTER — OFFICE VISIT (OUTPATIENT)
Dept: CARDIOLOGY CLINIC | Age: 49
End: 2019-09-12

## 2019-09-12 VITALS
WEIGHT: 312.4 LBS | BODY MASS INDEX: 50.21 KG/M2 | OXYGEN SATURATION: 95 % | HEIGHT: 66 IN | HEART RATE: 52 BPM | DIASTOLIC BLOOD PRESSURE: 62 MMHG | SYSTOLIC BLOOD PRESSURE: 118 MMHG | RESPIRATION RATE: 22 BRPM

## 2019-09-12 DIAGNOSIS — I42.8 NICM (NONISCHEMIC CARDIOMYOPATHY) (HCC): ICD-10-CM

## 2019-09-12 DIAGNOSIS — Z79.01 ANTICOAGULATED ON COUMADIN: ICD-10-CM

## 2019-09-12 DIAGNOSIS — Z79.01 CHRONIC ANTICOAGULATION: ICD-10-CM

## 2019-09-12 DIAGNOSIS — E66.01 MORBID OBESITY (HCC): ICD-10-CM

## 2019-09-12 DIAGNOSIS — I50.22 CHRONIC SYSTOLIC CONGESTIVE HEART FAILURE (HCC): Primary | ICD-10-CM

## 2019-09-12 DIAGNOSIS — I48.20 CHRONIC ATRIAL FIBRILLATION (HCC): ICD-10-CM

## 2019-09-12 DIAGNOSIS — G47.33 OSA (OBSTRUCTIVE SLEEP APNEA): ICD-10-CM

## 2019-09-12 NOTE — PROGRESS NOTES
Damon Palacio MD    [id number] [facility] [address] [facility]    Office [phone],BCB [phone]  Pager [phone]    Brandy Moncada is a 47 y.o. male is here for f/u visit. Primary care physician:MENA Fried    Patient Active Problem List   Diagnosis Code    Renal insufficiency N28.9    PEPE (obstructive sleep apnea) G47.33    Tobacco use Z72.0    Chronic back pain M54.9, G89.29    Asthma J45.909    Elevated BP without diagnosis of hypertension R03.0    Exertional dyspnea R06.09    Persistent atrial fibrillation (HCC) I48.1    Morbid obesity due to excess calories (HCC) E66.01    Chronic anticoagulation Z79.01    Chronic diastolic heart failure (HCC) I50.32    Chronic systolic congestive heart failure (HCC) I50.22    Acute on chronic systolic CHF (congestive heart failure) (HCC) I50.23    Acute renal insufficiency N28.9       Dear Mr. Fina Acharya had the pleasure of seeing Mr. Brandy Moncada in the office today. Chief complaint:  Chief Complaint   Patient presents with    CHF    Anticoagulation    Irregular Heart Beat       Assessment:  Chronic SHF - EF 30%. [facility] admit 7/8/19 Acute on chronic SHF. Underwent RHC/LHC - no sig CAD  A fib with CVR   Chronic anticoagulation - INR followed by PCP  Morbid obesity   Smoker - quit smoking/not chewing tobacco  Hx of Excessive EtOH  -- occ drinks beer now        Plan:     8/30/19 - Saw Ms Andrews Dasilva . Metazolone DC'd and Toresemide 40mg bid continued. K 10 meq bid. Cr 8/29/19 1.63/ K - 3.4 ( 8/5/19 - Cr 1.2)   Weight stable. Recently had a weight went up after drinking a few beers. He took Zaroxolyn and his weight immediately came down to his baseline. He uses Zaroxolyn on a as needed basis. He will try to decrease the torsemide from 20 mg 2 tablets twice daily to 2 tablets a.m. and 1 tablet p.m. If he starts gaining weight/fluid buildup he will go back to 2 tablets twice daily.     For now he will continue the Cardizem  mg daily. Blood pressures in the 100s. Will try Entresto-low-dose twice daily. He will carefully monitor his blood pressure. Aggressive CV risk factor modification. CMP/NTproBNP    On anticoagulation on Coumadin/INR followed by Coumadin clinic    He had a LifeVest on for few weeks but is not able to wear it now. He is sending it back. Follow-up in 3 weeks; Patient understands the plan. All questions were answered to the patient's satisfaction. Medication Side Effects and Warnings were discussed with patient: yes  Patient Labs were reviewed and or requested:  yes  Patient Past Records were reviewed and or requested: yes    I appreciate the opportunity to be involved in . See note below for details. Please do not hesitate to contact us with questions or concerns. Gerhardt Kluver, MD    Cardiac Testing/ Procedures: A. Cardiac Cath/PCI: 7/1/19 - LHC/RHC - No sig CAD; Mean PA pressure 21 mm Hg    B.ECHO/EVITA: 6/21/19 - EF 26-30%; Mod LAE; DIDIER: Mild to Mod MR; Mild to Mod TR; Mod Pulm HTN    6/22/2017 Left ventricle: Systolic function was mildly reduced. Ejection fraction was estimated to be 45 %. There were no regional wall motion abnormalities. Tricuspid valve: There was mild regurgitation. C.StressNuclear/Stress ECHO/Stress test: 6/26/19 - Abnormal ledy nuc; EF 24%; Fixed defect( inf/apical)    D. Vascular:    E. EP:    F. Miscellaneous:    Subjective:  Tammi Bedolla is a 47 y.o. male who returns for follow up visit. Has been doing well recently. Weight stable. Recently had a weight went up after drinking a few beers. He took Zaroxolyn and his weight immediately came down to his baseline. He uses Zaroxolyn on a as needed basis. He was started on beta-blockers previously but he states that the medication did  not \"agree with him\" and he does not want to be on beta-blockers. [facility] admit 7/8/19 Acute on chronic SHF.  Underwent RHC/LHC - no sig CAD    Tries to eat low-salt/heart healthy diet. Has difficulty losing weight. ROS:  (bold if positive, if negative)             Medications before admission:    Current Outpatient Medications   Medication Sig Dispense    torsemide (DEMADEX) 20 mg tablet Take 2 Tabs by mouth two (2) times a day. 120 Tab    potassium chloride SR (KLOR-CON 10) 10 mEq tablet Take 1 Tab by mouth two (2) times a day. 60 Tab    polyethylene glycol (MIRALAX) 17 gram/dose powder Take 17 g by mouth daily.  dilTIAZem CD (CARDIZEM CD) 180 mg ER capsule Take 1 Cap by mouth daily. 180 Cap    warfarin (COUMADIN) 5 mg tablet Take 5 mg by mouth two (2) days a week. Patient takes 5 mg on Thursday and Wednesday     warfarin (COUMADIN) 5 mg tablet Take 10 mg by mouth five (5) days a week. Patient takes 10 mg on Monday, tuesday, Friday, Saturday, Sunday     MULTIVITAMIN (MULTIPLE VITAMIN PO) Take 1 Tab by mouth daily. No current facility-administered medications for this visit.         Family History of CAD:    Yes    Social History:  Current  Smoker  No    Physical Exam:  Visit Vitals  /62 (BP 1 Location: Left arm, BP Patient Position: Sitting)   Pulse (!) 52   Resp 22   Ht 5' 6\" (1.676 m)   Wt 312 lb 6.4 oz (141.7 kg)   SpO2 95%   BMI 50.42 kg/m²         Gen: Well-developed, well-nourished, morbid obesity  Neck: Supple,thick neck, unable to appreciate JVD  Resp: No accessory muscle use, Clear breath sounds, No rales or rhonchi  Card: Irregular Rate,Rythm,Normal S1, S2, No murmurs  Abd:  Soft,morbidly obese, Abd wall - indurated skin  MSK: No cyanosis  Neuro: moving all four extremities , follows commands appropriately  Psych:  Good insight, oriented to person, place , alert, Nml Affect  LE: Trace edema;     EKG:       LABS:        Lab Results   Component Value Date/Time    WBC 8.4 07/15/2019 01:46 AM    HGB 10.8 (L) 07/15/2019 01:46 AM    HCT 34.7 (L) 07/15/2019 01:46 AM    PLATELET 227 40/26/0058 01:46 AM     Lab Results   Component Value Date/Time    Sodium 142 08/29/2019 01:12 PM    Potassium 3.4 (L) 08/29/2019 01:12 PM    Chloride 86 (L) 08/29/2019 01:12 PM    CO2 32 (H) 08/29/2019 01:12 PM    Anion gap 6 07/15/2019 01:46 AM    Glucose 107 (H) 08/29/2019 01:12 PM    BUN 30 (H) 08/29/2019 01:12 PM    Creatinine 1.63 (H) 08/29/2019 01:12 PM    BUN/Creatinine ratio 18 08/29/2019 01:12 PM    GFR est AA 56 (L) 08/29/2019 01:12 PM    GFR est non-AA 48 (L) 08/29/2019 01:12 PM    Calcium 10.3 (H) 08/29/2019 01:12 PM       Lab Results   Component Value Date/Time    aPTT 28.0 06/22/2017 12:54 PM     Lab Results   Component Value Date/Time    INR 1.6 (H) 07/15/2019 01:46 AM    INR 1.8 (H) 07/14/2019 12:42 AM    INR 1.8 (H) 07/13/2019 10:16 AM    INR, External 1.7 (A) 07/18/2019    INR POC 1.7 07/18/2019 03:22 PM    Prothrombin time 16.1 (H) 07/15/2019 01:46 AM    Prothrombin time 17.7 (H) 07/14/2019 12:42 AM    Prothrombin time 17.8 (H) 07/13/2019 10:16 AM     No components found for: Benjamin Naranjo MD

## 2019-09-12 NOTE — PROGRESS NOTES
Ananth Thurman. is a 47 y.o. male    Chief Complaint   Patient presents with    CHF    Anticoagulation    Irregular Heart Beat       Chest pain no    SOB with exertion    Dizziness no    Swelling no    Refills no    Visit Vitals  /62 (BP 1 Location: Left arm, BP Patient Position: Sitting)   Pulse (!) 52   Resp 22   Ht 5' 6\" (1.676 m)   Wt 312 lb 6.4 oz (141.7 kg)   SpO2 95%   BMI 50.42 kg/m²       1. Have you been to the ER, urgent care clinic since your last visit? Hospitalized since your last visit? No    2. Have you seen or consulted any other health care providers outside of the [address] [place] [address] since your last visit? Include any pap smears or colon screening.  No

## 2019-09-25 RX ORDER — POTASSIUM CHLORIDE 750 MG/1
10 TABLET, FILM COATED, EXTENDED RELEASE ORAL 2 TIMES DAILY
Qty: 60 TAB | Refills: 3 | Status: SHIPPED | OUTPATIENT
Start: 2019-09-25 | End: 2020-03-25

## 2019-09-25 NOTE — TELEPHONE ENCOUNTER
Pharmacy confirmed. Patient states he has run out of medication because Dr. Josue Razo has him taking more.      Phone: [phone]

## 2019-10-01 ENCOUNTER — HOSPITAL ENCOUNTER (OUTPATIENT)
Dept: CARDIAC REHAB | Age: 49
Discharge: HOME | End: 2019-10-01
Payer: COMMERCIAL

## 2019-10-01 VITALS — HEIGHT: 66 IN | WEIGHT: 315 LBS | BODY MASS INDEX: 50.62 KG/M2

## 2019-10-01 PROCEDURE — 93798 PHYS/QHP OP CAR RHAB W/ECG: CPT

## 2019-10-01 NOTE — CARDIO/PULMONARY
José Monroe.  1970 presented for cardiac rehab orientation and exercise tolerance test today with a primary diagnosis of Heart Failure. Pt has history of heart failure and chronic a fib. LVEF is 26% from ECHO on 19. Cardiac risk factors include:  gender, obesity, and sedentary lifestyle. CAD risk factors were reviewed with patient. Pt is single. Pt states he has a life partner. Pt currently lives with his mother and step-father. Pt currently does not work and is applying for disability. Depression score PHQ9 is zero and this is considered normal. The result was discussed with patient who affirms score to be accurate. Pt states he now is able to manage his fluid buildup and is less short of breath. He also has started Entresto in the last 2 weeks and sees a significant change for the better. Patient denied chest pain or SOB during 6 minute walk on treadmill at 1. 1mph, with RPE of 11. Cardiac rhythm was a fib with a peak ventricular rate in the low 130's. without ectopy. Exercise plan was developed. Pt will attend cardiac rehab 2-3 times per week. Cardiac rehab book reviewed and given to patient.    Yuliya Gruber RN

## 2019-10-02 LAB
ALBUMIN SERPL-MCNC: 4.5 G/DL (ref 3.5–5.5)
ALBUMIN/GLOB SERPL: 2.1 {RATIO} (ref 1.2–2.2)
ALP SERPL-CCNC: 77 IU/L (ref 39–117)
ALT SERPL-CCNC: 21 IU/L (ref 0–44)
AST SERPL-CCNC: 20 IU/L (ref 0–40)
BILIRUB SERPL-MCNC: 0.5 MG/DL (ref 0–1.2)
BUN SERPL-MCNC: 20 MG/DL (ref 6–24)
BUN/CREAT SERPL: 18 (ref 9–20)
CALCIUM SERPL-MCNC: 9.3 MG/DL (ref 8.7–10.2)
CHLORIDE SERPL-SCNC: 98 MMOL/L (ref 96–106)
CO2 SERPL-SCNC: 26 MMOL/L (ref 20–29)
CREAT SERPL-MCNC: 1.14 MG/DL (ref 0.76–1.27)
GLOBULIN SER CALC-MCNC: 2.1 G/DL (ref 1.5–4.5)
GLUCOSE SERPL-MCNC: 95 MG/DL (ref 65–99)
NT-PROBNP SERPL-MCNC: 659 PG/ML (ref 0–121)
POTASSIUM SERPL-SCNC: 3.9 MMOL/L (ref 3.5–5.2)
PROT SERPL-MCNC: 6.6 G/DL (ref 6–8.5)
SODIUM SERPL-SCNC: 142 MMOL/L (ref 134–144)

## 2019-10-03 ENCOUNTER — HOSPITAL ENCOUNTER (OUTPATIENT)
Dept: CARDIAC REHAB | Age: 49
Discharge: HOME | End: 2019-10-03
Payer: COMMERCIAL

## 2019-10-03 VITALS — BODY MASS INDEX: 50.04 KG/M2 | WEIGHT: 310 LBS

## 2019-10-03 PROCEDURE — 93798 PHYS/QHP OP CAR RHAB W/ECG: CPT

## 2019-10-04 ENCOUNTER — OFFICE VISIT (OUTPATIENT)
Dept: CARDIOLOGY CLINIC | Age: 49
End: 2019-10-04

## 2019-10-04 VITALS
SYSTOLIC BLOOD PRESSURE: 90 MMHG | HEIGHT: 66 IN | DIASTOLIC BLOOD PRESSURE: 58 MMHG | RESPIRATION RATE: 22 BRPM | HEART RATE: 88 BPM | BODY MASS INDEX: 49.5 KG/M2 | OXYGEN SATURATION: 97 % | WEIGHT: 308 LBS

## 2019-10-04 DIAGNOSIS — E66.01 MORBID OBESITY (HCC): ICD-10-CM

## 2019-10-04 DIAGNOSIS — I50.22 CHRONIC SYSTOLIC CONGESTIVE HEART FAILURE (HCC): Primary | ICD-10-CM

## 2019-10-04 DIAGNOSIS — Z79.01 CHRONIC ANTICOAGULATION: ICD-10-CM

## 2019-10-04 DIAGNOSIS — I48.20 CHRONIC ATRIAL FIBRILLATION (HCC): ICD-10-CM

## 2019-10-04 DIAGNOSIS — I42.8 NICM (NONISCHEMIC CARDIOMYOPATHY) (HCC): ICD-10-CM

## 2019-10-04 DIAGNOSIS — G47.33 OSA (OBSTRUCTIVE SLEEP APNEA): ICD-10-CM

## 2019-10-04 NOTE — PROGRESS NOTES
Chief Complaint   Patient presents with    Follow-up    CHF    Cardiomyopathy    Irregular Heart Beat     AF     Visit Vitals  BP 90/58   Pulse 88   Resp 22   Ht 5' 6\" (1.676 m)   Wt 308 lb (139.7 kg)   SpO2 97%   BMI 49.71 kg/m²     Patient denies any cardiac complaints. No recent hospital visits. No refills needed at this time.

## 2019-10-04 NOTE — PROGRESS NOTES
Fabiano De La Vega MD    [id number] [facility] [place], [address] [facility]    Office [phone],UAO [phone]  Pager [phone]    Sera Sams is a 47 y.o. male is here for f/u visit. Primary care physician:z Bascom Apley, PA    Patient Active Problem List   Diagnosis Code    Renal insufficiency N28.9    PEPE (obstructive sleep apnea) G47.33    Tobacco use Z72.0    Chronic back pain M54.9, G89.29    Asthma J45.909    Elevated BP without diagnosis of hypertension R03.0    Exertional dyspnea R06.09    Persistent atrial fibrillation I48.19    Morbid obesity due to excess calories (HCC) E66.01    Chronic anticoagulation Z79.01    Chronic diastolic heart failure (HCC) I50.32    Chronic systolic congestive heart failure (HCC) I50.22    Acute on chronic systolic CHF (congestive heart failure) (HCC) I50.23    Acute renal insufficiency N28.9       Dear Mr. Chris Prince had the pleasure of seeing Mr. Sera Sams in the office today. Chief complaint:  Chief Complaint   Patient presents with    Follow-up    CHF    Cardiomyopathy    Irregular Heart Beat     AF       Assessment:  Chronic SHF - EF 30%. [facility] admit 7/8/19 Acute on chronic SHF. Underwent RHC/LHC - no sig CAD  A fib with CVR   Chronic anticoagulation - INR followed by PCP  Morbid obesity   Smoker - quit smoking/not chewing tobacco  Hx of Excessive EtOH  -- occ drinks beer now        Plan:     10/1/19 - Cr 1.14; K 3.9; NT proBNP 659  Weight stable. Recently had a weight went up after drinking a few beers. He took Zaroxolyn and his weight immediately came down to his baseline. He uses Zaroxolyn on a as needed basis. Torsemide 2 tablets a.m. and 1 tablet p.m. If he starts gaining weight/fluid buildup he will go back to 2 tablets twice daily. For now he will continue the Cardizem  mg daily. Hold SBP <90    Aggressive CV risk factor modification.     On anticoagulation on Coumadin/INR followed by PCP    He had a LifeVest on for few weeks but is not able to wear it now. He sent it back. Follow-up in 8 weeks; Limited ECHO on f/u visit; BMP prior to visit         Patient understands the plan. All questions were answered to the patient's satisfaction. Medication Side Effects and Warnings were discussed with patient: yes  Patient Labs were reviewed and or requested:  yes  Patient Past Records were reviewed and or requested: yes    I appreciate the opportunity to be involved in . See note below for details. Please do not hesitate to contact us with questions or concerns. Michelle Womack MD    Cardiac Testing/ Procedures: A. Cardiac Cath/PCI: 7/1/19 - LHC/RHC - No sig CAD; Mean PA pressure 21 mm Hg    B.ECHO/EVITA: 6/21/19 - EF 26-30%; Mod LAE; DIDIER: Mild to Mod MR; Mild to Mod TR; Mod Pulm HTN    6/22/2017 Left ventricle: Systolic function was mildly reduced. Ejection fraction was estimated to be 45 %. There were no regional wall motion abnormalities. Tricuspid valve: There was mild regurgitation. C.StressNuclear/Stress ECHO/Stress test: 6/26/19 - Abnormal ledy nuc; EF 24%; Fixed defect( inf/apical)    D. Vascular:    E. EP:    F. Miscellaneous:    Subjective:  Mary Jo Khalil. is a 47 y.o. male who returns for follow up visit. Has been doing well recently. Weight stable. Recently had a weight went up after drinking a few beers. He took Zaroxolyn and his weight immediately came down to his baseline. He uses Zaroxolyn on a as needed basis. He was started on beta-blockers previously but he states that the medication did  not \"agree with him\" and he does not want to be on beta-blockers. [facility] admit 7/8/19 Acute on chronic SHF. Underwent RHC/LHC - no sig CAD    Tries to eat low-salt/heart healthy diet. Has difficulty losing weight.         ROS:  (bold if positive, if negative)             Medications before admission:    Current Outpatient Medications   Medication Sig Dispense    potassium chloride SR (KLOR-CON 10) 10 mEq tablet Take 1 Tab by mouth two (2) times a day. 60 Tab    sacubitril-valsartan (ENTRESTO) 24 mg/26 mg tablet Take 1 Tab by mouth two (2) times a day. 60 Tab    torsemide (DEMADEX) 20 mg tablet Take 2 Tabs by mouth two (2) times a day. (Patient taking differently: Take 40 mg by mouth two (2) times a day. Patient takes 2 TABS in AM and 1 TAB PM.) 120 Tab    polyethylene glycol (MIRALAX) 17 gram/dose powder Take 17 g by mouth daily.  dilTIAZem CD (CARDIZEM CD) 180 mg ER capsule Take 1 Cap by mouth daily. 180 Cap    warfarin (COUMADIN) 5 mg tablet Take 5 mg by mouth two (2) days a week. Patient takes 5 mg on Thursday and Wednesday     warfarin (COUMADIN) 5 mg tablet Take 10 mg by mouth five (5) days a week. Patient takes 10 mg on Monday, tuesday, Friday, Saturday, Sunday     MULTIVITAMIN (MULTIPLE VITAMIN PO) Take 1 Tab by mouth daily. No current facility-administered medications for this visit.         Family History of CAD:    Yes    Social History:  Current  Smoker  No    Physical Exam:  Visit Vitals  BP 90/58   Pulse 88   Resp 22   Ht 5' 6\" (1.676 m)   Wt 308 lb (139.7 kg)   SpO2 97%   BMI 49.71 kg/m²     Home SBP in 110's    Gen: Well-developed, well-nourished, morbid obesity  Neck: Supple,thick neck, unable to appreciate JVD  Resp: No accessory muscle use, Clear breath sounds, No rales or rhonchi  Card: Irregular Rate,Rythm,Normal S1, S2, No murmurs  Abd:  Soft,morbidly obese, Abd wall - indurated skin  MSK: No cyanosis  Neuro: moving all four extremities , follows commands appropriately  Psych:  Good insight, oriented to person, place , alert, Nml Affect  LE: Trace edema;     EKG:       LABS:        Lab Results   Component Value Date/Time    WBC 8.4 07/15/2019 01:46 AM    HGB 10.8 (L) 07/15/2019 01:46 AM    HCT 34.7 (L) 07/15/2019 01:46 AM    PLATELET 198 06/94/5059 01:46 AM     Lab Results   Component Value Date/Time    Sodium 142 10/01/2019 02:49 PM    Potassium 3.9 10/01/2019 02:49 PM    Chloride 98 10/01/2019 02:49 PM    CO2 26 10/01/2019 02:49 PM    Anion gap 6 07/15/2019 01:46 AM    Glucose 95 10/01/2019 02:49 PM    BUN 20 10/01/2019 02:49 PM    Creatinine 1.14 10/01/2019 02:49 PM    BUN/Creatinine ratio 18 10/01/2019 02:49 PM    GFR est AA 86 10/01/2019 02:49 PM    GFR est non-AA 75 10/01/2019 02:49 PM    Calcium 9.3 10/01/2019 02:49 PM       Lab Results   Component Value Date/Time    aPTT 28.0 06/22/2017 12:54 PM     Lab Results   Component Value Date/Time    INR 1.6 (H) 07/15/2019 01:46 AM    INR 1.8 (H) 07/14/2019 12:42 AM    INR 1.8 (H) 07/13/2019 10:16 AM    INR, External 1.7 (A) 07/18/2019    INR POC 1.7 07/18/2019 03:22 PM    Prothrombin time 16.1 (H) 07/15/2019 01:46 AM    Prothrombin time 17.7 (H) 07/14/2019 12:42 AM    Prothrombin time 17.8 (H) 07/13/2019 10:16 AM     No components found for: Angel Pink MD

## 2019-10-04 NOTE — TELEPHONE ENCOUNTER
Medication refills per VO Dr Janusz Shah. Requested Prescriptions     Pending Prescriptions Disp Refills    sacubitril-valsartan (ENTRESTO) 24 mg/26 mg tablet 60 Tab 3     Sig: Take 1 Tab by mouth two (2) times a day.

## 2019-10-08 ENCOUNTER — HOSPITAL ENCOUNTER (OUTPATIENT)
Dept: CARDIAC REHAB | Age: 49
Discharge: HOME | End: 2019-10-08
Payer: COMMERCIAL

## 2019-10-08 VITALS — WEIGHT: 315 LBS | BODY MASS INDEX: 50.84 KG/M2

## 2019-10-08 PROCEDURE — 93798 PHYS/QHP OP CAR RHAB W/ECG: CPT

## 2019-10-10 ENCOUNTER — HOSPITAL ENCOUNTER (OUTPATIENT)
Dept: CARDIAC REHAB | Age: 49
Discharge: HOME | End: 2019-10-10
Payer: COMMERCIAL

## 2019-10-10 VITALS — BODY MASS INDEX: 51.17 KG/M2 | WEIGHT: 315 LBS

## 2019-10-10 PROCEDURE — 93798 PHYS/QHP OP CAR RHAB W/ECG: CPT

## 2019-10-16 ENCOUNTER — HOSPITAL ENCOUNTER (OUTPATIENT)
Dept: CARDIAC REHAB | Age: 49
Discharge: HOME | End: 2019-10-16
Payer: COMMERCIAL

## 2019-10-16 VITALS — WEIGHT: 309 LBS | BODY MASS INDEX: 49.87 KG/M2

## 2019-10-16 PROCEDURE — 93797 PHYS/QHP OP CAR RHAB WO ECG: CPT | Performed by: DIETITIAN, REGISTERED

## 2019-10-16 PROCEDURE — 93798 PHYS/QHP OP CAR RHAB W/ECG: CPT

## 2019-10-16 NOTE — PROGRESS NOTES
Cardiac Rehab Nutrition Assessment - 1:1 Evaluation           NAME: José Jain : 1970 AGE: 47 y.o. GENDER: male  CARDIAC REHAB ADMITTING DIAGNOSIS: HF    Relevant Comorbidites: PEPE and afib        LABS:   Lab Results   Component Value Date/Time    Hemoglobin A1c 6.4 (H) 2019 02:05 AM     Lab Results   Component Value Date/Time    Cholesterol, total 119 2017 04:40 AM    HDL Cholesterol 23 2017 04:40 AM    LDL,Direct 98 2012 07:00 PM    LDL, calculated 75 2017 04:40 AM    VLDL, calculated 2017 04:40 AM    Triglyceride 105 2017 04:40 AM    CHOL/HDL Ratio 5.2 (H) 2017 04:40 AM         MEDICATIONS/SUPPLEMENTS:   [unfilled]  Prior to Admission medications    Medication Sig Start Date End Date Taking? Authorizing Provider   sacubitril-valsartan (ENTRESTO) 24 mg/26 mg tablet Take 1 Tab by mouth two (2) times a day. 10/4/19   Lanre Weber MD   potassium chloride SR (KLOR-CON 10) 10 mEq tablet Take 1 Tab by mouth two (2) times a day. 19   Peter Salazar MD   torsemide (DEMADEX) 20 mg tablet Take 2 Tabs by mouth two (2) times a day. Patient taking differently: Take 40 mg by mouth two (2) times a day. Patient takes 2 TABS in AM and 1 TAB PM. 19   Lanre Weber MD   polyethylene glycol (MIRALAX) 17 gram/dose powder Take 17 g by mouth daily. Provider, Historical   dilTIAZem CD (CARDIZEM CD) 180 mg ER capsule Take 1 Cap by mouth daily. 19   Peter Salazar MD   warfarin (COUMADIN) 5 mg tablet Take 5 mg by mouth two (2) days a week. Patient takes 5 mg on Thursday and Wednesday    Provider, Historical   warfarin (COUMADIN) 5 mg tablet Take 10 mg by mouth five (5) days a week. Patient takes 10 mg on Monday, tuesday, Friday, Saturday,     Provider, Historical   MULTIVITAMIN (MULTIPLE VITAMIN PO) Take 1 Tab by mouth daily.     Provider, Historical           ANTHROPOMETRICS:    Ht Readings from Last 1 Encounters:   10/04/19 5' 6\" (1.676 m)      Wt Readings from Last 1 Encounters:   10/10/19 143.8 kg (317 lb)      IBW:142 # +/- 10%  %IBW: 223 % +/- 10%    BMI: 51.2 kg/M2 Category: morbid obesity  Waist: 61 inches    Reported Wt Hx: Estimated dry weight 299 lbs; patient states lost 60 lbs of fluid this year \"bottoming out\" at 299 lbs. States gained weight from usual adult weight of 280 lbs when lost job and moved in with his mother in 2013. Reported Diet Hx:    Rate Your Plate Score: 53  (Score 58-72: Making many healthy choices; 41-57: Some choices need improving 24-40: many choices need improving)     24 Hour Diet Recall  Breakfast 2 [employer] chicken biscuits   Lunch    Dinner 2.5 cups homemade hashbrown casserole (contains sausage, cheese, etc)   Snacks    Beverages Water, 2 beer     Mary Jo Khalil. states breakfast yesterday was not typical. Generally eats 1-2 meals per day. Brunch is a peanut butter & jelly or egg sandwich. Lauren Lax is whatever his mom or stepfather prepares. Typically dinner is a high sodium entree with fresh vegetables. If he snacks, it is usually on unsalted almonds. Beverages are either water or beer. He states used to drink more heavily but now tries to limit it to 6 beers, if that, and not drink every day. Environmental/Social: unemployed; lives with his mother and stepfather; hopes to start collecting disability to be less dependent on his parents for food; for several years now his parents do all the shopping & cooking for him. NUTRITION INTERVENTION:  Nutrition 60 minute one-on-one education & goal setting with Mary Jo Khalil. Reviewed with Mary Jo Khalil. relevant labs compared to ideals. Reviewed weight history and patient's verbalized weight goal as well as any real or perceived barriers to obtaining the goal. Collaborated with patient to set a specific short and long term weight goal.     Reviewed Rate Your Plate and conducted a verbal diet recall.   Assessed for environmental, financial, psychosocial, physical and comorbidities that may impact the food and eating patterns / behaviors of Micha Young Collaborated with patient to set specific nutrient goals as well as specific food / behavior changes that will help patient meet the overall goal of following a heart healthy eating pattern (using guidelines as set forth by the American Heart Association and modeled after healthful eating patterns as recognized by the USDA Dietary Guidelines such as DASH, Mediterranean or plant-based). Briefly reviewed with Micha Young the nutrition information in the Cardiac Rehab patient education book and encouraged Micha Young to read thoroughly, ask questions as needed, and use for future reference for heart healthy nutrition information. Micha Young is encouraged to participate in Cardiac Rehab group nutrition classes. PATIENT GOALS:    Weight Goals:  Short Term Weight Goal:</= 300 lbs  Long Term Weight Goal: </= 280 lbs    Nutrition Goals:  Daily Recommendations:  Calories: 2000 /day  (using Taylor Alanis with AF 1.2-1.3 and deducting 600-800 calories for weight loss)      Trans Fat: 0 g/day  Sodium: no more than 1835-1911 mg/day  Fruit: 3 cups / day  Vegetables: 3+ cups/day    Other:  - read and compare food labels  - limit beer to 0-2 per day, do not drink daily  - shop with parents to help influence purchasing lower sodium options  - stock up on Healthy Choice meals for low sodium alternatives to use as needed; supplement with fresh veggies  - use fruit to alternate / replace part of portion of almonds as snack      Keeping a sodium log was recommended. Questions addressed. Follow-up plans discussed. Micha Young verbalized understanding.             Charlies Frankel, RD

## 2019-10-18 ENCOUNTER — HOSPITAL ENCOUNTER (OUTPATIENT)
Dept: CARDIAC REHAB | Age: 49
Discharge: HOME | End: 2019-10-18
Payer: COMMERCIAL

## 2019-10-18 VITALS — BODY MASS INDEX: 50.6 KG/M2 | WEIGHT: 313.5 LBS

## 2019-10-18 PROCEDURE — 93798 PHYS/QHP OP CAR RHAB W/ECG: CPT

## 2019-10-22 ENCOUNTER — HOSPITAL ENCOUNTER (OUTPATIENT)
Dept: CARDIAC REHAB | Age: 49
Discharge: HOME | End: 2019-10-22
Payer: COMMERCIAL

## 2019-10-22 PROCEDURE — 93798 PHYS/QHP OP CAR RHAB W/ECG: CPT

## 2019-10-23 ENCOUNTER — DOCUMENTATION ONLY (OUTPATIENT)
Dept: CARDIOLOGY CLINIC | Age: 49
End: 2019-10-23

## 2019-10-23 NOTE — CARDIO/PULMONARY
Sharin Cheadle.    47 y.o. Pt has been attending Cardiac Wellness Program since 10/1/19. His last 2 visit (10/18/19 and 10/22/19 showed low BP (86/56, 88/56 respectively). HR on arrive for both visit were in 100-110 starting (pt persistent Afib with rare to occ PVC's). On 10/18/19 his weight was up 4.5 lbs in 2 days and he reported high salty meals. Discussed low Na diet management as well as taking meds (Demedex) as prescribed. Pt reported he was told to take his metolazone when weigh was up. When he returned on 10/22/19 his weight was down 8.75 lb. However, his HR has continued to be elevated pre-exercise and increase to 140's during exercise. Exercise program has been modified to reduce HR elevation (METS 1.9). Strongly reinforced low Na diet, monitoring hydration especially when using metolazone, taking Demedex as prescibed. Pt is also on Cardizem CD,  Entresto and Coumadin. Information sent to Dr. Carolina Harris.     Wesley Courtney  MS-CNS, BSN, RN

## 2019-10-23 NOTE — PROGRESS NOTES
BP running low during cardiac reahab    On toresemide 20mg 2 tab AM and 1 tab PM . He will dec it to 2 tab AM    Monitor BP . Can also hold his diltizem if SBP continues to be below 100 mm Hg    Glenna Pritchett MD, [facility]

## 2019-10-24 ENCOUNTER — HOSPITAL ENCOUNTER (OUTPATIENT)
Dept: CARDIAC REHAB | Age: 49
Discharge: HOME | End: 2019-10-24
Payer: COMMERCIAL

## 2019-10-24 VITALS — WEIGHT: 306.5 LBS | BODY MASS INDEX: 49.47 KG/M2

## 2019-10-24 PROCEDURE — 93798 PHYS/QHP OP CAR RHAB W/ECG: CPT

## 2019-10-29 ENCOUNTER — HOSPITAL ENCOUNTER (OUTPATIENT)
Dept: CARDIAC REHAB | Age: 49
Discharge: HOME | End: 2019-10-29
Payer: COMMERCIAL

## 2019-10-29 VITALS — BODY MASS INDEX: 50.44 KG/M2 | WEIGHT: 312.5 LBS

## 2019-10-29 PROCEDURE — 93798 PHYS/QHP OP CAR RHAB W/ECG: CPT

## 2019-10-31 ENCOUNTER — HOSPITAL ENCOUNTER (OUTPATIENT)
Dept: CARDIAC REHAB | Age: 49
Discharge: HOME | End: 2019-10-31
Payer: COMMERCIAL

## 2019-10-31 VITALS — WEIGHT: 308 LBS | BODY MASS INDEX: 49.71 KG/M2

## 2019-10-31 PROCEDURE — 93798 PHYS/QHP OP CAR RHAB W/ECG: CPT

## 2019-10-31 PROCEDURE — 93797 PHYS/QHP OP CAR RHAB WO ECG: CPT

## 2019-11-05 ENCOUNTER — HOSPITAL ENCOUNTER (OUTPATIENT)
Dept: CARDIAC REHAB | Age: 49
Discharge: HOME | End: 2019-11-05
Payer: COMMERCIAL

## 2019-11-05 VITALS — WEIGHT: 311.2 LBS | BODY MASS INDEX: 50.23 KG/M2

## 2019-11-05 PROCEDURE — 93798 PHYS/QHP OP CAR RHAB W/ECG: CPT

## 2019-11-07 ENCOUNTER — HOSPITAL ENCOUNTER (OUTPATIENT)
Dept: CARDIAC REHAB | Age: 49
Discharge: HOME | End: 2019-11-07
Payer: COMMERCIAL

## 2019-11-07 VITALS — BODY MASS INDEX: 49.71 KG/M2 | WEIGHT: 308 LBS

## 2019-11-07 PROCEDURE — 93798 PHYS/QHP OP CAR RHAB W/ECG: CPT

## 2019-11-12 ENCOUNTER — HOSPITAL ENCOUNTER (OUTPATIENT)
Dept: CARDIAC REHAB | Age: 49
Discharge: HOME | End: 2019-11-12
Payer: COMMERCIAL

## 2019-11-12 PROCEDURE — 93798 PHYS/QHP OP CAR RHAB W/ECG: CPT

## 2019-11-12 RX ORDER — METOLAZONE 5 MG/1
5 TABLET ORAL AS NEEDED
COMMUNITY
End: 2020-06-04 | Stop reason: SDUPTHER

## 2019-11-12 NOTE — PROGRESS NOTES
Gave checkout over the phone at 8:18 AM to Dr. Sonny Lr who has accepted care of this patient.       Yaron Callaway MD  7/9/2019 My profound apologies for their wait time; was reviewing cardiac MRI results with cardiologist reading study at [facility].      Per discussion with Dr. Rivera:  Normal LVEF 62%, moderate LV enlargement, normal RVEF 69%  Dilated aortic root and ascending aorta   Trace/mild aortic regurgitation by volumetric analysis (<5%)  No pericardial effusion  No evidence for LGE    Spoke with Daniel and spouse Licha,   Informed that cardiac MRI findings consist of normal LVEF and moderate LV enlargement likely due to hypertensive heart disease. There is no clinically significant aortic regurgitation present.    At this point in time, I do not see cardiac contraindication for Bill pursuing renal transplant evaluation (last stress MPI 1/2017 wnl)    Thanks!  Ted Newby MD

## 2019-11-13 VITALS — WEIGHT: 313.5 LBS | BODY MASS INDEX: 50.6 KG/M2

## 2019-11-14 ENCOUNTER — HOSPITAL ENCOUNTER (OUTPATIENT)
Dept: CARDIAC REHAB | Age: 49
End: 2019-11-14
Payer: COMMERCIAL

## 2019-11-14 ENCOUNTER — OFFICE VISIT (OUTPATIENT)
Dept: CARDIOLOGY CLINIC | Age: 49
End: 2019-11-14

## 2019-11-14 VITALS
BODY MASS INDEX: 49.85 KG/M2 | SYSTOLIC BLOOD PRESSURE: 98 MMHG | HEIGHT: 66 IN | HEART RATE: 55 BPM | RESPIRATION RATE: 18 BRPM | WEIGHT: 310.2 LBS | DIASTOLIC BLOOD PRESSURE: 68 MMHG | OXYGEN SATURATION: 96 %

## 2019-11-14 DIAGNOSIS — E66.01 MORBID OBESITY (HCC): ICD-10-CM

## 2019-11-14 DIAGNOSIS — Z79.01 CHRONIC ANTICOAGULATION: ICD-10-CM

## 2019-11-14 DIAGNOSIS — I50.22 CHRONIC SYSTOLIC CONGESTIVE HEART FAILURE (HCC): Primary | ICD-10-CM

## 2019-11-14 DIAGNOSIS — G47.33 OSA (OBSTRUCTIVE SLEEP APNEA): ICD-10-CM

## 2019-11-14 DIAGNOSIS — I42.8 NICM (NONISCHEMIC CARDIOMYOPATHY) (HCC): ICD-10-CM

## 2019-11-14 DIAGNOSIS — I48.20 CHRONIC ATRIAL FIBRILLATION (HCC): ICD-10-CM

## 2019-11-14 NOTE — PROGRESS NOTES
Chief Complaint   Patient presents with    Follow-up     Patient presents today for a follow up visit for CHF.      CHF     Visit Vitals  BP 98/68 (BP 1 Location: Right arm, BP Patient Position: Sitting)   Pulse (!) 55   Resp 18   Ht 5' 6\" (1.676 m)   Wt 310 lb 3.2 oz (140.7 kg)   SpO2 96%   BMI 50.07 kg/m²       Chest pain denied  SOB - denied  Dizziness denied  Swelling/Edema - generalized all over   Recent hospital visit denied  Refills denied

## 2019-11-14 NOTE — PROGRESS NOTES
Patel Campos MD    [id number] [facility] [place], [address] [facility]    Office [phone],WYS [phone]  Pager [phone]    Akosua Valdivia. is a 47 y.o. male is here for f/u visit. Primary care physician:MENA Hodge    Patient Active Problem List   Diagnosis Code    Renal insufficiency N28.9    PEPE (obstructive sleep apnea) G47.33    Tobacco use Z72.0    Chronic back pain M54.9, G89.29    Asthma J45.909    Elevated BP without diagnosis of hypertension R03.0    Exertional dyspnea R06.09    Persistent atrial fibrillation I48.19    Morbid obesity due to excess calories (HCC) E66.01    Chronic anticoagulation Z79.01    Chronic diastolic heart failure (HCC) I50.32    Chronic systolic congestive heart failure (HCC) I50.22    Acute on chronic systolic CHF (congestive heart failure) (HCC) I50.23    Acute renal insufficiency N28.9       Dear Won Kelsey had the pleasure of seeing Mr. Akosua Valdivia. in the office today. Chief complaint:  Chief Complaint   Patient presents with    Follow-up     Patient presents today for a follow up visit for CHF.  CHF       Assessment:  Chronic SHF - EF 30%. [facility] admit 7/8/19 Acute on chronic SHF. Underwent RHC/LHC - no sig CAD  A fib with CVR   Chronic anticoagulation - INR followed by PCP  Morbid obesity   Smoker - quit smoking/not chewing tobacco  Hx of Excessive EtOH  -- occ drinks beer now        Plan:     Patient is metazolone on a as needed basis. He is also on torsemide 2 tablets a.m. and 1 tablet p.m. He sometimes skips the afternoon tablet if he is doing well. Since his blood pressures running low I asked him to stop taking his Cardizem  mg for now and monitor his heart rate/blood pressure. If his heart rate starts trending up, we can start him on short-acting Cardizem 30 mg 3 times daily. .     Patient states that he does not want to go back to cardiac rehab.       Aggressive CV risk factor modification. On anticoagulation on Coumadin/INR followed by PCP    Tolerating Entresto    Follow-up in 3-4 weeks; Limited ECHO on f/u visit; BMP/BNP  prior to visit         Patient understands the plan. All questions were answered to the patient's satisfaction. Medication Side Effects and Warnings were discussed with patient: yes  Patient Labs were reviewed and or requested:  yes  Patient Past Records were reviewed and or requested: yes    I appreciate the opportunity to be involved in . See note below for details. Please do not hesitate to contact us with questions or concerns. Naheed Watts MD    Cardiac Testing/ Procedures: A. Cardiac Cath/PCI: 7/1/19 - LHC/RHC - No sig CAD; Mean PA pressure 21 mm Hg    B.ECHO/EVITA: 6/21/19 - EF 26-30%; Mod LAE; DIDIER: Mild to Mod MR; Mild to Mod TR; Mod Pulm HTN    6/22/2017 Left ventricle: Systolic function was mildly reduced. Ejection fraction was estimated to be 45 %. There were no regional wall motion abnormalities. Tricuspid valve: There was mild regurgitation. C.StressNuclear/Stress ECHO/Stress test: 6/26/19 - Abnormal ledy nuc; EF 24%; Fixed defect( inf/apical)    D. Vascular:    E. EP:    F. Miscellaneous:    Subjective:  José Jain is a 47 y.o. male who returns for follow up visit. Patient was having issues with blood pressure/heart rate during cardiac rehab. He states that his weight fluctuates between 317 to 308 pounds. He was started on beta-blockers previously but he states that the medication did  not \"agree with him\" and he does not want to be on beta-blockers. [facility] admit 7/8/19 Acute on chronic SHF. Underwent RHC/LHC - no sig CAD    Tries to eat low-salt/heart healthy diet. Has difficulty losing weight.         ROS:  (bold if positive, if negative)             Medications before admission:    Current Outpatient Medications   Medication Sig Dispense    sacubitril-valsartan (ENTRESTO) 24 mg/26 mg tablet Take 1 Tab by mouth two (2) times a day. 60 Tab    potassium chloride SR (KLOR-CON 10) 10 mEq tablet Take 1 Tab by mouth two (2) times a day. 60 Tab    torsemide (DEMADEX) 20 mg tablet Take 2 Tabs by mouth two (2) times a day. (Patient taking differently: Take 40 mg by mouth two (2) times a day. Patient takes 2 TABS in AM and 1 TAB PM.) 120 Tab    polyethylene glycol (MIRALAX) 17 gram/dose powder Take 17 g by mouth daily.  dilTIAZem CD (CARDIZEM CD) 180 mg ER capsule Take 1 Cap by mouth daily. 180 Cap    warfarin (COUMADIN) 5 mg tablet Take 5 mg by mouth two (2) days a week. Patient takes 5 mg on Thursday and Wednesday     warfarin (COUMADIN) 5 mg tablet Take 10 mg by mouth five (5) days a week. Patient takes 10 mg on Monday, tuesday, Friday, Saturday, Sunday     MULTIVITAMIN (MULTIPLE VITAMIN PO) Take 1 Tab by mouth daily.  metOLazone (ZAROXOLYN) 5 mg tablet Take 5 mg by mouth daily. Indications: WEIGHT > 315 LB      No current facility-administered medications for this visit.         Family History of CAD:    Yes    Social History:  Current  Smoker  No    Physical Exam:  Visit Vitals  BP 98/68 (BP 1 Location: Right arm, BP Patient Position: Sitting)   Pulse (!) 55   Resp 18   Ht 5' 6\" (1.676 m)   Wt 310 lb 3.2 oz (140.7 kg)   SpO2 96%   BMI 50.07 kg/m²       Gen: Well-developed, well-nourished, morbid obesity  Neck: Supple,thick neck, unable to appreciate JVD  Resp: No accessory muscle use, Clear breath sounds, No rales or rhonchi  Card: Irregular Rate,Rythm,Normal S1, S2, No murmurs  Abd:  Soft,morbidly obese, Abd wall - indurated skin  MSK: No cyanosis  Neuro: moving all four extremities , follows commands appropriately  Psych:  Good insight, oriented to person, place , alert, Nml Affect  LE: Trace edema;     EKG:       LABS:        Lab Results   Component Value Date/Time    WBC 8.4 07/15/2019 01:46 AM    HGB 10.8 (L) 07/15/2019 01:46 AM    HCT 34.7 (L) 07/15/2019 01:46 AM    PLATELET 545 38/35/8524 01:46 AM Lab Results   Component Value Date/Time    Sodium 142 10/01/2019 02:49 PM    Potassium 3.9 10/01/2019 02:49 PM    Chloride 98 10/01/2019 02:49 PM    CO2 26 10/01/2019 02:49 PM    Anion gap 6 07/15/2019 01:46 AM    Glucose 95 10/01/2019 02:49 PM    BUN 20 10/01/2019 02:49 PM    Creatinine 1.14 10/01/2019 02:49 PM    BUN/Creatinine ratio 18 10/01/2019 02:49 PM    GFR est AA 86 10/01/2019 02:49 PM    GFR est non-AA 75 10/01/2019 02:49 PM    Calcium 9.3 10/01/2019 02:49 PM       Lab Results   Component Value Date/Time    aPTT 28.0 06/22/2017 12:54 PM     Lab Results   Component Value Date/Time    INR 1.6 (H) 07/15/2019 01:46 AM    INR 1.8 (H) 07/14/2019 12:42 AM    INR 1.8 (H) 07/13/2019 10:16 AM    INR, External 1.7 (A) 07/18/2019    INR POC 1.7 07/18/2019 03:22 PM    Prothrombin time 16.1 (H) 07/15/2019 01:46 AM    Prothrombin time 17.7 (H) 07/14/2019 12:42 AM    Prothrombin time 17.8 (H) 07/13/2019 10:16 AM     No components found for: Jadyn Lopez MD

## 2019-11-14 NOTE — LETTER
11/14/19 Patient: Mery Eli. YOB: 1970 Date of Visit: 11/14/2019 Jessica Ron, [id number]  [address] [facility] [address] VIA Facsimile: [phone] Dear MENA Cerrato, Thank you for referring Mr. Charlotte Tucker to [address] for evaluation. My notes for this consultation are attached. If you have questions, please do not hesitate to call me. I look forward to following your patient along with you. Sincerely, Amanda Corona MD

## 2019-11-19 ENCOUNTER — HOSPITAL ENCOUNTER (OUTPATIENT)
Dept: CARDIAC REHAB | Age: 49
End: 2019-11-19
Payer: COMMERCIAL

## 2019-11-21 ENCOUNTER — APPOINTMENT (OUTPATIENT)
Dept: CARDIAC REHAB | Age: 49
End: 2019-11-21
Payer: COMMERCIAL

## 2019-11-26 ENCOUNTER — APPOINTMENT (OUTPATIENT)
Dept: CARDIAC REHAB | Age: 49
End: 2019-11-26
Payer: COMMERCIAL

## 2019-12-26 ENCOUNTER — OFFICE VISIT (OUTPATIENT)
Dept: CARDIOLOGY CLINIC | Age: 49
End: 2019-12-26

## 2019-12-26 VITALS
SYSTOLIC BLOOD PRESSURE: 102 MMHG | RESPIRATION RATE: 18 BRPM | HEIGHT: 66 IN | BODY MASS INDEX: 49.82 KG/M2 | DIASTOLIC BLOOD PRESSURE: 64 MMHG | HEART RATE: 102 BPM | OXYGEN SATURATION: 96 % | WEIGHT: 310 LBS

## 2019-12-26 DIAGNOSIS — Z79.01 CHRONIC ANTICOAGULATION: ICD-10-CM

## 2019-12-26 DIAGNOSIS — Z87.891 HISTORY OF TOBACCO USE: ICD-10-CM

## 2019-12-26 DIAGNOSIS — I50.9 CHRONIC CONGESTIVE HEART FAILURE, UNSPECIFIED HEART FAILURE TYPE (HCC): ICD-10-CM

## 2019-12-26 DIAGNOSIS — I50.22 CHRONIC SYSTOLIC CONGESTIVE HEART FAILURE (HCC): Primary | ICD-10-CM

## 2019-12-26 DIAGNOSIS — M79.672 LEFT FOOT PAIN: ICD-10-CM

## 2019-12-26 DIAGNOSIS — E66.01 MORBID OBESITY WITH BMI OF 50.0-59.9, ADULT (HCC): ICD-10-CM

## 2019-12-26 DIAGNOSIS — I48.91 ATRIAL FIBRILLATION WITH RVR (HCC): ICD-10-CM

## 2019-12-26 RX ORDER — DILTIAZEM HYDROCHLORIDE 30 MG/1
30 TABLET, FILM COATED ORAL 3 TIMES DAILY
Qty: 90 TAB | Refills: 0 | Status: SHIPPED | OUTPATIENT
Start: 2019-12-26 | End: 2020-01-24

## 2019-12-26 NOTE — LETTER
12/27/19 Patient: Khadar Uriostegui. YOB: 1970 Date of Visit: 12/26/2019 Bindu Tamez, [id number]  [address] [facility] [address] VIA Facsimile: [phone] Dear MENA Ortiz, Thank you for referring Mr. Saray Hdz to [address] for evaluation. My notes for this consultation are attached. If you have questions, please do not hesitate to call me. I look forward to following your patient along with you.  
 
 
Sincerely, 
 
Ana Ortiz NP

## 2019-12-26 NOTE — PROGRESS NOTES
Angela Erickson, JC  [id number]  [place], [address] [facility]    Office [phone]  Fax [phone]      Jerald Leung. is a 47 y.o. male is here for f/u visit. Primary care physician:MENA Thompson    Patient Active Problem List   Diagnosis Code    Renal insufficiency N28.9    PEPE (obstructive sleep apnea) G47.33    Tobacco use Z72.0    Chronic back pain M54.9, G89.29    Asthma J45.909    Elevated BP without diagnosis of hypertension R03.0    Exertional dyspnea R06.09    Persistent atrial fibrillation I48.19    Morbid obesity due to excess calories (HCC) E66.01    Chronic anticoagulation Z79.01    Chronic diastolic heart failure (HCC) I50.32    Chronic systolic congestive heart failure (HCC) I50.22    Acute on chronic systolic CHF (congestive heart failure) (HCC) I50.23    Acute renal insufficiency N28.9       Dear  Elgin Stockton had the pleasure of seeing Mr. Jerald Leung. in the office today. Chief complaint:  Chief Complaint   Patient presents with    Follow-up     Afib, Chronic Systolic Congestive Heart Falure       Assessment:  Chronic SHF - EF 26-30%. [facility] admit 7/8/19 Acute on chronic SHF. Underwent RHC/LHC - no sig CAD  A fib with RVR  Chronic anticoagulation - INR followed by PCP  Morbid obesity   Smoker - quit smoking/not chewing tobacco  Hx of Excessive EtOH  -- occ drinks beer now  Left foot pain    Plan:   EKG reveals afib with RVR - rate 124; stopped diltiazem 180mg/d d/t hypotension last visit- asymptomatic  Discussed BB; did poorly in past and not willing to re-trail; start diltiazem 30mg q8 hours  Cont warfarin; INR check next week  Vol appears well controlled on low dose Entresto and torsemide 40mg/d.  Uses add'll Torsemide 20mg in evening and metolazone PRN  Echo today - results pending  Check updated labs including BMP, Mg, and pBNP  Foot pain concerning for gout - check uric acid  Check lipids    F/u in 2-3 weeks    Patient understands the plan. All questions were answered to the patient's satisfaction. Medication Side Effects and Warnings were discussed with patient: yes  Patient Labs were reviewed and or requested:  yes  Patient Past Records were reviewed and or requested: yes    I appreciate the opportunity to be involved in . See note below for details. Please do not hesitate to contact us with questions or concerns. Mireya Navarrete NP    Cardiac Testing/ Procedures: A. Cardiac Cath/PCI: 7/1/19 - LHC/RHC - No sig CAD; Mean PA pressure 21 mm Hg    B.ECHO/EVITA: 6/21/19 - EF 26-30%; Mod LAE; DIDIER: Mild to Mod MR; Mild to Mod TR; Mod Pulm HTN    6/22/2017 Left ventricle: Systolic function was mildly reduced. Ejection fraction was estimated to be 45 %. There were no regional wall motion abnormalities. Tricuspid valve: There was mild regurgitation. C.StressNuclear/Stress ECHO/Stress test: 6/26/19 - Abnormal ledy nuc; EF 24%; Fixed defect( inf/apical)    Subjective:  Mary Jo Khalil. is a 47 y.o. male who returns for follow up visit. Pt denies cardiac complaint. States weight is well controlled with Torsemide and PRN metolazone. Uses metolazone 1x every 1-2 weeks. Using torsemide 40mg/d with additional 20mg in PM several days after using metolazone. Weight and breathing remain stable. Biggest complaint is left foot pain which is severe. Pt feels unable to support his weight / walk. He has had pain in the past which he attributed to arthritis but it has never been this severe. Mosse hx of trauma or known injury to the foot. Last metolazone dose this past weekend. Pain started a few days after. Foot is red, swollen, and extremely tender to touch. Denies know hx of gout. Patient denies any chest pain, palpitations, syncope, orthopnea, or paroxysmal nocturnal dyspnea. Breathing is stable / improved. Stopped diltiazem as instructed last visit. On warfarin therapy with INR planned for next week. No abnormal bleeding. ROS:  Constitutional: Negative for fever, chills, malaise/fatigue and diaphoresis. Respiratory: Negative for cough, hemoptysis, sputum production, and wheezing. Cardiovascular: Negative for chest pain, palpitations, orthopnea, claudication, and PND. Gastrointestinal: Negative for heartburn, nausea, vomiting, blood in stool and melena. Genitourinary: Negative for dysuria and flank pain. Neurological: Negative for seizures, loss of consciousness, weakness and headaches. Endo/Heme/Allergies: Negative for abnormal bleeding. Psychiatric/Behavioral: Negative for memory loss. Medications before admission:    Current Outpatient Medications   Medication Sig Dispense    torsemide (DEMADEX) 20 mg tablet 40mg in AM daily; use additional 20mg in PM as needed for weight gain >3lbs in 1 day or >5lbs in 1 week 90 Tab    dilTIAZem (CARDIZEM) 30 mg tablet Take 1 Tab by mouth three (3) times daily. 90 Tab    metOLazone (ZAROXOLYN) 5 mg tablet Take 5 mg by mouth daily. Indications: WEIGHT > 315 LB     sacubitril-valsartan (ENTRESTO) 24 mg/26 mg tablet Take 1 Tab by mouth two (2) times a day. 60 Tab    potassium chloride SR (KLOR-CON 10) 10 mEq tablet Take 1 Tab by mouth two (2) times a day. 60 Tab    polyethylene glycol (MIRALAX) 17 gram/dose powder Take 17 g by mouth daily.  warfarin (COUMADIN) 5 mg tablet Take 5 mg by mouth two (2) days a week. Patient takes 5 mg on Thursday and Wednesday and takes 10mg on remaining days.  warfarin (COUMADIN) 5 mg tablet Take 10 mg by mouth five (5) days a week. Patient takes 10 mg on Monday, tuesday, Friday, Saturday, Sunday     MULTIVITAMIN (MULTIPLE VITAMIN PO) Take 1 Tab by mouth daily.  predniSONE (DELTASONE) 10 mg tablet Take 30 mg by mouth daily (with breakfast). Gout Flare - Follow-up with primary care for additional treatment. 12 Tab     No current facility-administered medications for this visit.         Family History of CAD: Yes    Social History:  Current  Smoker  No    Physical Exam:  Visit Vitals  /64 (BP 1 Location: Left arm, BP Patient Position: Sitting)   Pulse (!) 102   Resp 18   Ht 5' 6\" (1.676 m)   Wt 310 lb (140.6 kg)   SpO2 96%   BMI 50.04 kg/m²       Gen: Well-developed, well-nourished, morbid obesity  Neck: Supple,thick neck, unable to appreciate JVD  Resp: No accessory muscle use, Clear breath sounds, No rales or rhonchi  Card: Irregular Rate,tachy - distant heart sounds  Abd:  Soft,morbidly obese, Abd wall - indurated skin  MSK: No cyanosis  Neuro: moving all four extremities , follows commands appropriately  Psych:  Good insight, oriented to person, place , alert, Nml Affect  LE: Trace edema bilateral LE; left foot with redness / swelling in MCP joints of the 2 smaller toes extending to the lateral edge of his foot, severely tender, mildly warm    EKG: afib with RVR HR 120s    LABS:  Lab Results   Component Value Date/Time    WBC 8.4 07/15/2019 01:46 AM    HGB 10.8 (L) 07/15/2019 01:46 AM    HCT 34.7 (L) 07/15/2019 01:46 AM    PLATELET 028 87/77/0434 01:46 AM     Lab Results   Component Value Date/Time    Sodium 141 12/26/2019 04:19 PM    Potassium 3.5 12/26/2019 04:19 PM    Chloride 92 (L) 12/26/2019 04:19 PM    CO2 25 12/26/2019 04:19 PM    Anion gap 6 07/15/2019 01:46 AM    Glucose 97 12/26/2019 04:19 PM    BUN 29 (H) 12/26/2019 04:19 PM    Creatinine 1.71 (H) 12/26/2019 04:19 PM    BUN/Creatinine ratio 17 12/26/2019 04:19 PM    GFR est AA 53 (L) 12/26/2019 04:19 PM    GFR est non-AA 46 (L) 12/26/2019 04:19 PM    Calcium 9.7 12/26/2019 04:19 PM       Lab Results   Component Value Date/Time    aPTT 28.0 06/22/2017 12:54 PM     Lab Results   Component Value Date/Time    INR 1.6 (H) 07/15/2019 01:46 AM    INR 1.8 (H) 07/14/2019 12:42 AM    INR 1.8 (H) 07/13/2019 10:16 AM    INR, External 1.7 (A) 07/18/2019    INR POC 1.7 07/18/2019 03:22 PM    Prothrombin time 16.1 (H) 07/15/2019 01:46 AM    Prothrombin time 17.7 (H) 07/14/2019 12:42 AM    Prothrombin time 17.8 (H) 07/13/2019 10:16 AM     No components found for: LAST LIPIDS    Lab Results   Component Value Date/Time    Cholesterol, total 162 12/26/2019 04:19 PM    HDL Cholesterol 40 12/26/2019 04:19 PM    LDL,Direct 98 01/03/2012 07:00 PM    LDL, calculated 72 12/26/2019 04:19 PM    VLDL, calculated 50 (H) 12/26/2019 04:19 PM    Triglyceride 250 (H) 12/26/2019 04:19 PM    CHOL/HDL Ratio 5.2 (H) 06/23/2017 04:40 AM         Winnie Allen NP

## 2019-12-26 NOTE — PATIENT INSTRUCTIONS
Please get labs on the 3rd floor today  Restart diltiazem 30mg - three times per day    Follow-up again in 2-3 weeks.

## 2019-12-27 ENCOUNTER — TELEPHONE (OUTPATIENT)
Dept: CARDIOLOGY CLINIC | Age: 49
End: 2019-12-27

## 2019-12-27 LAB
BUN SERPL-MCNC: 29 MG/DL (ref 6–24)
BUN/CREAT SERPL: 17 (ref 9–20)
CALCIUM SERPL-MCNC: 9.7 MG/DL (ref 8.7–10.2)
CHLORIDE SERPL-SCNC: 92 MMOL/L (ref 96–106)
CHOLEST SERPL-MCNC: 162 MG/DL (ref 100–199)
CO2 SERPL-SCNC: 25 MMOL/L (ref 20–29)
CREAT SERPL-MCNC: 1.71 MG/DL (ref 0.76–1.27)
ECHO LA AREA 4C: 31.6 CM2
ECHO LA VOL 2C: 97.8 ML (ref 18–58)
ECHO LA VOL 4C: 126.07 ML (ref 18–58)
ECHO LA VOL BP: 120.65 ML (ref 18–58)
ECHO LA VOL/BSA BIPLANE: 50.07 ML/M2 (ref 16–28)
ECHO LA VOLUME INDEX A2C: 40.59 ML/M2 (ref 16–28)
ECHO LA VOLUME INDEX A4C: 52.32 ML/M2 (ref 16–28)
ECHO LV EDV A4C: 290.9 ML
ECHO LV EDV INDEX A4C: 120.7 ML/M2
ECHO LV EDV TEICHHOLZ: 1.42 ML
ECHO LV INTERNAL DIMENSION DIASTOLIC: 6.79 CM (ref 4.2–5.9)
ECHO LV IVSD: 0.97 CM (ref 0.6–1)
ECHO LV MASS 2D: 338.1 G (ref 88–224)
ECHO LV MASS INDEX 2D: 140.3 G/M2 (ref 49–115)
ECHO LV POSTERIOR WALL DIASTOLIC: 0.91 CM (ref 0.6–1)
ECHO RA AREA 4C: 23.39 CM2
ECHO TV REGURGITANT MAX VELOCITY: 256.98 CM/S
ECHO TV REGURGITANT PEAK GRADIENT: 26.4 MMHG
GLUCOSE SERPL-MCNC: 97 MG/DL (ref 65–99)
HDLC SERPL-MCNC: 40 MG/DL
INTERPRETATION, 910389: NORMAL
INTERPRETATION: NORMAL
LDLC SERPL CALC-MCNC: 72 MG/DL (ref 0–99)
MAGNESIUM SERPL-MCNC: 2.7 MG/DL (ref 1.6–2.3)
NT-PROBNP SERPL-MCNC: 956 PG/ML (ref 0–121)
PDF IMAGE, 910387: NORMAL
POTASSIUM SERPL-SCNC: 3.5 MMOL/L (ref 3.5–5.2)
SODIUM SERPL-SCNC: 141 MMOL/L (ref 134–144)
TRIGL SERPL-MCNC: 250 MG/DL (ref 0–149)
URATE SERPL-MCNC: 15.8 MG/DL (ref 3.7–8.6)
VLDLC SERPL CALC-MCNC: 50 MG/DL (ref 5–40)

## 2019-12-27 RX ORDER — TORSEMIDE 20 MG/1
TABLET ORAL
Qty: 90 TAB | Refills: 0
Start: 2019-12-27 | End: 2020-03-25

## 2019-12-27 RX ORDER — PREDNISONE 10 MG/1
30 TABLET ORAL
Qty: 12 TAB | Refills: 0 | Status: SHIPPED | OUTPATIENT
Start: 2019-12-27 | End: 2020-06-04

## 2019-12-27 NOTE — TELEPHONE ENCOUNTER
Labs reviewed with pt - consistent with gout flare to the left foot. Pt unable to see PCP today; given that it's a holiday weekend, I will provide prednisone 30mg/d x4 days. Pt will follow-up with PCP on Monday. Mild XU - suspect pt slightly dry. Uses metolazone 5mg every 1.5-2 weeks, but he took extra dose this past week. Also notes decreased fluid intake the past few days given pain/gout. Instructed to hold on additional metolazone dosing for at least the next 10days. Cont Torsemide. Take 40mEq K today. Hypermag 2.7; pt denies any OTC supplements or regular use of laxatives. Does use miralax daily; will change to every other day as long as BMs remain regular. Discuss lipids at f/u appt.      Component      Latest Ref Rng & Units 12/26/2019 12/26/2019 12/26/2019 12/26/2019           4:19 PM  4:19 PM  4:19 PM  4:19 PM   Glucose      65 - 99 mg/dL       BUN      6 - 24 mg/dL       Creatinine      0.76 - 1.27 mg/dL       GFR est non-AA      >59 mL/min/1.73       GFR est AA      >59 mL/min/1.73       BUN/Creatinine ratio      9 - 20       Sodium      134 - 144 mmol/L       Potassium      3.5 - 5.2 mmol/L       Chloride      96 - 106 mmol/L       CO2      20 - 29 mmol/L       Calcium      8.7 - 10.2 mg/dL       Cholesterol, total      100 - 199 mg/dL    162   Triglyceride      0 - 149 mg/dL    250 (H)   HDL Cholesterol      >39 mg/dL    40   VLDL, calculated      5 - 40 mg/dL    50 (H)   LDL, calculated      0 - 99 mg/dL    72   INTERPRETATION         Note    PDF IMAGE         Not applicable    Magnesium      1.6 - 2.3 mg/dL       Uric acid      3.7 - 8.6 mg/dL       Interpretation        Note     NT pro-BNP      0 - 121 pg/mL 956 (H)        Component      Latest Ref Rng & Units 12/26/2019 12/26/2019 12/26/2019           4:19 PM  4:19 PM  4:19 PM   Glucose      65 - 99 mg/dL 97     BUN      6 - 24 mg/dL 29 (H)     Creatinine      0.76 - 1.27 mg/dL 1.71 (H)     GFR est non-AA      >59 mL/min/1.73 46 (L)     GFR est AA      >59 mL/min/1.73 53 (L)     BUN/Creatinine ratio      9 - 20 17     Sodium      134 - 144 mmol/L 141     Potassium      3.5 - 5.2 mmol/L 3.5     Chloride      96 - 106 mmol/L 92 (L)     CO2      20 - 29 mmol/L 25     Calcium      8.7 - 10.2 mg/dL 9.7     Cholesterol, total      100 - 199 mg/dL      Triglyceride      0 - 149 mg/dL      HDL Cholesterol      >39 mg/dL      VLDL, calculated      5 - 40 mg/dL      LDL, calculated      0 - 99 mg/dL      INTERPRETATION            PDF IMAGE            Magnesium      1.6 - 2.3 mg/dL   2.7 (H)   Uric acid      3.7 - 8.6 mg/dL  15.8 (HH)    Interpretation            NT pro-BNP      0 - 121 pg/mL

## 2020-01-16 ENCOUNTER — TELEPHONE (OUTPATIENT)
Dept: CARDIOLOGY CLINIC | Age: 50
End: 2020-01-16

## 2020-01-16 NOTE — TELEPHONE ENCOUNTER
Patient would like to speak with you. He had to cancel his appt with Dr. Daphne Smiht today because he has gout and can't walk. He would like to discuss a couple of thing with you over the phone. Please call back.      Phone: [phone]

## 2020-01-17 ENCOUNTER — TELEPHONE (OUTPATIENT)
Dept: CARDIOLOGY | Age: 50
End: 2020-01-17

## 2020-01-17 ENCOUNTER — OFFICE VISIT (OUTPATIENT)
Dept: CARDIOLOGY CLINIC | Age: 50
End: 2020-01-17

## 2020-01-17 VITALS
HEART RATE: 75 BPM | OXYGEN SATURATION: 97 % | SYSTOLIC BLOOD PRESSURE: 118 MMHG | HEIGHT: 66 IN | BODY MASS INDEX: 49.98 KG/M2 | DIASTOLIC BLOOD PRESSURE: 70 MMHG | WEIGHT: 311 LBS

## 2020-01-17 DIAGNOSIS — I50.22 CHRONIC SYSTOLIC CONGESTIVE HEART FAILURE (HCC): Primary | ICD-10-CM

## 2020-01-17 DIAGNOSIS — I42.8 NICM (NONISCHEMIC CARDIOMYOPATHY) (HCC): ICD-10-CM

## 2020-01-17 DIAGNOSIS — M79.672 LEFT FOOT PAIN: ICD-10-CM

## 2020-01-17 DIAGNOSIS — E66.01 MORBID OBESITY WITH BMI OF 50.0-59.9, ADULT (HCC): ICD-10-CM

## 2020-01-17 DIAGNOSIS — I48.20 CHRONIC ATRIAL FIBRILLATION (HCC): ICD-10-CM

## 2020-01-17 DIAGNOSIS — Z79.01 CHRONIC ANTICOAGULATION: ICD-10-CM

## 2020-01-17 NOTE — PROGRESS NOTES
Jono Rondon. is a 47 y.o. male    Chief Complaint   Patient presents with    CHF    Irregular Heart Beat     afib    Other     PEPE, NICM      Patient would like to have a recommendation to a physician about his gout. Chest pain No    SOB No    Dizziness No    Swelling patient has swelling in due to gout in his left foot. Refills diltiazem    Visit Vitals  /70 (BP 1 Location: Left arm, BP Patient Position: Sitting)   Pulse 75   Ht 5' 6\" (1.676 m)   Wt 311 lb (141.1 kg)   SpO2 97%   BMI 50.20 kg/m²       1. Have you been to the ER, urgent care clinic since your last visit? Hospitalized since your last visit? no    2. Have you seen or consulted any other health care providers outside of the [address] [place] [address] since your last visit? Include any pap smears or colon screening.   No

## 2020-01-17 NOTE — PROGRESS NOTES
Sohan Garcia MD    [address] [place], [address] [facility]    Office [phone],ISO [phone]  Pager [phone]    Tang Guadarrama. is a 47 y.o. male is here for f/u visit. Primary care physician:MENA Mendoza    Patient Active Problem List   Diagnosis Code    Renal insufficiency N28.9    PEPE (obstructive sleep apnea) G47.33    Tobacco use Z72.0    Chronic back pain M54.9, G89.29    Asthma J45.909    Elevated BP without diagnosis of hypertension R03.0    Exertional dyspnea R06.09    Persistent atrial fibrillation I48.19    Morbid obesity due to excess calories (HCC) E66.01    Chronic anticoagulation Z79.01    Chronic diastolic heart failure (HCC) I50.32    Chronic systolic congestive heart failure (HCC) I50.22    Acute on chronic systolic CHF (congestive heart failure) (HCC) I50.23    Acute renal insufficiency N28.9       Dear Mr. Nicole Griffin had the pleasure of seeing Mr. Tang Guadarrama. in the office today. Chief complaint:  Chief Complaint   Patient presents with    CHF    Irregular Heart Beat     afib    Other     PEPE, NICM        Assessment:  Chronic SHF - EF 25-30%. [facility] admit 7/8/19 Acute on chronic SHF. Underwent RHC/LHC - no sig CAD  A fib with CVR   Chronic anticoagulation - INR followed by PCP  Morbid obesity   Smoker - quit smoking/not chewing tobacco  Hx of Excessive EtOH  -- occ drinks beer now        Plan:     Patient is metazolone on a as needed basis. He is also on torsemide 2 tablets a.m. His diuretics are probably waiting to his gout flareups. He will try to decrease the morning dose to 1 tablet daily. Discussed with patient about referral for AICD. However patient does not want to get it done now. Aggressive CV risk factor modification. On anticoagulation on Coumadin/INR followed by PCP    Patient was tolerating Entresto but states that he is off the medication because unable to afford it.     He will need to go back on ARB/ACE I  -however patient states that he is doing well and he does not want to take the medications for now. Will revisit at next visit. He will need to see his PCP regarding his gout flareup. Follow-up in 3 months/as needed         Patient understands the plan. All questions were answered to the patient's satisfaction. Medication Side Effects and Warnings were discussed with patient: yes  Patient Labs were reviewed and or requested:  yes  Patient Past Records were reviewed and or requested: yes    I appreciate the opportunity to be involved in . See note below for details. Please do not hesitate to contact us with questions or concerns. Rut Mckenzie MD    Cardiac Testing/ Procedures: A. Cardiac Cath/PCI: 7/1/19 - LHC/RHC - No sig CAD; Mean PA pressure 21 mm Hg    B.ECHO/EVITA: 12/26/2019-severely dilated LV, LVEF 25 to 30%, severely dilated left atrium, dilated right atrium, mild TR, moderate MR  6/21/19 - EF 26-30%; Mod LAE; DIDIER: Mild to Mod MR; Mild to Mod TR; Mod Pulm HTN    6/22/2017 Left ventricle: Systolic function was mildly reduced. Ejection fraction was estimated to be 45 %. There were no regional wall motion abnormalities. Tricuspid valve: There was mild regurgitation. C.StressNuclear/Stress ECHO/Stress test: 6/26/19 - Abnormal ledy nuc; EF 24%; Fixed defect( inf/apical)    D. Vascular:    E. EP:    F. Miscellaneous:    Subjective:  Soheila Simms. is a 47 y.o. male who returns for follow up visit. Recently has been on multiple dose of steroids for gout left foot. Has had recurrent flareups. He states that his weight fluctuates between 317 to 308 pounds. He was started on beta-blockers previously but he states that the medication did  not \"agree with him\" and he does not want to be on beta-blockers. Currently on short acting Cardizem because a long-acting Cardizem was dropping his blood pressures.   He states that his blood pressure doing well.    [facility] [place] 7/8/19 Acute on chronic SHF. Underwent RHC/LHC - no sig CAD    Tries to eat low-salt/heart healthy diet. Has difficulty losing weight. ROS:  (bold if positive, if negative)             Medications before admission:    Current Outpatient Medications   Medication Sig Dispense    torsemide (DEMADEX) 20 mg tablet 40mg in AM daily; use additional 20mg in PM as needed for weight gain >3lbs in 1 day or >5lbs in 1 week 90 Tab    dilTIAZem (CARDIZEM) 30 mg tablet Take 1 Tab by mouth three (3) times daily. 90 Tab    metOLazone (ZAROXOLYN) 5 mg tablet Take 5 mg by mouth as needed. Indications: WEIGHT > 315 LB     sacubitril-valsartan (ENTRESTO) 24 mg/26 mg tablet Take 1 Tab by mouth two (2) times a day. 60 Tab    potassium chloride SR (KLOR-CON 10) 10 mEq tablet Take 1 Tab by mouth two (2) times a day. 60 Tab    polyethylene glycol (MIRALAX) 17 gram/dose powder Take 17 g by mouth daily.  warfarin (COUMADIN) 5 mg tablet Take 5 mg by mouth two (2) days a week. Patient takes 5 mg on Thursday and Wednesday and takes 10mg on remaining days.  warfarin (COUMADIN) 5 mg tablet Take 10 mg by mouth five (5) days a week. Patient takes 10 mg on Monday, tuesday, Friday, Saturday, Sunday     MULTIVITAMIN (MULTIPLE VITAMIN PO) Take 1 Tab by mouth daily.  predniSONE (DELTASONE) 10 mg tablet Take 30 mg by mouth daily (with breakfast). Gout Flare - Follow-up with primary care for additional treatment. (Patient not taking: Reported on 1/17/2020) 12 Tab     No current facility-administered medications for this visit.         Family History of CAD:    Yes    Social History:  Current  Smoker  No    Physical Exam:  Visit Vitals  /70 (BP 1 Location: Left arm, BP Patient Position: Sitting)   Pulse 75   Ht 5' 6\" (1.676 m)   Wt 311 lb (141.1 kg)   SpO2 97%   BMI 50.20 kg/m²       Gen: Well-developed, well-nourished, morbid obesity  Neck: Supple,thick neck, unable to appreciate JVD  Resp: No accessory muscle use, Clear breath sounds, No rales or rhonchi  Card: Irregular Rate,Rythm,Normal S1, S2, No murmurs  Abd:  Soft,morbidly obese, Abd wall - indurated skin  MSK: No cyanosis  Neuro: moving all four extremities , follows commands appropriately  Psych:  Good insight, oriented to person, place , alert, Nml Affect  LE: Right lower extremities trace edema    Left foot -erythema, increased temperature to touch,swelling+    EKG:       LABS:        Lab Results   Component Value Date/Time    WBC 8.4 07/15/2019 01:46 AM    HGB 10.8 (L) 07/15/2019 01:46 AM    HCT 34.7 (L) 07/15/2019 01:46 AM    PLATELET 280 53/48/7499 01:46 AM     Lab Results   Component Value Date/Time    Sodium 141 12/26/2019 04:19 PM    Potassium 3.5 12/26/2019 04:19 PM    Chloride 92 (L) 12/26/2019 04:19 PM    CO2 25 12/26/2019 04:19 PM    Anion gap 6 07/15/2019 01:46 AM    Glucose 97 12/26/2019 04:19 PM    BUN 29 (H) 12/26/2019 04:19 PM    Creatinine 1.71 (H) 12/26/2019 04:19 PM    BUN/Creatinine ratio 17 12/26/2019 04:19 PM    GFR est AA 53 (L) 12/26/2019 04:19 PM    GFR est non-AA 46 (L) 12/26/2019 04:19 PM    Calcium 9.7 12/26/2019 04:19 PM       Lab Results   Component Value Date/Time    aPTT 28.0 06/22/2017 12:54 PM     Lab Results   Component Value Date/Time    INR 1.6 (H) 07/15/2019 01:46 AM    INR 1.8 (H) 07/14/2019 12:42 AM    INR 1.8 (H) 07/13/2019 10:16 AM    INR, External 1.7 (A) 07/18/2019    INR POC 1.7 07/18/2019 03:22 PM    Prothrombin time 16.1 (H) 07/15/2019 01:46 AM    Prothrombin time 17.7 (H) 07/14/2019 12:42 AM    Prothrombin time 17.8 (H) 07/13/2019 10:16 AM     No components found for: Romayne Kalata, MD

## 2020-01-20 NOTE — CARDIO/PULMONARY
Jerald Leung.  47 y. o. \  With diagnosis of Heart Failure attended [address] Cardiopulmonary Rehab  (phase II cardiac rehab) for 15 sessions from 10/1/19-11/12/19. Pt did also participate in the 1:1 meeting with the dietitian and the Heart Failure educational class. Telephone calls placed the week of 11/18/19 and 11/25/19 to schedule appointments. Pt returned call and stated he did not want to participate in Cardiac Rehab anymore. Pt encouraged to schedule 1 additional discharge appointment to review safe exercise guidelines and answer any disease specific questions. Pt declined. Pt is discharged from [facility] Cardiac Rehab.     Traci Maldonado RN  1/20/2020

## 2020-01-24 RX ORDER — DILTIAZEM HYDROCHLORIDE 30 MG/1
TABLET, FILM COATED ORAL
Qty: 90 TAB | Refills: 0 | Status: SHIPPED | OUTPATIENT
Start: 2020-01-24 | End: 2020-02-27

## 2020-03-25 RX ORDER — POTASSIUM CHLORIDE 750 MG/1
TABLET, FILM COATED, EXTENDED RELEASE ORAL
Qty: 60 TAB | Refills: 0 | Status: SHIPPED | OUTPATIENT
Start: 2020-03-25 | End: 2020-05-04

## 2020-03-25 RX ORDER — TORSEMIDE 20 MG/1
TABLET ORAL
Qty: 120 TAB | Refills: 0 | Status: SHIPPED | OUTPATIENT
Start: 2020-03-25 | End: 2020-05-20

## 2020-05-04 RX ORDER — POTASSIUM CHLORIDE 750 MG/1
TABLET, FILM COATED, EXTENDED RELEASE ORAL
Qty: 60 TAB | Refills: 0 | Status: SHIPPED | OUTPATIENT
Start: 2020-05-04 | End: 2020-06-04 | Stop reason: SDUPTHER

## 2020-05-21 DIAGNOSIS — N28.9 RENAL INSUFFICIENCY: ICD-10-CM

## 2020-05-21 DIAGNOSIS — I50.22 CHRONIC SYSTOLIC CONGESTIVE HEART FAILURE (HCC): Primary | ICD-10-CM

## 2020-05-21 DIAGNOSIS — I50.22 CHRONIC SYSTOLIC CONGESTIVE HEART FAILURE (HCC): ICD-10-CM

## 2020-06-04 ENCOUNTER — OFFICE VISIT (OUTPATIENT)
Dept: CARDIOLOGY CLINIC | Age: 50
End: 2020-06-04

## 2020-06-04 VITALS
SYSTOLIC BLOOD PRESSURE: 100 MMHG | HEART RATE: 84 BPM | BODY MASS INDEX: 49.18 KG/M2 | WEIGHT: 306 LBS | DIASTOLIC BLOOD PRESSURE: 70 MMHG | HEIGHT: 66 IN

## 2020-06-04 DIAGNOSIS — N18.9 CHRONIC KIDNEY DISEASE, UNSPECIFIED CKD STAGE: ICD-10-CM

## 2020-06-04 DIAGNOSIS — I50.22 CHRONIC SYSTOLIC CONGESTIVE HEART FAILURE (HCC): Primary | ICD-10-CM

## 2020-06-04 DIAGNOSIS — I42.8 NICM (NONISCHEMIC CARDIOMYOPATHY) (HCC): ICD-10-CM

## 2020-06-04 DIAGNOSIS — Z87.891 HISTORY OF TOBACCO USE: ICD-10-CM

## 2020-06-04 DIAGNOSIS — E87.6 HYPOKALEMIA: ICD-10-CM

## 2020-06-04 DIAGNOSIS — F10.11 H/O ETOH ABUSE: ICD-10-CM

## 2020-06-04 DIAGNOSIS — I48.20 CHRONIC ATRIAL FIBRILLATION (HCC): ICD-10-CM

## 2020-06-04 DIAGNOSIS — E66.01 MORBID OBESITY (HCC): ICD-10-CM

## 2020-06-04 LAB
BUN SERPL-MCNC: 24 MG/DL (ref 6–24)
BUN/CREAT SERPL: 15 (ref 9–20)
CALCIUM SERPL-MCNC: 10.2 MG/DL (ref 8.7–10.2)
CHLORIDE SERPL-SCNC: 91 MMOL/L (ref 96–106)
CO2 SERPL-SCNC: 30 MMOL/L (ref 20–29)
CREAT SERPL-MCNC: 1.57 MG/DL (ref 0.76–1.27)
GLUCOSE SERPL-MCNC: 128 MG/DL (ref 65–99)
INTERPRETATION: NORMAL
MAGNESIUM SERPL-MCNC: 2.4 MG/DL (ref 1.6–2.3)
POTASSIUM SERPL-SCNC: 3.1 MMOL/L (ref 3.5–5.2)
SODIUM SERPL-SCNC: 142 MMOL/L (ref 134–144)

## 2020-06-04 RX ORDER — COLCHICINE 0.6 MG/1
0.6 TABLET ORAL AS NEEDED
COMMUNITY
End: 2021-06-02 | Stop reason: SDUPTHER

## 2020-06-04 RX ORDER — METOLAZONE 5 MG/1
5 TABLET ORAL AS NEEDED
Qty: 10 TAB | Refills: 0 | Status: SHIPPED | OUTPATIENT
Start: 2020-06-04 | End: 2021-01-07 | Stop reason: SDUPTHER

## 2020-06-04 RX ORDER — ALLOPURINOL 100 MG/1
100 TABLET ORAL DAILY
COMMUNITY
End: 2022-02-09 | Stop reason: SDUPTHER

## 2020-06-04 RX ORDER — POTASSIUM CHLORIDE 750 MG/1
TABLET, FILM COATED, EXTENDED RELEASE ORAL
Qty: 66 TAB | Refills: 0 | Status: SHIPPED | OUTPATIENT
Start: 2020-06-04 | End: 2020-07-09

## 2020-06-04 RX ORDER — TORSEMIDE 20 MG/1
40 TABLET ORAL 2 TIMES DAILY
Qty: 120 TAB | Refills: 0 | Status: SHIPPED | OUTPATIENT
Start: 2020-06-04 | End: 2020-07-23

## 2020-06-04 NOTE — PROGRESS NOTES
Renal function is trending down  Potassium is low - patient currently taking 10 meq BID  Please increase potassium to 20 meq BID x 3 days then back to 10 meq BID  Recheck BMP next week

## 2020-06-04 NOTE — LETTER
6/4/20 Patient: Carolina Daigle. YOB: 1970 Date of Visit: 6/4/2020 Elijah Abreu, [address] [facility] [address] [facility] [id number] VIA Facsimile: [phone] Dear MENA Ponce, Thank you for referring Mr. Erasmo Okeefe to [address] for evaluation. My notes for this consultation are attached. If you have questions, please do not hesitate to call me. I look forward to following your patient along with you.  
 
 
Sincerely, 
 
Mario Rollins NP

## 2020-06-04 NOTE — PROGRESS NOTES
Luis Schmitz, JC  [address], [address] [place]  [place], [address] [facility]    Office [phone]  Fax [phone]      Deepika Dutton is a 45 y.o. male is here for f/u visit. Primary care physician:MENA Gamez    Patient Active Problem List   Diagnosis Code    Renal insufficiency N28.9    PEPE (obstructive sleep apnea) G47.33    Tobacco use Z72.0    Chronic back pain M54.9, G89.29    Asthma J45.909    Elevated BP without diagnosis of hypertension R03.0    Exertional dyspnea R06.00    Persistent atrial fibrillation (HCC) I48.19    Morbid obesity due to excess calories (HCC) E66.01    Chronic anticoagulation Z79.01    Chronic diastolic heart failure (HCC) I50.32    Chronic systolic congestive heart failure (HCC) I50.22    Acute on chronic systolic CHF (congestive heart failure) (HCC) I50.23    Acute renal insufficiency N28.9       Dear Mr. Blanca Uribe had the pleasure of seeing Mr. Deepika Dutton in the office today. Chief complaint:  Chief Complaint   Patient presents with    Cardiomyopathy    CHF    Irregular Heart Beat     Assessment:  Chronic Systolic HF - EF 76-79%. [facility] admit 7/8/19 Acute on chronic SHF. Underwent RHC/LHC - no sig CAD  A fib   Chronic anticoagulation - INR followed by PCP  Morbid obesity   Smoker - quit smoking  Hx of Excessive EtOH  -- occ drinks wine now  Gout    Plan:   Patient is on metazolone on an as needed basis. Weight increades on Torsemide 40mg/d so he has been back on BID dosing the past 2 months. Today his weight is stable   His diuretics are probably contributing to gout flare ups but this has been stable the past few months - will continue meds as he has been taking. Renal fx stable 6/1    Not on good DMTs; pt is not interested in BB therapy as had sig SOB on BB in the past. He prefers diltiazem. Would like to start ARB/ACE I; however, BP borderline hypotensive today. Will revisit at f/u. Can not afford Entresto.       Was out of potassium; recent K 3.1; will take 20mg BID x3 days then go back to 10mEq BID. Repeat labs at next visit.       Aggressive CV risk factor modification.     On anticoagulation on Coumadin/INR followed by PCP     Follow-up in 3 months/as needed  Discussed with patient about referral for AICD; pt is not interested at this time. Care limited by pt's financial constraints. Patient understands the plan. All questions were answered to the patient's satisfaction. Medication Side Effects and Warnings were discussed with patient: yes  Patient Labs were reviewed and or requested:  yes  Patient Past Records were reviewed and or requested: yes    I appreciate the opportunity to be involved in . See note below for details. Please do not hesitate to contact us with questions or concerns. Eunice Saanbria NP    Cardiac Testing/ Procedures: A. Cardiac Cath/PCI: 7/1/19 - LHC/RHC - No sig CAD; Mean PA pressure 21 mm Hg    B.ECHO/EVITA: 6/21/19 - EF 26-30%; Mod LAE; DIDIER: Mild to Mod MR; Mild to Mod TR; Mod Pulm HTN    6/22/2017 Left ventricle: Systolic function was mildly reduced. Ejection fraction was estimated to be 45 %. There were no regional wall motion abnormalities. Tricuspid valve: There was mild regurgitation. C.StressNuclear/Stress ECHO/Stress test: 6/26/19 - Abnormal ledy nuc; EF 24%; Fixed defect( inf/apical)    Subjective:  Vipul Miller. is a 45 y.o. male who returns for follow up visit. States he has been doing well the past few months and is without complaint today. Increased torsemide back to 40mg BID 2 months ago as weight was going up again. States weight stable at this dose. Uses metolazone 2.5mg if weight >308lbs and 5mg if weight >315lb. Uses very infrequently but requests refill today. Has 4.5 pills remaining. Was out of potassium recently and missed 3-4 days of med. No recent gout flare ups. Last INR 2 weeks ago with PCP. Denies abn bleeding.      Patient denies any chest pain, palpitations, syncope, edema, or paroxysmal nocturnal dyspnea. Breathing stable. Drinking less alcohol. No beer but a glass of wine or so daily. ROS:  Constitutional: Negative for fever, chills, and diaphoresis. Respiratory: Negative for cough, hemoptysis, sputum production, and wheezing. Cardiovascular: Negative for chest pain, palpitations, and PND. Gastrointestinal: Negative for heartburn, nausea, vomiting, blood in stool and melena. Genitourinary: Negative for dysuria and flank pain. Neurological: Negative for seizures, loss of consciousness,   Endo/Heme/Allergies: Negative for abnormal bleeding. Psychiatric/Behavioral: Negative for memory loss. Medications before admission:    Current Outpatient Medications   Medication Sig Dispense    colchicine 0.6 mg tablet Take 0.6 mg by mouth as needed for Gout or Pain.  allopurinoL (ZYLOPRIM) 100 mg tablet Take  by mouth daily.  torsemide (DEMADEX) 20 mg tablet Take 2 Tabs by mouth two (2) times a day. Take 40mg daily versus twice daily based on weight 120 Tab    potassium chloride SR (KLOR-CON 10) 10 mEq tablet Take 2 tabs twice daily x3 days, then decrease to 1 tab twice daily 66 Tab    metOLazone (ZAROXOLYN) 5 mg tablet Take 1 Tab by mouth as needed (use as needed for weight >315lbs). Indications: WEIGHT > 315 LB 10 Tab    dilTIAZem (CARDIZEM) 30 mg tablet Take 1 Tab by mouth three (3) times daily. . 270 Tab    polyethylene glycol (MIRALAX) 17 gram/dose powder Take 17 g by mouth daily.  warfarin (COUMADIN) 5 mg tablet Take 5 mg by mouth two (2) days a week. Patient takes 5 mg on Thursday and Wednesday and takes 10mg on remaining days.  warfarin (COUMADIN) 5 mg tablet Take 10 mg by mouth five (5) days a week. Patient takes 10 mg on Monday, tuesday, Friday, Saturday, Sunday     MULTIVITAMIN (MULTIPLE VITAMIN PO) Take 1 Tab by mouth daily.       No current facility-administered medications for this visit.         Family History of CAD:    Yes    Social History:  Current  Smoker  No    Physical Exam:  Visit Vitals  /70   Pulse 84   Ht 5' 6\" (1.676 m)   Wt 306 lb (138.8 kg)   BMI 49.39 kg/m²       Gen: Well-developed, well-nourished, morbid obesity  Neck: Supple,thick neck,no JVD  Resp: No accessory muscle use, Clear breath sounds, No rales or rhonchi  Card: Slightly irregular - distant heart sounds  Abd:  Soft protuberant, +BS,   MSK: No cyanosis  Neuro: moving all four extremities , follows commands appropriately  Psych:  Good insight, oriented to person, place , alert, Nml Affect  LE: no VIOLA, warm      LABS:  Lab Results   Component Value Date/Time    WBC 8.4 07/15/2019 01:46 AM    HGB 10.8 (L) 07/15/2019 01:46 AM    HCT 34.7 (L) 07/15/2019 01:46 AM    PLATELET 268 20/93/1933 01:46 AM     Lab Results   Component Value Date/Time    Sodium 142 06/01/2020 12:16 PM    Potassium 3.1 (L) 06/01/2020 12:16 PM    Chloride 91 (L) 06/01/2020 12:16 PM    CO2 30 (H) 06/01/2020 12:16 PM    Anion gap 6 07/15/2019 01:46 AM    Glucose 128 (H) 06/01/2020 12:16 PM    BUN 24 06/01/2020 12:16 PM    Creatinine 1.57 (H) 06/01/2020 12:16 PM    BUN/Creatinine ratio 15 06/01/2020 12:16 PM    GFR est AA 58 (L) 06/01/2020 12:16 PM    GFR est non-AA 50 (L) 06/01/2020 12:16 PM    Calcium 10.2 06/01/2020 12:16 PM     Lab Results   Component Value Date/Time    Cholesterol, total 162 12/26/2019 04:19 PM    HDL Cholesterol 40 12/26/2019 04:19 PM    LDL,Direct 98 01/03/2012 07:00 PM    LDL, calculated 72 12/26/2019 04:19 PM    VLDL, calculated 50 (H) 12/26/2019 04:19 PM    Triglyceride 250 (H) 12/26/2019 04:19 PM    CHOL/HDL Ratio 5.2 (H) 06/23/2017 04:40 AM     Lab Results   Component Value Date/Time    TSH 2.97 07/12/2019 11:47 AM     Lab Results   Component Value Date/Time    Hemoglobin A1c 6.4 (H) 07/09/2019 02:05 AM       Remedios Sumner NP

## 2020-06-04 NOTE — PROGRESS NOTES
Visit Vitals  /70   Pulse 84   Ht 5' 6\" (1.676 m)   Wt 306 lb (138.8 kg)   BMI 49.39 kg/m²     Medication changes for this OV VO Tyrese Brunson Snare

## 2020-06-04 NOTE — PATIENT INSTRUCTIONS
Increase potassium to 20mEq twice daily x 3 days, then go back to 10mEq twice daily     Follow-up again in 3 months or as needed.

## 2020-06-09 NOTE — PROGRESS NOTES
Lab results were reviewed with patient at [facility] on 6/4/2020.     Renal function is trending down   Potassium is low - patient currently taking 10 meq BID   Please increase potassium to 20 meq BID x 3 days then back to 10 meq BID   Recheck BMP next week

## 2020-08-28 RX ORDER — TORSEMIDE 20 MG/1
TABLET ORAL
Qty: 120 TAB | Refills: 0 | Status: SHIPPED | OUTPATIENT
Start: 2020-08-28 | End: 2020-10-01

## 2020-08-28 RX ORDER — DILTIAZEM HYDROCHLORIDE 30 MG/1
TABLET, FILM COATED ORAL
Qty: 270 TAB | Refills: 0 | Status: SHIPPED | OUTPATIENT
Start: 2020-08-28 | End: 2020-09-18

## 2020-09-08 ENCOUNTER — TELEPHONE (OUTPATIENT)
Dept: CARDIOLOGY CLINIC | Age: 50
End: 2020-09-08

## 2020-09-08 DIAGNOSIS — I50.32 CHRONIC DIASTOLIC HEART FAILURE (HCC): Primary | ICD-10-CM

## 2020-09-08 DIAGNOSIS — I50.32 CHRONIC DIASTOLIC HEART FAILURE (HCC): ICD-10-CM

## 2020-09-08 NOTE — TELEPHONE ENCOUNTER
Patient would like lab orders mailed to him for his appointment on 9/18/20. Address confirmed with patient.      Phone: [phone]

## 2020-09-15 LAB
BUN SERPL-MCNC: 14 MG/DL (ref 6–24)
BUN/CREAT SERPL: 11 (ref 9–20)
CALCIUM SERPL-MCNC: 10.2 MG/DL (ref 8.7–10.2)
CHLORIDE SERPL-SCNC: 94 MMOL/L (ref 96–106)
CHOLEST SERPL-MCNC: 149 MG/DL (ref 100–199)
CO2 SERPL-SCNC: 28 MMOL/L (ref 20–29)
CREAT SERPL-MCNC: 1.28 MG/DL (ref 0.76–1.27)
GLUCOSE SERPL-MCNC: 108 MG/DL (ref 65–99)
HDLC SERPL-MCNC: 27 MG/DL
INTERPRETATION, 910389: NORMAL
LDLC SERPL CALC-MCNC: 76 MG/DL (ref 0–99)
MAGNESIUM SERPL-MCNC: 2.3 MG/DL (ref 1.6–2.3)
POTASSIUM SERPL-SCNC: 3.5 MMOL/L (ref 3.5–5.2)
SODIUM SERPL-SCNC: 142 MMOL/L (ref 134–144)
TRIGL SERPL-MCNC: 282 MG/DL (ref 0–149)
VLDLC SERPL CALC-MCNC: 46 MG/DL (ref 5–40)

## 2020-09-18 ENCOUNTER — OFFICE VISIT (OUTPATIENT)
Dept: CARDIOLOGY CLINIC | Age: 50
End: 2020-09-18
Payer: COMMERCIAL

## 2020-09-18 VITALS
DIASTOLIC BLOOD PRESSURE: 72 MMHG | HEIGHT: 66 IN | OXYGEN SATURATION: 95 % | BODY MASS INDEX: 50.37 KG/M2 | HEART RATE: 96 BPM | SYSTOLIC BLOOD PRESSURE: 110 MMHG | WEIGHT: 313.4 LBS

## 2020-09-18 DIAGNOSIS — I42.8 NICM (NONISCHEMIC CARDIOMYOPATHY) (HCC): ICD-10-CM

## 2020-09-18 DIAGNOSIS — I50.32 CHRONIC DIASTOLIC HEART FAILURE (HCC): Primary | ICD-10-CM

## 2020-09-18 DIAGNOSIS — I48.20 CHRONIC ATRIAL FIBRILLATION (HCC): ICD-10-CM

## 2020-09-18 PROCEDURE — 99214 OFFICE O/P EST MOD 30 MIN: CPT | Performed by: INTERNAL MEDICINE

## 2020-09-18 RX ORDER — CARVEDILOL 3.12 MG/1
3.12 TABLET ORAL 2 TIMES DAILY WITH MEALS
Qty: 180 TAB | Refills: 3 | Status: SHIPPED | OUTPATIENT
Start: 2020-09-18 | End: 2021-01-21

## 2020-09-18 NOTE — LETTER
9/21/20 Patient: Akosua Valdivia. YOB: 1970 Date of Visit: 9/18/2020 Kristin Cannon, [address] [facility] [address] [facility] [address] [phone] [address] Facsimile: [phone] Dear MENA Cuellar, Thank you for referring Mr. Bhargav Valdivia to [address] for evaluation. My notes for this consultation are attached. If you have questions, please do not hesitate to call me. I look forward to following your patient along with you. Sincerely, Patel Campos MD

## 2020-09-18 NOTE — PROGRESS NOTES
Katerina Miller MD    [id number] [facility], [address] [facility]    Office [phone],BUD [phone]  /    Deejay Hahn. is a 45 y.o. male is here for f/u visit. Primary care physician:z Juaquin Kussmaul, PA    Patient Active Problem List   Diagnosis Code    Renal insufficiency N28.9    PEPE (obstructive sleep apnea) G47.33    Tobacco use Z72.0    Chronic back pain M54.9, G89.29    Asthma J45.909    Elevated BP without diagnosis of hypertension R03.0    Exertional dyspnea R06.00    Persistent atrial fibrillation (HCC) I48.19    Morbid obesity due to excess calories (HCC) E66.01    Chronic anticoagulation Z79.01    Chronic diastolic heart failure (HCC) I50.32    Chronic systolic congestive heart failure (HCC) I50.22    Acute on chronic systolic CHF (congestive heart failure) (HCC) I50.23    Acute renal insufficiency N28.9       Dear Mr. Yenifer Castro had the pleasure of seeing Mr. Deejay Negrete in the office today. Chief complaint:  Chief Complaint   Patient presents with    Follow-up     3 month f/u Anticoag., NICM, Afib    CHF       Assessment:  Chronic SHF - EF 25-30%. [facility] admit 7/8/19 Acute on chronic SHF. Underwent RHC/LHC - no sig CAD  A fib with CVR   Chronic anticoagulation - INR followed by PCP  Morbid obesity   Smoker - quit smoking/not chewing tobacco  Hx of Excessive EtOH  -- occ drinks beer now        Plan:     Patient is metazolone on a as needed basis. He is also on torsemide 2 tablets a.m. His diuretics are probably waiting to his gout flareups. He will try to decrease the morning dose to 1 tablet daily. Discussed with patient about referral for AICD. However patient does not want to get it done now. Aggressive CV risk factor modification. On anticoagulation on Coumadin/INR followed by PCP    Patient was tolerating Entresto but states that he is off the medication because unable to afford it.     He will need to go back on ARB/ACE I -however patient states that he is doing well and he does not want to take the medications for now. Will revisit at next visit. He will need to see his PCP regarding his gout flareup. Follow-up in 3 months/as needed     Lab Results   Component Value Date/Time    Cholesterol, total 149 09/14/2020 11:34 AM    HDL Cholesterol 27 (L) 09/14/2020 11:34 AM    LDL,Direct 98 01/03/2012 07:00 PM    LDL, calculated 72 12/26/2019 04:19 PM    LDL Chol Calc (NIH) 76 09/14/2020 11:34 AM    VLDL, calculated 50 (H) 12/26/2019 04:19 PM    VLDL Cholesterol Juaquin 46 (H) 09/14/2020 11:34 AM    Triglyceride 282 (H) 09/14/2020 11:34 AM    CHOL/HDL Ratio 5.2 (H) 06/23/2017 04:40 AM         Patient understands the plan. All questions were answered to the patient's satisfaction. Medication Side Effects and Warnings were discussed with patient: yes  Patient Labs were reviewed and or requested:  yes  Patient Past Records were reviewed and or requested: yes    I appreciate the opportunity to be involved in . See note below for details. Please do not hesitate to contact us with questions or concerns. Demi Vora MD    Cardiac Testing/ Procedures: A. Cardiac Cath/PCI: 7/1/19 - LHC/RHC - No sig CAD; Mean PA pressure 21 mm Hg    B.ECHO/EVITA: 12/26/2019-severely dilated LV, LVEF 25 to 30%, severely dilated left atrium, dilated right atrium, mild TR, moderate MR  6/21/19 - EF 26-30%; Mod LAE; DIDIER: Mild to Mod MR; Mild to Mod TR; Mod Pulm HTN    6/22/2017 Left ventricle: Systolic function was mildly reduced. Ejection fraction was estimated to be 45 %. There were no regional wall motion abnormalities. Tricuspid valve: There was mild regurgitation. C.StressNuclear/Stress ECHO/Stress test: 6/26/19 - Abnormal ledy nuc; EF 24%; Fixed defect( inf/apical)    D. Vascular:    E. EP:    F. Miscellaneous:    Subjective:  Krystal Oliva is a 45 y.o. male who returns for follow up visit.               Currently on short acting Cardizem because a long-acting Cardizem was dropping his blood pressures. He states that his blood pressure doing well. He was started on beta-blockers previously but he states that the medication did  not \"agree with him\" . Willing to retry coreg    [facility] admit 7/8/19 Acute on chronic SHF. Underwent RHC/LHC - no sig CAD    Tries to eat low-salt/heart healthy diet. Has difficulty losing weight. ROS:  (bold if positive, if negative)             Medications before admission:    Current Outpatient Medications   Medication Sig Dispense    cpap machine kit by Does Not Apply route daily.  carvediloL (COREG) 3.125 mg tablet Take 1 Tab by mouth two (2) times daily (with meals). 180 Tab    torsemide (DEMADEX) 20 mg tablet TAKE 2 TABLETS BY MOUTH ONCE DAILY CAN  USE  TWICE  DAILY  AS  NEEDED  BASED  ON  WEIGHT 120 Tab    potassium chloride SR (KLOR-CON 10) 10 mEq tablet Take 1 Tab by mouth two (2) times a day. 60 Tab    colchicine 0.6 mg tablet Take 0.6 mg by mouth as needed for Gout or Pain.  allopurinoL (ZYLOPRIM) 100 mg tablet Take  by mouth daily.  metOLazone (ZAROXOLYN) 5 mg tablet Take 1 Tab by mouth as needed (use as needed for weight >315lbs). Indications: WEIGHT > 315 LB 10 Tab    polyethylene glycol (MIRALAX) 17 gram/dose powder Take 17 g by mouth daily.  warfarin (COUMADIN) 5 mg tablet Take 5 mg by mouth two (2) days a week. Patient takes 5 mg on Thursday and Wednesday and takes 10mg on remaining days.  warfarin (COUMADIN) 5 mg tablet Take 5 mg by mouth five (5) days a week. Patient takes 10 mg on Monday, tuesday, Friday, Saturday, Sunday     MULTIVITAMIN (MULTIPLE VITAMIN PO) Take 1 Tab by mouth daily. No current facility-administered medications for this visit.         Family History of CAD:    Yes    Social History:  Current  Smoker  No    Physical Exam:  Visit Vitals  /72 (BP 1 Location: Left arm, BP Patient Position: Sitting)   Pulse 96   Ht 5' 6\" (1.676 m)   Wt 313 lb 6.4 oz (142.2 kg)   SpO2 95%   BMI 50.58 kg/m²       Gen: Well-developed, well-nourished, morbid obesity  Neck: Supple,thick neck, unable to appreciate JVD  Resp: No accessory muscle use, Clear breath sounds, No rales or rhonchi  Card: Irregular Rate,Rythm,Normal S1, S2, No murmurs  Abd:  Soft,morbidly obese, Abd wall - indurated skin  MSK: No cyanosis  Neuro: moving all four extremities , follows commands appropriately  Psych:  Good insight, oriented to person, place , alert, Nml Affect  LE:Edema+    EKG:       LABS:        Lab Results   Component Value Date/Time    WBC 8.4 07/15/2019 01:46 AM    HGB 10.8 (L) 07/15/2019 01:46 AM    HCT 34.7 (L) 07/15/2019 01:46 AM    PLATELET 377 78/26/2383 01:46 AM     Lab Results   Component Value Date/Time    Sodium 142 09/14/2020 11:34 AM    Potassium 3.5 09/14/2020 11:34 AM    Chloride 94 (L) 09/14/2020 11:34 AM    CO2 28 09/14/2020 11:34 AM    Anion gap 6 07/15/2019 01:46 AM    Glucose 108 (H) 09/14/2020 11:34 AM    BUN 14 09/14/2020 11:34 AM    Creatinine 1.28 (H) 09/14/2020 11:34 AM    BUN/Creatinine ratio 11 09/14/2020 11:34 AM    GFR est AA 74 09/14/2020 11:34 AM    GFR est non-AA 64 09/14/2020 11:34 AM    Calcium 10.2 09/14/2020 11:34 AM       Lab Results   Component Value Date/Time    aPTT 28.0 06/22/2017 12:54 PM     Lab Results   Component Value Date/Time    INR 1.6 (H) 07/15/2019 01:46 AM    INR 1.8 (H) 07/14/2019 12:42 AM    INR 1.8 (H) 07/13/2019 10:16 AM    INR, External 1.7 (A) 07/18/2019    INR POC 1.7 07/18/2019 03:22 PM    Prothrombin time 16.1 (H) 07/15/2019 01:46 AM    Prothrombin time 17.7 (H) 07/14/2019 12:42 AM    Prothrombin time 17.8 (H) 07/13/2019 10:16 AM     No components found for: Isak Gomez MD

## 2020-09-18 NOTE — PROGRESS NOTES
Sera Sams is a 45 y.o. male    Chief Complaint   Patient presents with    Follow-up     3 month f/u Anticoag., NICM, Afib    CHF       Chest pain No    SOB patient states SOB; due to fluid    Dizziness No    Swelling No    Refills No    Visit Vitals  /72 (BP 1 Location: Left arm, BP Patient Position: Sitting)   Pulse 96   Ht 5' 6\" (1.676 m)   Wt 313 lb 6.4 oz (142.2 kg)   SpO2 95%   BMI 50.58 kg/m²       1. Have you been to the ER, urgent care clinic since your last visit? Hospitalized since your last visit? No    2. Have you seen or consulted any other health care providers outside of the [address] [place] [address] since your last visit? Include any pap smears or colon screening.   no

## 2020-10-01 RX ORDER — TORSEMIDE 20 MG/1
TABLET ORAL
Qty: 180 TAB | Refills: 1 | Status: SHIPPED | OUTPATIENT
Start: 2020-10-01 | End: 2021-02-09 | Stop reason: SDUPTHER

## 2020-10-01 NOTE — TELEPHONE ENCOUNTER
Pharmacy confirmed     Patient is requesting a 90 supply with refills     Patient is out of medication

## 2020-10-07 ENCOUNTER — TELEPHONE (OUTPATIENT)
Dept: CARDIOLOGY CLINIC | Age: 50
End: 2020-10-07

## 2020-10-12 NOTE — TELEPHONE ENCOUNTER
Called patient advised per Dr Chris Turcios to discontinue the Metoprolol.
Patient would like to speak with Cassandra Mehta regarding some issues he is having with his new medication.      Phone: [phone]
Returned patient's call since taking the Carvedilol patient has noticed increased SOB as well keeping him awake at night. He thinks he may be allergic to Beta Blockers same thing happened when he took the Metoprolol. On  Monday patient felt like he couldn't catch his breath . He took his BP it was 102/70 . Yesterday he only took the morning dose and same thing happened. He did not take the evening dose. Patient is feeling much better today feels it is easier to take a deep breath /70 HR 56.      Patient's weight today was 307 lbs    Please advise. Paresh Mcgee
no

## 2021-01-11 ENCOUNTER — TELEPHONE (OUTPATIENT)
Dept: CARDIOLOGY CLINIC | Age: 51
End: 2021-01-11

## 2021-01-11 ENCOUNTER — OFFICE VISIT (OUTPATIENT)
Dept: CARDIOLOGY CLINIC | Age: 51
End: 2021-01-11
Payer: COMMERCIAL

## 2021-01-11 VITALS
HEIGHT: 66 IN | BODY MASS INDEX: 47.73 KG/M2 | DIASTOLIC BLOOD PRESSURE: 72 MMHG | HEART RATE: 95 BPM | OXYGEN SATURATION: 99 % | SYSTOLIC BLOOD PRESSURE: 102 MMHG | WEIGHT: 297 LBS

## 2021-01-11 DIAGNOSIS — G47.33 OSA (OBSTRUCTIVE SLEEP APNEA): ICD-10-CM

## 2021-01-11 DIAGNOSIS — E66.01 MORBID OBESITY (HCC): ICD-10-CM

## 2021-01-11 DIAGNOSIS — Z79.01 ANTICOAGULATED ON COUMADIN: ICD-10-CM

## 2021-01-11 DIAGNOSIS — F10.11 H/O ETOH ABUSE: ICD-10-CM

## 2021-01-11 DIAGNOSIS — I48.21 PERMANENT ATRIAL FIBRILLATION (HCC): ICD-10-CM

## 2021-01-11 DIAGNOSIS — I50.22 CHRONIC SYSTOLIC CONGESTIVE HEART FAILURE (HCC): ICD-10-CM

## 2021-01-11 DIAGNOSIS — I42.8 NICM (NONISCHEMIC CARDIOMYOPATHY) (HCC): Primary | ICD-10-CM

## 2021-01-11 PROCEDURE — 99214 OFFICE O/P EST MOD 30 MIN: CPT | Performed by: NURSE PRACTITIONER

## 2021-01-11 RX ORDER — DILTIAZEM HYDROCHLORIDE 30 MG/1
30 TABLET, FILM COATED ORAL 2 TIMES DAILY
Qty: 60 TAB | Refills: 0 | Status: SHIPPED | OUTPATIENT
Start: 2021-01-11 | End: 2021-02-16

## 2021-01-11 RX ORDER — FERROUS SULFATE 137(45) MG
TABLET, EXTENDED RELEASE ORAL
COMMUNITY
End: 2021-04-12

## 2021-01-11 RX ORDER — ROPINIROLE 0.5 MG/1
TABLET, FILM COATED ORAL
COMMUNITY
Start: 2021-01-05 | End: 2022-05-10

## 2021-01-11 NOTE — PROGRESS NOTES
Room 7  Room 9  Torsemide    Per patient he is taking 1-3 tablets depending on water weight    Quit Dilt but then stated he has taken it lately PRN    Having stomach issues    Chest pain: little here and there  Shortness of breath: no  Edema: no  Palpitations: no  Dizziness: no    New diagnosis/Surgeries: Restless leg syndrome    ER/Hospitalizations: no    Refills: no

## 2021-01-11 NOTE — PATIENT INSTRUCTIONS
Take diltiazem 30mg twice daily (if able)   I will set you up with a 7day loop monitor to evaluate your heart rhythm    See Dr. Velma Frederick in 3-4 weeks  I will get your labs from primary care and call you about volume status  You may benefit from repeat right heart cath - I will discuss with your doctor.

## 2021-01-11 NOTE — LETTER
1/21/2021 Patient: Deion Moreno. YOB: 1970 Date of Visit: 1/11/2021 Deonna Bolanos, [address] [facility] [address] [facility] [phone] Via Fax: [phone] Dear MENA Calderon, Thank you for referring Mr. Amie Jenkins to [address]  [facility] for evaluation. My notes for this consultation are attached. If you have questions, please do not hesitate to call me. I look forward to following your patient along with you.  
 
 
Sincerely, 
 
Bronson Spencer NP

## 2021-01-11 NOTE — PROGRESS NOTES
Ely Lujan, JC  [address] [facility], [id number] [place] [address]    Office [phone]  Fax [phone]          Jarett Atkins. is a 45 y.o. male is here for f/u visit. Primary care physician:david Veliz MD    Patient Active Problem List   Diagnosis Code    Renal insufficiency N28.9    PEPE (obstructive sleep apnea) G47.33    Tobacco use Z72.0    Chronic back pain M54.9, G89.29    Asthma J45.909    Elevated BP without diagnosis of hypertension R03.0    Exertional dyspnea R06.00    Persistent atrial fibrillation (HCC) I48.19    Morbid obesity due to excess calories (HCC) E66.01    Chronic anticoagulation Z79.01    Chronic diastolic heart failure (HCC) I50.32    Chronic systolic congestive heart failure (HCC) I50.22    Acute on chronic systolic CHF (congestive heart failure) (HCC) I50.23    Acute renal insufficiency N28.9       Dear Mr. Swathi Greene had the pleasure of seeing Mr. Jarett Atkins. in the office today. Chief complaint:  Chief Complaint   Patient presents with    Irregular Heart Beat    CHF    Follow-up       Assessment:  Chronic Systolic Heart Failure, suspect vol overload, LVEF 25-30%  Underwent RHC/LHC - no sig CAD  A fib - rate difficult to control   Chronic anticoagulation - INR followed by PCP  Morbid obesity - BMI 48  Smoker - quit smoking/not chewing tobacco  Hx of Excessive EtOH  --no longer drinking    Plan:   Suspect acute exacerbation of HF - will obtain recent labs from PCP prior to adjusting diuretics. check updated Echo  Cont Torsemide BID and metolazone PRN for now - pt self adjusts dose  If ongoing SOB despite diuresis, may benefit from repeat RHC in the future  His diuretics excerbate gout flareups - will will need to monitor this closely.   Restart diltiazem 30mg BID for rate control of afib - check 7d monitor to assess for RVR  Refer to EP to discuss AICD as well as options for rate control of afib - pt has been intolerant to BB in past - BP has limited further titration of dilt. On anticoagulation on Coumadin/INR followed by PCP    Patient was tolerating Entresto but was unable to afford this med. Now off. GDMTs limited by BP. Would re-trial ARB/ACE I next visit if BP allows. He will need to see his PCP regarding gout if flareups. Phone fu to review labs  F/u in office in 2-4 weeks    Patient understands the plan. All questions were answered to the patient's satisfaction. Medication Side Effects and Warnings were discussed with patient: yes  Patient Labs were reviewed and or requested:  yes  Patient Past Records were reviewed and or requested: yes    I appreciate the opportunity to be involved in . See note below for details. Please do not hesitate to contact us with questions or concerns. Cosmo Beaver NP    Cardiac Testing/ Procedures: A. Cardiac Cath/PCI: 7/1/19 - LHC/RHC - No sig CAD; Mean PA pressure 21 mm Hg    B.ECHO/EVITA: 12/26/2019-severely dilated LV, LVEF 25 to 30%, severely dilated left atrium, dilated right atrium, mild TR, moderate MR  6/21/19 - EF 26-30%; Mod LAE; DIDIER: Mild to Mod MR; Mild to Mod TR; Mod Pulm HTN    6/22/2017 Left ventricle: Systolic function was mildly reduced. Ejection fraction was estimated to be 45 %. There were no regional wall motion abnormalities. Tricuspid valve: There was mild regurgitation. C.StressNuclear/Stress ECHO/Stress test: 6/26/19 - Abnormal ledy nuc; EF 24%; Fixed defect( inf/apical)    D. Vascular:    E. EP:    F. Miscellaneous:    Subjective:  Uday Craft is a 45 y.o. male who returns for follow up visit. Pt more SOB than normal.  Feels volume overloaded but weight is OK. Feels like he has to push for lower and lower weight recently to feel well. Wonders if loosing caloric weight due to improved diet. No longer drinking alcohol. Eating much better than before. Using varied doses of Torsemide and metolazone pending symptoms. Waking up with 'panic attacks' / PND again. Very SOB at night as well as with minimal activity. Thought it maybe Coreg or diltiazem but stopped both without improvement in symptoms. Notes heart is frequently racing. Feels poorly / dizzy when BP is too low - this is a sometimes an issue on these meds. Recent labs obtained by PCP. Pt denies abn bleeding. Patient denies any chest pain or syncope. ROS:  (Positive findings above)  Constitutional: Negative for fever, chills, and diaphoresis. Respiratory: Negative for cough, hemoptysis, sputum production,   Cardiovascular: Negative for chest pain,   Gastrointestinal: Negative for heartburn, nausea, vomiting, blood in stool and melena. Genitourinary: Negative for dysuria and flank pain. Neurological: Negative for focal weakness, seizures, loss of consciousness  Endo/Heme/Allergies: Negative for abnormal bleeding. Psychiatric/Behavioral: Negative for memory loss. Medications before admission:    Current Outpatient Medications   Medication Sig Dispense    ferrous sulfate (Slow Fe) 142 mg (45 mg iron) ER tablet Take  by mouth three (3) times daily (with meals).  potassium chloride SR (KLOR-CON 10) 10 mEq tablet Take 1 tablet by mouth twice daily (Patient taking differently: Dosing based on fluid pill consumption) 60 Tab    torsemide (DEMADEX) 20 mg tablet TAKE 2 TABLETS BY MOUTH ONCE DAILY CAN  USE  TWICE  DAILY  AS  NEEDED  BASED  ON  WEIGHT (Patient taking differently: Takes up to 3 tablets per day) 180 Tab    cpap machine kit by Does Not Apply route daily.  colchicine 0.6 mg tablet Take 0.6 mg by mouth as needed for Gout or Pain.  allopurinoL (ZYLOPRIM) 100 mg tablet Take  by mouth daily.  metOLazone (ZAROXOLYN) 5 mg tablet Take 1 Tab by mouth as needed (use as needed for weight >315lbs). Indications: WEIGHT > 315 LB 10 Tab    polyethylene glycol (MIRALAX) 17 gram/dose powder Take 17 g by mouth daily.      warfarin (COUMADIN) 5 mg tablet Take 5 mg by mouth two (2) days a week. Patient takes 5 mg on Thursday and Wednesday and takes 10mg on remaining days.  warfarin (COUMADIN) 5 mg tablet Take 5 mg by mouth five (5) days a week. Patient takes 10 mg on Monday, tuesday, Friday, Saturday, Sunday     MULTIVITAMIN (MULTIPLE VITAMIN PO) Take 1 Tab by mouth daily.  rOPINIRole (REQUIP) 0.5 mg tablet TAKE 1 TABLET 1 TO 3 HOURS BEFORE BEDTIME ONCE A DAY FOR 30 DAYS     metOLazone (ZAROXOLYN) 5 mg tablet Take 1 Tab by mouth daily. Take as needed for weight gain >5lbs. 10 Tab    carvediloL (COREG) 3.125 mg tablet Take 1 Tab by mouth two (2) times daily (with meals). 180 Tab     No current facility-administered medications for this visit.         Family History of CAD:    Yes    Social History:  Current  Smoker  No    Physical Exam:  Visit Vitals  /72   Pulse 95   Ht 5' 6\" (1.676 m)   Wt 297 lb (134.7 kg)   SpO2 99%   BMI 47.94 kg/m²     Wt Readings from Last 3 Encounters:   01/11/21 297 lb (134.7 kg)   09/18/20 313 lb 6.4 oz (142.2 kg)   06/04/20 306 lb (138.8 kg)     Gen: Well-developed, well-nourished, morbid obesity  Neck: Supple,thick neck, unable to appreciate JVD  Resp: No accessory muscle use, Clear breath sounds, No rales or rhonchi  Card: Irregular Rate,Rythm, slightly tachy, no appreciate m/r/g (distant heart sounds)  Abd:  Soft,morbidly obese,  MSK: No cyanosis  Neuro: moving all four extremities , follows commands appropriately  Psych:  Good insight, oriented to person, place , alert, Nml Affect  LE:mild VIOLA      LABS:  Lab Results   Component Value Date/Time    Sodium 142 09/14/2020 11:34 AM    Potassium 3.5 09/14/2020 11:34 AM    Chloride 94 (L) 09/14/2020 11:34 AM    CO2 28 09/14/2020 11:34 AM    Anion gap 6 07/15/2019 01:46 AM    Glucose 108 (H) 09/14/2020 11:34 AM    BUN 14 09/14/2020 11:34 AM    Creatinine 1.28 (H) 09/14/2020 11:34 AM    BUN/Creatinine ratio 11 09/14/2020 11:34 AM    GFR est AA 74 09/14/2020 11:34 AM    GFR est non-AA 64 09/14/2020 11:34 AM    Calcium 10.2 09/14/2020 11:34 AM    Bilirubin, total 0.5 10/01/2019 02:49 PM    Alk.  phosphatase 77 10/01/2019 02:49 PM    Protein, total 6.6 10/01/2019 02:49 PM    Albumin 4.5 10/01/2019 02:49 PM    Globulin 3.1 07/15/2019 01:46 AM    A-G Ratio 2.1 10/01/2019 02:49 PM    ALT (SGPT) 21 10/01/2019 02:49 PM    AST (SGOT) 20 10/01/2019 02:49 PM     Lab Results   Component Value Date/Time    WBC 8.4 07/15/2019 01:46 AM    HGB 10.8 (L) 07/15/2019 01:46 AM    HCT 34.7 (L) 07/15/2019 01:46 AM    PLATELET 150 87/38/1646 01:46 AM    MCV 93.8 07/15/2019 01:46 AM     Lab Results   Component Value Date/Time    Cholesterol, total 149 09/14/2020 11:34 AM    HDL Cholesterol 27 (L) 09/14/2020 11:34 AM    LDL,Direct 98 01/03/2012 07:00 PM    LDL, calculated 76 09/14/2020 11:34 AM    LDL, calculated 72 12/26/2019 04:19 PM    VLDL, calculated 46 (H) 09/14/2020 11:34 AM    VLDL, calculated 50 (H) 12/26/2019 04:19 PM    Triglyceride 282 (H) 09/14/2020 11:34 AM    CHOL/HDL Ratio 5.2 (H) 06/23/2017 04:40 AM     Lab Results   Component Value Date/Time    Hemoglobin A1c 6.4 (H) 07/09/2019 02:05 AM     Lab Results   Component Value Date/Time    TSH 2.97 07/12/2019 11:47 AM     Mario Rollins NP

## 2021-01-11 NOTE — TELEPHONE ENCOUNTER
Medical records request has been fax to Nazia Mcnamara MD, at [phone]. Records requested: recent labs, ALL.

## 2021-01-13 ENCOUNTER — TELEPHONE (OUTPATIENT)
Dept: CARDIOLOGY CLINIC | Age: 51
End: 2021-01-13

## 2021-01-13 RX ORDER — METOLAZONE 5 MG/1
5 TABLET ORAL AS NEEDED
Qty: 10 TAB | Refills: 0 | Status: SHIPPED | OUTPATIENT
Start: 2021-01-13 | End: 2021-01-29 | Stop reason: SDUPTHER

## 2021-01-13 RX ORDER — METOLAZONE 5 MG/1
TABLET ORAL
Qty: 10 TAB | Refills: 0 | Status: SHIPPED | OUTPATIENT
Start: 2021-01-13 | End: 2021-01-29 | Stop reason: SDUPTHER

## 2021-01-13 NOTE — TELEPHONE ENCOUNTER
Labs were received from Dr. Cherelle Agosto. Labs have been sent to Graham Victoria NP via email. A copy of the labs have been placed on YONIS Cheung desk.

## 2021-01-14 ENCOUNTER — TELEPHONE (OUTPATIENT)
Dept: CARDIOLOGY CLINIC | Age: 51
End: 2021-01-14

## 2021-01-14 NOTE — TELEPHONE ENCOUNTER
Called pt - reviewed labs from PCP office. 1/5/21: Hgb 12.1, plt 282, Na 136, K 3.5, Bun 25, cre 1.65, TBil 1.9, INR 2.1    Suspect hepatic congestion / vol overload - pt using metolazine 2.5mg PRN - maybe 1 -2x per week. Taking torsemide 20mg BID vs 40mg BID. Taking potassium 10mEq daily vs BID. Advised to take torsemide 20mg BID daily, take metolazone 5mg today and then again on Sunday. Increase potassium to 20mEq BID x3d with each metolazone dose. Then return to nml diuretic schedule. Pt agreed. Has echo arranged for fu.

## 2021-01-20 ENCOUNTER — TELEPHONE (OUTPATIENT)
Dept: CARDIOLOGY CLINIC | Age: 51
End: 2021-01-20

## 2021-01-20 NOTE — TELEPHONE ENCOUNTER
"""Continue Artificial tears both eyes as needed . """ Patient calling in regards to device. States he has received it but was told that the plan has changed and is unsure if he still needs to wear it.     Phone: [phone]

## 2021-01-26 NOTE — TELEPHONE ENCOUNTER
Returned patient's call he received the 7 day Event Monitor from [employer] and applied it last Friday. Informed patient he has a VV with NP, [facility] on 1/28/21 at 10:00 am.    Patient verbalized understanding.

## 2021-01-27 ENCOUNTER — TELEPHONE (OUTPATIENT)
Dept: CARDIOLOGY CLINIC | Age: 51
End: 2021-01-27

## 2021-01-27 NOTE — TELEPHONE ENCOUNTER
Cassie from [facility] following up on medication clearance request they faxed to #[phone] on 1/22/21. She is also faxing to [phone] in case it was not received. Please advise.     Phone: [phone]

## 2021-01-28 ENCOUNTER — VIRTUAL VISIT (OUTPATIENT)
Dept: CARDIOLOGY CLINIC | Age: 51
End: 2021-01-28
Payer: COMMERCIAL

## 2021-01-28 DIAGNOSIS — I42.8 NICM (NONISCHEMIC CARDIOMYOPATHY) (HCC): Primary | ICD-10-CM

## 2021-01-28 DIAGNOSIS — I48.0 PAROXYSMAL ATRIAL FIBRILLATION (HCC): ICD-10-CM

## 2021-01-28 DIAGNOSIS — E66.01 MORBID OBESITY (HCC): ICD-10-CM

## 2021-01-28 DIAGNOSIS — I50.23 ACUTE ON CHRONIC SYSTOLIC CONGESTIVE HEART FAILURE (HCC): ICD-10-CM

## 2021-01-28 DIAGNOSIS — F10.11 H/O ETOH ABUSE: ICD-10-CM

## 2021-01-28 DIAGNOSIS — Z79.01 ANTICOAGULATED ON COUMADIN: ICD-10-CM

## 2021-01-28 PROCEDURE — 99213 OFFICE O/P EST LOW 20 MIN: CPT | Performed by: NURSE PRACTITIONER

## 2021-01-28 NOTE — PROGRESS NOTES
JC Morse  [address]  [place], [address] [facility]    Office [phone]  Fax [phone]          VIRTUAL VISIT DOCUMENTATION     Pursuant to the emergency declaration under the [facility], [facility] waiver authority and the [employer] Act, this Virtual  Visit was conducted, with patient's consent, to reduce the patient's risk of exposure to COVID-19 and provide continuity of care for an established patient. Services were provided through a video synchronous discussion virtually to substitute for in-person clinic visit. Doug Olivarez is a 45 y.o. male is here for f/u visit. Primary care physician:david Campos MD    Patient Active Problem List   Diagnosis Code    Renal insufficiency N28.9    PEPE (obstructive sleep apnea) G47.33    Tobacco use Z72.0    Chronic back pain M54.9, G89.29    Asthma J45.909    Elevated BP without diagnosis of hypertension R03.0    Exertional dyspnea R06.00    Persistent atrial fibrillation (HCC) I48.19    Morbid obesity due to excess calories (HCC) E66.01    Chronic anticoagulation Z79.01    Chronic diastolic heart failure (HCC) I50.32    Chronic systolic congestive heart failure (HCC) I50.22    Acute on chronic systolic CHF (congestive heart failure) (HCC) I50.23    Acute renal insufficiency N28.9       Dear Won Kemp had the pleasure of seeing Mr. Doug Olivarez in the office today.     Chief complaint:  Chief Complaint   Patient presents with    Shortness of Breath       Assessment:  Acute on Chronic Systolic Heart Failure, suspect vol overload, LVEF 25-30%  Underwent RHC/LHC - no sig CAD  A fib - rate difficult to control   Chronic anticoagulation - INR followed by PCP  Morbid obesity - BMI 48  Smoker - quit smoking/not chewing tobacco  Hx of Excessive EtOH  --no longer drinking    Plan:   Worsening SOB appears driven by volume overload - would adjust target weight to 280-283lbs - use metolazone 1-2x per week as needed in addition to BID torsemide. Use potassium daily; BID for 3d after metolazone use  Repeat BMP/Mag next week - 2/5  check updated Echo next visit, LVEF 25-30% 12/2019  Tolerating diltiazem 30mg BID for rate control of afib - wearing 7d monitor to assess for RVR; meds limited by BP in past   BP ok currently 110s-120s   pt has refused BB as becomes more SOB  Scheduled to see EP to discuss AICD   On anticoagulation on Coumadin/INR followed by PCP    Patient was tolerating Entresto in the past but was unable to afford this med. Would re-trial ARB/ACE next visit if BP allows. He will need to see his PCP regarding gout if flareups. Labs 2/5/21 (when pt comes for EP appt)  Fu again in 3-4 wks with Dr. Pastor Coats     Patient understands the plan. All questions were answered to the patient's satisfaction. Medication Side Effects and Warnings were discussed with patient: yes  Patient Labs were reviewed and or requested:  yes  Patient Past Records were reviewed and or requested: yes    I appreciate the opportunity to be involved in . See note below for details. Please do not hesitate to contact us with questions or concerns. Yaw Logan NP    Cardiac Testing/ Procedures: A. Cardiac Cath/PCI: 7/1/19 - LHC/RHC - No sig CAD; Mean PA pressure 21 mm Hg    B.ECHO/EVITA: 12/26/2019-severely dilated LV, LVEF 25 to 30%, severely dilated left atrium, dilated right atrium, mild TR, moderate MR  6/21/19 - EF 26-30%; Mod LAE; DIDIER: Mild to Mod MR; Mild to Mod TR; Mod Pulm HTN    6/22/2017 Left ventricle: Systolic function was mildly reduced. Ejection fraction was estimated to be 45 %. There were no regional wall motion abnormalities. Tricuspid valve: There was mild regurgitation. C.StressNuclear/Stress ECHO/Stress test: 6/26/19 - Abnormal ledy nuc; EF 24%; Fixed defect( inf/apical)    D. Vascular:    E. EP:    NITIN Miscellaneous:    Subjective:  Queen Ute is a 45 y.o. male who returns for follow up visit  - took metolazone 5mg two times in a week after last time we spoke - weight down to 282lb with significant improvement in breathing. Tried to hold off on taking again but weight back up to 192 today - will take metolazone again. Taking Torsemide 40mg in AM, 20mg in PM day 1 with metolazone but otherwise 20mg BID. Using potassium daily except takes BID for 3d after metolazone use. Gout OK - hurts today a little but nothing like before. Will fu with PCP. Needs refill on allopurinol.  BP tolerating dilt 30mg BID. -529 systolic. ROS:  (Positive findings above)  Constitutional: Negative for fever, chills, and diaphoresis. Respiratory: Negative for cough, hemoptysis, sputum production,   Cardiovascular: Negative for chest pain,   Gastrointestinal: Negative for heartburn, nausea, vomiting, blood in stool and melena. Genitourinary: Negative for dysuria and flank pain. Neurological: Negative for focal weakness, seizures, loss of consciousness  Endo/Heme/Allergies: Negative for abnormal bleeding. Psychiatric/Behavioral: Negative for memory loss. Medications before admission:    Current Outpatient Medications   Medication Sig Dispense    rOPINIRole (REQUIP) 0.5 mg tablet TAKE 1 TABLET 1 TO 3 HOURS BEFORE BEDTIME ONCE A DAY FOR 30 DAYS     ferrous sulfate (Slow Fe) 142 mg (45 mg iron) ER tablet Take  by mouth three (3) times daily (with meals).  dilTIAZem IR (CARDIZEM) 30 mg tablet Take 1 Tab by mouth two (2) times a day. Hold if BP <100/55 60 Tab    metOLazone (ZAROXOLYN) 5 mg tablet Take 1 Tab by mouth daily. Take as needed for weight gain >5lbs.  10 Tab    potassium chloride SR (KLOR-CON 10) 10 mEq tablet Take 1 tablet by mouth twice daily (Patient taking differently: Dosing based on fluid pill consumption) 60 Tab    torsemide (DEMADEX) 20 mg tablet TAKE 2 TABLETS BY MOUTH ONCE DAILY CAN  USE  TWICE  DAILY  AS  NEEDED  BASED  ON  WEIGHT (Patient taking differently: Takes up to 3 tablets per day) 180 Tab    cpap machine kit by Does Not Apply route daily.  colchicine 0.6 mg tablet Take 0.6 mg by mouth as needed for Gout or Pain.  allopurinoL (ZYLOPRIM) 100 mg tablet Take  by mouth daily.  polyethylene glycol (MIRALAX) 17 gram/dose powder Take 17 g by mouth daily.  warfarin (COUMADIN) 5 mg tablet Take 5 mg by mouth two (2) days a week. Patient takes 5 mg on Thursday and Wednesday and takes 10mg on remaining days.  warfarin (COUMADIN) 5 mg tablet Take 5 mg by mouth five (5) days a week. Patient takes 10 mg on Monday, tuesday, Friday, Saturday, Sunday     MULTIVITAMIN (MULTIPLE VITAMIN PO) Take 1 Tab by mouth daily. No current facility-administered medications for this visit. Family History of CAD:    Yes    Social History:  Current  Smoker  No    Physical Exam:  Due to this being a TeleHealth evaluation, many elements of the physical examination are unable to be assessed. General: Well developed, in no acute distress, cooperative and alert, obese  Respiratory: No audible wheezing, no signs of respiratory distress, lips non cyanotic  Neuro: A&Ox3, speech clear, no facial droop, answering questions appropriately  Skin: Skin color is normal. No rashes or lesions.  Non diaphoretic on visible skin during exam      LABS:  Lab Results   Component Value Date/Time    Sodium 142 09/14/2020 11:34 AM    Potassium 3.5 09/14/2020 11:34 AM    Chloride 94 (L) 09/14/2020 11:34 AM    CO2 28 09/14/2020 11:34 AM    Anion gap 6 07/15/2019 01:46 AM    Glucose 108 (H) 09/14/2020 11:34 AM    BUN 14 09/14/2020 11:34 AM    Creatinine 1.28 (H) 09/14/2020 11:34 AM    BUN/Creatinine ratio 11 09/14/2020 11:34 AM    GFR est AA 74 09/14/2020 11:34 AM    GFR est non-AA 64 09/14/2020 11:34 AM    Calcium 10.2 09/14/2020 11:34 AM    Bilirubin, total 0.5 10/01/2019 02:49 PM Alk. phosphatase 77 10/01/2019 02:49 PM    Protein, total 6.6 10/01/2019 02:49 PM    Albumin 4.5 10/01/2019 02:49 PM    Globulin 3.1 07/15/2019 01:46 AM    A-G Ratio 2.1 10/01/2019 02:49 PM    ALT (SGPT) 21 10/01/2019 02:49 PM    AST (SGOT) 20 10/01/2019 02:49 PM     Lab Results   Component Value Date/Time    WBC 8.4 07/15/2019 01:46 AM    HGB 10.8 (L) 07/15/2019 01:46 AM    HCT 34.7 (L) 07/15/2019 01:46 AM    PLATELET 998 20/49/2044 01:46 AM    MCV 93.8 07/15/2019 01:46 AM     Lab Results   Component Value Date/Time    Cholesterol, total 149 09/14/2020 11:34 AM    HDL Cholesterol 27 (L) 09/14/2020 11:34 AM    LDL,Direct 98 01/03/2012 07:00 PM    LDL, calculated 76 09/14/2020 11:34 AM    LDL, calculated 72 12/26/2019 04:19 PM    VLDL, calculated 46 (H) 09/14/2020 11:34 AM    VLDL, calculated 50 (H) 12/26/2019 04:19 PM    Triglyceride 282 (H) 09/14/2020 11:34 AM    CHOL/HDL Ratio 5.2 (H) 06/23/2017 04:40 AM     Lab Results   Component Value Date/Time    Hemoglobin A1c 6.4 (H) 07/09/2019 02:05 AM     Lab Results   Component Value Date/Time    TSH 2.97 07/12/2019 11:47 AM     Greater than 20 minutes was spent in direct video patient care, planning and chart review. This visit was conducted using [facility] telemedicine services.        Jose Manuel Jackson NP    [facility] [address] [place] [address] Drive        [phone] / [phone] Fax

## 2021-01-28 NOTE — TELEPHONE ENCOUNTER
Called and spoke with Dearnitza Herbert at [facility] advised patient has several medical issues going on at this time patient needs to be seen in the office by Dr Jose Manuel Russo before cardiac clearance can be done. She stated she fully understands and will check back in a couple of weeks.

## 2021-01-29 ENCOUNTER — TELEPHONE (OUTPATIENT)
Dept: CARDIOLOGY CLINIC | Age: 51
End: 2021-01-29

## 2021-01-29 DIAGNOSIS — I50.23 ACUTE ON CHRONIC SYSTOLIC CONGESTIVE HEART FAILURE (HCC): ICD-10-CM

## 2021-01-29 DIAGNOSIS — I42.8 NICM (NONISCHEMIC CARDIOMYOPATHY) (HCC): ICD-10-CM

## 2021-01-29 NOTE — TELEPHONE ENCOUNTER
Loop Monitor 1/28/21 with afib with run of vt (13b). Called pt - left VM to call back. Checking lytes on 2/5/21 - would recommend in meantime he increase dilt to 30mg tid.

## 2021-02-05 ENCOUNTER — OFFICE VISIT (OUTPATIENT)
Dept: CARDIOLOGY CLINIC | Age: 51
End: 2021-02-05
Payer: COMMERCIAL

## 2021-02-05 ENCOUNTER — ANCILLARY PROCEDURE (OUTPATIENT)
Dept: CARDIOLOGY CLINIC | Age: 51
End: 2021-02-05
Payer: COMMERCIAL

## 2021-02-05 VITALS
BODY MASS INDEX: 44.36 KG/M2 | HEART RATE: 92 BPM | DIASTOLIC BLOOD PRESSURE: 72 MMHG | WEIGHT: 276 LBS | OXYGEN SATURATION: 96 % | SYSTOLIC BLOOD PRESSURE: 110 MMHG | HEIGHT: 66 IN | RESPIRATION RATE: 16 BRPM

## 2021-02-05 DIAGNOSIS — Z79.01 CHRONIC ANTICOAGULATION: ICD-10-CM

## 2021-02-05 DIAGNOSIS — I48.11 LONGSTANDING PERSISTENT ATRIAL FIBRILLATION (HCC): Primary | ICD-10-CM

## 2021-02-05 DIAGNOSIS — I42.8 NONISCHEMIC CARDIOMYOPATHY (HCC): ICD-10-CM

## 2021-02-05 DIAGNOSIS — I10 ESSENTIAL HYPERTENSION: ICD-10-CM

## 2021-02-05 DIAGNOSIS — I48.91 ATRIAL FIBRILLATION, UNSPECIFIED TYPE (HCC): ICD-10-CM

## 2021-02-05 DIAGNOSIS — R06.09 EXERTIONAL DYSPNEA: ICD-10-CM

## 2021-02-05 DIAGNOSIS — I50.32 CHRONIC DIASTOLIC HEART FAILURE (HCC): ICD-10-CM

## 2021-02-05 DIAGNOSIS — I50.22 CHRONIC SYSTOLIC CONGESTIVE HEART FAILURE (HCC): ICD-10-CM

## 2021-02-05 DIAGNOSIS — N28.9 ACUTE RENAL INSUFFICIENCY: ICD-10-CM

## 2021-02-05 DIAGNOSIS — I50.23 ACUTE ON CHRONIC SYSTOLIC CHF (CONGESTIVE HEART FAILURE) (HCC): ICD-10-CM

## 2021-02-05 PROCEDURE — 93308 TTE F-UP OR LMTD: CPT | Performed by: INTERNAL MEDICINE

## 2021-02-05 PROCEDURE — 99215 OFFICE O/P EST HI 40 MIN: CPT | Performed by: INTERNAL MEDICINE

## 2021-02-05 RX ORDER — DOCUSATE SODIUM 100 MG/1
100 CAPSULE, LIQUID FILLED ORAL AS NEEDED
COMMUNITY
End: 2022-02-01 | Stop reason: ALTCHOICE

## 2021-02-05 RX ORDER — LANOLIN ALCOHOL/MO/W.PET/CERES
CREAM (GRAM) TOPICAL
COMMUNITY
End: 2021-04-12

## 2021-02-05 NOTE — PROGRESS NOTES
Room # 5  Event mon: 1/22/2021 - 1/29/2021   Echo @ 4 pm  Chest pain: no  Shortness of breath: yes  Edema: no  Palpitations, Skipped beats, Rapid heartbeat: yes  Dizziness: no  Fatigue: yes    New diagnosis/Surgeries: no    ER/Hospitalizations: no    Refills:no     Visit Vitals  /72 (BP 1 Location: Left upper arm, BP Patient Position: Sitting)   Pulse 92   Resp 16   Ht 5' 6\" (1.676 m)   Wt 276 lb (125.2 kg)   SpO2 96%   BMI 44.55 kg/m²

## 2021-02-05 NOTE — PROGRESS NOTES
Room # 5  Event monitor   Chest pain: no  Shortness of breath: no  Edema: no  Palpitations, Skipped beats, Rapid heartbeat: no  Dizziness: no    New diagnosis/Surgeries: no    ER/Hospitalizations: no    Refills:

## 2021-02-05 NOTE — PROGRESS NOTES
HISTORY OF PRESENTING ILLNESS      Doug Olivarez is a 45 y.o. male with HTN, obesity, CHF with an EF of 25-30%, NICM- no significant CAD on cardiac catheterization, PEPE, and persistent atrial fibrillation. He has hx of tobacco use and excessive ETOH use- had quit both but as of last week he is drinking again. He was recently seen for worsening SOB due to volume overload, unable to tolerate BB due to hx of asthma and worsening SOB. Other BP meds limited by either cost or BP or patient preference (ACEi). Echo has shown severely dilated LA/RA, moderate MR, mild TR. AF rates have been challenging to control at times with rates to the 150's per patient report.  Wore a LifeVest in the past.        PAST MEDICAL HISTORY     Past Medical History:   Diagnosis Date    Arrhythmia     a fib    Arthritis     left ankle    Asthma     Back pain     Gout     left foot     Heart failure (HCC)     Hypertension     Nicotine vapor product user     Non-nicotine vapor product user     Restless leg syndrome     Sleep apnea     wears CPAP    Spinal stenosis     Staph infection            PAST SURGICAL HISTORY     Past Surgical History:   Procedure Laterality Date    HX TYMPANOSTOMY            ALLERGIES     Allergies   Allergen Reactions    Penicillins Hives    Metoprolol Shortness of Breath          FAMILY HISTORY     Family History   Problem Relation Age of Onset    COPD Mother     Hypertension Mother     Diabetes Mother     Heart Disease Mother     negative for cardiac disease       SOCIAL HISTORY     Social History     Socioeconomic History    Marital status: LIFE PARTNER     Spouse name: Jesse Coker     Number of children: 0    Years of education: 9    Highest education level: 9th grade   Occupational History     Comment: on disability   Social Needs    Financial resource strain: Somewhat hard    Food insecurity     Worry: Never true     Inability: Never true    Transportation needs     Medical: No Non-medical: No   Tobacco Use    Smoking status: Former Smoker     Packs/day: 1.00     Quit date: 2017     Years since quittin.0    Smokeless tobacco: Current User    Tobacco comment: vapes   Substance and Sexual Activity    Alcohol use: Yes     Comment: Hard seltzer 10 a week    Drug use: No    Sexual activity: Not Currently     Partners: Female   Lifestyle    Physical activity     Days per week: 0 days     Minutes per session: 0 min    Stress: Not at all   Relationships    Social connections     Talks on phone: Once a week     Gets together: Once a week     Attends [employer] service: 1 to 4 times per year     Active member of club or organization: No     Attends meetings of clubs or organizations: Never     Relationship status: Never    Other Topics Concern     Service No    Blood Transfusions No    Caffeine Concern No    Occupational Exposure No    Hobby Hazards No    Sleep Concern No    Stress Concern No    Weight Concern Yes    Special Diet No    Back Care Yes    Exercise No    Bike Helmet No    Seat Belt No    Self-Exams No         MEDICATIONS     Current Outpatient Medications   Medication Sig    docusate sodium (COLACE) 100 mg capsule Take 100 mg by mouth two (2) times a day.  ferrous sulfate (Iron) 325 mg (65 mg iron) tablet Take  by mouth Daily (before breakfast). Taking in small increments    psyllium (METAMUCIL) powd Take  by mouth as needed.  rOPINIRole (REQUIP) 0.5 mg tablet TAKE 1 TABLET 1 TO 3 HOURS BEFORE BEDTIME ONCE A DAY FOR 30 DAYS    dilTIAZem IR (CARDIZEM) 30 mg tablet Take 1 Tab by mouth two (2) times a day. Hold if BP <100/55    metOLazone (ZAROXOLYN) 5 mg tablet Take 1 Tab by mouth daily. Take as needed for weight gain >5lbs. (Patient taking differently: Take 5 mg by mouth two (2) times a week.  Take as needed for weight gain >5lbs.)    potassium chloride SR (KLOR-CON 10) 10 mEq tablet Take 1 tablet by mouth twice daily (Patient taking differently: Dosing based on fluid pill consumption)    torsemide (DEMADEX) 20 mg tablet TAKE 2 TABLETS BY MOUTH ONCE DAILY CAN  USE  TWICE  DAILY  AS  NEEDED  BASED  ON  WEIGHT (Patient taking differently: Takes up to 3 tablets per day)    cpap machine kit by Does Not Apply route daily.  colchicine 0.6 mg tablet Take 0.6 mg by mouth as needed for Gout or Pain.  allopurinoL (ZYLOPRIM) 100 mg tablet Take  by mouth daily.  polyethylene glycol (MIRALAX) 17 gram/dose powder Take 17 g by mouth daily.  warfarin (COUMADIN) 5 mg tablet Take 5 mg by mouth two (2) days a week. Patient takes 5 mg on Thursday and Wednesday and takes 10mg on remaining days.  warfarin (COUMADIN) 5 mg tablet Take 5 mg by mouth five (5) days a week. Patient takes 10 mg on Monday, tuesday, Friday, Saturday, Sunday  PCP MANAGES    MULTIVITAMIN (MULTIPLE VITAMIN PO) Take 1 Tab by mouth daily.  ferrous sulfate (Slow Fe) 142 mg (45 mg iron) ER tablet Take  by mouth three (3) times daily (with meals). No current facility-administered medications for this visit. I have reviewed the nurses notes, vitals, problem list, allergy list, medical history, family, social history and medications. REVIEW OF SYMPTOMS      General: Pt denies excessive weight gain or loss. Pt is able to conduct ADL's  HEENT: Denies blurred vision, headaches, hearing loss, epistaxis and difficulty swallowing. Respiratory: Denies cough, congestion, shortness of breath, [place], wheezing or stridor.   Cardiovascular: Denies precordial pain, palpitations, edema or PND  Gastrointestinal: Denies poor appetite, indigestion, abdominal pain or blood in stool  Genitourinary: Denies hematuria, dysuria, increased urinary frequency  Musculoskeletal: Denies joint pain or swelling from muscles or joints  Neurologic: Denies tremor, paresthesias, headache, or sensory motor disturbance  Psychiatric: Denies confusion, insomnia, depression  Integumentray: Denies rash, itching or ulcers. Hematologic: Denies easy bruising, bleeding       PHYSICAL EXAMINATION      Vitals: see vitals section  General: Well developed, in no acute distress. HEENT: No jaundice, oral mucosa moist, no oral ulcers  Neck: Supple, no stiffness, no lymphadenopathy, supple  Heart:  Normal S1/S2 negative S3 or S4. Regular, no murmur, gallop or rub, no jugular venous distention  Respiratory: Clear bilaterally x 4, no wheezing or rales  Abdomen:   Soft, non-tender, bowel sounds are active. Extremities:  No edema, normal cap refill, no cyanosis. Musculoskeletal: No clubbing, no deformities  Neuro: A&Ox3, speech clear, gait stable, cooperative, no focal neurologic deficits  Skin: Skin color is normal. No rashes or lesions. Non diaphoretic, moist.  Vascular: 2+ pulses symmetric in all extremities       DIAGNOSTIC DATA      EKG:        LABORATORY DATA      Lab Results   Component Value Date/Time    WBC 8.4 07/15/2019 01:46 AM    HGB 10.8 (L) 07/15/2019 01:46 AM    HCT 34.7 (L) 07/15/2019 01:46 AM    PLATELET 966 83/29/3043 01:46 AM    MCV 93.8 07/15/2019 01:46 AM      Lab Results   Component Value Date/Time    Sodium 142 09/14/2020 11:34 AM    Potassium 3.5 09/14/2020 11:34 AM    Chloride 94 (L) 09/14/2020 11:34 AM    CO2 28 09/14/2020 11:34 AM    Anion gap 6 07/15/2019 01:46 AM    Glucose 108 (H) 09/14/2020 11:34 AM    BUN 14 09/14/2020 11:34 AM    Creatinine 1.28 (H) 09/14/2020 11:34 AM    BUN/Creatinine ratio 11 09/14/2020 11:34 AM    GFR est AA 74 09/14/2020 11:34 AM    GFR est non-AA 64 09/14/2020 11:34 AM    Calcium 10.2 09/14/2020 11:34 AM    Bilirubin, total 0.5 10/01/2019 02:49 PM    Alk. phosphatase 77 10/01/2019 02:49 PM    Protein, total 6.6 10/01/2019 02:49 PM    Albumin 4.5 10/01/2019 02:49 PM    Globulin 3.1 07/15/2019 01:46 AM    A-G Ratio 2.1 10/01/2019 02:49 PM    ALT (SGPT) 21 10/01/2019 02:49 PM           ASSESSMENT      1. Cardiomyopathy  2. Atrial fibrillation  3. Obesity  4.  Mitral regurgitation       PLAN     Recommend Holter to evaluate HR control. Recommended CRTD with AV node ablation if rates uncontrolled on monitor. He has cost issues and will check with his insurance company. ICD-10-CM ICD-9-CM    1. Longstanding persistent atrial fibrillation (HCC)  I48.11 427.31    2. Nonischemic cardiomyopathy (HCC)  I42.8 425.4    3. Essential hypertension  I10 401.9      Orders Placed This Encounter    docusate sodium (COLACE) 100 mg capsule     Sig: Take 100 mg by mouth two (2) times a day.  ferrous sulfate (Iron) 325 mg (65 mg iron) tablet     Sig: Take  by mouth Daily (before breakfast). Taking in small increments    psyllium (METAMUCIL) powd     Sig: Take  by mouth as needed. FOLLOW-UP     TBD      Thank you, Carol Turcios MD for allowing me to participate in the care of this extraordinarily pleasant male. Please do not hesitate to contact me for further questions/concerns.          Rosalba Liz MD  Cardiac Electrophysiology / Cardiology    [address]  [address] [place] [address], [address]  [phone] / [phone] Fax   [phone] / [phone] Fax

## 2021-02-06 LAB
BUN SERPL-MCNC: 33 MG/DL (ref 6–24)
BUN/CREAT SERPL: 24 (ref 9–20)
CALCIUM SERPL-MCNC: 10.1 MG/DL (ref 8.7–10.2)
CHLORIDE SERPL-SCNC: 90 MMOL/L (ref 96–106)
CO2 SERPL-SCNC: 29 MMOL/L (ref 20–29)
CREAT SERPL-MCNC: 1.38 MG/DL (ref 0.76–1.27)
GLUCOSE SERPL-MCNC: 96 MG/DL (ref 65–99)
INTERPRETATION: NORMAL
MAGNESIUM SERPL-MCNC: 2.4 MG/DL (ref 1.6–2.3)
POTASSIUM SERPL-SCNC: 3.2 MMOL/L (ref 3.5–5.2)
SODIUM SERPL-SCNC: 142 MMOL/L (ref 134–144)

## 2021-02-08 ENCOUNTER — TELEPHONE (OUTPATIENT)
Dept: CARDIOLOGY CLINIC | Age: 51
End: 2021-02-08

## 2021-02-08 NOTE — TELEPHONE ENCOUNTER
Called to review recent echo as well as labs. Left VM for pt to call back. 02/05/21   ECHO ADULT FOLLOW-UP OR LIMITED 02/08/2021 2/8/2021    Narrative · Follow-up LV EF  · Image quality for this study was technically difficult. · Contrast used: DEFINITY. · LV: Calculated LVEF is 27%. Biplane method used to measure ejection   fraction. Normal wall thickness. Severely dilated left ventricle. Severely   reduced systolic function. · LA: Severely dilated left atrium. · RV: Dilated right ventricle. Reduced systolic function. · RA: Severely dilated right atrium. · MV: Mild to moderate mitral valve regurgitation is present. · TV: Mild to moderate tricuspid valve regurgitation is present. · PA: Pulmonary arterial systolic pressure is 42 mmHg. · IVC: Severely elevated central venous pressure (15 mmHg); IVC diameter   is larger than 21 mm and collapses less than 50% with respiration.         Signed by: Stella Gay MD     Component      Latest Ref Rng & Units 2/5/2021 2/5/2021           2:29 PM  2:29 PM   Glucose      65 - 99 mg/dL  96   BUN      6 - 24 mg/dL  33 (H)   Creatinine      0.76 - 1.27 mg/dL  1.38 (H)   GFR est non-AA      >59 mL/min/1.73  59 (L)   GFR est AA      >59 mL/min/1.73  68   BUN/Creatinine ratio      9 - 20  24 (H)   Sodium      134 - 144 mmol/L  142   Potassium      3.5 - 5.2 mmol/L  3.2 (L)   Chloride      96 - 106 mmol/L  90 (L)   CO2      20 - 29 mmol/L  29   Calcium      8.7 - 10.2 mg/dL  10.1   Magnesium      1.6 - 2.3 mg/dL 2.4 (H)

## 2021-02-09 ENCOUNTER — TELEPHONE (OUTPATIENT)
Dept: CARDIOLOGY CLINIC | Age: 51
End: 2021-02-09

## 2021-02-09 RX ORDER — POTASSIUM CHLORIDE 1500 MG/1
TABLET, FILM COATED, EXTENDED RELEASE ORAL
Qty: 60 TAB | Refills: 2 | Status: SHIPPED | OUTPATIENT
Start: 2021-02-09 | End: 2021-05-20 | Stop reason: SDUPTHER

## 2021-02-09 RX ORDER — TORSEMIDE 20 MG/1
TABLET ORAL
Qty: 180 TAB | Refills: 0 | Status: SHIPPED | OUTPATIENT
Start: 2021-02-09 | End: 2021-05-03

## 2021-02-09 NOTE — TELEPHONE ENCOUNTER
Called pt per Dr Sandy Correa request to give price on doing a 24 hr holter. Informed pt that he will need to call his insurance company for the price he will be responsible for since it depends on his deductible. Gave pt the following codes:  CPT office: 13211, 02982  BioTel: [phone]  Dx code:  AFib  I48.91  Pt stated he will call back to inform us if he wants to do holter or not.

## 2021-02-11 ENCOUNTER — DOCUMENTATION ONLY (OUTPATIENT)
Dept: CARDIOLOGY CLINIC | Age: 51
End: 2021-02-11

## 2021-02-11 NOTE — PROGRESS NOTES
Holter monitor-1/22/2021-1/28/2021 1/22/2021-A. fib with RVR (heart rate 120s),   1/28/2021-1 episode of NSVT-13 beat run (auto detected). PVCs    Has seen EP-recommended for persistent A. fib with RVR-AV node ablation/CRT-D.       Has follow-up appointment 2/22/2021

## 2021-02-22 ENCOUNTER — OFFICE VISIT (OUTPATIENT)
Dept: CARDIOLOGY CLINIC | Age: 51
End: 2021-02-22
Payer: COMMERCIAL

## 2021-02-22 VITALS
HEART RATE: 52 BPM | WEIGHT: 275 LBS | OXYGEN SATURATION: 96 % | SYSTOLIC BLOOD PRESSURE: 116 MMHG | HEIGHT: 66 IN | DIASTOLIC BLOOD PRESSURE: 70 MMHG | BODY MASS INDEX: 44.2 KG/M2

## 2021-02-22 DIAGNOSIS — I42.8 NICM (NONISCHEMIC CARDIOMYOPATHY) (HCC): Primary | ICD-10-CM

## 2021-02-22 PROCEDURE — 93000 ELECTROCARDIOGRAM COMPLETE: CPT | Performed by: INTERNAL MEDICINE

## 2021-02-22 PROCEDURE — 99214 OFFICE O/P EST MOD 30 MIN: CPT | Performed by: INTERNAL MEDICINE

## 2021-02-22 RX ORDER — METOLAZONE 5 MG/1
5 TABLET ORAL DAILY
Qty: 30 TAB | Refills: 1 | Status: SHIPPED | OUTPATIENT
Start: 2021-02-22 | End: 2021-05-04 | Stop reason: SDUPTHER

## 2021-02-22 NOTE — PROGRESS NOTES
Cassie Whitley is a 45 y.o. male    Visit Vitals  /70 (BP 1 Location: Left upper arm, BP Patient Position: Sitting, BP Cuff Size: Adult long)   Pulse (!) 52   Ht 5' 6\" (1.676 m)   Wt 275 lb (124.7 kg)   SpO2 96%   BMI 44.39 kg/m²       Chief Complaint   Patient presents with    Other     NICM    Other     HF    Irregular Heart Beat     AFIB       Chest pain NO  SOB NO  Dizziness NO  Swelling NO  Recent hospital visit NO  Refills NO

## 2021-02-22 NOTE — LETTER
2/22/2021 Patient: Vipul Miller. YOB: 1970 Date of Visit: 2/22/2021 Irving Glover MD 
[phone] [address] Fax: [phone] Dear Irving Glover MD, Thank you for referring Mr. Shawna Cordoba to [address] [place] [address] for evaluation. My notes for this consultation are attached. If you have questions, please do not hesitate to call me. I look forward to following your patient along with you. Sincerely, Anais Chaudhary MD

## 2021-02-22 NOTE — PROGRESS NOTES
Shirley Duran MD    [id number] [facility] [place], [address] [facility]    Office [phone],BPQ [phone]  /    Carolina Lopez. is a 45 y.o. male is here for f/u visit. Primary care physician:  Gregory Barrientos MD      Dear Mr. Joycelyn Murphy had the pleasure of seeing Mr. Carolina Carcaom in the office today. Chief complaint:  As documented in EMR    Assessment:  Chronic SHF - EF 25-30%. ([facility] admit 7/8/19 Acute on chronic SHF. Underwent RHC/LHC - no sig CAD)  A fib with CVR   Chronic anticoagulation - INR followed by PCP  Morbid obesity - has lost some wt  Hx of Smoker - quit smoking/not chewing tobacco  Hx of Excessive EtOH  -- occ drinks beer now        Plan:     Patient is metazolone twice weekly. He is also on torsemide 20mg  2 tablets a.m    On short acting cardizem which he takes when HR is elevated    Aggressive CV risk factor modification. On anticoagulation on Coumadin/INR followed by PCP    Pt could not afford Entresto. He will need to go back on ARB/ACE I  -however patient has been resistant to start new meds. Has seen EP ( Dr Austin Shah) -recommended for persistent A. fib with RVR-AV node ablation/CRT-D. However pt is worried about finances and he wants to check with his insurance company prior to proceeding. Has colonoscopy scheduled for constipation - OK to proceed from cardiac standpoint; Hold coumadin 5 days prior ; restart post procedure when safe to do so and recheck INR in 48 -72 hours post procedure.     F/u with Ms Davion Chu in 2 months and with me in 4 months         Lab Results   Component Value Date/Time    Cholesterol, total 149 09/14/2020 11:34 AM    HDL Cholesterol 27 (L) 09/14/2020 11:34 AM    LDL,Direct 98 01/03/2012 07:00 PM    LDL, calculated 76 09/14/2020 11:34 AM    LDL, calculated 72 12/26/2019 04:19 PM    VLDL, calculated 46 (H) 09/14/2020 11:34 AM    VLDL, calculated 50 (H) 12/26/2019 04:19 PM    Triglyceride 282 (H) 09/14/2020 11:34 AM CHOL/HDL Ratio 5.2 (H) 06/23/2017 04:40 AM         Patient understands the plan. All questions were answered to the patient's satisfaction. Medication Side Effects and Warnings were discussed with patient: yes  Patient Labs were reviewed and or requested:  yes  Patient Past Records were reviewed and or requested: yes    I appreciate the opportunity to be involved in . See note below for details. Please do not hesitate to contact us with questions or concerns. Attila Teran MD    Cardiac Testing/ Procedures: A. Cardiac Cath/PCI: 7/1/19 - LHC/RHC - No sig CAD; Mean PA pressure 21 mm Hg    B.ECHO/EVITA: 12/26/2019-severely dilated LV, LVEF 25 to 30%, severely dilated left atrium, dilated right atrium, mild TR, moderate MR  6/21/19 - EF 26-30%; Mod LAE; DIDIER: Mild to Mod MR; Mild to Mod TR; Mod Pulm HTN    6/22/2017 Left ventricle: Systolic function was mildly reduced. Ejection fraction was estimated to be 45 %. There were no regional wall motion abnormalities. Tricuspid valve: There was mild regurgitation. C.StressNuclear/Stress ECHO/Stress test: 6/26/19 - Abnormal ledy nuc; EF 24%; Fixed defect( inf/apical)    D. Vascular:    E. EP:    F. Miscellaneous:    Subjective:  Gerry Faye is a 45 y.o. male who returns for follow up visit. Currently on short acting Cardizem because a long-acting Cardizem was dropping his blood pressures. He states that his blood pressure doing well. He was started on beta-blockers (metoprolol/coreg) previously but was stopped becauase the medication did  not \"agree with him\"   Takes cardizem only if HR is high. Holter monitor-1/22/2021-1/28/2021 1/22/2021-A. fib with RVR (heart rate 120s),   1/28/2021-1 episode of NSVT-13 beat run (auto detected). PVCs    Has seen EP ( Dr Michelle Mccarthy) -recommended for persistent A. fib with RVR-AV node ablation/CRT-D.  However pt is worried about finances and he wants to check with his insurance company prior to proceeding. [facility] admit 7/8/19 Acute on chronic SHF. Underwent RHC/LHC - no sig CAD    Tries to eat low-salt/heart healthy diet. Has been losing wt        ROS:  (bold if positive, if negative)             Medications before admission:    Current Outpatient Medications   Medication Sig Dispense    metOLazone (ZAROXOLYN) 5 mg tablet Take 1 Tab by mouth daily. Take as needed for weight gain >5lbs. 30 Tab    dilTIAZem IR (CARDIZEM) 30 mg tablet TAKE 1 TABLET BY MOUTH TWICE DAILY . HOLD  IF  BLOOD  PRESSURE  IS  LESS  THAN  100/55 180 Tab    potassium chloride SR (K-TAB) 20 mEq tablet Take 1 tablet daily; increase to 1 tablet twice daily x3 days when using metolazone. 60 Tab    torsemide (DEMADEX) 20 mg tablet Take 2 tablets by mouth daily; can increase to 2 tablets in AM, 1 tablet in PM as needed based on weight. 180 Tab    docusate sodium (COLACE) 100 mg capsule Take 100 mg by mouth two (2) times a day.  psyllium (METAMUCIL) powd Take  by mouth as needed.  rOPINIRole (REQUIP) 0.5 mg tablet TAKE 1 TABLET 1 TO 3 HOURS BEFORE BEDTIME ONCE A DAY FOR 30 DAYS     cpap machine kit by Does Not Apply route daily.  colchicine 0.6 mg tablet Take 0.6 mg by mouth as needed for Gout or Pain.  allopurinoL (ZYLOPRIM) 100 mg tablet Take  by mouth daily.  polyethylene glycol (MIRALAX) 17 gram/dose powder Take 17 g by mouth daily.  warfarin (COUMADIN) 5 mg tablet Take 5 mg by mouth five (5) days a week. Patient takes 10 mg on Monday, tuesday, Friday, Saturday, Sunday  PCP MANAGES     MULTIVITAMIN (MULTIPLE VITAMIN PO) Take 1 Tab by mouth daily.  ferrous sulfate (Iron) 325 mg (65 mg iron) tablet Take  by mouth Daily (before breakfast). Taking in small increments     ferrous sulfate (Slow Fe) 142 mg (45 mg iron) ER tablet Take  by mouth three (3) times daily (with meals).  warfarin (COUMADIN) 5 mg tablet Take 5 mg by mouth two (2) days a week.  Patient takes 5 mg on Thursday and Wednesday and takes 10mg on remaining days. No current facility-administered medications for this visit.         Family History of CAD:    Yes    Social History:  Current  Smoker  No    Physical Exam:  Visit Vitals  /70 (BP 1 Location: Left upper arm, BP Patient Position: Sitting, BP Cuff Size: Adult long)   Pulse (!) 52   Ht 5' 6\" (1.676 m)   Wt 275 lb (124.7 kg)   SpO2 96%   BMI 44.39 kg/m²       Gen: Well-developed, well-nourished, morbid obesity  Neck: Supple,thick neck, unable to appreciate JVD  Resp: No accessory muscle use, Clear breath sounds, No rales or rhonchi  Card: Irregular Rate,Rythm,Normal S1, S2, No murmurs  Abd:  Soft,morbidly obese, Abd wall - indurated skin  MSK: No cyanosis  Neuro: moving all four extremities , follows commands appropriately  Psych:  Good insight, oriented to person, place , alert, Nml Affect  LE:Edema - trace    EKG:       LABS:        Lab Results   Component Value Date/Time    WBC 8.4 07/15/2019 01:46 AM    HGB 10.8 (L) 07/15/2019 01:46 AM    HCT 34.7 (L) 07/15/2019 01:46 AM    PLATELET 855 63/84/6841 01:46 AM     Lab Results   Component Value Date/Time    Sodium 142 02/05/2021 02:29 PM    Potassium 3.2 (L) 02/05/2021 02:29 PM    Chloride 90 (L) 02/05/2021 02:29 PM    CO2 29 02/05/2021 02:29 PM    Anion gap 6 07/15/2019 01:46 AM    Glucose 96 02/05/2021 02:29 PM    BUN 33 (H) 02/05/2021 02:29 PM    Creatinine 1.38 (H) 02/05/2021 02:29 PM    BUN/Creatinine ratio 24 (H) 02/05/2021 02:29 PM    GFR est AA 68 02/05/2021 02:29 PM    GFR est non-AA 59 (L) 02/05/2021 02:29 PM    Calcium 10.1 02/05/2021 02:29 PM       Lab Results   Component Value Date/Time    aPTT 28.0 06/22/2017 12:54 PM               Attila Teran MD

## 2021-02-23 ENCOUNTER — DOCUMENTATION ONLY (OUTPATIENT)
Dept: CARDIOLOGY CLINIC | Age: 51
End: 2021-02-23

## 2021-04-12 ENCOUNTER — OFFICE VISIT (OUTPATIENT)
Dept: FAMILY MEDICINE CLINIC | Age: 51
End: 2021-04-12
Payer: COMMERCIAL

## 2021-04-12 VITALS
HEIGHT: 66 IN | DIASTOLIC BLOOD PRESSURE: 96 MMHG | SYSTOLIC BLOOD PRESSURE: 142 MMHG | BODY MASS INDEX: 43.07 KG/M2 | WEIGHT: 268 LBS | RESPIRATION RATE: 20 BRPM | OXYGEN SATURATION: 98 % | TEMPERATURE: 97 F | HEART RATE: 42 BPM

## 2021-04-12 DIAGNOSIS — Z12.5 SCREENING FOR MALIGNANT NEOPLASM OF PROSTATE: ICD-10-CM

## 2021-04-12 DIAGNOSIS — Z12.11 SCREENING FOR COLON CANCER: ICD-10-CM

## 2021-04-12 DIAGNOSIS — Z79.01 LONG TERM CURRENT USE OF ANTICOAGULANTS WITH INR GOAL OF 2.0-3.0: ICD-10-CM

## 2021-04-12 DIAGNOSIS — Z00.00 ENCOUNTER FOR ROUTINE ADULT HEALTH EXAMINATION WITHOUT ABNORMAL FINDINGS: Primary | ICD-10-CM

## 2021-04-12 DIAGNOSIS — Z87.39 PERSONAL HISTORY OF GOUT: ICD-10-CM

## 2021-04-12 LAB
INR BLD: 2
PT POC: 24.1 SECONDS
VALID INTERNAL CONTROL?: YES

## 2021-04-12 PROCEDURE — 85610 PROTHROMBIN TIME: CPT | Performed by: FAMILY MEDICINE

## 2021-04-12 PROCEDURE — 99386 PREV VISIT NEW AGE 40-64: CPT | Performed by: FAMILY MEDICINE

## 2021-04-12 NOTE — PROGRESS NOTES
Subjective:      Kareem Cates is a 45 y.o. male  here to establish care and for annual physical exam. Transferring care from [facility] due to change in insurance. Diet: low sodium diet (less than 2500 mg daily), well balanced, lean meats, lots of vegetables. Has cut down on alcohol use. Physical Activity: limited but he is trying to increase his level of physical activity     Health Maintenance:  · Colonoscopy: scheduled for 21 with Dr. Kameron Mckeon  · Hepatitis C screenin19, negative  · Tetanus: unknown   · Pneumovax: none, \"I don't do vaccines\"   · PSA: unknown     Anticoagulation Information:   · Indication: atrial fibrillation, has not had Coumadin monitoring in some time per report  · INR Goal: 2-3  · Current dose: 5 mg daily  · Missed Coumadin Doses: None  · Medication Changes: no  · Dietary Changes: no     · Symptoms: taking coumadin appropriately without any bleeding. Denies gum bleeding, epistaxis, hematemesis, melena, hematochezia, hematuria, easy bruising. Current Outpatient Medications   Medication Sig Dispense Refill    metOLazone (ZAROXOLYN) 5 mg tablet Take 1 Tab by mouth daily. Take as needed for weight gain >5lbs. (Patient taking differently: Take 5 mg by mouth daily. Take as needed for weight gain >5lbs. Taking every 4 days) 30 Tab 1    dilTIAZem IR (CARDIZEM) 30 mg tablet TAKE 1 TABLET BY MOUTH TWICE DAILY . HOLD  IF  BLOOD  PRESSURE  IS  LESS  THAN  100/55 180 Tab 0    potassium chloride SR (K-TAB) 20 mEq tablet Take 1 tablet daily; increase to 1 tablet twice daily x3 days when using metolazone. 60 Tab 2    torsemide (DEMADEX) 20 mg tablet Take 2 tablets by mouth daily; can increase to 2 tablets in AM, 1 tablet in PM as needed based on weight. 180 Tab 0    docusate sodium (COLACE) 100 mg capsule Take 100 mg by mouth two (2) times a day.  psyllium (METAMUCIL) powd Take  by mouth as needed.       rOPINIRole (REQUIP) 0.5 mg tablet TAKE 1 TABLET 1 TO 3 HOURS BEFORE BEDTIME ONCE A DAY FOR 30 DAYS      cpap machine kit by Does Not Apply route daily.  colchicine 0.6 mg tablet Take 0.6 mg by mouth as needed for Gout or Pain.  allopurinoL (ZYLOPRIM) 100 mg tablet Take 100 mg by mouth daily.  polyethylene glycol (MIRALAX) 17 gram/dose powder Take 17 g by mouth daily.  warfarin (COUMADIN) 5 mg tablet Take 5 mg by mouth daily.  MULTIVITAMIN (MULTIPLE VITAMIN PO) Take 1 Tab by mouth daily. Allergies   Allergen Reactions    Penicillins Hives    Metoprolol Shortness of Breath       Past medical history - reviewed. Past Medical History:   Diagnosis Date    Arrhythmia     a fib    Arthritis     left ankle    Asthma     Back pain     Gout     left foot     Heart failure (HCC)     Heart failure ([facility] [address] 2017    Hypertension     Nicotine vapor product user     Non-nicotine vapor product user     Restless leg syndrome     Sleep apnea     wears CPAP    Spinal stenosis     Staph infection        Social history - reviewed. Social History     Tobacco Use    Smoking status: Former Smoker     Packs/day: 1.00     Quit date: 2017     Years since quittin.1    Smokeless tobacco: Current User    Tobacco comment: vapes   Substance Use Topics    Alcohol use: Yes     Comment: Hard seltzer 10 a week        Family history - reviewed. Family History   Problem Relation Age of Onset   [facility] COPD Mother     Hypertension Mother     Diabetes Mother     Heart Disease Mother     Heart Disease Father     Hypertension Father     Obesity Paternal Aunt     Hypertension Paternal Aunt     Diabetes Paternal Aunt     Cancer Paternal Uncle         lung    Cancer Maternal Grandfather         lung    No Known Problems Paternal Grandmother        Review of Systems  Pertinent items are noted in HPI.      Objective:     Visit Vitals  BP (!) 142/96 (BP 1 Location: Right arm, BP Patient Position: Sitting, BP Cuff Size: Adult) Pulse (!) 42   Temp 97 °F (36.1 °C) (Temporal)   Resp 20   Ht 5' 6\" (1.676 m)   Wt 268 lb (121.6 kg)   SpO2 98%   BMI 43.26 kg/m²      General appearance - alert, well appearing, and in no distress  Eyes - pupils equal and reactive, extraocular eye movements intact, sclera anicteric  Neck - supple, no significant adenopathy  Chest - clear to auscultation, no wheezes, rales or rhonchi, symmetric air entry, no tachypnea, retractions or cyanosis  Heart - normal rate, regular rhythm, normal S1, S2, no murmurs, rubs, clicks or gallops  Abdomen - soft, nontender, nondistended, (+) reducible umbilical hernia, normal bowel sounds  Extremities - peripheral pulses normal, no pedal edema, no clubbing or cyanosis  Neurologic - alert, oriented, normal speech, no focal findings or movement disorder noted  Mental Status - alert, oriented to person, place, and time, normal mood, behavior, speech, dress, motor activity, and thought processes    Recent Results (from the past 12 hour(s))   AMB POC PT/INR    Collection Time: 04/12/21  3:10 PM   Result Value Ref Range    VALID INTERNAL CONTROL POC Yes     Prothrombin time (POC) 24.1 seconds    INR POC 2.0          Assessment/Plan:   Mariano Gaytan is a 45 y.o. male seen for:     1. Encounter for routine adult health examination without abnormal findings    2. Screening for colon cancer: scheduled June 2021.   - REFERRAL TO GASTROENTEROLOGY    3. Screening for malignant neoplasm of prostate  - PSA W/ REFLX FREE PSA; Future    4. Long term current use of anticoagulants with INR goal of 2.0-3.0: INR at goal, continue Coumadin 5 mg daily. Return in 4 weeks for monitoring.   - AMB POC PT/INR    5. Personal history of gout: continue with allopurinol. Check uric acid. - URIC ACID; Future      I have discussed the diagnosis with the patient and the intended plan as seen in the above orders. The patient has received an after-visit summary and questions were answered concerning future plans. I have discussed medication side effects and warnings with the patient as well. Patient verbalizes understanding of plan of care and denies further questions or concerns at this time. Informed patient to return to the office if symptoms worsen or if new symptoms arise. Follow-up and Dispositions    · Return in about 4 weeks (around 5/10/2021) for INR check or sooner as needed.

## 2021-04-12 NOTE — PATIENT INSTRUCTIONS
A Healthy Lifestyle: Care Instructions  Your Care Instructions     A healthy lifestyle can help you feel good, stay at a healthy weight, and have plenty of energy for both work and play. A healthy lifestyle is something you can share with your whole family. A healthy lifestyle also can lower your risk for serious health problems, such as high blood pressure, heart disease, and diabetes. You can follow a few steps listed below to improve your health and the health of your family. Follow-up care is a key part of your treatment and safety. Be sure to make and go to all appointments, and call your doctor if you are having problems. It's also a good idea to know your test results and keep a list of the medicines you take. How can you care for yourself at home? · Do not eat too much sugar, fat, or fast foods. You can still have dessert and treats now and then. The goal is moderation. · Start small to improve your eating habits. Pay attention to portion sizes, drink less juice and soda pop, and eat more fruits and vegetables. ? Eat a healthy amount of food. A 3-ounce serving of meat, for example, is about the size of a deck of cards. Fill the rest of your plate with vegetables and whole grains. ? Limit the amount of soda and sports drinks you have every day. Drink more water when you are thirsty. ? Eat plenty of fruits and vegetables every day. Have an apple or some carrot sticks as an afternoon snack instead of a candy bar. Try to have fruits and/or vegetables at every meal.  · Make exercise part of your daily routine. You may want to start with simple activities, such as walking, bicycling, or slow swimming. Try to be active 30 to 60 minutes every day. You do not need to do all 30 to 60 minutes all at once. For example, you can exercise 3 times a day for 10 or 20 minutes.  Moderate exercise is safe for most people, but it is always a good idea to talk to your doctor before starting an exercise program.  · Keep moving. Daniel Essex the lawn, work in the garden, or [employer]. Take the stairs instead of the elevator at work. · If you smoke, quit. People who smoke have an increased risk for heart attack, stroke, cancer, and other lung illnesses. Quitting is hard, but there are ways to boost your chance of quitting tobacco for good. ? Use nicotine gum, patches, or lozenges. ? Ask your doctor about stop-smoking programs and medicines. ? Keep trying. In addition to reducing your risk of diseases in the future, you will notice some benefits soon after you stop using tobacco. If you have shortness of breath or asthma symptoms, they will likely get better within a few weeks after you quit. · Limit how much alcohol you drink. Moderate amounts of alcohol (up to 2 drinks a day for men, 1 drink a day for women) are okay. But drinking too much can lead to liver problems, high blood pressure, and other health problems. Family health  If you have a family, there are many things you can do together to improve your health. · Eat meals together as a family as often as possible. · Eat healthy foods. This includes fruits, vegetables, lean meats and dairy, and whole grains. · Include your family in your fitness plan. Most people think of activities such as jogging or tennis as the way to fitness, but there are many ways you and your family can be more active. Anything that makes you breathe hard and gets your heart pumping is exercise. Here are some tips:  ? Walk to do errands or to take your child to school or the bus.  ? Go for a family bike ride after dinner instead of watching TV. Where can you learn more? Go to http://cleo-elton.info/  Enter B354 in the search box to learn more about \"A Healthy Lifestyle: Care Instructions. \"  Current as of: September 23, 2020               Content Version: 12.8  © 8445-6939 Healthwise, Incorporated.    Care instructions adapted under license by [employer] (which disclaims liability or warranty for this information). If you have questions about a medical condition or this instruction, always ask your healthcare professional. [address] any warranty or liability for your use of this information.

## 2021-04-12 NOTE — PROGRESS NOTES
Identified pt with two pt identifiers(name and ). Chief Complaint   Patient presents with    New Patient     est care    Complete Physical        Health Maintenance Due   Topic    Pneumococcal 0-64 years (1 of 1 - PPSV23)    COVID-19 Vaccine (1)    DTaP/Tdap/Td series (1 - Tdap)    Shingrix Vaccine Age 49> (1 of 2)    Colorectal Cancer Screening Combo        Wt Readings from Last 3 Encounters:   21 268 lb (121.6 kg)   21 275 lb (124.7 kg)   21 276 lb (125.2 kg)     Temp Readings from Last 3 Encounters:   21 97 °F (36.1 °C) (Temporal)   07/15/19 98 °F (36.7 °C)   17 98.2 °F (36.8 °C)     BP Readings from Last 3 Encounters:   21 (!) 142/96   21 116/70   21 110/72     Pulse Readings from Last 3 Encounters:   21 (!) 42   21 (!) 52   21 92         Learning Assessment:  :     Learning Assessment 8/3/2017   PRIMARY LEARNER Patient   PRIMARY LANGUAGE ENGLISH   LEARNER PREFERENCE PRIMARY LISTENING   ANSWERED BY patient   RELATIONSHIP SELF       Depression Screening:  :     3 most recent PHQ Screens 2021   Little interest or pleasure in doing things Not at all   Feeling down, depressed, irritable, or hopeless Not at all   Total Score PHQ 2 0   Trouble falling or staying asleep, or sleeping too much -   Feeling tired or having little energy -   Poor appetite, weight loss, or overeating -   Feeling bad about yourself - or that you are a failure or have let yourself or your family down -   Trouble concentrating on things such as school, work, reading, or watching TV -   Moving or speaking so slowly that other people could have noticed; or the opposite being so fidgety that others notice -   Thoughts of being better off dead, or hurting yourself in some way -   PHQ 9 Score -       Fall Risk Assessment:  :     Fall Risk Assessment, last 12 mths 2019   Able to walk? Yes   Fall in past 12 months?  No       Abuse Screening:  :     Abuse Screening Questionnaire 7/18/2019   Do you ever feel afraid of your partner? N   Are you in a relationship with someone who physically or mentally threatens you? N   Is it safe for you to go home? Y       Coordination of Care Questionnaire:  :     1) Have you been to an emergency room, urgent care clinic since your last visit? no   Hospitalized since your last visit? no             2) Have you seen or consulted any other health care providers outside of [address] [place] [address] since your last visit? no      3) Do you have an Advance Directive on file? no  Are you interested in receiving information about Advance Directives? no    Reviewed record in preparation for visit and have obtained necessary documentation.

## 2021-04-13 ENCOUNTER — DOCUMENTATION ONLY (OUTPATIENT)
Dept: CARDIOLOGY CLINIC | Age: 51
End: 2021-04-13

## 2021-04-13 LAB
PSA SERPL-MCNC: 1.9 NG/ML (ref 0–4)
REFLEX CRITERIA: NORMAL
URATE SERPL-MCNC: 9.4 MG/DL (ref 3.8–8.4)

## 2021-04-22 ENCOUNTER — OFFICE VISIT (OUTPATIENT)
Dept: CARDIOLOGY CLINIC | Age: 51
End: 2021-04-22
Payer: COMMERCIAL

## 2021-04-22 VITALS
DIASTOLIC BLOOD PRESSURE: 70 MMHG | OXYGEN SATURATION: 95 % | WEIGHT: 265.6 LBS | BODY MASS INDEX: 42.68 KG/M2 | HEIGHT: 66 IN | HEART RATE: 60 BPM | SYSTOLIC BLOOD PRESSURE: 112 MMHG

## 2021-04-22 DIAGNOSIS — I50.22 CHRONIC SYSTOLIC CONGESTIVE HEART FAILURE (HCC): Primary | ICD-10-CM

## 2021-04-22 DIAGNOSIS — Z79.01 CHRONIC ANTICOAGULATION: ICD-10-CM

## 2021-04-22 DIAGNOSIS — Z87.891 HISTORY OF TOBACCO USE: ICD-10-CM

## 2021-04-22 DIAGNOSIS — I42.8 NICM (NONISCHEMIC CARDIOMYOPATHY) (HCC): ICD-10-CM

## 2021-04-22 DIAGNOSIS — E87.6 HYPOKALEMIA: ICD-10-CM

## 2021-04-22 DIAGNOSIS — F10.11 H/O ETOH ABUSE: ICD-10-CM

## 2021-04-22 DIAGNOSIS — I48.91 ATRIAL FIBRILLATION, UNSPECIFIED TYPE (HCC): ICD-10-CM

## 2021-04-22 PROCEDURE — 99214 OFFICE O/P EST MOD 30 MIN: CPT | Performed by: NURSE PRACTITIONER

## 2021-04-22 NOTE — PATIENT INSTRUCTIONS
Get labs in the next month. See MD in 3mo or as needed. Watch home HR; call if running >100 at rest.    You look great - keep up the good work.

## 2021-04-22 NOTE — LETTER
4/22/2021 Patient: Sonja Chavarria. YOB: 1970 Date of Visit: 4/22/2021 Ethel Bowers MD 
[phone] [address] Fax: [phone] Dear Ethel Bowers MD, Thank you for referring Mr. Angelica Dailey to [address]  for evaluation. My notes for this consultation are attached. If you have questions, please do not hesitate to call me. I look forward to following your patient along with you.  
 
 
Sincerely, 
 
Jaiden Delacruz NP

## 2021-04-22 NOTE — PROGRESS NOTES
[facility]  [address] [place] [facility], [address] [facility]    Office [phone]  Fax [phone]      Mariano Leigh. is a 45 y.o. male is here for f/u visit. Primary care physician:  Jai Jones MD    Assessment:  Chronic SHF - EF 25-30%. ([facility] admit 7/8/19 Acute on chronic SHF. Underwent RHC/LHC - no sig CAD)  A fib    Chronic anticoagulation - INR followed by PCP  Morbid obesity   Hx of Smoker - quit smoking/not chewing tobacco  Hx of Excessive EtOH  -- occ drinks beer now    Plan:   CHF - volume compensated. Using Torsemide 40 to 60mg /d in addition to metolazone 5mg every 5d. Pt could not afford Entresto. He ideally would re-trial ARB/ACE I  -however patient has been resistant to start new meds. BP also intolerant of meds in past. SOB on BBs. Recheck BMP/Mag in the next month. Has seen EP ( Dr Kate Fontenot) -recommended for persistent A. fib with RVR-AV node ablation/CRT-D. However pt wants to hold off for now as feeling much better; HR controlled recently with IR dilt. On anticoagulation w/ Coumadin/INR followed by PCP    Continues to work on weight loss - pt states he feels better than he has in a long time. F/u in 3mo or PRN with Dr. Sandra Levy     Patient understands the plan. All questions were answered to the patient's satisfaction. Medication Side Effects and Warnings were discussed with patient: yes  Patient Labs were reviewed and or requested:  yes  Patient Past Records were reviewed and or requested: yes    I appreciate the opportunity to be involved in . See note below for details. Please do not hesitate to contact us with questions or concerns. Beryle Locket, NP    Cardiac Testing/ Procedures: A. Cardiac Cath/PCI: 7/1/19 - LHC/RHC - No sig CAD; Mean PA pressure 21 mm Hg    B.ECHO/EVITA: 12/26/2019-severely dilated LV, LVEF 25 to 30%, severely dilated left atrium, dilated right atrium, mild TR, moderate MR  6/21/19 - EF 26-30%; Mod LAE; DIDIER: Mild to Mod MR; Mild to Mod TR; Mod Pulm HTN    6/22/2017 Left ventricle: Systolic function was mildly reduced. Ejection fraction was estimated to be 45 %. There were no regional wall motion abnormalities. Tricuspid valve: There was mild regurgitation. C.StressNuclear/Stress ECHO/Stress test: 6/26/19 - Abnormal ledy nuc; EF 24%; Fixed defect( inf/apical)    D. Vascular:    E. EP:    F. Miscellaneous:    Subjective:  Jai Betancur is a 45 y.o. male who returns for follow up visit. Has been losing wt and feels great. Weight down to 262-265lbs. taking metolazone 5 mg 1 tab every 5 days. Taking torsemide 40 mg in the a.m. On days he uses metolazone, he uses torsemide 2 tabs (40mg) in the a.m. and 1 tab (20mg) in the p.m. On short acting Cardizem because a long-acting Cardizem was dropping his blood pressures. He states that his blood pressure is doing well on IR. He was started on beta-blockers (metoprolol/coreg) previously but was stopped becauase the medication did  not \"agree with him\". Caused worsening SOB. Holter monitor-1/22/2021-1/28/2021 1/22/2021-A. fib with RVR (heart rate 120s),   1/28/2021-1 episode of NSVT-13 beat run (auto detected). PVCs    Has seen EP ( Dr iNya Spring) -recommended for persistent A. fib with RVR-AV node ablation/CRT-D. However pt was worried about finances. Now he has good insurance but has been feeling so much better. He wants to hold off a little longer. [facility] admit 7/8/19 Acute on chronic SHF. Underwent RHC/LHC - no sig CAD    Tries to eat low-salt/heart healthy diet. ROS:  (Positive findings above)  Constitutional: Negative for fever, chills, and diaphoresis. Respiratory: Negative for cough, hemoptysis, sputum production, shortness of breath and wheezing. Cardiovascular: Negative for chest pain, palpitations, leg swelling and PND. Gastrointestinal: Negative for nausea, vomiting, blood in stool and melena.   Genitourinary: Negative for dysuria and flank pain. Neurological: Negative for focal weakness, seizures, loss of consciousness  Endo/Heme/Allergies: Negative for abnormal bleeding. Psychiatric/Behavioral: Negative for memory loss. Medications before admission:    Current Outpatient Medications   Medication Sig Dispense    metOLazone (ZAROXOLYN) 5 mg tablet Take 1 Tab by mouth daily. Take as needed for weight gain >5lbs. (Patient taking differently: Take 5 mg by mouth daily. Take as needed for weight gain >5lbs. Taking every 4 days) 30 Tab    dilTIAZem IR (CARDIZEM) 30 mg tablet TAKE 1 TABLET BY MOUTH TWICE DAILY . HOLD  IF  BLOOD  PRESSURE  IS  LESS  THAN  100/55 180 Tab    potassium chloride SR (K-TAB) 20 mEq tablet Take 1 tablet daily; increase to 1 tablet twice daily x3 days when using metolazone. 60 Tab    torsemide (DEMADEX) 20 mg tablet Take 2 tablets by mouth daily; can increase to 2 tablets in AM, 1 tablet in PM as needed based on weight. 180 Tab    docusate sodium (COLACE) 100 mg capsule Take 100 mg by mouth daily.  psyllium (METAMUCIL) powd Take  by mouth as needed.  rOPINIRole (REQUIP) 0.5 mg tablet TAKE 1 TABLET 1 TO 3 HOURS BEFORE BEDTIME ONCE A DAY FOR 30 DAYS     cpap machine kit by Does Not Apply route daily.  colchicine 0.6 mg tablet Take 0.6 mg by mouth as needed for Gout or Pain.  allopurinoL (ZYLOPRIM) 100 mg tablet Take 100 mg by mouth daily.  polyethylene glycol (MIRALAX) 17 gram/dose powder Take 17 g by mouth daily.  warfarin (COUMADIN) 5 mg tablet Take 5 mg by mouth daily.  MULTIVITAMIN (MULTIPLE VITAMIN PO) Take 1 Tab by mouth daily. No current facility-administered medications for this visit.         Family History of CAD:    Yes    Social History:  Current  Smoker  No    Physical Exam:  Visit Vitals  /70 (BP 1 Location: Left upper arm, BP Patient Position: Sitting)   Pulse 60   Ht 5' 6\" (1.676 m)   Wt 265 lb 9.6 oz (120.5 kg)   SpO2 95%   BMI 42.87 kg/m² Gen: Well-developed, well-nourished, morbid obesity  Neck: Supple,thick neck, unable to appreciate JVD  Resp: No accessory muscle use, Clear breath sounds, No rales or rhonchi  Card: Irregular, No murmurs  Abd:  Soft,morbidly obese,   MSK: No cyanosis  Neuro: moving all four extremities , follows commands appropriately  Psych:  Good insight, oriented to person, place , alert, Nml Affect  LE: no edema, warm    Labs:     Lab Results   Component Value Date/Time    WBC 8.4 07/15/2019 01:46 AM    HGB 10.8 (L) 07/15/2019 01:46 AM    HCT 34.7 (L) 07/15/2019 01:46 AM    PLATELET 202 93/25/2130 01:46 AM     Lab Results   Component Value Date/Time    Sodium 142 02/05/2021 02:29 PM    Potassium 3.2 (L) 02/05/2021 02:29 PM    Chloride 90 (L) 02/05/2021 02:29 PM    CO2 29 02/05/2021 02:29 PM    Anion gap 6 07/15/2019 01:46 AM    Glucose 96 02/05/2021 02:29 PM    BUN 33 (H) 02/05/2021 02:29 PM    Creatinine 1.38 (H) 02/05/2021 02:29 PM    BUN/Creatinine ratio 24 (H) 02/05/2021 02:29 PM    GFR est AA 68 02/05/2021 02:29 PM    GFR est non-AA 59 (L) 02/05/2021 02:29 PM    Calcium 10.1 02/05/2021 02:29 PM     Lab Results   Component Value Date/Time    aPTT 28.0 06/22/2017 12:54 PM     Leandro Castro NP

## 2021-04-22 NOTE — PROGRESS NOTES
Roberta Ramsey. is a 45 y.o. male    Chief Complaint   Patient presents with    Follow-up     NICM, Afib        Chest pain No    SOB some SOB with exertions     Dizziness No    Swelling No    Refills No    Visit Vitals  /70 (BP 1 Location: Left upper arm, BP Patient Position: Sitting)   Pulse 60   Ht 5' 6\" (1.676 m)   Wt 265 lb 9.6 oz (120.5 kg)   SpO2 95%   BMI 42.87 kg/m²       1. Have you been to the ER, urgent care clinic since your last visit? Hospitalized since your last visit? no    2. Have you seen or consulted any other health care providers outside of the [address] [place] [address] since your last visit? Include any pap smears or colon screening.   no

## 2021-05-03 RX ORDER — TORSEMIDE 20 MG/1
TABLET ORAL
Qty: 200 TAB | Refills: 0 | Status: SHIPPED | OUTPATIENT
Start: 2021-05-03 | End: 2021-05-20 | Stop reason: SDUPTHER

## 2021-05-05 RX ORDER — METOLAZONE 5 MG/1
5 TABLET ORAL DAILY
Qty: 30 TAB | Refills: 1 | Status: SHIPPED | OUTPATIENT
Start: 2021-05-05 | End: 2022-05-10

## 2021-05-20 ENCOUNTER — TELEPHONE (OUTPATIENT)
Dept: CARDIOLOGY CLINIC | Age: 51
End: 2021-05-20

## 2021-05-20 LAB
BUN SERPL-MCNC: 22 MG/DL (ref 6–24)
BUN/CREAT SERPL: 16 (ref 9–20)
CALCIUM SERPL-MCNC: 9.9 MG/DL (ref 8.7–10.2)
CHLORIDE SERPL-SCNC: 91 MMOL/L (ref 96–106)
CO2 SERPL-SCNC: 33 MMOL/L (ref 20–29)
CREAT SERPL-MCNC: 1.4 MG/DL (ref 0.76–1.27)
GLUCOSE SERPL-MCNC: 138 MG/DL (ref 65–99)
INTERPRETATION: NORMAL
MAGNESIUM SERPL-MCNC: 2.7 MG/DL (ref 1.6–2.3)
POTASSIUM SERPL-SCNC: 3 MMOL/L (ref 3.5–5.2)
SODIUM SERPL-SCNC: 142 MMOL/L (ref 134–144)

## 2021-05-20 RX ORDER — POTASSIUM CHLORIDE 1500 MG/1
40 TABLET, FILM COATED, EXTENDED RELEASE ORAL 2 TIMES DAILY
Qty: 120 TABLET | Refills: 2 | Status: SHIPPED | OUTPATIENT
Start: 2021-05-20 | End: 2021-08-23

## 2021-05-20 RX ORDER — TORSEMIDE 20 MG/1
TABLET ORAL
Qty: 90 TABLET | Refills: 2 | Status: SHIPPED | OUTPATIENT
Start: 2021-05-20 | End: 2021-07-26 | Stop reason: SDUPTHER

## 2021-05-20 NOTE — TELEPHONE ENCOUNTER
Called pt and reviewed labs - increase potassium to 40mEq BID.       Component      Latest Ref Rng & Units 5/19/2021 5/19/2021          10:50 AM 10:50 AM   Glucose      65 - 99 mg/dL  138 (H)   BUN      6 - 24 mg/dL  22   Creatinine      0.76 - 1.27 mg/dL  1.40 (H)   GFR est non-AA      >59 mL/min/1.73  57 (L)   GFR est AA      >59 mL/min/1.73  66   BUN/Creatinine ratio      9 - 20  16   Sodium      134 - 144 mmol/L  142   Potassium      3.5 - 5.2 mmol/L  3.0 (L)   Chloride      96 - 106 mmol/L  91 (L)   CO2      20 - 29 mmol/L  33 (H)   Calcium      8.7 - 10.2 mg/dL  9.9   Magnesium      1.6 - 2.3 mg/dL 2.7 (H)

## 2021-06-02 RX ORDER — WARFARIN SODIUM 5 MG/1
5 TABLET ORAL DAILY
Qty: 90 TABLET | Refills: 3 | Status: SHIPPED | OUTPATIENT
Start: 2021-06-02 | End: 2022-01-19 | Stop reason: SDUPTHER

## 2021-06-02 RX ORDER — COLCHICINE 0.6 MG/1
0.6 TABLET ORAL
Qty: 30 TABLET | Refills: 5 | Status: SHIPPED | OUTPATIENT
Start: 2021-06-02 | End: 2021-08-19

## 2021-07-26 ENCOUNTER — ANTI-COAG VISIT (OUTPATIENT)
Dept: CARDIOLOGY CLINIC | Age: 51
End: 2021-07-26

## 2021-07-26 ENCOUNTER — OFFICE VISIT (OUTPATIENT)
Dept: CARDIOLOGY CLINIC | Age: 51
End: 2021-07-26
Payer: COMMERCIAL

## 2021-07-26 VITALS
HEIGHT: 66 IN | HEART RATE: 79 BPM | DIASTOLIC BLOOD PRESSURE: 72 MMHG | BODY MASS INDEX: 42.85 KG/M2 | SYSTOLIC BLOOD PRESSURE: 110 MMHG | WEIGHT: 266.6 LBS | OXYGEN SATURATION: 97 %

## 2021-07-26 DIAGNOSIS — Z79.01 ANTICOAGULATED ON COUMADIN: Primary | ICD-10-CM

## 2021-07-26 DIAGNOSIS — Z87.891 HISTORY OF TOBACCO USE: ICD-10-CM

## 2021-07-26 DIAGNOSIS — Z79.01 CHRONIC ANTICOAGULATION: ICD-10-CM

## 2021-07-26 DIAGNOSIS — F10.11 H/O ETOH ABUSE: ICD-10-CM

## 2021-07-26 DIAGNOSIS — I50.22 CHRONIC SYSTOLIC CONGESTIVE HEART FAILURE (HCC): ICD-10-CM

## 2021-07-26 DIAGNOSIS — I10 ESSENTIAL HYPERTENSION: ICD-10-CM

## 2021-07-26 DIAGNOSIS — I42.8 NICM (NONISCHEMIC CARDIOMYOPATHY) (HCC): Primary | ICD-10-CM

## 2021-07-26 LAB
INR BLD: 2
INR, EXTERNAL: 2 (ref 2–3)
PT POC: 24.1 SECONDS
VALID INTERNAL CONTROL?: YES

## 2021-07-26 PROCEDURE — 99214 OFFICE O/P EST MOD 30 MIN: CPT | Performed by: INTERNAL MEDICINE

## 2021-07-26 PROCEDURE — 85610 PROTHROMBIN TIME: CPT | Performed by: INTERNAL MEDICINE

## 2021-07-26 RX ORDER — TORSEMIDE 20 MG/1
TABLET ORAL
Qty: 90 TABLET | Refills: 2 | Status: SHIPPED | OUTPATIENT
Start: 2021-07-26 | End: 2022-02-01 | Stop reason: SDUPTHER

## 2021-07-26 NOTE — PROGRESS NOTES
Chief Complaint   Patient presents with    Follow-up    CHF    Irregular Heart Beat     AFIB     Visit Vitals  /72 (BP 1 Location: Left upper arm, BP Patient Position: Sitting)   Pulse 79   Ht 5' 6\" (1.676 m)   Wt 266 lb 9.6 oz (120.9 kg)   SpO2 97%   BMI 43.03 kg/m²         Chest pain denied  SOB - with activity  Dizziness denied  Swelling/Edema - BL/LE  Recent hospital visit - July 2021 [address] Colonoscopy  Needs Refills

## 2021-07-26 NOTE — PROGRESS NOTES
Tina More MD    [id number] [facility] [place], [address] [facility]    Office [phone],TYP [phone]  /    Brijesh Elias. is a 45 y.o. male is here for f/u visit. Primary care physician:  Priscila Caruso MD      Dear Mr. Dianne Tilley had the pleasure of seeing Mr. Brijesh Elias. in the office today. Chief complaint:  As documented in EMR    Assessment:  Chronic SHF - EF 25-30%. ([facility] admit 7/8/19 Acute on chronic SHF. Underwent RHC/LHC - no sig CAD)  A fib with CVR   Chronic anticoagulation - INR followed by PCP  Morbid obesity - has  some wt  Hx of Smoker - quit smoking/not chewing tobacco  Hx of Excessive EtOH  -- occ drinks beer now  Anemia - on iron supplements        Plan:     CHF - volume compensated. Using Torsemide 40 to 60mg /d in addition to metolazone 5mg every 5d. Pt could not afford Entresto. He ideally would re-trial ARB/ACE I  -however patient has been resistant to start new meds. BP also intolerant of meds in past. SOB on BBs. On short acting cardizem which he takes when HR is elevated    Aggressive CV risk factor modification. On anticoagulation on Coumadin/INR  -used to be checked by PCP. However, his PCP is recently left and he is in the process of getting a new PCP. Will get a checked in our Coumadin clinic. Pt could not afford Entresto. He will need to go back on ARB/ACE I  -however patient has been resistant to start new meds/still has low blood pressures. .    Has seen EP ( Dr Katerina Ramirez) -recommended for persistent A. fib with RVR-AV node ablation/CRT-D. However pt is worried about finances and he wants to check with his insurance company prior to proceeding.        F/u with Ms Gustavo Leland in 3 months and with me in 6 months         Lab Results   Component Value Date/Time    Cholesterol, total 149 09/14/2020 11:34 AM    HDL Cholesterol 27 (L) 09/14/2020 11:34 AM    LDL,Direct 98 01/03/2012 07:00 PM    LDL, calculated 76 09/14/2020 11:34 AM LDL, calculated 72 12/26/2019 04:19 PM    VLDL, calculated 46 (H) 09/14/2020 11:34 AM    VLDL, calculated 50 (H) 12/26/2019 04:19 PM    Triglyceride 282 (H) 09/14/2020 11:34 AM    CHOL/HDL Ratio 5.2 (H) 06/23/2017 04:40 AM         Patient understands the plan. All questions were answered to the patient's satisfaction. Medication Side Effects and Warnings were discussed with patient: yes  Patient Labs were reviewed and or requested:  yes  Patient Past Records were reviewed and or requested: yes    I appreciate the opportunity to be involved in . See note below for details. Please do not hesitate to contact us with questions or concerns. Clay Nunez MD    Cardiac Testing/ Procedures: A. Cardiac Cath/PCI: 7/1/19 - LHC/RHC - No sig CAD; Mean PA pressure 21 mm Hg    B.ECHO/EVITA: 12/26/2019-severely dilated LV, LVEF 25 to 30%, severely dilated left atrium, dilated right atrium, mild TR, moderate MR  6/21/19 - EF 26-30%; Mod LAE; DIDIER: Mild to Mod MR; Mild to Mod TR; Mod Pulm HTN    6/22/2017 Left ventricle: Systolic function was mildly reduced. Ejection fraction was estimated to be 45 %. There were no regional wall motion abnormalities. Tricuspid valve: There was mild regurgitation. C.StressNuclear/Stress ECHO/Stress test: 6/26/19 - Abnormal ledy nuc; EF 24%; Fixed defect( inf/apical)    D. Vascular:    E. EP:    F. Miscellaneous:    Subjective:  Lianna Benavides is a 45 y.o. male who returns for follow up visit. No CP. Dyspnea - chronic - can walk in [employer] twice without difficulty    Currently on short acting Cardizem because a long-acting Cardizem was dropping his blood pressures. He states that his blood pressure doing well. He was started on beta-blockers (metoprolol/coreg) previously but was stopped becauase the medication did  not \"agree with him\"   Takes cardizem only if HR is high.       Holter monitor-1/22/2021-1/28/2021 1/22/2021-A. fib with RVR (heart rate 120s),   1/28/2021-1 episode of NSVT-13 beat run (auto detected). PVCs    Has seen EP ( Dr Marilee Arguello) -recommended for persistent A. fib with RVR-AV node ablation/CRT-D. However pt is worried about finances and he wants to check with his insurance company prior to proceeding. [facility] admit 7/8/19 Acute on chronic SHF. Underwent RHC/LHC - no sig CAD    Tries to eat low-salt/heart healthy diet. Has been losing wt        ROS:  (bold if positive, if negative)             Medications before admission:    Current Outpatient Medications   Medication Sig Dispense    torsemide (DEMADEX) 20 mg tablet TAKE 2 TABLETS BY MOUTH ONCE DAILY. MAY INCREASE TO TWO TABLETS IN THE MORNING AND ONE TABLET IN THE EVENING AS NEEDED BASED ON WEIGHT 90 Tablet    warfarin (COUMADIN) 5 mg tablet Take 1 Tablet by mouth daily. 90 Tablet    potassium chloride SR (K-TAB) 20 mEq tablet Take 2 Tablets by mouth two (2) times a day. 120 Tablet    dilTIAZem IR (CARDIZEM) 30 mg tablet TAKE 1 TABLET BY MOUTH TWICE DAILY . HOLD  IF  BLOOD  PRESSURE  IS  LESS  THAN  100/55 180 Tab    metOLazone (ZAROXOLYN) 5 mg tablet Take 1 Tab by mouth daily. Take as needed for weight gain >5lbs. 30 Tab    docusate sodium (COLACE) 100 mg capsule Take 100 mg by mouth daily.  psyllium (METAMUCIL) powd Take  by mouth as needed.  rOPINIRole (REQUIP) 0.5 mg tablet TAKE 1 TABLET 1 TO 3 HOURS BEFORE BEDTIME ONCE A DAY FOR 30 DAYS     cpap machine kit by Does Not Apply route daily.  allopurinoL (ZYLOPRIM) 100 mg tablet Take 100 mg by mouth daily.  polyethylene glycol (MIRALAX) 17 gram/dose powder Take 17 g by mouth daily.  MULTIVITAMIN (MULTIPLE VITAMIN PO) Take 1 Tab by mouth daily.  colchicine 0.6 mg tablet Take 1 Tablet by mouth daily as needed for Gout or Pain. (Patient not taking: Reported on 7/26/2021) 30 Tablet     No current facility-administered medications for this visit.        Family History of CAD:    Yes    Social History:  Current  Smoker  No    Physical Exam:  Visit Vitals  /72 (BP 1 Location: Left upper arm, BP Patient Position: Sitting)   Pulse 79   Ht 5' 6\" (1.676 m)   Wt 266 lb 9.6 oz (120.9 kg)   SpO2 97%   BMI 43.03 kg/m²       Gen: Well-developed, well-nourished, morbid obesity  Neck: Supple,thick neck, unable to appreciate JVD  Resp: No accessory muscle use, Clear breath sounds, No rales or rhonchi  Card: Irregular Rate,Rythm,Normal S1, S2, No murmurs  Abd:  Soft,morbidly obese, Abd wall - indurated skin  MSK: No cyanosis  Neuro: moving all four extremities , follows commands appropriately  Psych:  Good insight, oriented to person, place , alert, Nml Affect  LE:Edema - trace    EKG:       LABS:        Lab Results   Component Value Date/Time    WBC 8.4 07/15/2019 01:46 AM    HGB 10.8 (L) 07/15/2019 01:46 AM    HCT 34.7 (L) 07/15/2019 01:46 AM    PLATELET 564 29/98/2358 01:46 AM     Lab Results   Component Value Date/Time    Sodium 142 05/19/2021 10:50 AM    Potassium 3.0 (L) 05/19/2021 10:50 AM    Chloride 91 (L) 05/19/2021 10:50 AM    CO2 33 (H) 05/19/2021 10:50 AM    Anion gap 6 07/15/2019 01:46 AM    Glucose 138 (H) 05/19/2021 10:50 AM    BUN 22 05/19/2021 10:50 AM    Creatinine 1.40 (H) 05/19/2021 10:50 AM    BUN/Creatinine ratio 16 05/19/2021 10:50 AM    GFR est AA 66 05/19/2021 10:50 AM    GFR est non-AA 57 (L) 05/19/2021 10:50 AM    Calcium 9.9 05/19/2021 10:50 AM       Lab Results   Component Value Date/Time    aPTT 28.0 06/22/2017 12:54 PM               Kvng Ryan MD

## 2021-07-27 ENCOUNTER — TELEPHONE (OUTPATIENT)
Dept: CARDIOLOGY CLINIC | Age: 51
End: 2021-07-27

## 2021-07-27 DIAGNOSIS — I50.32 CHRONIC DIASTOLIC HEART FAILURE (HCC): ICD-10-CM

## 2021-07-27 DIAGNOSIS — N28.9 RENAL INSUFFICIENCY: Primary | ICD-10-CM

## 2021-07-27 LAB
BUN SERPL-MCNC: 20 MG/DL (ref 6–24)
BUN/CREAT SERPL: 16 (ref 9–20)
CALCIUM SERPL-MCNC: 9.5 MG/DL (ref 8.7–10.2)
CHLORIDE SERPL-SCNC: 92 MMOL/L (ref 96–106)
CO2 SERPL-SCNC: 33 MMOL/L (ref 20–29)
CREAT SERPL-MCNC: 1.25 MG/DL (ref 0.76–1.27)
GLUCOSE SERPL-MCNC: 93 MG/DL (ref 65–99)
MAGNESIUM SERPL-MCNC: 2.4 MG/DL (ref 1.6–2.3)
POTASSIUM SERPL-SCNC: 2.8 MMOL/L (ref 3.5–5.2)
SODIUM SERPL-SCNC: 140 MMOL/L (ref 134–144)

## 2021-07-27 RX ORDER — SPIRONOLACTONE 25 MG/1
25 TABLET ORAL
Qty: 30 TABLET | Refills: 1 | Status: SHIPPED | OUTPATIENT
Start: 2021-07-27 | End: 2021-08-11

## 2021-07-27 NOTE — TELEPHONE ENCOUNTER
----- Message from Rob Swann NP sent at 7/27/2021 10:18 AM EDT -----  BODØ reached out to me to address lab result/low potassium level for Mr. Ilan Campa. K was 2.8    on Metolazone 5 mg every 5 days and Torsemide 40 mg daily. On KCl 40 mg BID    Looking back in labs from 2019, hypokalemia has been a chronic issue. iMtra Bryant,   Please have him take KCL 80 mg BID x 2 days. ADD Aldactone 25 mg at bedtime; to help with potassium retention moving forward. Repeat BMP again in 1 week.

## 2021-07-27 NOTE — TELEPHONE ENCOUNTER
Called patient reviewed below message per Marty Jeong NP. Please have him take KCL 80 mg BID x 2 days. ADD Aldactone 25 mg at bedtime; to help with potassium retention moving forward. Repeat BMP again in 1 week.       Patient verbalized understanding.

## 2021-07-29 ENCOUNTER — TELEPHONE (OUTPATIENT)
Dept: CARDIOLOGY CLINIC | Age: 51
End: 2021-07-29

## 2021-07-29 NOTE — TELEPHONE ENCOUNTER
Calling to get permission to refill the Spironolactone    Sebastian Alvarez can be reached at [phone]    [facility]

## 2021-07-29 NOTE — TELEPHONE ENCOUNTER
Verified patient with two patient identifiers. Spoke with patient and he said that pharmacist denied a refill on one of his medication. I called [facility] and spoke with Pharmacist.  Pharmacist would like to know if okay to refill Spironolactone. Patient also on Demadex and Potassium.   Please advise

## 2021-07-30 ENCOUNTER — TELEPHONE (OUTPATIENT)
Dept: CARDIOLOGY CLINIC | Age: 51
End: 2021-07-30

## 2021-07-30 NOTE — TELEPHONE ENCOUNTER
Patient calling back to speak with the nurse about his medication at the pharmacy, please advise      [phone]

## 2021-07-30 NOTE — TELEPHONE ENCOUNTER
Called patient he currently is taking Potassium Chloride 20 mg - 2 Tabs BID. I will review this with Dr Alphonse Tompkins and get back with him.

## 2021-08-04 LAB
BUN SERPL-MCNC: 16 MG/DL (ref 6–24)
BUN/CREAT SERPL: 12 (ref 9–20)
CALCIUM SERPL-MCNC: 9.1 MG/DL (ref 8.7–10.2)
CHLORIDE SERPL-SCNC: 102 MMOL/L (ref 96–106)
CO2 SERPL-SCNC: 26 MMOL/L (ref 20–29)
CREAT SERPL-MCNC: 1.33 MG/DL (ref 0.76–1.27)
GLUCOSE SERPL-MCNC: 109 MG/DL (ref 65–99)
MAGNESIUM SERPL-MCNC: 2.4 MG/DL (ref 1.6–2.3)
POTASSIUM SERPL-SCNC: 4.3 MMOL/L (ref 3.5–5.2)
SODIUM SERPL-SCNC: 144 MMOL/L (ref 134–144)

## 2021-08-04 NOTE — PROGRESS NOTES
BMP 8/3/21 much improved. K nml range. Cr mildly elevated but baseline for pt. Continue current meds.    Gloria Mendez - can you call pt?

## 2021-08-11 ENCOUNTER — TELEPHONE (OUTPATIENT)
Dept: CARDIOLOGY CLINIC | Age: 51
End: 2021-08-11

## 2021-08-11 NOTE — TELEPHONE ENCOUNTER
Called pt - reviewed below. He is now on potassium 40mg BID. Not taking medina (never filled). BMP 8/3/21 much improved.    K nml range.  Cr mildly elevated but baseline for pt. Continue current meds.

## 2021-08-19 ENCOUNTER — OFFICE VISIT (OUTPATIENT)
Dept: FAMILY MEDICINE CLINIC | Age: 51
End: 2021-08-19
Payer: COMMERCIAL

## 2021-08-19 VITALS
TEMPERATURE: 97.4 F | HEART RATE: 59 BPM | HEIGHT: 66 IN | DIASTOLIC BLOOD PRESSURE: 80 MMHG | RESPIRATION RATE: 22 BRPM | BODY MASS INDEX: 42.43 KG/M2 | OXYGEN SATURATION: 97 % | SYSTOLIC BLOOD PRESSURE: 124 MMHG | WEIGHT: 264 LBS

## 2021-08-19 DIAGNOSIS — I48.19 PERSISTENT ATRIAL FIBRILLATION (HCC): Primary | ICD-10-CM

## 2021-08-19 DIAGNOSIS — E78.2 MIXED HYPERLIPIDEMIA: ICD-10-CM

## 2021-08-19 DIAGNOSIS — Z79.01 CHRONIC ANTICOAGULATION: ICD-10-CM

## 2021-08-19 DIAGNOSIS — Z86.2 HISTORY OF ANEMIA: ICD-10-CM

## 2021-08-19 DIAGNOSIS — E55.9 VITAMIN D DEFICIENCY: ICD-10-CM

## 2021-08-19 PROCEDURE — 99214 OFFICE O/P EST MOD 30 MIN: CPT | Performed by: INTERNAL MEDICINE

## 2021-08-19 RX ORDER — LANOLIN ALCOHOL/MO/W.PET/CERES
CREAM (GRAM) TOPICAL EVERY OTHER DAY
COMMUNITY

## 2021-08-19 NOTE — PATIENT INSTRUCTIONS
DASH Diet: Care Instructions  Your Care Instructions     The DASH diet is an eating plan that can help lower your blood pressure. DASH stands for Dietary Approaches to Stop Hypertension. Hypertension is high blood pressure. The DASH diet focuses on eating foods that are high in calcium, potassium, and magnesium. These nutrients can lower blood pressure. The foods that are highest in these nutrients are fruits, vegetables, low-fat dairy products, nuts, seeds, and legumes. But taking calcium, potassium, and magnesium supplements instead of eating foods that are high in those nutrients does not have the same effect. The DASH diet also includes whole grains, fish, and poultry. The DASH diet is one of several lifestyle changes your doctor may recommend to lower your high blood pressure. Your doctor may also want you to decrease the amount of sodium in your diet. Lowering sodium while following the DASH diet can lower blood pressure even further than just the DASH diet alone. Follow-up care is a key part of your treatment and safety. Be sure to make and go to all appointments, and call your doctor if you are having problems. It's also a good idea to know your test results and keep a list of the medicines you take. How can you care for yourself at home? Following the DASH diet  · Eat 4 to 5 servings of fruit each day. A serving is 1 medium-sized piece of fruit, ½ cup chopped or canned fruit, 1/4 cup dried fruit, or 4 ounces (½ cup) of fruit juice. Choose fruit more often than fruit juice. · Eat 4 to 5 servings of vegetables each day. A serving is 1 cup of lettuce or raw leafy vegetables, ½ cup of chopped or cooked vegetables, or 4 ounces (½ cup) of vegetable juice. Choose vegetables more often than vegetable juice. · Get 2 to 3 servings of low-fat and fat-free dairy each day. A serving is 8 ounces of milk, 1 cup of yogurt, or 1 ½ ounces of cheese. · Eat 6 to 8 servings of grains each day.  A serving is 1 slice of bread, 1 ounce of dry cereal, or ½ cup of cooked rice, pasta, or cooked cereal. Try to choose whole-grain products as much as possible. · Limit lean meat, poultry, and fish to 2 servings each day. A serving is 3 ounces, about the size of a deck of cards. · Eat 4 to 5 servings of nuts, seeds, and legumes (cooked dried beans, lentils, and split peas) each week. A serving is 1/3 cup of nuts, 2 tablespoons of seeds, or ½ cup of cooked beans or peas. · Limit fats and oils to 2 to 3 servings each day. A serving is 1 teaspoon of vegetable oil or 2 tablespoons of salad dressing. · Limit sweets and added sugars to 5 servings or less a week. A serving is 1 tablespoon jelly or jam, ½ cup sorbet, or 1 cup of lemonade. · Eat less than 2,300 milligrams (mg) of sodium a day. If you limit your sodium to 1,500 mg a day, you can lower your blood pressure even more. · Be aware that all of these are the suggested number of servings for people who eat 1,800 to 2,000 calories a day. Your recommended number of servings may be different if you need more or fewer calories. Tips for success  · Start small. Do not try to make dramatic changes to your diet all at once. You might feel that you are missing out on your favorite foods and then be more likely to not follow the plan. Make small changes, and stick with them. Once those changes become habit, add a few more changes. · Try some of the following:  ? Make it a goal to eat a fruit or vegetable at every meal and at snacks. This will make it easy to get the recommended amount of fruits and vegetables each day. ? Try yogurt topped with fruit and nuts for a snack or healthy dessert. ? Add lettuce, tomato, cucumber, and onion to sandwiches. ? Combine a ready-made pizza crust with low-fat mozzarella cheese and lots of vegetable toppings. Try using tomatoes, squash, spinach, broccoli, carrots, cauliflower, and onions. ?  Have a variety of cut-up vegetables with a low-fat dip as an appetizer instead of chips and dip. ? Sprinkle sunflower seeds or chopped almonds over salads. Or try adding chopped walnuts or almonds to cooked vegetables. ? Try some vegetarian meals using beans and peas. Add garbanzo or kidney beans to salads. Make burritos and tacos with mashed [place]udd[place] beans or black beans. Where can you learn more? Go to http://www.[employer]arre[employer].com/  Enter H967 in the search box to learn more about \"DASH Diet: Care Instructions. \"  Current as of: August 31, 2020               Content Version: 12.8  © 4984-4596 [employer]. Care instructions adapted under license by [employer] (which disclaims liability or warranty for this information). If you have questions about a medical condition or this instruction, always ask your healthcare professional. [address] any warranty or liability for your use of this information.

## 2021-08-19 NOTE — PROGRESS NOTES
Chief Complaint   Patient presents with    Anemia     wants labs to see if meds are working       Assessment/ Plan:   Diagnoses and all orders for this visit:    1. Persistent atrial fibrillation (HCC)   On chronic anticoagulation. Followed by cardiology. Last INR = 2.0 7/26/2021    2. History of anemia  -     CBC WITH AUTOMATED DIFF; Future  -     IRON PROFILE; Future  -     FERRITIN; Future    3. Chronic anticoagulation   Persistent A. Fib. 4. Mixed hyperlipidemia  -     METABOLIC PANEL, COMPREHENSIVE; Future  -     CBC WITH AUTOMATED DIFF; Future  -     LIPID PANEL; Future    5. Vitamin D deficiency  -     VITAMIN D, 25 HYDROXY; Future    I have discussed the diagnosis with the patient and the intended treatment plan as seen in the above orders. The patient has received an after-visit summary and questions were answered concerning future plans. Asked to return should symptoms worsen or not improve with treatment. Any pending labs and studies will be relayed to patient when they become available. Pt verbalizes understanding of plan of care and denies further questions or concerns at this time. Will return for fasting labs. Follow-up and Dispositions    · Return in about 3 months (around 11/19/2021), or if symptoms worsen or fail to improve, for Follow up Type II DM, few days for fasting labs. .       Subjective:     Sarika Leary is a 45 y.o. male who presents today for follow up. He had been seen by Dr. Cb Echevarria previously. Today, he is here for follow up. He is followed by cardiology for INR check, but would like to start getting it done here. His last check was 7/26/2021 and INR = 2.0. The patient also has multiple chornic issues that need to be addressed. Health Maintenance:  · Colonoscopy: Discussed and needs to have done  · Hepatitis C screening: Ordered  · Tetanus: Discussed. Needs to be done. · Other Immunizations: Recommended COVID-19 vaccine. Patient is thinking about this. · PSA: 4/21/2021 - normal range      Chronic CHF - EF 25-30%. ([facility] admit 7/8/19 Acute on chronic CHF. Underwent RHC/LHC - no sig CAD)  A fib with CVR   Chronic anticoagulation - INR followed by PCP  Morbid obesity - has  some wt  Hx of Smoker - quit smoking/not chewing tobacco  Hx of Excessive EtOH  -- occ drinks beer now  Anemia - on iron supplements    Indication: Atrial Fibrillation  INR Goal: 2.5-3.5. Current dose:  Coumadin 5 mg daily. Missed Coumadin Doses:  None  Medication Changes:  no  Dietary Changes:  no    Symptoms: taking coumadin appropriately without any bleeding. Latest INRs:  Lab Results   Component Value Date/Time    INR POC 2.0 07/26/2021 03:35 PM    INR POC 2.0 04/12/2021 03:10 PM    INR POC 1.7 07/18/2019 03:22 PM        New Coumadin dose:.current treatment plan is effective, no change in therapy. Next check to be scheduled for  4 weeks. Patient Active Problem List    Diagnosis Date Noted    Acute on chronic systolic CHF (congestive heart failure) ([facility] [address] 07/08/2019    Acute renal insufficiency 07/08/2019    Chronic diastolic heart failure ([facility] [address] 04/25/2018    Chronic systolic congestive heart failure ([facility] [address] 04/25/2018    Chronic anticoagulation 10/13/2017    Persistent atrial fibrillation ([facility] [address] 07/18/2017    Morbid obesity due to excess calories ([facility] [address] 07/18/2017    Renal insufficiency 06/22/2017    PEPE (obstructive sleep apnea) 06/22/2017    Tobacco use 06/22/2017    Chronic back pain 06/22/2017    Asthma 06/22/2017    Elevated BP without diagnosis of hypertension 06/22/2017    Exertional dyspnea 06/22/2017         Current Outpatient Medications   Medication Sig Dispense Refill    ferrous sulfate (Iron) 325 mg (65 mg iron) tablet Take 65 mg by mouth Daily (before breakfast). Takes 1/2 tab daily      torsemide (DEMADEX) 20 mg tablet TAKE 2 TABLETS BY MOUTH ONCE DAILY.  MAY INCREASE TO TWO TABLETS IN THE MORNING AND ONE TABLET IN THE EVENING AS NEEDED BASED ON WEIGHT 90 Tablet 2    warfarin (COUMADIN) 5 mg tablet Take 1 Tablet by mouth daily. 90 Tablet 3    potassium chloride SR (K-TAB) 20 mEq tablet Take 2 Tablets by mouth two (2) times a day. 120 Tablet 2    dilTIAZem IR (CARDIZEM) 30 mg tablet TAKE 1 TABLET BY MOUTH TWICE DAILY . HOLD  IF  BLOOD  PRESSURE  IS  LESS  THAN  100/55 180 Tab 1    metOLazone (ZAROXOLYN) 5 mg tablet Take 1 Tab by mouth daily. Take as needed for weight gain >5lbs. 30 Tab 1    docusate sodium (COLACE) 100 mg capsule Take 100 mg by mouth daily.  psyllium (METAMUCIL) powd Take  by mouth as needed.  rOPINIRole (REQUIP) 0.5 mg tablet TAKE 1 TABLET 1 TO 3 HOURS BEFORE BEDTIME ONCE A DAY FOR 30 DAYS      cpap machine kit by Does Not Apply route daily.  allopurinoL (ZYLOPRIM) 100 mg tablet Take 100 mg by mouth daily.  MULTIVITAMIN (MULTIPLE VITAMIN PO) Take 1 Tab by mouth daily.  colchicine 0.6 mg tablet Take 1 Tablet by mouth daily as needed for Gout or Pain. (Patient not taking: Reported on 7/26/2021) 30 Tablet 5    polyethylene glycol (MIRALAX) 17 gram/dose powder Take 17 g by mouth daily.  (Patient not taking: Reported on 8/19/2021)           Allergies   Allergen Reactions    Penicillins Hives    Metoprolol Shortness of Breath         Past Medical History:   Diagnosis Date    Arrhythmia     a fib    Arthritis     left ankle    Asthma     Back pain     Gout     left foot     Heart failure (HCC)     Heart failure ([facility] [address] 2017    Hypertension     Nicotine vapor product user     Non-nicotine vapor product user     Restless leg syndrome     Sleep apnea     wears CPAP    Spinal stenosis     Staph infection          Past Surgical History:   Procedure Laterality Date    HX TYMPANOSTOMY           Family History   Problem Relation Age of Onset    COPD Mother     Hypertension Mother     Diabetes Mother     Heart Disease Mother     Heart Disease Father     Hypertension Father     Obesity Paternal Polagiuliano Simeon Hypertension Paternal Aunt     Diabetes Paternal Aunt     Cancer Paternal Uncle         lung    Cancer Maternal Grandfather         lung    No Known Problems Paternal Grandmother          Social History     Tobacco Use    Smoking status: Former Smoker     Packs/day: 1.00     Quit date: 2017     Years since quittin.5    Smokeless tobacco: Current User    Tobacco comment: vapes   Substance Use Topics    Alcohol use: Yes     Comment: Hard seltzer 10 a week        Review of Systems  Pertinent items are noted in HPI. Objective:     Vitals:    21 1129   BP: 124/80   Pulse: (!) 59   Resp: 22   Temp: 97.4 °F (36.3 °C)   TempSrc: Oral   SpO2: 97%   Weight: 264 lb (119.7 kg)   Height: 5' 6\" (1.676 m)       General: alert, cooperative, no distress   Mental  status: normal mood, behavior, speech, dress, motor activity, and thought processes, able to follow commands   HENT: NCAT   Neck: no visualized mass   Resp: no respiratory distress   Neuro: no gross deficits   Skin: no discoloration or lesions of concern on visible areas   Psychiatric: normal affect, consistent with stated mood, no evidence of hallucinations     Greg Camacho MD    Patient Instructions        DASH Diet: Care Instructions  Your Care Instructions     The DASH diet is an eating plan that can help lower your blood pressure. DASH stands for Dietary Approaches to Stop Hypertension. Hypertension is high blood pressure. The DASH diet focuses on eating foods that are high in calcium, potassium, and magnesium. These nutrients can lower blood pressure. The foods that are highest in these nutrients are fruits, vegetables, low-fat dairy products, nuts, seeds, and legumes. But taking calcium, potassium, and magnesium supplements instead of eating foods that are high in those nutrients does not have the same effect. The DASH diet also includes whole grains, fish, and poultry.   The DASH diet is one of several lifestyle changes your doctor may recommend to lower your high blood pressure. Your doctor may also want you to decrease the amount of sodium in your diet. Lowering sodium while following the DASH diet can lower blood pressure even further than just the DASH diet alone. Follow-up care is a key part of your treatment and safety. Be sure to make and go to all appointments, and call your doctor if you are having problems. It's also a good idea to know your test results and keep a list of the medicines you take. How can you care for yourself at home? Following the DASH diet  · Eat 4 to 5 servings of fruit each day. A serving is 1 medium-sized piece of fruit, ½ cup chopped or canned fruit, 1/4 cup dried fruit, or 4 ounces (½ cup) of fruit juice. Choose fruit more often than fruit juice. · Eat 4 to 5 servings of vegetables each day. A serving is 1 cup of lettuce or raw leafy vegetables, ½ cup of chopped or cooked vegetables, or 4 ounces (½ cup) of vegetable juice. Choose vegetables more often than vegetable juice. · Get 2 to 3 servings of low-fat and fat-free dairy each day. A serving is 8 ounces of milk, 1 cup of yogurt, or 1 ½ ounces of cheese. · Eat 6 to 8 servings of grains each day. A serving is 1 slice of bread, 1 ounce of dry cereal, or ½ cup of cooked rice, pasta, or cooked cereal. Try to choose whole-grain products as much as possible. · Limit lean meat, poultry, and fish to 2 servings each day. A serving is 3 ounces, about the size of a deck of cards. · Eat 4 to 5 servings of nuts, seeds, and legumes (cooked dried beans, lentils, and split peas) each week. A serving is 1/3 cup of nuts, 2 tablespoons of seeds, or ½ cup of cooked beans or peas. · Limit fats and oils to 2 to 3 servings each day. A serving is 1 teaspoon of vegetable oil or 2 tablespoons of salad dressing. · Limit sweets and added sugars to 5 servings or less a week. A serving is 1 tablespoon jelly or jam, ½ cup sorbet, or 1 cup of lemonade.   · Eat less than 2,300 milligrams (mg) of sodium a day. If you limit your sodium to 1,500 mg a day, you can lower your blood pressure even more. · Be aware that all of these are the suggested number of servings for people who eat 1,800 to 2,000 calories a day. Your recommended number of servings may be different if you need more or fewer calories. Tips for success  · Start small. Do not try to make dramatic changes to your diet all at once. You might feel that you are missing out on your favorite foods and then be more likely to not follow the plan. Make small changes, and stick with them. Once those changes become habit, add a few more changes. · Try some of the following:  ? Make it a goal to eat a fruit or vegetable at every meal and at snacks. This will make it easy to get the recommended amount of fruits and vegetables each day. ? Try yogurt topped with fruit and nuts for a snack or healthy dessert. ? Add lettuce, tomato, cucumber, and onion to sandwiches. ? Combine a ready-made pizza crust with low-fat mozzarella cheese and lots of vegetable toppings. Try using tomatoes, squash, spinach, broccoli, carrots, cauliflower, and onions. ? Have a variety of cut-up vegetables with a low-fat dip as an appetizer instead of chips and dip. ? Sprinkle sunflower seeds or chopped almonds over salads. Or try adding chopped walnuts or almonds to cooked vegetables. ? Try some vegetarian meals using beans and peas. Add garbanzo or kidney beans to salads. Make burritos and tacos with mashed [place]udd[place] beans or black beans. Where can you learn more? Go to http://www.navarrete.com/  Enter H967 in the search box to learn more about \"DASH Diet: Care Instructions. \"  Current as of: August 31, 2020               Content Version: 12.8  © 3860-8075 [employer]. Care instructions adapted under license by [employer]. (which disclaims liability or warranty for this information).  If you have questions about a medical condition or this instruction, always ask your healthcare professional. [place] [address] any warranty or liability for your use of this information.

## 2021-08-19 NOTE — PROGRESS NOTES
Identified pt with two pt identifiers(name and ). Chief Complaint   Patient presents with    Anemia     wants labs to see if meds are working        Health Maintenance Due   Topic    Pneumococcal 0-64 years (1 of 2 - PPSV23)    COVID-19 Vaccine (1)    DTaP/Tdap/Td series (1 - Tdap)    Colorectal Cancer Screening Combo     Shingrix Vaccine Age 50> (1 of 2)       Wt Readings from Last 3 Encounters:   21 264 lb (119.7 kg)   21 266 lb 9.6 oz (120.9 kg)   21 265 lb 9.6 oz (120.5 kg)     Temp Readings from Last 3 Encounters:   21 97.4 °F (36.3 °C) (Oral)   21 97 °F (36.1 °C) (Temporal)   07/15/19 98 °F (36.7 °C)     BP Readings from Last 3 Encounters:   21 124/80   21 110/72   21 112/70     Pulse Readings from Last 3 Encounters:   21 (!) 59   21 79   21 60         Learning Assessment:  :     Learning Assessment 8/3/2017   PRIMARY LEARNER Patient   PRIMARY LANGUAGE ENGLISH   LEARNER PREFERENCE PRIMARY LISTENING   ANSWERED BY patient   RELATIONSHIP SELF       Depression Screening:  :     3 most recent PHQ Screens 2021   Little interest or pleasure in doing things Not at all   Feeling down, depressed, irritable, or hopeless Not at all   Total Score PHQ 2 0   Trouble falling or staying asleep, or sleeping too much -   Feeling tired or having little energy -   Poor appetite, weight loss, or overeating -   Feeling bad about yourself - or that you are a failure or have let yourself or your family down -   Trouble concentrating on things such as school, work, reading, or watching TV -   Moving or speaking so slowly that other people could have noticed; or the opposite being so fidgety that others notice -   Thoughts of being better off dead, or hurting yourself in some way -   PHQ 9 Score -       Fall Risk Assessment:  :     Fall Risk Assessment, last 12 mths 2019   Able to walk? Yes   Fall in past 12 months?  No       Abuse Screening:  : Abuse Screening Questionnaire 8/19/2021 7/18/2019   Do you ever feel afraid of your partner? N N   Are you in a relationship with someone who physically or mentally threatens you? N N   Is it safe for you to go home? Y Y       Coordination of Care Questionnaire:  :     1) Have you been to an emergency room, urgent care clinic since your last visit? no   Hospitalized since your last visit? no             2) Have you seen or consulted any other health care providers outside of [address] [place] [address] since your last visit? no  (Include any pap smears or colon screenings in this section.)    3) Do you have an Advance Directive on file? no  Are you interested in receiving information about Advance Directives? no    Reviewed record in preparation for visit and have obtained necessary documentation.

## 2021-08-23 RX ORDER — POTASSIUM CHLORIDE 1500 MG/1
TABLET, FILM COATED, EXTENDED RELEASE ORAL
Qty: 120 TABLET | Refills: 0 | Status: SHIPPED | OUTPATIENT
Start: 2021-08-23 | End: 2021-09-20

## 2021-08-23 NOTE — TELEPHONE ENCOUNTER
Patient requesting a refill on potassium chloride sr 20 meq, pharmacy stated there were no more refills left on this medication, [facility] [phone]

## 2021-08-25 ENCOUNTER — APPOINTMENT (OUTPATIENT)
Dept: FAMILY MEDICINE CLINIC | Age: 51
End: 2021-08-25

## 2021-08-26 ENCOUNTER — TELEPHONE (OUTPATIENT)
Dept: FAMILY MEDICINE CLINIC | Age: 51
End: 2021-08-26

## 2021-08-26 DIAGNOSIS — E87.6 HYPOKALEMIA: Primary | ICD-10-CM

## 2021-08-26 LAB
25(OH)D3+25(OH)D2 SERPL-MCNC: 28.8 NG/ML (ref 30–100)
ALBUMIN SERPL-MCNC: 4.7 G/DL (ref 3.8–4.9)
ALBUMIN/GLOB SERPL: 2.4 {RATIO} (ref 1.2–2.2)
ALP SERPL-CCNC: 101 IU/L (ref 48–121)
ALT SERPL-CCNC: 24 IU/L (ref 0–44)
AST SERPL-CCNC: 22 IU/L (ref 0–40)
BASOPHILS # BLD AUTO: 0.1 X10E3/UL (ref 0–0.2)
BASOPHILS NFR BLD AUTO: 1 %
BILIRUB SERPL-MCNC: 0.8 MG/DL (ref 0–1.2)
BUN SERPL-MCNC: 18 MG/DL (ref 6–24)
BUN/CREAT SERPL: 14 (ref 9–20)
CALCIUM SERPL-MCNC: 9.2 MG/DL (ref 8.7–10.2)
CHLORIDE SERPL-SCNC: 91 MMOL/L (ref 96–106)
CHOLEST SERPL-MCNC: 129 MG/DL (ref 100–199)
CO2 SERPL-SCNC: 31 MMOL/L (ref 20–29)
CREAT SERPL-MCNC: 1.31 MG/DL (ref 0.76–1.27)
EOSINOPHIL # BLD AUTO: 0.2 X10E3/UL (ref 0–0.4)
EOSINOPHIL NFR BLD AUTO: 2 %
ERYTHROCYTE [DISTWIDTH] IN BLOOD BY AUTOMATED COUNT: 13.3 % (ref 11.6–15.4)
FERRITIN SERPL-MCNC: 115 NG/ML (ref 30–400)
GLOBULIN SER CALC-MCNC: 2 G/DL (ref 1.5–4.5)
GLUCOSE SERPL-MCNC: 104 MG/DL (ref 65–99)
HCT VFR BLD AUTO: 46.1 % (ref 37.5–51)
HDLC SERPL-MCNC: 32 MG/DL
HGB BLD-MCNC: 14.9 G/DL (ref 13–17.7)
IMM GRANULOCYTES # BLD AUTO: 0 X10E3/UL (ref 0–0.1)
IMM GRANULOCYTES NFR BLD AUTO: 0 %
IMP & REVIEW OF LAB RESULTS: NORMAL
IRON SATN MFR SERPL: 16 % (ref 15–55)
IRON SERPL-MCNC: 60 UG/DL (ref 38–169)
LDLC SERPL CALC-MCNC: 64 MG/DL (ref 0–99)
LYMPHOCYTES # BLD AUTO: 1.2 X10E3/UL (ref 0.7–3.1)
LYMPHOCYTES NFR BLD AUTO: 12 %
MCH RBC QN AUTO: 31.3 PG (ref 26.6–33)
MCHC RBC AUTO-ENTMCNC: 32.3 G/DL (ref 31.5–35.7)
MCV RBC AUTO: 97 FL (ref 79–97)
MONOCYTES # BLD AUTO: 1 X10E3/UL (ref 0.1–0.9)
MONOCYTES NFR BLD AUTO: 10 %
NEUTROPHILS # BLD AUTO: 7.5 X10E3/UL (ref 1.4–7)
NEUTROPHILS NFR BLD AUTO: 75 %
PLATELET # BLD AUTO: 206 X10E3/UL (ref 150–450)
POTASSIUM SERPL-SCNC: 3 MMOL/L (ref 3.5–5.2)
PROT SERPL-MCNC: 6.7 G/DL (ref 6–8.5)
RBC # BLD AUTO: 4.76 X10E6/UL (ref 4.14–5.8)
SODIUM SERPL-SCNC: 138 MMOL/L (ref 134–144)
TIBC SERPL-MCNC: 379 UG/DL (ref 250–450)
TRIGL SERPL-MCNC: 199 MG/DL (ref 0–149)
UIBC SERPL-MCNC: 319 UG/DL (ref 111–343)
VLDLC SERPL CALC-MCNC: 33 MG/DL (ref 5–40)
WBC # BLD AUTO: 10 X10E3/UL (ref 3.4–10.8)

## 2021-08-26 NOTE — TELEPHONE ENCOUNTER
----- Message from Poncho Blackwell MD sent at 8/26/2021  2:58 PM EDT -----  Patients labs came back showing low potassium - again. Kidney function is stable. Anemia is improved. Is he taking a potassium supplement? If not, I will send one in. Would then need recheck in 2-weeks.

## 2021-08-26 NOTE — TELEPHONE ENCOUNTER
He is taking 40 mEq two times a day prescribed by his cardiologist.     Estela Minder to pt and relayed result message. He understood.

## 2021-08-26 NOTE — PROGRESS NOTES
Patients labs came back showing low potassium - again. Kidney function is stable. Anemia is improved. Is he taking a potassium supplement? If not, I will send one in. Would then need recheck in 2-weeks.

## 2021-08-27 NOTE — TELEPHONE ENCOUNTER
L/M that we want to recheck potassium next week and to call to make a lab only appointment for this.

## 2021-08-31 ENCOUNTER — TELEPHONE (OUTPATIENT)
Dept: FAMILY MEDICINE CLINIC | Age: 51
End: 2021-08-31

## 2021-08-31 DIAGNOSIS — M10.9 GOUT OF MULTIPLE SITES, UNSPECIFIED CAUSE, UNSPECIFIED CHRONICITY: Primary | ICD-10-CM

## 2021-08-31 RX ORDER — PREDNISONE 10 MG/1
TABLET ORAL
Qty: 21 TABLET | Refills: 0 | Status: SHIPPED | OUTPATIENT
Start: 2021-08-31 | End: 2022-02-01 | Stop reason: ALTCHOICE

## 2021-08-31 RX ORDER — COLCHICINE 0.6 MG/1
0.6 TABLET ORAL DAILY
Qty: 30 TABLET | Refills: 1 | Status: SHIPPED | OUTPATIENT
Start: 2021-08-31 | End: 2022-02-01 | Stop reason: ALTCHOICE

## 2021-08-31 NOTE — TELEPHONE ENCOUNTER
Pt needs the medication for gout sent to [facility] in [address] as he is not currently not able to walk

## 2021-08-31 NOTE — TELEPHONE ENCOUNTER
----- Message from Percy Doss sent at 8/31/2021  1:23 PM EDT -----  Regarding: MD Bryce/Telephone  General Message/Vendor Calls    Caller's first and last name: Lissy Araiza      Reason for call: Drug interaction. Callback required yes/no and why: Yes      Best contact number(s): [phone]      Details to clarify the request: Dr Saroj Angel sent in Rx for Colchicine, pharmacist says pt is already taking Diltiazem and there could be a possible drug interaction with those. Asking if she would like to go through with filling.       Percy Doss

## 2021-09-03 ENCOUNTER — APPOINTMENT (OUTPATIENT)
Dept: FAMILY MEDICINE CLINIC | Age: 51
End: 2021-09-03

## 2021-09-04 LAB
BUN SERPL-MCNC: 22 MG/DL (ref 6–24)
BUN/CREAT SERPL: 14 (ref 9–20)
CALCIUM SERPL-MCNC: 9.2 MG/DL (ref 8.7–10.2)
CHLORIDE SERPL-SCNC: 93 MMOL/L (ref 96–106)
CO2 SERPL-SCNC: 35 MMOL/L (ref 20–29)
CREAT SERPL-MCNC: 1.56 MG/DL (ref 0.76–1.27)
GLUCOSE SERPL-MCNC: 115 MG/DL (ref 65–99)
INTERPRETATION: NORMAL
POTASSIUM SERPL-SCNC: 3.2 MMOL/L (ref 3.5–5.2)
SODIUM SERPL-SCNC: 139 MMOL/L (ref 134–144)

## 2021-09-07 ENCOUNTER — TELEPHONE (OUTPATIENT)
Dept: FAMILY MEDICINE CLINIC | Age: 51
End: 2021-09-07

## 2021-09-07 DIAGNOSIS — E87.6 HYPOKALEMIA: Primary | ICD-10-CM

## 2021-09-07 NOTE — TELEPHONE ENCOUNTER
----- Message from Farzad Acosta MD sent at 9/7/2021 12:52 PM EDT -----  Labs continue to show low potassium, but improved. Needs to drink more water. Would like to repeat later this week - Thursday or Friday.

## 2021-09-07 NOTE — TELEPHONE ENCOUNTER
Labs continue to show low potassium, but improved. Needs to drink more water. Would like to repeat later this week - Thursday or Friday.

## 2021-09-14 ENCOUNTER — APPOINTMENT (OUTPATIENT)
Dept: FAMILY MEDICINE CLINIC | Age: 51
End: 2021-09-14

## 2021-09-15 ENCOUNTER — TELEPHONE (OUTPATIENT)
Dept: FAMILY MEDICINE CLINIC | Age: 51
End: 2021-09-15

## 2021-09-15 DIAGNOSIS — E87.6 HYPOKALEMIA: Primary | ICD-10-CM

## 2021-09-15 LAB
ALBUMIN SERPL-MCNC: 4.9 G/DL (ref 3.8–4.9)
ALBUMIN/GLOB SERPL: 1.8 {RATIO} (ref 1.2–2.2)
ALP SERPL-CCNC: 121 IU/L (ref 44–121)
ALT SERPL-CCNC: 33 IU/L (ref 0–44)
AST SERPL-CCNC: 30 IU/L (ref 0–40)
BILIRUB SERPL-MCNC: 1.3 MG/DL (ref 0–1.2)
BUN SERPL-MCNC: 19 MG/DL (ref 6–24)
BUN/CREAT SERPL: 15 (ref 9–20)
CALCIUM SERPL-MCNC: 9.9 MG/DL (ref 8.7–10.2)
CHLORIDE SERPL-SCNC: 91 MMOL/L (ref 96–106)
CO2 SERPL-SCNC: 28 MMOL/L (ref 20–29)
CREAT SERPL-MCNC: 1.26 MG/DL (ref 0.76–1.27)
GLOBULIN SER CALC-MCNC: 2.7 G/DL (ref 1.5–4.5)
GLUCOSE SERPL-MCNC: 112 MG/DL (ref 65–99)
POTASSIUM SERPL-SCNC: 2.8 MMOL/L (ref 3.5–5.2)
PROT SERPL-MCNC: 7.6 G/DL (ref 6–8.5)
SODIUM SERPL-SCNC: 139 MMOL/L (ref 134–144)

## 2021-09-15 NOTE — TELEPHONE ENCOUNTER
----- Message from Octavio Irvin MD sent at 9/15/2021  5:38 PM EDT -----  Potassium is very low again. Kidney function has improved. He needs to be on potassium 20 mEq 2 tablets 2 x per day. Is he taking that dose? If he is, then we may need an endocrinology evaluation.

## 2021-09-15 NOTE — TELEPHONE ENCOUNTER
Potassium is very low again. Kidney function has improved. He needs to be on potassium 20 mEq 2 tablets 2 x per day. Is he taking that dose? If he is, then we may need an endocrinology evaluation.

## 2021-09-15 NOTE — TELEPHONE ENCOUNTER
Poke to pt and relayed result message. He has been taking potassium 20 mEq 2 tabs BID for at least a month.

## 2021-09-20 ENCOUNTER — APPOINTMENT (OUTPATIENT)
Dept: FAMILY MEDICINE CLINIC | Age: 51
End: 2021-09-20

## 2021-09-20 RX ORDER — POTASSIUM CHLORIDE 1500 MG/1
TABLET, FILM COATED, EXTENDED RELEASE ORAL
Qty: 120 TABLET | Refills: 3 | Status: SHIPPED | OUTPATIENT
Start: 2021-09-20 | End: 2021-11-01 | Stop reason: SDUPTHER

## 2021-09-21 LAB — POTASSIUM SERPL-SCNC: 3.8 MMOL/L (ref 3.5–5.2)

## 2021-09-22 ENCOUNTER — TELEPHONE (OUTPATIENT)
Dept: FAMILY MEDICINE CLINIC | Age: 51
End: 2021-09-22

## 2021-09-22 DIAGNOSIS — E87.6 HYPOKALEMIA: Primary | ICD-10-CM

## 2021-09-22 NOTE — TELEPHONE ENCOUNTER
Potassium is now in the normal range. I would like to get a full panel of labs next week to make sure that the potassium and other labs are normal.   Lab orders placed.    Thanks,   Dr. Gay Wilson

## 2021-09-22 NOTE — TELEPHONE ENCOUNTER
----- Message from Taran Farley MD sent at 9/22/2021 12:24 PM EDT -----  Potassium is now in the normal range. I would like to get a full panel of labs next week to make sure that the potassium and other labs are normal.   Lab orders placed.    Thanks,   Dr. Gay Wilson

## 2021-09-22 NOTE — TELEPHONE ENCOUNTER
Spoke to pt and relayed message. He understood. He stated he will be out of town next week but will get repeat labs as soon as he gets back.

## 2021-10-22 ENCOUNTER — APPOINTMENT (OUTPATIENT)
Dept: FAMILY MEDICINE CLINIC | Age: 51
End: 2021-10-22

## 2021-10-23 LAB
ALBUMIN SERPL-MCNC: 4.9 G/DL (ref 3.8–4.9)
ALBUMIN/GLOB SERPL: 2 {RATIO} (ref 1.2–2.2)
ALP SERPL-CCNC: 113 IU/L (ref 44–121)
ALT SERPL-CCNC: 26 IU/L (ref 0–44)
AST SERPL-CCNC: 22 IU/L (ref 0–40)
BILIRUB SERPL-MCNC: 0.8 MG/DL (ref 0–1.2)
BUN SERPL-MCNC: 24 MG/DL (ref 6–24)
BUN/CREAT SERPL: 18 (ref 9–20)
CALCIUM SERPL-MCNC: 10.1 MG/DL (ref 8.7–10.2)
CHLORIDE SERPL-SCNC: 90 MMOL/L (ref 96–106)
CO2 SERPL-SCNC: 30 MMOL/L (ref 20–29)
CREAT SERPL-MCNC: 1.34 MG/DL (ref 0.76–1.27)
GLOBULIN SER CALC-MCNC: 2.4 G/DL (ref 1.5–4.5)
GLUCOSE SERPL-MCNC: 114 MG/DL (ref 65–99)
POTASSIUM SERPL-SCNC: 2.9 MMOL/L (ref 3.5–5.2)
PROT SERPL-MCNC: 7.3 G/DL (ref 6–8.5)
SODIUM SERPL-SCNC: 139 MMOL/L (ref 134–144)

## 2021-10-25 ENCOUNTER — TELEPHONE (OUTPATIENT)
Dept: FAMILY MEDICINE CLINIC | Age: 51
End: 2021-10-25

## 2021-10-25 NOTE — TELEPHONE ENCOUNTER
----- Message from Brooke Ochoa MD sent at 10/24/2021 10:05 PM EDT -----  Patient's potassium is way down again. Kidney function slightly better. I belive that this is due to the medication that he is on. He needs to be on a potassium agonist (Spironolactone). I need to see him in the office for weight check and BP.

## 2021-10-25 NOTE — TELEPHONE ENCOUNTER
Spoke to pt and relayed results. He understood and will add more potassium to diet. He is still taking his medication but had backed off on potasium in his diet.

## 2021-10-25 NOTE — PROGRESS NOTES
Patient's potassium is way down again. Kidney function slightly better. I belive that this is due to the medication that he is on. He needs to be on a potassium agonist (Spironolactone). I need to see him in the office for weight check and BP.

## 2021-11-01 ENCOUNTER — OFFICE VISIT (OUTPATIENT)
Dept: CARDIOLOGY CLINIC | Age: 51
End: 2021-11-01
Payer: COMMERCIAL

## 2021-11-01 VITALS
HEIGHT: 66 IN | WEIGHT: 257 LBS | OXYGEN SATURATION: 98 % | DIASTOLIC BLOOD PRESSURE: 74 MMHG | SYSTOLIC BLOOD PRESSURE: 126 MMHG | HEART RATE: 72 BPM | BODY MASS INDEX: 41.3 KG/M2

## 2021-11-01 DIAGNOSIS — Z87.891 HISTORY OF TOBACCO USE: ICD-10-CM

## 2021-11-01 DIAGNOSIS — I50.22 CHRONIC SYSTOLIC CONGESTIVE HEART FAILURE (HCC): Primary | ICD-10-CM

## 2021-11-01 DIAGNOSIS — I42.8 NICM (NONISCHEMIC CARDIOMYOPATHY) (HCC): ICD-10-CM

## 2021-11-01 DIAGNOSIS — Z79.01 ANTICOAGULATED ON COUMADIN: ICD-10-CM

## 2021-11-01 DIAGNOSIS — I48.91 ATRIAL FIBRILLATION, UNSPECIFIED TYPE (HCC): ICD-10-CM

## 2021-11-01 DIAGNOSIS — E66.01 MORBID OBESITY (HCC): ICD-10-CM

## 2021-11-01 DIAGNOSIS — F10.11 H/O ETOH ABUSE: ICD-10-CM

## 2021-11-01 DIAGNOSIS — E87.6 HYPOKALEMIA: ICD-10-CM

## 2021-11-01 PROCEDURE — 99214 OFFICE O/P EST MOD 30 MIN: CPT | Performed by: NURSE PRACTITIONER

## 2021-11-01 RX ORDER — POTASSIUM CHLORIDE 1500 MG/1
40 TABLET, FILM COATED, EXTENDED RELEASE ORAL 3 TIMES DAILY
Qty: 540 TABLET | Refills: 0 | Status: SHIPPED | OUTPATIENT
Start: 2021-11-01 | End: 2022-02-09

## 2021-11-01 NOTE — PROGRESS NOTES
JC Sorenson  [id number] [place] [facility], [address] [facility]    Office [phone]  Fax [phone]      Tarun . is a 45 y.o. male is here for f/u visit. Primary care physician:  Dionisio Lopez MD    Assessment:  Chronic SHF - EF 25-30%. ([facility] admit 7/8/19 Acute on chronic SHF. Underwent RHC/LHC - no sig CAD)  A fib    Chronic anticoagulation - INR followed by PCP  Morbid obesity   Hx of Smoker - quit smoking/not chewing tobacco  Hx of Excessive EtOH  -- occ drinks beer now  Hypokalemia     Plan:   CHF - volume compensated. Using Torsemide in addition to metolazone 5mg every 4d. BP intolerant of CHF meds in past. SOB on BBs. Vol appears well compensated. Will try Jardiance 10mg/d (discussed possible side effects). Try to change metolazone to q5 days. Change potassium to 40mEq tid. Recheck BMP in 2 weeks. Discussed increase potassium in diet as well. Intolerant to medina in past d/t low BP per above. Has seen EP ( Dr Seema Riojas) -recommended for persistent A. fib with RVR-AV node ablation/CRT-D. However pt wants to hold off for now as feeling much better; HR controlled recently with IR dilt. On anticoagulation w/ Coumadin/INR followed by PCP    Continues to work on weight loss     F/u in 3mo or PRN with Dr. Eliceo Plata     Patient understands the plan. All questions were answered to the patient's satisfaction. Medication Side Effects and Warnings were discussed with patient: yes  Patient Labs were reviewed and or requested:  yes  Patient Past Records were reviewed and or requested: yes    I appreciate the opportunity to be involved in . See note below for details. Please do not hesitate to contact us with questions or concerns. Celeste Goode NP    Cardiac Testing/ Procedures: A. Cardiac Cath/PCI: 7/1/19 - LHC/RHC - No sig CAD; Mean PA pressure 21 mm Hg    B.ECHO/EVITA: 12/26/2019-severely dilated LV, LVEF 25 to 30%, severely dilated left atrium, dilated right atrium, mild TR, moderate MR  6/21/19 - EF 26-30%; Mod LAE; DIDIER: Mild to Mod MR; Mild to Mod TR; Mod Pulm HTN    6/22/2017 Left ventricle: Systolic function was mildly reduced. Ejection fraction was estimated to be 45 %. There were no regional wall motion abnormalities. Tricuspid valve: There was mild regurgitation. C.StressNuclear/Stress ECHO/Stress test: 6/26/19 - Abnormal ledy nuc; EF 24%; Fixed defect( inf/apical)    D. Vascular:    E. EP:    F. Miscellaneous:    02/05/21    ECHO ADULT FOLLOW-UP OR LIMITED 02/08/2021 2/8/2021    Interpretation Summary  · Follow-up LV EF  · Image quality for this study was technically difficult. · Contrast used: DEFINITY. · LV: Calculated LVEF is 27%. Biplane method used to measure ejection fraction. Normal wall thickness. Severely dilated left ventricle. Severely reduced systolic function. · LA: Severely dilated left atrium. · RV: Dilated right ventricle. Reduced systolic function. · RA: Severely dilated right atrium. · MV: Mild to moderate mitral valve regurgitation is present. · TV: Mild to moderate tricuspid valve regurgitation is present. · PA: Pulmonary arterial systolic pressure is 42 mmHg. · IVC: Severely elevated central venous pressure (15 mmHg); IVC diameter is larger than 21 mm and collapses less than 50% with respiration. Signed by: Harish Munoz MD on 2/8/2021 10:51 AM    Subjective:  Tamela Ptoter. is a 45 y.o. male who returns for follow up visit. Recently found to have K 2.9 by PCP. Pt admits to low potassium in diet- taking potassium 40mEq BID. Uses Torsemide and metolazone - metolazone every 4d. Weight stable. Pt feels well. Patient denies any exertional chest pain, breathing issues, palpitations, syncope, edema, or paroxysmal nocturnal dyspnea. Trying to exercise. Poor diet the past month - eating too much candy.    Hx of low BP - intolerant to HF meds in past.        ROS:  (Positive findings above)  Constitutional: Negative for fever, chills, and diaphoresis. Respiratory: Negative for cough, hemoptysis, sputum production, shortness of breath and wheezing. Cardiovascular: Negative for chest pain, palpitations, leg swelling and PND. Gastrointestinal: Negative for nausea, vomiting, blood in stool and melena. Genitourinary: Negative for dysuria and flank pain. Neurological: Negative for focal weakness, seizures, loss of consciousness  Endo/Heme/Allergies: Negative for abnormal bleeding. Psychiatric/Behavioral: Negative for memory loss. Medications before admission:    Current Outpatient Medications   Medication Sig Dispense    potassium chloride SR (K-TAB) 20 mEq tablet Take 2 Tablets by mouth three (3) times daily. 540 Tablet    empagliflozin (Jardiance) 10 mg tablet Take 1 Tablet by mouth daily. 90 Tablet    predniSONE (STERAPRED DS) 10 mg dose pack See administration instruction per 10mg dose pack (Patient taking differently: See administration instruction per 10mg dose pack (has on hand for emergency)) 21 Tablet    ferrous sulfate (Iron) 325 mg (65 mg iron) tablet Take  by mouth Daily (before breakfast). Takes 1/2 tab daily      torsemide (DEMADEX) 20 mg tablet TAKE 2 TABLETS BY MOUTH ONCE DAILY. MAY INCREASE TO TWO TABLETS IN THE MORNING AND ONE TABLET IN THE EVENING AS NEEDED BASED ON WEIGHT 90 Tablet    warfarin (COUMADIN) 5 mg tablet Take 1 Tablet by mouth daily. 90 Tablet    dilTIAZem IR (CARDIZEM) 30 mg tablet TAKE 1 TABLET BY MOUTH TWICE DAILY . HOLD  IF  BLOOD  PRESSURE  IS  LESS  THAN  100/55 180 Tab    metOLazone (ZAROXOLYN) 5 mg tablet Take 1 Tab by mouth daily. Take as needed for weight gain >5lbs. 30 Tab    docusate sodium (COLACE) 100 mg capsule Take 100 mg by mouth as needed.  psyllium (METAMUCIL) powd Take  by mouth as needed.      rOPINIRole (REQUIP) 0.5 mg tablet TAKE 1 TABLET 1 TO 3 HOURS BEFORE BEDTIME ONCE A DAY FOR 30 DAYS     cpap machine kit by Does Not Apply route daily.  allopurinoL (ZYLOPRIM) 100 mg tablet Take 100 mg by mouth daily.  MULTIVITAMIN (MULTIPLE VITAMIN PO) Take 1 Tab by mouth daily.  colchicine 0.6 mg tablet Take 1 Tablet by mouth daily. as needed for Gout or Pain. 30 Tablet     No current facility-administered medications for this visit. Family History of CAD:    Yes    Social History:  Current  Smoker  No    Physical Exam:  Visit Vitals  /74 (BP 1 Location: Left upper arm, BP Patient Position: Sitting, BP Cuff Size: Large adult)   Pulse 72   Ht 5' 6\" (1.676 m)   Wt 257 lb (116.6 kg)   SpO2 98%   BMI 41.48 kg/m²     Gen: Well-developed, well-nourished, morbid obesity  Neck: Supple,thick neck, unable to appreciate JVD  Resp: No accessory muscle use, Clear breath sounds, No rales or rhonchi  Card: Irregular, No murmurs  Abd:  Soft,morbidly obese,   MSK: No cyanosis  Neuro: moving all four extremities , follows commands appropriately  Psych:  Good insight, oriented to person, place , alert, Nml Affect  LE: no edema, warm    Labs:  Lab Results   Component Value Date/Time    Sodium 139 10/22/2021 12:00 AM    Potassium 2.9 (L) 10/22/2021 12:00 AM    Chloride 90 (L) 10/22/2021 12:00 AM    CO2 30 (H) 10/22/2021 12:00 AM    Anion gap 6 07/15/2019 01:46 AM    Glucose 114 (H) 10/22/2021 12:00 AM    BUN 24 10/22/2021 12:00 AM    Creatinine 1.34 (H) 10/22/2021 12:00 AM    BUN/Creatinine ratio 18 10/22/2021 12:00 AM    GFR est AA 70 10/22/2021 12:00 AM    GFR est non-AA 60 10/22/2021 12:00 AM    Calcium 10.1 10/22/2021 12:00 AM    Bilirubin, total 0.8 10/22/2021 12:00 AM    Alk.  phosphatase 113 10/22/2021 12:00 AM    Protein, total 7.3 10/22/2021 12:00 AM    Albumin 4.9 10/22/2021 12:00 AM    Globulin 3.1 07/15/2019 01:46 AM    A-G Ratio 2.0 10/22/2021 12:00 AM    ALT (SGPT) 26 10/22/2021 12:00 AM    AST (SGOT) 22 10/22/2021 12:00 AM     Lab Results   Component Value Date/Time    WBC 10.0 08/25/2021 12:00 AM    HGB 14.9 08/25/2021 12:00 AM    HCT 46.1 08/25/2021 12:00 AM    PLATELET 920 79/34/4823 12:00 AM    MCV 97 08/25/2021 12:00 AM     Lab Results   Component Value Date/Time    Cholesterol, total 129 08/25/2021 12:00 AM    HDL Cholesterol 32 (L) 08/25/2021 12:00 AM    LDL,Direct 98 01/03/2012 07:00 PM    LDL, calculated 64 08/25/2021 12:00 AM    LDL, calculated 72 12/26/2019 04:19 PM    VLDL, calculated 33 08/25/2021 12:00 AM    VLDL, calculated 50 (H) 12/26/2019 04:19 PM    Triglyceride 199 (H) 08/25/2021 12:00 AM    CHOL/HDL Ratio 5.2 (H) 06/23/2017 04:40 AM     Lab Results   Component Value Date/Time    TSH 2.97 07/12/2019 11:47 AM     Lab Results   Component Value Date/Time    Hemoglobin A1c 6.4 (H) 07/09/2019 02:05 AM     Lab Results   Component Value Date/Time    Troponin-I, Qt. <0.05 07/08/2019 12:22 PM       Albino Elizabeth NP

## 2021-11-01 NOTE — PATIENT INSTRUCTIONS
Change potassium to 40mEq three times per day   Start Jardiance 10mg per day     Try to change metolazone to every 5 days if able pending weight. See us again in 3 mo  Get labs in 2 weeks.

## 2021-11-01 NOTE — LETTER
11/1/2021    Patient: Susan Stevenson. YOB: 1970   Date of Visit: 11/1/2021     Brooke Ochoa MD  [facility]  [address] [facility]    Dear Brooke Ochoa MD,      Thank you for referring Mr. Javed Obrien to [address] for evaluation. My notes for this consultation are attached. If you have questions, please do not hesitate to call me. I look forward to following your patient along with you.       Sincerely,    Destinee Holley NP

## 2021-11-01 NOTE — PROGRESS NOTES
Room 9    Visit Vitals  /74 (BP 1 Location: Left upper arm, BP Patient Position: Sitting, BP Cuff Size: Large adult)   Pulse 72   Ht 5' 6\" (1.676 m)   Wt 257 lb (116.6 kg)   SpO2 98%   BMI 41.48 kg/m²         Chest pain: no  Shortness of breath: no  Edema: no  Palpitations: no  Dizziness: no    New diagnosis/Surgeries: no    ER/Hospitalizations: no    Refills: Diltiazem, Potassium, Torsemide, Metolazone

## 2021-11-15 RX ORDER — DILTIAZEM HYDROCHLORIDE 30 MG/1
TABLET, FILM COATED ORAL
Qty: 180 TABLET | Refills: 0 | Status: SHIPPED | OUTPATIENT
Start: 2021-11-15 | End: 2022-02-22

## 2021-11-30 ENCOUNTER — DOCUMENTATION ONLY (OUTPATIENT)
Dept: CARDIOLOGY CLINIC | Age: 51
End: 2021-11-30

## 2021-11-30 ENCOUNTER — OFFICE VISIT (OUTPATIENT)
Dept: FAMILY MEDICINE CLINIC | Age: 51
End: 2021-11-30
Payer: COMMERCIAL

## 2021-11-30 VITALS
WEIGHT: 253 LBS | OXYGEN SATURATION: 97 % | HEIGHT: 66 IN | TEMPERATURE: 98 F | SYSTOLIC BLOOD PRESSURE: 112 MMHG | HEART RATE: 67 BPM | BODY MASS INDEX: 40.66 KG/M2 | RESPIRATION RATE: 20 BRPM | DIASTOLIC BLOOD PRESSURE: 66 MMHG

## 2021-11-30 DIAGNOSIS — Z51.81 ANTICOAGULATION GOAL OF INR 2.5 TO 3.5: ICD-10-CM

## 2021-11-30 DIAGNOSIS — Z71.89 ENCOUNTER FOR ANTICOAGULATION DISCUSSION AND COUNSELING: ICD-10-CM

## 2021-11-30 DIAGNOSIS — Z79.01 ANTICOAGULATION GOAL OF INR 2.5 TO 3.5: ICD-10-CM

## 2021-11-30 DIAGNOSIS — I48.19 PERSISTENT ATRIAL FIBRILLATION (HCC): Primary | ICD-10-CM

## 2021-11-30 LAB
INR BLD: 2
PT POC: 23.6 SECONDS
VALID INTERNAL CONTROL?: YES

## 2021-11-30 PROCEDURE — 99213 OFFICE O/P EST LOW 20 MIN: CPT | Performed by: INTERNAL MEDICINE

## 2021-11-30 PROCEDURE — 85610 PROTHROMBIN TIME: CPT | Performed by: INTERNAL MEDICINE

## 2021-11-30 NOTE — PROGRESS NOTES
Identified pt with two pt identifiers(name and ). Chief Complaint   Patient presents with    Irregular Heart Beat    Coagulation disorder        Health Maintenance Due   Topic    Pneumococcal 0-64 years (1 of 2 - PPSV23)    DTaP/Tdap/Td series (1 - Tdap)    Colorectal Cancer Screening Combo     Shingrix Vaccine Age 50> (1 of 2)       Wt Readings from Last 3 Encounters:   21 253 lb (114.8 kg)   21 257 lb (116.6 kg)   21 264 lb (119.7 kg)     Temp Readings from Last 3 Encounters:   21 98 °F (36.7 °C) (Temporal)   21 97.4 °F (36.3 °C) (Oral)   21 97 °F (36.1 °C) (Temporal)     BP Readings from Last 3 Encounters:   21 112/66   21 126/74   21 124/80     Pulse Readings from Last 3 Encounters:   21 67   21 72   21 (!) 59         Learning Assessment:  :     Learning Assessment 8/3/2017   PRIMARY LEARNER Patient   PRIMARY LANGUAGE ENGLISH   LEARNER PREFERENCE PRIMARY LISTENING   ANSWERED BY patient   RELATIONSHIP SELF       Depression Screening:  :     3 most recent PHQ Screens 2021   Little interest or pleasure in doing things Not at all   Feeling down, depressed, irritable, or hopeless Not at all   Total Score PHQ 2 0   Trouble falling or staying asleep, or sleeping too much -   Feeling tired or having little energy -   Poor appetite, weight loss, or overeating -   Feeling bad about yourself - or that you are a failure or have let yourself or your family down -   Trouble concentrating on things such as school, work, reading, or watching TV -   Moving or speaking so slowly that other people could have noticed; or the opposite being so fidgety that others notice -   Thoughts of being better off dead, or hurting yourself in some way -   PHQ 9 Score -       Fall Risk Assessment:  :     Fall Risk Assessment, last 12 mths 2019   Able to walk? Yes   Fall in past 12 months?  No       Abuse Screening:  :     Abuse Screening Questionnaire 11/30/2021 8/19/2021 7/18/2019   Do you ever feel afraid of your partner? N N N   Are you in a relationship with someone who physically or mentally threatens you? N N N   Is it safe for you to go home? Y Y Y       Coordination of Care Questionnaire:  :     1) Have you been to an emergency room, urgent care clinic since your last visit? no   Hospitalized since your last visit? no             2) Have you seen or consulted any other health care providers outside of [address] [place] [address] since your last visit? no  (Include any pap smears or colon screenings in this section.)    3) Do you have an Advance Directive on file? no  Are you interested in receiving information about Advance Directives? no    Reviewed record in preparation for visit and have obtained necessary documentation.

## 2021-11-30 NOTE — PROGRESS NOTES
Prior Authorization initiated to cover Jardiance 10mg with [employer]. Case ID #[id number] for heart failure per Humberto Man. Pending decision.

## 2021-11-30 NOTE — PATIENT INSTRUCTIONS
Taking Warfarin Safely: Care Instructions  Your Care Instructions     Warfarin is a medicine that you take to prevent blood clots. It is often called a blood thinner. Doctors give warfarin (such as Coumadin) to reduce the risk of blood clots. You may be at risk for blood clots if you have atrial fibrillation or deep vein thrombosis. Some other health problems may also put you at risk. Warfarin slows the amount of time it takes for your blood to clot. It can cause bleeding problems. Even if you've been taking warfarin for a while, it's important to know how to take it safely. Foods and other medicines can affect the way warfarin works. Some can make warfarin work too well. This can cause bleeding problems. And some can make it work poorly, so that it does not prevent blood clots very well. You will need regular blood tests to check how long it takes for your blood to form a clot. This test is called a PT or prothrombin time test. The result of the test is called an INR level. Depending on the test results, your doctor or anticoagulation clinic may adjust your dose of warfarin. Follow-up care is a key part of your treatment and safety. Be sure to make and go to all appointments, and call your doctor if you are having problems. It's also a good idea to know your test results and keep a list of the medicines you take. How can you care for yourself at home? Take warfarin safely  · Take your warfarin at the same time each day. · If you miss a dose of warfarin, don't take an extra dose to make up for it. Your doctor can tell you exactly what to do so you don't take too much or too little. · Wear medical alert jewelry that lets others know that you take warfarin. You can buy this at most drugsFractal On[employer] Solutionses. · Don't take warfarin if you are pregnant or planning to get pregnant. Talk to your doctor about how you can prevent getting pregnant while you are taking it.   · Don't change your dose or stop taking warfarin unless your doctor tells you to. Effects of medicines and food on warfarin  · Don't start or stop taking any medicines, vitamins, or natural remedies unless you first talk to your doctor. Many medicines can affect how warfarin works. These include aspirin and other pain relievers, over-the-counter medicines, multivitamins, dietary supplements, and herbal products. · Tell all of your doctors and pharmacists that you take warfarin. Some prescription medicines can affect how warfarin works. · Keep the amount of vitamin K in your diet about the same from day to day. Do not suddenly eat a lot more or a lot less food that is rich in vitamin K than you usually do. Vitamin K affects how warfarin works and how your blood clots. Talk with your doctor before making big changes in your diet. Foods that have a lot of vitamin K include cooked green vegetables, such as:  ? Kale, spinach, turnip greens, herve greens, Swiss chard, and mustard greens. ? [place] sprouts, broccoli, and cabbage. · Limit your use of alcohol. Avoid bleeding by preventing falls and injuries  · Wear slippers or shoes with nonskid soles. · Remove throw rugs and clutter. · Rearrange furniture and electrical cords to keep them out of walking paths. · Keep stairways, porches, and outside walkways well lit. Use night-lights in hallways and bathrooms. · Be extra careful when you work with sharp tools or knives. When should you call for help? Call 911 anytime you think you may need emergency care. For example, call if:    · You have a sudden, severe headache that is different from past headaches. Call your doctor now or seek immediate medical care if:    · You have any abnormal bleeding, such as:  ? Nosebleeds. ? Vaginal bleeding that is different (heavier, more frequent, at a different time of the month) than what you are used to.  ? Bloody or black stools, or rectal bleeding. ? Bloody or pink urine.    Watch closely for changes in your health, and be sure to contact your doctor if you have any problems. Where can you learn more? Go to http://www.gray.com/  Enter X469 in the search box to learn more about \"Taking Warfarin Safely: Care Instructions. \"  Current as of: April 29, 2021               Content Version: 13.0  © 4298-3434 [employer], "[employer] - The Simple, Fast and Elegant Tablet Sales App". Care instructions adapted under license by [employer] (which disclaims liability or warranty for this information). If you have questions about a medical condition or this instruction, always ask your healthcare professional. [address] any warranty or liability for your use of this information.

## 2021-11-30 NOTE — PROGRESS NOTES
Chief Complaint   Patient presents with    Irregular Heart Beat    Coagulation disorder       Assessment/ Plan:   Diagnoses and all orders for this visit:    1. Persistent atrial fibrillation (HCC)  -     AMB POC PT/INR    2. Anticoagulation goal of INR 2.0 to 3.5   Continue with current dose. 3. Encounter for anticoagulation discussion and counseling    New Coumadin dose:.current treatment plan is effective, no change in therapy. Next check to be scheduled for  4 weeks. I have discussed the diagnosis with the patient and the intended treatment plan as seen in the above orders. The patient has received an after-visit summary and questions were answered concerning future plans. Asked to return should symptoms worsen or not improve with treatment. Any pending labs and studies will be relayed to patient when they become available. Pt verbalizes understanding of plan of care and denies further questions or concerns at this time. Follow-up and Dispositions    · Return in about 4 weeks (around 12/28/2021), or if symptoms worsen or fail to improve, for PT/INR. Subjective:   Pietro Cobian is a 45 y.o. male who presents for follow up. He has a h/o CHF, A.fib and persistent hypokalemia. Recently, his potassium was increased to 40 mgs TID and to recheck his labs in 4-weeks. He also is here today because he needs a PT/INR. Was seen by cardiology today. Apparently they did not check it. Cardiac History:  Chronic CHF - EF 25-30%. ([facility] admit 7/8/19 Acute on chronic SHF. Underwent RHC/LHC - no sig CAD)  A fib    Chronic anticoagulation - INR followed by PCP  Morbid obesity   Hx of Smoker - quit smoking/not chewing tobacco  Hx of Excessive EtOH  -- occ drinks beer now  Hypokalemia     Anticoagulation:  Indication: Atrial Fibrillation  INR Goal: 2.5-3.5. Current dose:  Coumadin 5 mg daily.   Missed Coumadin Doses:  None  Medication Changes:  no  Dietary Changes:  no    Symptoms: taking coumadin appropriately without any bleeding. Latest INRs:  Results for orders placed or performed in visit on 11/30/21   AMB POC PT/INR   Result Value Ref Range    VALID INTERNAL CONTROL POC Yes     Prothrombin time (POC) 23.6 seconds    INR POC 2.0           Patient Active Problem List    Diagnosis Date Noted    Acute on chronic systolic CHF (congestive heart failure) ([facility] 07/08/2019    Acute renal insufficiency 07/08/2019    [facility] 04/25/2018    Chronic systolic congestive heart failure ([facility] 04/25/2018    Chronic anticoagulation 10/13/2017    Persistent atrial fibrillation ([facility] 07/18/2017    Morbid obesity due to excess calories ([facility] 07/18/2017    Renal insufficiency 06/22/2017    PEPE (obstructive sleep apnea) 06/22/2017    Tobacco use 06/22/2017    Chronic back pain 06/22/2017    Asthma 06/22/2017    Elevated BP without diagnosis of hypertension 06/22/2017    Exertional dyspnea 06/22/2017         Current Outpatient Medications   Medication Sig Dispense Refill    dilTIAZem IR (CARDIZEM) 30 mg tablet TAKE 1 TABLET BY MOUTH TWICE DAILY HOLD  IF  BLOOD  PRESSURE  IS  LESS  THAN  100/55 180 Tablet 0    potassium chloride SR (K-TAB) 20 mEq tablet Take 2 Tablets by mouth three (3) times daily. 540 Tablet 0    empagliflozin (Jardiance) 10 mg tablet Take 1 Tablet by mouth daily. 90 Tablet 0    colchicine 0.6 mg tablet Take 1 Tablet by mouth daily. as needed for Gout or Pain. 30 Tablet 1    predniSONE (STERAPRED DS) 10 mg dose pack See administration instruction per 10mg dose pack (Patient taking differently: See administration instruction per 10mg dose pack (has on hand for emergency)) 21 Tablet 0    ferrous sulfate (Iron) 325 mg (65 mg iron) tablet Take  by mouth Daily (before breakfast). Takes 1/2 tab daily       torsemide (DEMADEX) 20 mg tablet TAKE 2 TABLETS BY MOUTH ONCE DAILY.  MAY INCREASE TO TWO TABLETS IN THE MORNING AND ONE TABLET IN THE EVENING AS NEEDED BASED ON WEIGHT 90 Tablet 2    warfarin (COUMADIN) 5 mg tablet Take 1 Tablet by mouth daily. 90 Tablet 3    metOLazone (ZAROXOLYN) 5 mg tablet Take 1 Tab by mouth daily. Take as needed for weight gain >5lbs. 30 Tab 1    docusate sodium (COLACE) 100 mg capsule Take 100 mg by mouth as needed.  psyllium (METAMUCIL) powd Take  by mouth as needed.  rOPINIRole (REQUIP) 0.5 mg tablet TAKE 1 TABLET 1 TO 3 HOURS BEFORE BEDTIME ONCE A DAY FOR 30 DAYS      cpap machine kit by Does Not Apply route daily.  allopurinoL (ZYLOPRIM) 100 mg tablet Take 100 mg by mouth daily.  MULTIVITAMIN (MULTIPLE VITAMIN PO) Take 1 Tab by mouth daily.            Allergies   Allergen Reactions    Penicillins Hives    Metoprolol Shortness of Breath         Past Medical History:   Diagnosis Date    Arrhythmia     a fib    Arthritis     left ankle    Asthma     Back pain     Gout     left foot     Heart failure (HCC)     Heart failure ([facility] 2017    Hypertension     Nicotine vapor product user     Non-nicotine vapor product user     Restless leg syndrome     Sleep apnea     wears CPAP    Spinal stenosis     Staph infection          Past Surgical History:   Procedure Laterality Date    HX TYMPANOSTOMY           Family History   Problem Relation Age of Onset    COPD Mother     Hypertension Mother     Diabetes Mother     Heart Disease Mother     Heart Disease Father     Hypertension Father     Obesity Paternal Aunt     Hypertension Paternal Aunt     Diabetes Paternal Aunt     Cancer Paternal Uncle         lung    Cancer Maternal Grandfather         lung    No Known Problems Paternal Grandmother          Social History     Tobacco Use    Smoking status: Former Smoker     Packs/day: 1.00     Quit date: 2017     Years since quittin.8    Smokeless tobacco: Current User    Tobacco comment: vapes   Substance Use Topics    Alcohol use: Yes     Comment: Hard seltzer 10 a week          Review of Systems    Pertinent items are noted in HPI. Objective: There were no vitals filed for this visit. General: alert, cooperative, no distress   Mental  status: normal mood, behavior, speech, dress, motor activity, and thought processes, able to follow commands   HENT: NCAT   Neck: no visualized mass   Resp: no respiratory distress   Neuro: no gross deficits   Skin: no discoloration or lesions of concern on visible areas   Psychiatric: normal affect, consistent with stated mood, no evidence of hallucinations     Libby Alexander MD    Patient Instructions          Taking Warfarin Safely: Care Instructions  Your Care Instructions     Warfarin is a medicine that you take to prevent blood clots. It is often called a blood thinner. Doctors give warfarin (such as Coumadin) to reduce the risk of blood clots. You may be at risk for blood clots if you have atrial fibrillation or deep vein thrombosis. Some other health problems may also put you at risk. Warfarin slows the amount of time it takes for your blood to clot. It can cause bleeding problems. Even if you've been taking warfarin for a while, it's important to know how to take it safely. Foods and other medicines can affect the way warfarin works. Some can make warfarin work too well. This can cause bleeding problems. And some can make it work poorly, so that it does not prevent blood clots very well. You will need regular blood tests to check how long it takes for your blood to form a clot. This test is called a PT or prothrombin time test. The result of the test is called an INR level. Depending on the test results, your doctor or anticoagulation clinic may adjust your dose of warfarin. Follow-up care is a key part of your treatment and safety. Be sure to make and go to all appointments, and call your doctor if you are having problems. It's also a good idea to know your test results and keep a list of the medicines you take.   How can you care for yourself at home? Take warfarin safely  · Take your warfarin at the same time each day. · If you miss a dose of warfarin, don't take an extra dose to make up for it. Your doctor can tell you exactly what to do so you don't take too much or too little. · Wear medical alert jewelry that lets others know that you take warfarin. You can buy this at most drugstores. · Don't take warfarin if you are pregnant or planning to get pregnant. Talk to your doctor about how you can prevent getting pregnant while you are taking it. · Don't change your dose or stop taking warfarin unless your doctor tells you to. Effects of medicines and food on warfarin  · Don't start or stop taking any medicines, vitamins, or natural remedies unless you first talk to your doctor. Many medicines can affect how warfarin works. These include aspirin and other pain relievers, over-the-counter medicines, multivitamins, dietary supplements, and herbal products. · Tell all of your doctors and pharmacists that you take warfarin. Some prescription medicines can affect how warfarin works. · Keep the amount of vitamin K in your diet about the same from day to day. Do not suddenly eat a lot more or a lot less food that is rich in vitamin K than you usually do. Vitamin K affects how warfarin works and how your blood clots. Talk with your doctor before making big changes in your diet. Foods that have a lot of vitamin K include cooked green vegetables, such as:  ? Kale, spinach, turnip greens, herve greens, Swiss chard, and mustard greens. ? [place] sprouts, broccoli, and cabbage. · Limit your use of alcohol. Avoid bleeding by preventing falls and injuries  · Wear slippers or shoes with nonskid soles. · Remove throw rugs and clutter. · Rearrange furniture and electrical cords to keep them out of walking paths. · Keep stairways, porches, and outside walkways well lit. Use night-lights in hallways and bathrooms.   · Be extra careful when you work with sharp tools or knives. When should you call for help? Call 911 anytime you think you may need emergency care. For example, call if:    · You have a sudden, severe headache that is different from past headaches. Call your doctor now or seek immediate medical care if:    · You have any abnormal bleeding, such as:  ? Nosebleeds. ? Vaginal bleeding that is different (heavier, more frequent, at a different time of the month) than what you are used to.  ? Bloody or black stools, or rectal bleeding. ? Bloody or pink urine. Watch closely for changes in your health, and be sure to contact your doctor if you have any problems. Where can you learn more? Go to http://www.gray.com/  Enter V458 in the search box to learn more about \"Taking Warfarin Safely: Care Instructions. \"  Current as of: April 29, 2021               Content Version: 13.0  © 2640-5074 [employer]. Care instructions adapted under license by [employer] (which disclaims liability or warranty for this information). If you have questions about a medical condition or this instruction, always ask your healthcare professional. [place] [address] any warranty or liability for your use of this information.

## 2021-12-07 ENCOUNTER — TELEPHONE (OUTPATIENT)
Dept: CARDIOLOGY CLINIC | Age: 51
End: 2021-12-07

## 2021-12-07 NOTE — TELEPHONE ENCOUNTER
Jardiance 10mg tablet approved effective from 11/30/21 to 12/6/22. [facility]  Patient ID: [id number]    Request ID: [id number]    Notified patient. Stated he did not want to start medication at this time d/t the associated side effects. Also stated his mother took it and was intolerant of it's side effects. May reconsider in the future and  medication from pharmacy.

## 2021-12-14 ENCOUNTER — TELEPHONE (OUTPATIENT)
Dept: CARDIOLOGY CLINIC | Age: 51
End: 2021-12-14

## 2021-12-14 LAB
BUN SERPL-MCNC: 16 MG/DL (ref 6–24)
BUN/CREAT SERPL: 13 (ref 9–20)
CALCIUM SERPL-MCNC: 9.3 MG/DL (ref 8.7–10.2)
CHLORIDE SERPL-SCNC: 97 MMOL/L (ref 96–106)
CO2 SERPL-SCNC: 29 MMOL/L (ref 20–29)
CREAT SERPL-MCNC: 1.19 MG/DL (ref 0.76–1.27)
GLUCOSE SERPL-MCNC: 120 MG/DL (ref 65–99)
POTASSIUM SERPL-SCNC: 3.4 MMOL/L (ref 3.5–5.2)
SODIUM SERPL-SCNC: 140 MMOL/L (ref 134–144)

## 2021-12-14 NOTE — PROGRESS NOTES
Can you call pt with stable results? BMP looks good - renal function looks great! Potassium is improved as well- still borderline low. Please have pt work on increased potassium in his diet - also cont potassium 40mEq tid as rx.

## 2022-01-19 RX ORDER — WARFARIN SODIUM 5 MG/1
5 TABLET ORAL DAILY
Qty: 90 TABLET | Refills: 0 | Status: SHIPPED | OUTPATIENT
Start: 2022-01-19 | End: 2022-03-30 | Stop reason: SDUPTHER

## 2022-01-27 ENCOUNTER — OFFICE VISIT (OUTPATIENT)
Dept: FAMILY MEDICINE CLINIC | Age: 52
End: 2022-01-27
Payer: COMMERCIAL

## 2022-01-27 VITALS
OXYGEN SATURATION: 98 % | DIASTOLIC BLOOD PRESSURE: 84 MMHG | SYSTOLIC BLOOD PRESSURE: 136 MMHG | WEIGHT: 251 LBS | HEART RATE: 100 BPM | BODY MASS INDEX: 40.34 KG/M2 | RESPIRATION RATE: 24 BRPM | HEIGHT: 66 IN | TEMPERATURE: 97.6 F

## 2022-01-27 DIAGNOSIS — Z86.2 HISTORY OF ANEMIA: ICD-10-CM

## 2022-01-27 DIAGNOSIS — I48.11 LONGSTANDING PERSISTENT ATRIAL FIBRILLATION (HCC): Primary | ICD-10-CM

## 2022-01-27 DIAGNOSIS — Z51.81 ENCOUNTER FOR THERAPEUTIC DRUG MONITORING: ICD-10-CM

## 2022-01-27 DIAGNOSIS — E87.6 HYPOKALEMIA: ICD-10-CM

## 2022-01-27 LAB
INR BLD: 1.5
PT POC: 17.5 SECONDS
VALID INTERNAL CONTROL?: YES

## 2022-01-27 PROCEDURE — 85610 PROTHROMBIN TIME: CPT | Performed by: INTERNAL MEDICINE

## 2022-01-27 PROCEDURE — 99213 OFFICE O/P EST LOW 20 MIN: CPT | Performed by: INTERNAL MEDICINE

## 2022-01-27 NOTE — PROGRESS NOTES
Identified pt with two pt identifiers(name and ). Chief Complaint   Patient presents with    Coagulation disorder     INR        Health Maintenance Due   Topic    Pneumococcal 0-64 years (1 of 2 - PPSV23)    DTaP/Tdap/Td series (1 - Tdap)    Colorectal Cancer Screening Combo     Shingrix Vaccine Age 50> (1 of 2)    COVID-19 Vaccine (3 - Booster for Moderna series)       Wt Readings from Last 3 Encounters:   22 251 lb (113.9 kg)   21 253 lb (114.8 kg)   21 257 lb (116.6 kg)     Temp Readings from Last 3 Encounters:   22 97.6 °F (36.4 °C) (Temporal)   21 98 °F (36.7 °C) (Temporal)   21 97.4 °F (36.3 °C) (Oral)     BP Readings from Last 3 Encounters:   22 136/84   21 112/66   21 126/74     Pulse Readings from Last 3 Encounters:   22 100   21 67   21 72         Learning Assessment:  :     Learning Assessment 8/3/2017   PRIMARY LEARNER Patient   PRIMARY LANGUAGE ENGLISH   LEARNER PREFERENCE PRIMARY LISTENING   ANSWERED BY patient   RELATIONSHIP SELF       Depression Screening:  :     3 most recent PHQ Screens 2022   Little interest or pleasure in doing things Not at all   Feeling down, depressed, irritable, or hopeless Not at all   Total Score PHQ 2 0   Trouble falling or staying asleep, or sleeping too much -   Feeling tired or having little energy -   Poor appetite, weight loss, or overeating -   Feeling bad about yourself - or that you are a failure or have let yourself or your family down -   Trouble concentrating on things such as school, work, reading, or watching TV -   Moving or speaking so slowly that other people could have noticed; or the opposite being so fidgety that others notice -   Thoughts of being better off dead, or hurting yourself in some way -   PHQ 9 Score -       Fall Risk Assessment:  :     Fall Risk Assessment, last 12 mths 2019   Able to walk? Yes   Fall in past 12 months?  No       Abuse Screening:  : Abuse Screening Questionnaire 1/27/2022 11/30/2021 8/19/2021 7/18/2019   Do you ever feel afraid of your partner? N N N N   Are you in a relationship with someone who physically or mentally threatens you? N N N N   Is it safe for you to go home? Y Y Y Y       Coordination of Care Questionnaire:  :     1) Have you been to an emergency room, urgent care clinic since your last visit? no   Hospitalized since your last visit? no             2) Have you seen or consulted any other health care providers outside of [address] [place] [address] since your last visit? no  (Include any pap smears or colon screenings in this section.)    3) Do you have an Advance Directive on file? no  Are you interested in receiving information about Advance Directives? no    Reviewed record in preparation for visit and have obtained necessary documentation.

## 2022-01-27 NOTE — PATIENT INSTRUCTIONS
COUMADIN INSTRUCTIONS  1. INR is TOO LOW  2. Increased Coumadin to 7.5 mgs Mon-Fri   Coumadin 5 mgs Sat and Sun.   3. Please return in 2-weeks to recheck INR. Also, return when you can to have other labs checked. You do not need to see me for the labs, but will need to see me in 2-weeks when we recheck your INR.

## 2022-01-27 NOTE — PROGRESS NOTES
Chief Complaint   Patient presents with    Coagulation disorder     INR         Assessment/ Plan:   Diagnoses and all orders for this visit:    1. Longstanding persistent atrial fibrillation (HCC)  -     AMB POC PT/INR    2. Encounter for therapeutic drug monitoring    3. Hypokalemia  -     METABOLIC PANEL, COMPREHENSIVE; Future    4. History of anemia  -     CBC WITH AUTOMATED DIFF; Future    I have discussed the diagnosis with the patient and the intended treatment plan as seen in the above orders. The patient has received an after-visit summary and questions were answered concerning future plans. Asked to return should symptoms worsen or not improve with treatment. Any pending labs and studies will be relayed to patient when they become available. Pt verbalizes understanding of plan of care and denies further questions or concerns at this time. Follow-up and Dispositions    · Return in about 2 weeks (around 2/10/2022), or if symptoms worsen or fail to improve, for PT/INR. Subjective:     Laine Rudd is a 45 y.o. male who presents today for Anticoagulation monitoring. He has a h/o CHF, A.fib and persistent hypokalemia. Recently, his potassium was increased to 40 mgs TID. He also is here today because he needs a PT/INR. Indication: Atrial Fibrillation  INR Goal: 2.0-3.0. Current dose:  Coumadin 5mg daily. Missed Coumadin Doses:  None  Medication Changes:  no  Dietary Changes:  no    Symptoms: taking coumadin appropriately without any bleeding. Latest INRs:  Lab Results   Component Value Date/Time    INR POC 1.5 01/27/2022 09:58 AM    INR POC 2.0 11/30/2021 04:21 PM    INR POC 2.0 07/26/2021 03:35 PM        New Coumadin dose:. .  1. INR is TOO LOW  2. Increased Coumadin to 7.5 mgs Mon-Fri   Coumadin 5 mgs Sat and Sun. Next check to be scheduled for  2 weeks.       Lab Results   Component Value Date/Time    Sodium 140 12/13/2021 01:03 PM    Potassium 3.4 (L) 12/13/2021 01:03 PM Chloride 97 12/13/2021 01:03 PM    CO2 29 12/13/2021 01:03 PM    Anion gap 6 07/15/2019 01:46 AM    Glucose 120 (H) 12/13/2021 01:03 PM    BUN 16 12/13/2021 01:03 PM    Creatinine 1.19 12/13/2021 01:03 PM    BUN/Creatinine ratio 13 12/13/2021 01:03 PM    GFR est AA 81 12/13/2021 01:03 PM    GFR est non-AA 70 12/13/2021 01:03 PM    Calcium 9.3 12/13/2021 01:03 PM       Patient Active Problem List    Diagnosis Date Noted    Acute on chronic systolic CHF (congestive heart failure) ([facility] 07/08/2019    Acute renal insufficiency 07/08/2019    Chronic diastolic heart failure ([facility] 04/25/2018    Chronic systolic congestive heart failure ([facility] 04/25/2018    Chronic anticoagulation 10/13/2017    Persistent atrial fibrillation ([facility] 07/18/2017    Morbid obesity due to excess calories ([facility] 07/18/2017    Renal insufficiency 06/22/2017    PEPE (obstructive sleep apnea) 06/22/2017    Tobacco use 06/22/2017    Chronic back pain 06/22/2017    Asthma 06/22/2017    Elevated BP without diagnosis of hypertension 06/22/2017    Exertional dyspnea 06/22/2017         Current Outpatient Medications   Medication Sig Dispense Refill    warfarin (COUMADIN) 5 mg tablet Take 1 Tablet by mouth daily. 90 Tablet 0    dilTIAZem IR (CARDIZEM) 30 mg tablet TAKE 1 TABLET BY MOUTH TWICE DAILY HOLD  IF  BLOOD  PRESSURE  IS  LESS  THAN  100/55 180 Tablet 0    potassium chloride SR (K-TAB) 20 mEq tablet Take 2 Tablets by mouth three (3) times daily. 540 Tablet 0    ferrous sulfate (Iron) 325 mg (65 mg iron) tablet Take  by mouth Daily (before breakfast). Takes 1/2 tab daily       torsemide (DEMADEX) 20 mg tablet TAKE 2 TABLETS BY MOUTH ONCE DAILY. MAY INCREASE TO TWO TABLETS IN THE MORNING AND ONE TABLET IN THE EVENING AS NEEDED BASED ON WEIGHT 90 Tablet 2    metOLazone (ZAROXOLYN) 5 mg tablet Take 1 Tab by mouth daily. Take as needed for weight gain >5lbs. 30 Tab 1    psyllium (METAMUCIL) powd Take  by mouth as needed.       rOPINIRole (REQUIP) 0.5 mg tablet TAKE 1 TABLET 1 TO 3 HOURS BEFORE BEDTIME ONCE A DAY FOR 30 DAYS      cpap machine kit by Does Not Apply route daily.  allopurinoL (ZYLOPRIM) 100 mg tablet Take 100 mg by mouth daily.  MULTIVITAMIN (MULTIPLE VITAMIN PO) Take 1 Tab by mouth daily.  empagliflozin (Jardiance) 10 mg tablet Take 1 Tablet by mouth daily. (Patient not taking: Reported on 11/30/2021) 90 Tablet 0    colchicine 0.6 mg tablet Take 1 Tablet by mouth daily. as needed for Gout or Pain. (Patient not taking: Reported on 11/30/2021) 30 Tablet 1    predniSONE (STERAPRED DS) 10 mg dose pack See administration instruction per 10mg dose pack (Patient not taking: Reported on 11/30/2021) 21 Tablet 0    docusate sodium (COLACE) 100 mg capsule Take 100 mg by mouth as needed.  (Patient not taking: Reported on 11/30/2021)           Allergies   Allergen Reactions    Penicillins Hives    Metoprolol Shortness of Breath         Past Medical History:   Diagnosis Date    Arrhythmia     a fib    Arthritis     left ankle    Asthma     Back pain     Gout     left foot     Heart failure (HCC)     Heart failure ([facility] 2017    Hypertension     Nicotine vapor product user     Non-nicotine vapor product user     Restless leg syndrome     Sleep apnea     wears CPAP    Spinal stenosis     Staph infection          Past Surgical History:   Procedure Laterality Date    HX TYMPANOSTOMY           Family History   Problem Relation Age of Onset    COPD Mother     Hypertension Mother     Diabetes Mother     Heart Disease Mother     Heart Disease Father     Hypertension Father     Obesity Paternal Aunt     Hypertension Paternal Aunt     Diabetes Paternal Aunt     Cancer Paternal Uncle         lung    Cancer Maternal Grandfather         lung    No Known Problems Paternal Grandmother          Social History     Tobacco Use    Smoking status: Former Smoker     Packs/day: 1.00     Quit date: 2/5/2017     Years since quittin.9    Smokeless tobacco: Current User    Tobacco comment: vapes   Substance Use Topics    Alcohol use: Yes     Comment: Hard seltzer 10 a week          Review of Systems    Pertinent items are noted in HPI. Objective:     Vitals:    22 0952   BP: 136/84   Pulse: 100   Resp: 24   Temp: 97.6 °F (36.4 °C)   TempSrc: Temporal   SpO2: 98%   Weight: 251 lb (113.9 kg)   Height: 5' 6\" (1.676 m)       General: alert, cooperative, no distress   Mental  status: normal mood, behavior, speech, dress, motor activity, and thought processes, able to follow commands   HENT: NCAT   Neck: no visualized mass   Resp: no respiratory distress   Neuro: no gross deficits   Skin: no discoloration or lesions of concern on visible areas   Psychiatric: normal affect, consistent with stated mood, no evidence of hallucinations     Caroline You MD    Patient Instructions   COUMADIN INSTRUCTIONS  1. INR is TOO LOW  2. Increased Coumadin to 7.5 mgs Mon-Fri   Coumadin 5 mgs Sat and Sun.   3. Please return in 2-weeks to recheck INR. Also, return when you can to have other labs checked. You do not need to see me for the labs, but will need to see me in 2-weeks when we recheck your INR. Paco Knight

## 2022-01-31 NOTE — PROGRESS NOTES
Jhonny Burkitt, MD    [id number] [place], [address] [facility]    Office [phone],NDS [phone]  /    Mercy Parra. is a 45 y.o. male is here for f/u visit. Primary care physician:  Chelsie Reyna MD      Dear  Phuc Parsons,    I had the pleasure of seeing Mr. Mercy Suarez in the office today. Chief complaint:  As documented in EMR    Assessment:  Chronic SHF - EF 25-30%. Nonischemic cardiomyopathy. ([facility] admit 7/8/19 Acute on chronic SHF. Underwent RHC/LHC - no sig CAD)  Persistent atrial fibrillation  Chronic anticoagulation - INR followed by PCP  Morbid obesity - has  some wt  Hx of Smoker - quit smoking/not chewing tobacco  Hx of Excessive EtOH  -- occ drinks beer now  Anemia - on iron supplements        Plan:     CHF - volume compensated. Using Torsemide 40 to 60mg /d in addition to metolazone 5mg every 5d. Pt could not afford Entresto. He is willing to retry Entresto provided that it is approved by his insurance-we will restart Entresto 24/26 1 tablet twice daily. I had a detailed discussion with the patient stating that he is not in any GDMT for his cardiomyopathy other than being on diuretics. Also he could not tolerate metoprolol and does not want to take it again. He takes Cardizem for rate control of his A. fib. I would like to switch him to another beta-blocker/rate controlling agent if possible if patient agrees-we will readdress at a later date. He was recently started on Jardiance by ANP-Ms. Cheung (11/2021)-however he could not afford the medication/did not like the side effects of the medication is not currently taking it. He did not fill the prescription.     On short acting cardizem which he takes when HR is elevated    Labs reviewed 11/1/2021-creatinine 1.1, K3.4    Will need BMP next visit after starting Entresto      On anticoagulation on Coumadin/INR  -INR followed by PCP      Has seen EP ( Dr Corey Anthony) -recommended for persistent A. fib with RVR-AV node ablation/CRT-D. However pt is worried about finances and he wants to check with his insurance company prior to proceeding. Will continue to readdress this issue. F/u with in 8 weeks/earlier as needed       Lab Results   Component Value Date/Time    Cholesterol, total 129 08/25/2021 12:00 AM    HDL Cholesterol 32 (L) 08/25/2021 12:00 AM    LDL,Direct 98 01/03/2012 07:00 PM    LDL, calculated 64 08/25/2021 12:00 AM    LDL, calculated 72 12/26/2019 04:19 PM    VLDL, calculated 33 08/25/2021 12:00 AM    VLDL, calculated 50 (H) 12/26/2019 04:19 PM    Triglyceride 199 (H) 08/25/2021 12:00 AM    CHOL/HDL Ratio 5.2 (H) 06/23/2017 04:40 AM         Patient understands the plan. All questions were answered to the patient's satisfaction. Medication Side Effects and Warnings were discussed with patient: yes  Patient Labs were reviewed and or requested:  yes  Patient Past Records were reviewed and or requested: yes    I appreciate the opportunity to be involved in . See note below for details. Please do not hesitate to contact us with questions or concerns. Kenroy Pinto MD    Cardiac Testing/ Procedures: A. Cardiac Cath/PCI: 7/1/19 - LHC/RHC - No sig CAD; Mean PA pressure 21 mm Hg    B.ECHO/EVITA: 12/26/2019-severely dilated LV, LVEF 25 to 30%, severely dilated left atrium, dilated right atrium, mild TR, moderate MR  6/21/19 - EF 26-30%; Mod LAE; DIDIER: Mild to Mod MR; Mild to Mod TR; Mod Pulm HTN    6/22/2017 Left ventricle: Systolic function was mildly reduced. Ejection fraction was estimated to be 45 %. There were no regional wall motion abnormalities. Tricuspid valve: There was mild regurgitation. C.StressNuclear/Stress ECHO/Stress test: 6/26/19 - Abnormal ledy nuc; EF 24%; Fixed defect( inf/apical)    D. Vascular:    E. EP:    F. Miscellaneous:    Subjective:  Andres Ortega is a 45 y.o. male who returns for follow up visit. No CP.   Dyspnea - chronic     Currently on short acting Cardizem because a long-acting Cardizem was dropping his blood pressures. He states that his blood pressure doing well. He was started on beta-blockers (metoprolol/coreg) previously but was stopped becauase the medication did  not \"agree with him\"     Takes cardizem only if HR is high. Holter monitor-1/22/2021-1/28/2021 1/22/2021-A. fib with RVR (heart rate 120s),   1/28/2021-1 episode of NSVT-13 beat run (auto detected). PVCs    Has seen EP ( Dr Roberto Perkins) -recommended for persistent A. fib with RVR-AV node ablation/CRT-D. However pt is worried about finances and he wants to check with his insurance company prior to proceeding. [facility] admit 7/8/19 Acute on chronic SHF. Underwent RHC/LHC - no sig CAD    Tries to eat low-salt/heart healthy diet. Has been losing wt        ROS:  (bold if positive, if negative)             Medications before admission:    Current Outpatient Medications   Medication Sig Dispense    warfarin (COUMADIN) 5 mg tablet Take 1 Tablet by mouth daily. (Patient taking differently: Take 5 mg by mouth daily. 1.5 during the week and 1 on the weekend) 90 Tablet    dilTIAZem IR (CARDIZEM) 30 mg tablet TAKE 1 TABLET BY MOUTH TWICE DAILY HOLD  IF  BLOOD  PRESSURE  IS  LESS  THAN  100/55 180 Tablet    potassium chloride SR (K-TAB) 20 mEq tablet Take 2 Tablets by mouth three (3) times daily. 540 Tablet    ferrous sulfate (Iron) 325 mg (65 mg iron) tablet Take  by mouth Daily (before breakfast). Takes 1/2 tab daily      torsemide (DEMADEX) 20 mg tablet TAKE 2 TABLETS BY MOUTH ONCE DAILY. MAY INCREASE TO TWO TABLETS IN THE MORNING AND ONE TABLET IN THE EVENING AS NEEDED BASED ON WEIGHT 90 Tablet    metOLazone (ZAROXOLYN) 5 mg tablet Take 1 Tab by mouth daily. Take as needed for weight gain >5lbs. (Patient taking differently: Take 5 mg by mouth daily. Take as needed for weight gain >5lbs. Every 5 days) 30 Tab    psyllium (METAMUCIL) powd Take  by mouth as needed.      rOPINIRole (REQUIP) 0.5 mg tablet TAKE 1 TABLET 1 TO 3 HOURS BEFORE BEDTIME ONCE A DAY FOR 30 DAYS     cpap machine kit by Does Not Apply route daily.  allopurinoL (ZYLOPRIM) 100 mg tablet Take 100 mg by mouth daily.  MULTIVITAMIN (MULTIPLE VITAMIN PO) Take 1 Tab by mouth daily. No current facility-administered medications for this visit.        Family History of CAD:    Yes    Social History:  Current  Smoker  No    Physical Exam:  Visit Vitals  /82 (BP 1 Location: Left upper arm, BP Patient Position: Sitting)   Pulse 88   Ht 5' 6\" (1.676 m)   Wt 254 lb (115.2 kg)   SpO2 97%   BMI 41.00 kg/m²       Gen: Well-developed, well-nourished, morbid obesity  Neck: Supple,thick neck, unable to appreciate JVD  Resp: No accessory muscle use, Clear breath sounds, No rales or rhonchi  Card: Irregular Rate,Rythm,Normal S1, S2, No murmurs  Abd:  Soft,morbidly obese, Abd wall - indurated skin  MSK: No cyanosis  Neuro: moving all four extremities , follows commands appropriately  Psych:  Good insight, oriented to person, place , alert, Nml Affect  LE:Edema - mild    EKG: A. fib-rate 1 1 9 bpm, IVCD/incomplete LBBB/nonspecific ST-T changes      LABS:        Lab Results   Component Value Date/Time    WBC 10.0 08/25/2021 12:00 AM    HGB 14.9 08/25/2021 12:00 AM    HCT 46.1 08/25/2021 12:00 AM    PLATELET 788 26/68/3713 12:00 AM     Lab Results   Component Value Date/Time    Sodium 140 12/13/2021 01:03 PM    Potassium 3.4 (L) 12/13/2021 01:03 PM    Chloride 97 12/13/2021 01:03 PM    CO2 29 12/13/2021 01:03 PM    Anion gap 6 07/15/2019 01:46 AM    Glucose 120 (H) 12/13/2021 01:03 PM    BUN 16 12/13/2021 01:03 PM    Creatinine 1.19 12/13/2021 01:03 PM    BUN/Creatinine ratio 13 12/13/2021 01:03 PM    GFR est AA 81 12/13/2021 01:03 PM    GFR est non-AA 70 12/13/2021 01:03 PM    Calcium 9.3 12/13/2021 01:03 PM       Lab Results   Component Value Date/Time    aPTT 28.0 06/22/2017 12:54 PM               Raul Chung MD

## 2022-02-01 ENCOUNTER — OFFICE VISIT (OUTPATIENT)
Dept: CARDIOLOGY CLINIC | Age: 52
End: 2022-02-01
Payer: COMMERCIAL

## 2022-02-01 VITALS
SYSTOLIC BLOOD PRESSURE: 134 MMHG | BODY MASS INDEX: 40.82 KG/M2 | DIASTOLIC BLOOD PRESSURE: 82 MMHG | HEART RATE: 98 BPM | OXYGEN SATURATION: 97 % | HEIGHT: 66 IN | WEIGHT: 254 LBS

## 2022-02-01 DIAGNOSIS — E66.01 MORBID OBESITY (HCC): ICD-10-CM

## 2022-02-01 DIAGNOSIS — Z79.01 ANTICOAGULATED ON COUMADIN: ICD-10-CM

## 2022-02-01 DIAGNOSIS — I48.19 PERSISTENT ATRIAL FIBRILLATION (HCC): ICD-10-CM

## 2022-02-01 DIAGNOSIS — I50.22 CHRONIC SYSTOLIC CONGESTIVE HEART FAILURE (HCC): Primary | ICD-10-CM

## 2022-02-01 PROCEDURE — 93000 ELECTROCARDIOGRAM COMPLETE: CPT | Performed by: INTERNAL MEDICINE

## 2022-02-01 PROCEDURE — 99214 OFFICE O/P EST MOD 30 MIN: CPT | Performed by: INTERNAL MEDICINE

## 2022-02-01 RX ORDER — TORSEMIDE 20 MG/1
TABLET ORAL
Qty: 180 TABLET | Refills: 3 | Status: SHIPPED | OUTPATIENT
Start: 2022-02-01 | End: 2022-05-26 | Stop reason: SDUPTHER

## 2022-02-01 NOTE — LETTER
2/1/2022    Patient: Jordan Godinez. YOB: 1970   Date of Visit: 2/1/2022     Leah Henriquez MD  [facility]  [address] [facility]    Dear Leah Henriquez MD,      Thank you for referring Mr. Kerri Sanchez to [address] for evaluation. My notes for this consultation are attached. If you have questions, please do not hesitate to call me. I look forward to following your patient along with you.       Sincerely,    Tyrese Russo MD

## 2022-02-01 NOTE — PROGRESS NOTES
Sima Lee is a 45 y.o. male    Chief Complaint   Patient presents with    Follow-up     3 month f/u alvarado REYNOLDS.  Hypertension    CHF       Chest pain No    SOB some slight SOB with weight gain     Dizziness slight dizziness at times     Swelling No    Refills No    Visit Vitals  /82 (BP 1 Location: Left upper arm, BP Patient Position: Sitting)   Pulse 98   Ht 5' 6\" (1.676 m)   Wt 254 lb (115.2 kg)   SpO2 97%   BMI 41.00 kg/m²       1. Have you been to the ER, urgent care clinic since your last visit? Hospitalized since your last visit? No    2. Have you seen or consulted any other health care providers outside of the [address] [place] [address] since your last visit? Include any pap smears or colon screening.   No

## 2022-02-08 ENCOUNTER — TELEPHONE (OUTPATIENT)
Dept: CARDIOLOGY CLINIC | Age: 52
End: 2022-02-08

## 2022-02-08 RX ORDER — SACUBITRIL AND VALSARTAN 24; 26 MG/1; MG/1
1 TABLET, FILM COATED ORAL 2 TIMES DAILY
Qty: 180 TABLET | Refills: 3 | Status: SHIPPED | OUTPATIENT
Start: 2022-02-08

## 2022-02-08 NOTE — TELEPHONE ENCOUNTER
Refill per VO of Gus  Last appt: Visit date not found  Future Appointments   Date Time Provider Gray Carrasquillo   2/10/2022  9:45 AM LAB_PMA PMA BS AMB   4/1/2022  3:20 PM Pily Weber MD [facility] BS AMB       Requested Prescriptions     Signed Prescriptions Disp Refills    sacubitriL-valsartan (Entresto) 24-26 mg tablet 180 Tablet 3     Sig: Take 1 Tablet by mouth two (2) times a day.      Authorizing Provider: Radha Hanna     Ordering User: Jim Seip

## 2022-02-08 NOTE — TELEPHONE ENCOUNTER
Patient was seen in the office on 2/1/22 and stated he was to start entresto but the pharmacy has not received the prescription, please advise        [phone]    [facility] [phone]

## 2022-02-09 RX ORDER — ALLOPURINOL 100 MG/1
100 TABLET ORAL DAILY
Qty: 30 TABLET | Refills: 5 | Status: SHIPPED | OUTPATIENT
Start: 2022-02-09 | End: 2022-03-11

## 2022-02-10 ENCOUNTER — APPOINTMENT (OUTPATIENT)
Dept: FAMILY MEDICINE CLINIC | Age: 52
End: 2022-02-10

## 2022-02-11 ENCOUNTER — TELEPHONE (OUTPATIENT)
Dept: FAMILY MEDICINE CLINIC | Age: 52
End: 2022-02-11

## 2022-02-11 LAB
ALBUMIN SERPL-MCNC: 4.8 G/DL (ref 3.8–4.9)
ALBUMIN/GLOB SERPL: 2 {RATIO} (ref 1.2–2.2)
ALP SERPL-CCNC: 93 IU/L (ref 44–121)
ALT SERPL-CCNC: 24 IU/L (ref 0–44)
AST SERPL-CCNC: 22 IU/L (ref 0–40)
BASOPHILS # BLD AUTO: 0 X10E3/UL (ref 0–0.2)
BASOPHILS NFR BLD AUTO: 1 %
BILIRUB SERPL-MCNC: 0.6 MG/DL (ref 0–1.2)
BUN SERPL-MCNC: 18 MG/DL (ref 6–24)
BUN/CREAT SERPL: 15 (ref 9–20)
CALCIUM SERPL-MCNC: 9.6 MG/DL (ref 8.7–10.2)
CHLORIDE SERPL-SCNC: 93 MMOL/L (ref 96–106)
CO2 SERPL-SCNC: 25 MMOL/L (ref 20–29)
CREAT SERPL-MCNC: 1.19 MG/DL (ref 0.76–1.27)
EOSINOPHIL # BLD AUTO: 0.1 X10E3/UL (ref 0–0.4)
EOSINOPHIL NFR BLD AUTO: 2 %
ERYTHROCYTE [DISTWIDTH] IN BLOOD BY AUTOMATED COUNT: 13.6 % (ref 11.6–15.4)
GLOBULIN SER CALC-MCNC: 2.4 G/DL (ref 1.5–4.5)
GLUCOSE SERPL-MCNC: 100 MG/DL (ref 65–99)
HCT VFR BLD AUTO: 48.3 % (ref 37.5–51)
HGB BLD-MCNC: 15.8 G/DL (ref 13–17.7)
IMM GRANULOCYTES # BLD AUTO: 0 X10E3/UL (ref 0–0.1)
IMM GRANULOCYTES NFR BLD AUTO: 0 %
LYMPHOCYTES # BLD AUTO: 1.4 X10E3/UL (ref 0.7–3.1)
LYMPHOCYTES NFR BLD AUTO: 17 %
MCH RBC QN AUTO: 31 PG (ref 26.6–33)
MCHC RBC AUTO-ENTMCNC: 32.7 G/DL (ref 31.5–35.7)
MCV RBC AUTO: 95 FL (ref 79–97)
MONOCYTES # BLD AUTO: 0.7 X10E3/UL (ref 0.1–0.9)
MONOCYTES NFR BLD AUTO: 9 %
NEUTROPHILS # BLD AUTO: 5.7 X10E3/UL (ref 1.4–7)
NEUTROPHILS NFR BLD AUTO: 71 %
PLATELET # BLD AUTO: 248 X10E3/UL (ref 150–450)
POTASSIUM SERPL-SCNC: 3.6 MMOL/L (ref 3.5–5.2)
PROT SERPL-MCNC: 7.2 G/DL (ref 6–8.5)
RBC # BLD AUTO: 5.1 X10E6/UL (ref 4.14–5.8)
SODIUM SERPL-SCNC: 138 MMOL/L (ref 134–144)
WBC # BLD AUTO: 7.9 X10E3/UL (ref 3.4–10.8)

## 2022-02-11 NOTE — TELEPHONE ENCOUNTER
----- Message from Cedric Osorio MD sent at 2/11/2022  9:24 AM EST -----  Please let patient know that his labs have improved. He is no longer anemic. Continue with medications as prescribed. Follow up every 4-6 months.

## 2022-02-11 NOTE — PROGRESS NOTES
Please let patient know that his labs have improved. He is no longer anemic. Continue with medications as prescribed. Follow up every 4-6 months.

## 2022-02-16 ENCOUNTER — OFFICE VISIT (OUTPATIENT)
Dept: FAMILY MEDICINE CLINIC | Age: 52
End: 2022-02-16
Payer: COMMERCIAL

## 2022-02-16 VITALS
RESPIRATION RATE: 16 BRPM | TEMPERATURE: 97.8 F | HEIGHT: 66 IN | OXYGEN SATURATION: 98 % | WEIGHT: 260 LBS | DIASTOLIC BLOOD PRESSURE: 72 MMHG | SYSTOLIC BLOOD PRESSURE: 102 MMHG | HEART RATE: 62 BPM | BODY MASS INDEX: 41.78 KG/M2

## 2022-02-16 DIAGNOSIS — K13.70 MOUTH LESION: ICD-10-CM

## 2022-02-16 DIAGNOSIS — D68.9 COAGULATION DEFECT (HCC): Primary | ICD-10-CM

## 2022-02-16 LAB
INR BLD: 2.1
PT POC: 21.6 SECONDS
VALID INTERNAL CONTROL?: YES

## 2022-02-16 PROCEDURE — 85610 PROTHROMBIN TIME: CPT | Performed by: NURSE PRACTITIONER

## 2022-02-16 PROCEDURE — 99213 OFFICE O/P EST LOW 20 MIN: CPT | Performed by: NURSE PRACTITIONER

## 2022-02-16 NOTE — PROGRESS NOTES
HISTORY OF PRESENT ILLNESS  Castro Carbajal is a 45 y.o. male. HPI   Pt presents with \"INR re-check\"  Pt is here for PT/INR check  He is currently taking 7.5 mg of Coumadin Monday-Friday, 5 mg Coumadin Saturday and Sunday  No missed doses  No diet changes  INR Goal: 2.0-3.0  INR today:2.1    In addition, he was seen by the dentist about a month ago and was found to have a lesion on the roof of his mouth. They suggested that we refer him somewhere for eval.  Review of Systems   Constitutional: Negative for fever. Physical Exam  Constitutional:       Appearance: Normal appearance. Cardiovascular:      Rate and Rhythm: Normal rate and regular rhythm. Heart sounds: Normal heart sounds. Pulmonary:      Effort: Pulmonary effort is normal.      Breath sounds: Normal breath sounds. Neurological:      Mental Status: He is alert. Psychiatric:         Mood and Affect: Mood normal.         Behavior: Behavior normal.         ASSESSMENT and PLAN    ICD-10-CM ICD-9-CM    1. Coagulation defect (HCC)  D68.9 286.9 AMB POC PT/INR   2. Mouth lesion  K13.70 528.9 REFERRAL TO ENT-OTOLARYNGOLOGY     Educated about returning to office in 1 month for INR re-check  Should continue current Coumadin dosing    Should call ENT for an appointment today    Pt informed to return to office with worsening of symptoms, or PRN with any questions or concerns. Pt verbalizes understanding of plan of care and denies further questions or concerns at this time.

## 2022-02-16 NOTE — PROGRESS NOTES
Identified pt with two pt identifiers(name and ). Chief Complaint   Patient presents with    Coagulation disorder        Health Maintenance Due   Topic    Pneumococcal 0-64 years (1 of 2 - PPSV23)    DTaP/Tdap/Td series (1 - Tdap)    Colorectal Cancer Screening Combo     Shingrix Vaccine Age 50> (1 of 2)       Wt Readings from Last 3 Encounters:   22 260 lb (117.9 kg)   22 254 lb (115.2 kg)   22 251 lb (113.9 kg)     Temp Readings from Last 3 Encounters:   22 97.8 °F (36.6 °C) (Temporal)   22 97.6 °F (36.4 °C) (Temporal)   21 98 °F (36.7 °C) (Temporal)     BP Readings from Last 3 Encounters:   22 102/72   22 134/82   22 136/84     Pulse Readings from Last 3 Encounters:   22 62   22 98   22 100         Learning Assessment:  :     Learning Assessment 8/3/2017   PRIMARY LEARNER Patient   PRIMARY LANGUAGE ENGLISH   LEARNER PREFERENCE PRIMARY LISTENING   ANSWERED BY patient   RELATIONSHIP SELF       Depression Screening:  :     3 most recent PHQ Screens 2022   Little interest or pleasure in doing things Not at all   Feeling down, depressed, irritable, or hopeless Not at all   Total Score PHQ 2 0   Trouble falling or staying asleep, or sleeping too much -   Feeling tired or having little energy -   Poor appetite, weight loss, or overeating -   Feeling bad about yourself - or that you are a failure or have let yourself or your family down -   Trouble concentrating on things such as school, work, reading, or watching TV -   Moving or speaking so slowly that other people could have noticed; or the opposite being so fidgety that others notice -   Thoughts of being better off dead, or hurting yourself in some way -   PHQ 9 Score -       Fall Risk Assessment:  :     Fall Risk Assessment, last 12 mths 2019   Able to walk? Yes   Fall in past 12 months?  No       Abuse Screening:  :     Abuse Screening Questionnaire 2022 11/30/2021 8/19/2021 7/18/2019   Do you ever feel afraid of your partner? N N N N N   Are you in a relationship with someone who physically or mentally threatens you? N N N N N   Is it safe for you to go home? Y Y Y Y Y       Coordination of Care Questionnaire:  :     1) Have you been to an emergency room, urgent care clinic since your last visit? no   Hospitalized since your last visit? no             2) Have you seen or consulted any other health care providers outside of [address] [place] [address] since your last visit? no  (Include any pap smears or colon screenings in this section.)    3) Do you have an Advance Directive on file? no  Are you interested in receiving information about Advance Directives? no      Reviewed record in preparation for visit and have obtained necessary documentation.

## 2022-03-18 PROBLEM — I48.19 PERSISTENT ATRIAL FIBRILLATION (HCC): Status: ACTIVE | Noted: 2017-07-18

## 2022-03-18 PROBLEM — R06.09 EXERTIONAL DYSPNEA: Status: ACTIVE | Noted: 2017-06-22

## 2022-03-18 PROBLEM — G89.29 CHRONIC BACK PAIN: Status: ACTIVE | Noted: 2017-06-22

## 2022-03-18 PROBLEM — N28.9 ACUTE RENAL INSUFFICIENCY: Status: ACTIVE | Noted: 2019-07-08

## 2022-03-18 PROBLEM — M54.9 CHRONIC BACK PAIN: Status: ACTIVE | Noted: 2017-06-22

## 2022-03-18 PROBLEM — Z79.01 CHRONIC ANTICOAGULATION: Status: ACTIVE | Noted: 2017-10-13

## 2022-03-19 PROBLEM — N28.9 RENAL INSUFFICIENCY: Status: ACTIVE | Noted: 2017-06-22

## 2022-03-19 PROBLEM — Z72.0 TOBACCO USE: Status: ACTIVE | Noted: 2017-06-22

## 2022-03-19 PROBLEM — I50.32 CHRONIC DIASTOLIC HEART FAILURE (HCC): Status: ACTIVE | Noted: 2018-04-25

## 2022-03-19 PROBLEM — E66.01 MORBID OBESITY DUE TO EXCESS CALORIES (HCC): Status: ACTIVE | Noted: 2017-07-18

## 2022-03-19 PROBLEM — R03.0 ELEVATED BP WITHOUT DIAGNOSIS OF HYPERTENSION: Status: ACTIVE | Noted: 2017-06-22

## 2022-03-19 PROBLEM — I50.22 CHRONIC SYSTOLIC CONGESTIVE HEART FAILURE (HCC): Status: ACTIVE | Noted: 2018-04-25

## 2022-03-20 PROBLEM — G47.33 OSA (OBSTRUCTIVE SLEEP APNEA): Status: ACTIVE | Noted: 2017-06-22

## 2022-03-20 PROBLEM — J45.909 ASTHMA: Status: ACTIVE | Noted: 2017-06-22

## 2022-03-20 PROBLEM — I50.23 ACUTE ON CHRONIC SYSTOLIC CHF (CONGESTIVE HEART FAILURE) (HCC): Status: ACTIVE | Noted: 2019-07-08

## 2022-03-30 RX ORDER — WARFARIN SODIUM 5 MG/1
TABLET ORAL
Qty: 100 TABLET | Refills: 0 | Status: SHIPPED | OUTPATIENT
Start: 2022-03-30 | End: 2022-06-01

## 2022-03-30 NOTE — TELEPHONE ENCOUNTER
----- Message from [employer] sent at 3/30/2022  8:44 AM EDT -----  Subject: Refill Request    QUESTIONS  Name of Medication? warfarin (COUMADIN) 5 mg tablet  Patient-reported dosage and instructions? 5mg tablet   How many days do you have left? 2  Preferred Pharmacy? [address] [facility]  Pharmacy phone number (if available)? [phone]  Additional Information for Provider? Patient states that he takes 5mg M-F,   and 1.5 tabs Sat-Sun, so pt is running out sooner. Pt would like to know   if he can have a higher amount so he is not running out please. Pt would   like a call back if there are any questions, thank you! Pt also has appt   scheduled for 04/13  ---------------------------------------------------------------------------  --------------  CALL BACK INFO  What is the best way for the office to contact you? OK to leave message on   voicemail  Preferred Call Back Phone Number?  [phone]

## 2022-04-01 ENCOUNTER — OFFICE VISIT (OUTPATIENT)
Dept: CARDIOLOGY CLINIC | Age: 52
End: 2022-04-01
Payer: COMMERCIAL

## 2022-04-01 VITALS
BODY MASS INDEX: 41.78 KG/M2 | OXYGEN SATURATION: 95 % | DIASTOLIC BLOOD PRESSURE: 76 MMHG | WEIGHT: 260 LBS | HEIGHT: 66 IN | SYSTOLIC BLOOD PRESSURE: 128 MMHG | HEART RATE: 76 BPM

## 2022-04-01 DIAGNOSIS — I48.19 PERSISTENT ATRIAL FIBRILLATION (HCC): ICD-10-CM

## 2022-04-01 DIAGNOSIS — Z79.01 ANTICOAGULATED ON COUMADIN: ICD-10-CM

## 2022-04-01 DIAGNOSIS — I42.9 CARDIOMYOPATHY, UNSPECIFIED TYPE (HCC): Primary | ICD-10-CM

## 2022-04-01 DIAGNOSIS — E66.01 MORBID OBESITY (HCC): ICD-10-CM

## 2022-04-01 DIAGNOSIS — I50.22 CHRONIC SYSTOLIC CONGESTIVE HEART FAILURE (HCC): ICD-10-CM

## 2022-04-01 PROCEDURE — 99214 OFFICE O/P EST MOD 30 MIN: CPT | Performed by: INTERNAL MEDICINE

## 2022-04-01 NOTE — PROGRESS NOTES
Cedric Rios MD    [address] [facility] [place], [address] [facility]    Office [phone],[facility] [phone]  /    J Carlos Lu. is a 47 y.o. male is here for f/u visit. Primary care physician:  Perfecto Anand MD      Dear  Russ Tolbert,    I had the pleasure of seeing Mr. J Carlos Lu. in the office today. Chief complaint:  As documented in EMR    Assessment:  Chronic SHF - EF 25-30%. Nonischemic cardiomyopathy. ([facility] admit 7/8/19 Acute on chronic SHF. Underwent RHC/LHC - no sig CAD)  Persistent atrial fibrillation  Chronic anticoagulation - INR followed by PCP  Morbid obesity   Hx of Smoker - quit smoking/now chewing tobacco  Hx of Excessive EtOH  -- occ drinks beer now  History of anemia        Plan:     CHF - volume compensated. Using Torsemide 40 to 60mg /d in addition to metolazone 5mg every 5d. Pt could not afford Entresto. On Entresto 24/26 1 tablet twice daily. Patient states that his weight fluctuates been 156 to 162 pounds. I have had a detailed discussion with the patient stating that he is not on GDMT for his cardiomyopathy other than being on diuretics. Recently started on Entresto. Also, he could not tolerate metoprolol and does not want to take it again. He takes Cardizem for rate control of his A. fib. I would like to switch him to another beta-blocker/rate controlling agent if possible but patient prefers to take Cardizem. Have explained to him that it is probably not the right medication when he has cardiomyopathy. Could not tolerate Jardiance/afford it. CMP was ordered today. On anticoagulation on Coumadin/INR  -INR followed by PCP    Has seen EP ( Dr Marilee Arguello) -recommended for persistent A. fib with RVR-AV node ablation/CRT-D. However pt is worried about finances and he wants to check with his insurance company prior to proceeding. Will continue to readdress this issue.       F/u with in12 weeks/earlier as needed       Lab Results   Component Value Date/Time    Cholesterol, total 129 08/25/2021 12:00 AM    HDL Cholesterol 32 (L) 08/25/2021 12:00 AM    LDL,Direct 98 01/03/2012 07:00 PM    LDL, calculated 64 08/25/2021 12:00 AM    LDL, calculated 72 12/26/2019 04:19 PM    VLDL, calculated 33 08/25/2021 12:00 AM    VLDL, calculated 50 (H) 12/26/2019 04:19 PM    Triglyceride 199 (H) 08/25/2021 12:00 AM    CHOL/HDL Ratio 5.2 (H) 06/23/2017 04:40 AM         Patient understands the plan. All questions were answered to the patient's satisfaction. Medication Side Effects and Warnings were discussed with patient: yes  Patient Labs were reviewed and or requested:  yes  Patient Past Records were reviewed and or requested: yes    I appreciate the opportunity to be involved in . See note below for details. Please do not hesitate to contact us with questions or concerns. Remedios Graham MD    Cardiac Testing/ Procedures: A. Cardiac Cath/PCI: 7/1/19 - LHC/RHC - No sig CAD; Mean PA pressure 21 mm Hg    B.ECHO/EVITA: 12/26/2019-severely dilated LV, LVEF 25 to 30%, severely dilated left atrium, dilated right atrium, mild TR, moderate MR  6/21/19 - EF 26-30%; Mod LAE; DIDIER: Mild to Mod MR; Mild to Mod TR; Mod Pulm HTN    6/22/2017 Left ventricle: Systolic function was mildly reduced. Ejection fraction was estimated to be 45 %. There were no regional wall motion abnormalities. Tricuspid valve: There was mild regurgitation. C.StressNuclear/Stress ECHO/Stress test: 6/26/19 - Abnormal ledy nuc; EF 24%; Fixed defect( inf/apical)    D. Vascular:    E. EP:Holter monitor-1/22/2021-1/28/2021 1/22/2021-A. fib with RVR (heart rate 120s),   1/28/2021-1 episode of NSVT-13 beat run (auto detected). PVCs  F. Miscellaneous:    Subjective:  Ashley Ramirez is a 47 y.o. male who returns for follow up visit. No CP. Dyspnea - chronic     Currently on short acting Cardizem because a long-acting Cardizem was dropping his blood pressures.   He states that his blood pressure doing well. He was started on beta-blockers (metoprolol/coreg) previously but was stopped becauase the medication did  not \"agree with him\"     Takes cardizem only if HR is high. Has seen EP ( Dr Hugo Prince) -recommended for persistent A. fib with RVR-AV node ablation/CRT-D. However pt is worried about finances and he wants to check with his insurance company prior to proceeding. [facility] admit 7/8/19 Acute on chronic SHF. Underwent RHC/LHC - no sig CAD    Tries to eat low-salt/heart healthy diet. Has been losing wt        ROS:  (bold if positive, if negative)             Medications before admission:    Current Outpatient Medications   Medication Sig Dispense    warfarin (COUMADIN) 5 mg tablet 1 tablet M-F and 1.5 tablets on Sat & Sun 100 Tablet    dilTIAZem IR (CARDIZEM) 30 mg tablet TAKE 1 TABLET BY MOUTH TWICE DAILY. HOLD IF BLOOD PRESSURE IS LESS THAN 100/55 180 Tablet    potassium chloride SR (K-TAB) 20 mEq tablet TAKE 2 TABLETS BY MOUTH THREE TIMES DAILY 540 Tablet    sacubitriL-valsartan (Entresto) 24-26 mg tablet Take 1 Tablet by mouth two (2) times a day. 180 Tablet    torsemide (DEMADEX) 20 mg tablet TAKE 2 TABLETS BY MOUTH ONCE DAILY. MAY INCREASE TO TWO TABLETS IN THE MORNING AND ONE TABLET IN THE EVENING AS NEEDED BASED ON WEIGHT 180 Tablet    ferrous sulfate (Iron) 325 mg (65 mg iron) tablet Take  by mouth every other day. Takes 1/2 tab daily      metOLazone (ZAROXOLYN) 5 mg tablet Take 1 Tab by mouth daily. Take as needed for weight gain >5lbs. (Patient taking differently: Take 5 mg by mouth daily. Take as needed for weight gain >5lbs. Every 5 days) 30 Tab    psyllium (METAMUCIL) powd Take  by mouth as needed.  rOPINIRole (REQUIP) 0.5 mg tablet TAKE 1 TABLET 1 TO 3 HOURS BEFORE BEDTIME ONCE A DAY FOR 30 DAYS     cpap machine kit by Does Not Apply route daily.  MULTIVITAMIN (MULTIPLE VITAMIN PO) Take 1 Tab by mouth daily.       No current facility-administered medications for this visit.        Family History of CAD:    Yes    Social History:  Current  Smoker  No    Physical Exam:  Visit Vitals  /76 (BP 1 Location: Left upper arm, BP Patient Position: Sitting)   Pulse 76   Ht 5' 6\" (1.676 m)   Wt 260 lb (117.9 kg)   SpO2 95%   BMI 41.97 kg/m²       Gen: Well-developed, well-nourished, morbid obesity  Neck: Supple,thick neck, unable to appreciate JVD  Resp: No accessory muscle use, Clear breath sounds, No rales or rhonchi  Card: Irregular Rate,Rythm,Normal S1, S2, No murmurs  Abd:  Soft,morbidly obese, Abd wall - indurated skin  MSK: No cyanosis  Neuro: moving all four extremities , follows commands appropriately  Psych:  Good insight, oriented to person, place , alert, Nml Affect  LE:Edema - mild    EKG:       LABS:        Lab Results   Component Value Date/Time    WBC 7.9 02/10/2022 12:00 AM    HGB 15.8 02/10/2022 12:00 AM    HCT 48.3 02/10/2022 12:00 AM    PLATELET 910 61/51/9446 12:00 AM     Lab Results   Component Value Date/Time    Sodium 138 02/10/2022 12:00 AM    Potassium 3.6 02/10/2022 12:00 AM    Chloride 93 (L) 02/10/2022 12:00 AM    CO2 25 02/10/2022 12:00 AM    Anion gap 6 07/15/2019 01:46 AM    Glucose 100 (H) 02/10/2022 12:00 AM    BUN 18 02/10/2022 12:00 AM    Creatinine 1.19 02/10/2022 12:00 AM    BUN/Creatinine ratio 15 02/10/2022 12:00 AM    GFR est AA 81 02/10/2022 12:00 AM    GFR est non-AA 70 02/10/2022 12:00 AM    Calcium 9.6 02/10/2022 12:00 AM       Lab Results   Component Value Date/Time    aPTT 28.0 06/22/2017 12:54 PM               Charlene Vazquez MD

## 2022-04-01 NOTE — PROGRESS NOTES
J Carlos Lu. is a 47 y.o. male    Chief Complaint   Patient presents with    Follow-up     alvarado Beaver.  CHF       Chest pain No    SOB No    Dizziness No    Swelling legs have some swelling     Refills No    Visit Vitals  /76 (BP 1 Location: Left upper arm, BP Patient Position: Sitting)   Pulse 76   Ht 5' 6\" (1.676 m)   Wt 260 lb (117.9 kg)   SpO2 95%   BMI 41.97 kg/m²       1. Have you been to the ER, urgent care clinic since your last visit? Hospitalized since your last visit? No    2. Have you seen or consulted any other health care providers outside of the [address] [place] [address] since your last visit? Include any pap smears or colon screening.   No

## 2022-04-01 NOTE — LETTER
4/1/2022    Patient: Kelley Leonardo. YOB: 1970   Date of Visit: 4/1/2022     Vito Schwartz, [facility]  [address] [facility]  Via In Basket    Dear Vito Schwartz MD,      Thank you for referring Mr. Akosua Lindsey to [address] for evaluation. My notes for this consultation are attached. If you have questions, please do not hesitate to call me. I look forward to following your patient along with you.       Sincerely,    Kenia Wyatt MD

## 2022-04-02 ENCOUNTER — TELEPHONE (OUTPATIENT)
Dept: CARDIOLOGY CLINIC | Age: 52
End: 2022-04-02

## 2022-04-02 LAB
ALBUMIN SERPL-MCNC: 4.9 G/DL (ref 3.8–4.9)
ALBUMIN/GLOB SERPL: 2.3 {RATIO} (ref 1.2–2.2)
ALP SERPL-CCNC: 93 IU/L (ref 44–121)
ALT SERPL-CCNC: 29 IU/L (ref 0–44)
AST SERPL-CCNC: 26 IU/L (ref 0–40)
BILIRUB SERPL-MCNC: 0.5 MG/DL (ref 0–1.2)
BUN SERPL-MCNC: 35 MG/DL (ref 6–24)
BUN/CREAT SERPL: 27 (ref 9–20)
CALCIUM SERPL-MCNC: 9.6 MG/DL (ref 8.7–10.2)
CHLORIDE SERPL-SCNC: 94 MMOL/L (ref 96–106)
CO2 SERPL-SCNC: 28 MMOL/L (ref 20–29)
CREAT SERPL-MCNC: 1.32 MG/DL (ref 0.76–1.27)
EGFR: 64 ML/MIN/1.73
GLOBULIN SER CALC-MCNC: 2.1 G/DL (ref 1.5–4.5)
GLUCOSE SERPL-MCNC: 99 MG/DL (ref 65–99)
POTASSIUM SERPL-SCNC: 2.8 MMOL/L (ref 3.5–5.2)
PROT SERPL-MCNC: 7 G/DL (ref 6–8.5)
SODIUM SERPL-SCNC: 139 MMOL/L (ref 134–144)

## 2022-04-02 RX ORDER — SPIRONOLACTONE 25 MG/1
25 TABLET ORAL DAILY
Qty: 30 TABLET | Refills: 5 | Status: SHIPPED | OUTPATIENT
Start: 2022-04-02 | End: 2022-08-03

## 2022-04-02 NOTE — TELEPHONE ENCOUNTER
Notified by lab core of clinical lab result. Lab Results   Component Value Date/Time    Sodium 139 04/01/2022 04:33 PM    Potassium 2.8 (<) 04/01/2022 04:33 PM    Chloride 94 (L) 04/01/2022 04:33 PM    CO2 28 04/01/2022 04:33 PM    Anion gap 6 07/15/2019 01:46 AM    Glucose 99 04/01/2022 04:33 PM    BUN 35 (H) 04/01/2022 04:33 PM    Creatinine 1.32 (H) 04/01/2022 04:33 PM    BUN/Creatinine ratio 27 (H) 04/01/2022 04:33 PM    GFR est AA 81 02/10/2022 12:00 AM    GFR est non-AA 70 02/10/2022 12:00 AM    Calcium 9.6 04/01/2022 04:33 PM         -Due to his systolic congestive heart failure and hypokalemia recommend initiating therapy with spironolactone 25 mg daily. Discussed with patient to increase his potassium in his diet along with increased adherence to his potassium supplementation.    -Asked patient to be mindful and check his blood pressures as we initiate spironolactone therapy    -Recommend repeat basic metabolic panel in 7 to 10 days.

## 2022-04-04 DIAGNOSIS — E87.6 LOW SERUM POTASSIUM: ICD-10-CM

## 2022-04-04 DIAGNOSIS — I50.9 CONGESTIVE HEART FAILURE, UNSPECIFIED HF CHRONICITY, UNSPECIFIED HEART FAILURE TYPE (HCC): Primary | ICD-10-CM

## 2022-04-13 ENCOUNTER — OFFICE VISIT (OUTPATIENT)
Dept: FAMILY MEDICINE CLINIC | Age: 52
End: 2022-04-13
Payer: COMMERCIAL

## 2022-04-13 VITALS
DIASTOLIC BLOOD PRESSURE: 62 MMHG | SYSTOLIC BLOOD PRESSURE: 118 MMHG | TEMPERATURE: 97.6 F | OXYGEN SATURATION: 96 % | BODY MASS INDEX: 41.95 KG/M2 | WEIGHT: 261 LBS | RESPIRATION RATE: 22 BRPM | HEIGHT: 66 IN | HEART RATE: 61 BPM

## 2022-04-13 DIAGNOSIS — I48.19 PERSISTENT ATRIAL FIBRILLATION (HCC): Primary | ICD-10-CM

## 2022-04-13 DIAGNOSIS — D68.9 COAGULATION DEFECT (HCC): ICD-10-CM

## 2022-04-13 DIAGNOSIS — Z51.81 ENCOUNTER FOR THERAPEUTIC DRUG MONITORING: ICD-10-CM

## 2022-04-13 DIAGNOSIS — D50.9 IRON DEFICIENCY ANEMIA, UNSPECIFIED IRON DEFICIENCY ANEMIA TYPE: ICD-10-CM

## 2022-04-13 LAB
INR BLD: 2.4
PT POC: 29.2 SECONDS
VALID INTERNAL CONTROL?: YES

## 2022-04-13 PROCEDURE — 85610 PROTHROMBIN TIME: CPT | Performed by: INTERNAL MEDICINE

## 2022-04-13 PROCEDURE — 99213 OFFICE O/P EST LOW 20 MIN: CPT | Performed by: INTERNAL MEDICINE

## 2022-04-13 RX ORDER — ALLOPURINOL 100 MG/1
TABLET ORAL
COMMUNITY
Start: 2022-04-08 | End: 2022-08-08

## 2022-04-13 NOTE — PATIENT INSTRUCTIONS
Taking Warfarin Safely: Care Instructions  Your Care Instructions     Warfarin is a medicine that you take to prevent blood clots. It is often called a blood thinner. Doctors give warfarin (such as Coumadin) to reduce the risk of blood clots. You may be at risk for blood clots if you have atrial fibrillation or deep vein thrombosis. Some other health problems may also put you at risk. Warfarin slows the amount of time it takes for your blood to clot. It can cause bleeding problems. Even if you've been taking warfarin for a while, it's important to know how to take it safely. Foods and other medicines can affect the way warfarin works. Some can make warfarin work too well. This can cause bleeding problems. And some can make it work poorly, so that it does not prevent blood clots very well. You will need regular blood tests to check how long it takes for your blood to form a clot. This test is called a PT or prothrombin time test. The result of the test is called an INR level. Depending on the test results, your doctor or anticoagulation clinic may adjust your dose of warfarin. Follow-up care is a key part of your treatment and safety. Be sure to make and go to all appointments, and call your doctor if you are having problems. It's also a good idea to know your test results and keep a list of the medicines you take. How can you care for yourself at home? Take warfarin safely  · Take your warfarin at the same time each day. · If you miss a dose of warfarin, don't take an extra dose to make up for it. Your doctor can tell you exactly what to do so you don't take too much or too little. · Wear medical alert jewelry that lets others know that you take warfarin. You can buy this at most [employer]ius[employer]. · Don't take warfarin if you are pregnant or planning to get pregnant. Talk to your doctor about how you can prevent getting pregnant while you are taking it.   · Don't change your dose or stop taking warfarin unless your doctor tells you to. Effects of medicines and food on warfarin  · Don't start or stop taking any medicines, vitamins, or natural remedies unless you first talk to your doctor. Many medicines can affect how warfarin works. These include aspirin and other pain relievers, over-the-counter medicines, multivitamins, dietary supplements, and herbal products. · Tell all of your doctors and pharmacists that you take warfarin. Some prescription medicines can affect how warfarin works. · Keep the amount of vitamin K in your diet about the same from day to day. Do not suddenly eat a lot more or a lot less food that is rich in vitamin K than you usually do. Vitamin K affects how warfarin works and how your blood clots. Talk with your doctor before making big changes in your diet. Foods that have a lot of vitamin K include cooked green vegetables, such as:  ? Kale, spinach, turnip greens, herve greens, Swiss chard, and mustard greens. ? [place] sprouts, broccoli, and cabbage. · Limit your use of alcohol. Avoid bleeding by preventing falls and injuries  · Wear slippers or shoes with nonskid soles. · Remove throw rugs and clutter. · Rearrange furniture and electrical cords to keep them out of walking paths. · Keep stairways, porches, and outside walkways well lit. Use night-lights in hallways and bathrooms. · Be extra careful when you work with sharp tools or knives. When should you call for help? Call 911 anytime you think you may need emergency care. For example, call if:    · You have a sudden, severe headache that is different from past headaches. Call your doctor now or seek immediate medical care if:    · You have any abnormal bleeding, such as:  ? Nosebleeds. ? Vaginal bleeding that is different (heavier, more frequent, at a different time of the month) than what you are used to.  ? Bloody or black stools, or rectal bleeding. ? Bloody or pink urine.    Watch closely for changes in your health, and be sure to contact your doctor if you have any problems. Where can you learn more? Go to http://www.gray.com/  Enter G767 in the search box to learn more about \"Taking Warfarin Safely: Care Instructions. \"  Current as of: January 10, 2022               Content Version: 13.2  © 8131-9229 [employer]. Care instructions adapted under license by [employer]. (which disclaims liability or warranty for this information). If you have questions about a medical condition or this instruction, always ask your healthcare professional. [address] any warranty or liability for your use of this information.

## 2022-04-13 NOTE — PROGRESS NOTES
Chief Complaint   Patient presents with    Coagulation disorder     PT/INR     Assessment/ Plan:   Diagnoses and all orders for this visit:    1. Persistent atrial fibrillation (HCC)   Irregular, irregular. No other concerns. Taking Entresto now. Tolerating well. 2. Encounter for therapeutic drug monitoring  -     AMB POC PT/INR    3. Coagulation defect ([address]   Well anticoagulated today. RTC in 8 weeks for recheck, since he is getting this done here. I have discussed the diagnosis with the patient and the intended treatment plan as seen in the above orders. The patient has received an after-visit summary and questions were answered concerning future plans. Asked to return should symptoms worsen or not improve with treatment. Any pending labs and studies will be relayed to patient when they become available. Pt verbalizes understanding of plan of care and denies further questions or concerns at this time. Subjective:     Chasity Leavitt is a 47 y.o. male who presents today for Anticoagulation monitoring. Indication: Atrial Fibrillation  INR Goal: 2.0-3.0. Current dose:  Coumadin 5 m tablet M-F and 1.5 tablets on Sat & Sun  Missed Coumadin Doses:  None  Medication Changes:  no  Dietary Changes:  no    Symptoms: taking coumadin appropriately without any bleeding. Latest INRs:  Lab Results   Component Value Date/Time    INR POC 2.4 2022 11:28 AM    INR POC 2.1 2022 09:50 AM    INR POC 1.5 2022 09:58 AM       New Coumadin dose:.current treatment plan is effective, no change in therapy. Next check to be scheduled for  8 weeks. Visit Vitals  /62 (BP 1 Location: Right arm, BP Patient Position: Sitting, BP Cuff Size: Adult)   Pulse 61   Temp 97.6 °F (36.4 °C) (Temporal)   Resp 22   Ht 5' 6\" (1.676 m)   Wt 261 lb (118.4 kg)   SpO2 96%   BMI 42.13 kg/m²     General appearance: alert, well appearing, and in no distress.   Chest: clear to auscultation, no wheezes, rales or rhonchi, symmetric air entry. CVS exam: normal rate, regular rhythm, normal S1, S2, no murmurs, rubs, clicks or gallops. Oropharyngeal exam - mucous membranes moist, pharynx normal without lesions. Ears - bilateral TM's and external ear canals normal.  Exam of extremities: peripheral pulses normal, no pedal edema, no clubbing or cyanosis  Skin exam - normal coloration and turgor, no rashes, no suspicious skin lesions noted. HISTORICAL  PMH, PSH, FHX, SOCHX, ALLERGIES and MES were reviewed and updated today. Review of Systems  Review of Systems   Constitutional: Negative. HENT: Negative. Eyes: Negative. Respiratory: Negative. Cardiovascular: Negative. Gastrointestinal: Negative. Genitourinary: Negative. Musculoskeletal: Negative. Skin: Negative. Neurological: Negative. Endo/Heme/Allergies: Negative. Psychiatric/Behavioral: Negative. All other systems reviewed and are negative. Objective:     Vitals:    04/13/22 1122   BP: 118/62   Pulse: 61   Resp: 22   Temp: 97.6 °F (36.4 °C)   TempSrc: Temporal   SpO2: 96%   Weight: 261 lb (118.4 kg)   Height: 5' 6\" (1.676 m)       Physical Exam  Constitutional:       Appearance: He is obese. Cardiovascular:      Rate and Rhythm: Normal rate and regular rhythm. Pulses: Normal pulses. Heart sounds: Normal heart sounds. Pulmonary:      Effort: Pulmonary effort is normal.      Breath sounds: Normal breath sounds. Musculoskeletal:      Cervical back: Normal range of motion and neck supple. Neurological:      General: No focal deficit present. Dee Dee Giles MD  [facility]   02/21/22 10:06 AM    Patient Instructions          Taking Warfarin Safely: Care Instructions  Your Care Instructions     Warfarin is a medicine that you take to prevent blood clots. It is often called a blood thinner. Doctors give warfarin (such as Coumadin) to reduce the risk of blood clots.  You may be at risk for blood clots if you have atrial fibrillation or deep vein thrombosis. Some other health problems may also put you at risk. Warfarin slows the amount of time it takes for your blood to clot. It can cause bleeding problems. Even if you've been taking warfarin for a while, it's important to know how to take it safely. Foods and other medicines can affect the way warfarin works. Some can make warfarin work too well. This can cause bleeding problems. And some can make it work poorly, so that it does not prevent blood clots very well. You will need regular blood tests to check how long it takes for your blood to form a clot. This test is called a PT or prothrombin time test. The result of the test is called an INR level. Depending on the test results, your doctor or anticoagulation clinic may adjust your dose of warfarin. Follow-up care is a key part of your treatment and safety. Be sure to make and go to all appointments, and call your doctor if you are having problems. It's also a good idea to know your test results and keep a list of the medicines you take. How can you care for yourself at home? Take warfarin safely  · Take your warfarin at the same time each day. · If you miss a dose of warfarin, don't take an extra dose to make up for it. Your doctor can tell you exactly what to do so you don't take too much or too little. · Wear medical alert jewelry that lets others know that you take warfarin. You can buy this at most drugstores. · Don't take warfarin if you are pregnant or planning to get pregnant. Talk to your doctor about how you can prevent getting pregnant while you are taking it. · Don't change your dose or stop taking warfarin unless your doctor tells you to. Effects of medicines and food on warfarin  · Don't start or stop taking any medicines, vitamins, or natural remedies unless you first talk to your doctor. Many medicines can affect how warfarin works.  These include aspirin and other pain relievers, over-the-counter medicines, multivitamins, dietary supplements, and herbal products. · Tell all of your doctors and pharmacists that you take warfarin. Some prescription medicines can affect how warfarin works. · Keep the amount of vitamin K in your diet about the same from day to day. Do not suddenly eat a lot more or a lot less food that is rich in vitamin K than you usually do. Vitamin K affects how warfarin works and how your blood clots. Talk with your doctor before making big changes in your diet. Foods that have a lot of vitamin K include cooked green vegetables, such as:  ? Kale, spinach, turnip greens, herve greens, Swiss chard, and mustard greens. ? [place] sprouts, broccoli, and cabbage. · Limit your use of alcohol. Avoid bleeding by preventing falls and injuries  · Wear slippers or shoes with nonskid soles. · Remove throw rugs and clutter. · Rearrange furniture and electrical cords to keep them out of walking paths. · Keep stairways, porches, and outside walkways well lit. Use night-lights in hallways and bathrooms. · Be extra careful when you work with sharp tools or knives. When should you call for help? Call 911 anytime you think you may need emergency care. For example, call if:    · You have a sudden, severe headache that is different from past headaches. Call your doctor now or seek immediate medical care if:    · You have any abnormal bleeding, such as:  ? Nosebleeds. ? Vaginal bleeding that is different (heavier, more frequent, at a different time of the month) than what you are used to.  ? Bloody or black stools, or rectal bleeding. ? Bloody or pink urine. Watch closely for changes in your health, and be sure to contact your doctor if you have any problems. Where can you learn more? Go to http://www.gray.com/  Enter F392 in the search box to learn more about \"Taking Warfarin Safely: Care Instructions. \"  Current as of: January 10, 2022               Content Version: 13.2  © 6758-4419 [employer], [employer]. Care instructions adapted under license by [employer] (which disclaims liability or warranty for this information). If you have questions about a medical condition or this instruction, always ask your healthcare professional. [address] any warranty or liability for your use of this information.

## 2022-04-13 NOTE — PROGRESS NOTES
Identified pt with two pt identifiers(name and ). Chief Complaint   Patient presents with    Coagulation disorder     PT/INR        Health Maintenance Due   Topic    Pneumococcal 0-64 years (1 - PCV)    DTaP/Tdap/Td series (1 - Tdap)    Colorectal Cancer Screening Combo     Shingrix Vaccine Age 50> (1 of 2)       Wt Readings from Last 3 Encounters:   22 261 lb (118.4 kg)   22 260 lb (117.9 kg)   22 260 lb (117.9 kg)     Temp Readings from Last 3 Encounters:   22 97.6 °F (36.4 °C) (Temporal)   22 97.8 °F (36.6 °C) (Temporal)   22 97.6 °F (36.4 °C) (Temporal)     BP Readings from Last 3 Encounters:   22 118/62   22 128/76   22 102/72     Pulse Readings from Last 3 Encounters:   22 61   22 76   22 62         Learning Assessment:  :     Learning Assessment 8/3/2017   PRIMARY LEARNER Patient   PRIMARY LANGUAGE ENGLISH   LEARNER PREFERENCE PRIMARY LISTENING   ANSWERED BY patient   RELATIONSHIP SELF       Depression Screening:  :     3 most recent PHQ Screens 2022   Little interest or pleasure in doing things Not at all   Feeling down, depressed, irritable, or hopeless Not at all   Total Score PHQ 2 0   Trouble falling or staying asleep, or sleeping too much -   Feeling tired or having little energy -   Poor appetite, weight loss, or overeating -   Feeling bad about yourself - or that you are a failure or have let yourself or your family down -   Trouble concentrating on things such as school, work, reading, or watching TV -   Moving or speaking so slowly that other people could have noticed; or the opposite being so fidgety that others notice -   Thoughts of being better off dead, or hurting yourself in some way -   PHQ 9 Score -       Fall Risk Assessment:  :     Fall Risk Assessment, last 12 mths 2019   Able to walk? Yes   Fall in past 12 months?  No       Abuse Screening:  :     Abuse Screening Questionnaire 2022 1/27/2022 11/30/2021 8/19/2021 7/18/2019   Do you ever feel afraid of your partner? N N N N N N   Are you in a relationship with someone who physically or mentally threatens you? N N N N N N   Is it safe for you to go home? Y Y Y Y Y Y       Coordination of Care Questionnaire:  :     1) Have you been to an emergency room, urgent care clinic since your last visit? no   Hospitalized since your last visit? no             2) Have you seen or consulted any other health care providers outside of [address] [place] [address] since your last visit? no  (Include any pap smears or colon screenings in this section.)    3) Do you have an Advance Directive on file? no  Are you interested in receiving information about Advance Directives? no    4. For patients aged 39-70: Has the patient had a colonoscopy / FIT/ Cologuard? Yes - Care Gap present. Rooming MA/LPN to request most recent results  JW 2021      If the patient is female:    11. For patients aged 41-77: Has the patient had a mammogram within the past 2 years? NA - based on age or sex      10. For patients aged 21-65: Has the patient had a pap smear?  NA - based on age or sex

## 2022-04-14 ENCOUNTER — TELEPHONE (OUTPATIENT)
Dept: FAMILY MEDICINE CLINIC | Age: 52
End: 2022-04-14

## 2022-04-14 ENCOUNTER — TELEPHONE (OUTPATIENT)
Dept: CARDIOLOGY CLINIC | Age: 52
End: 2022-04-14

## 2022-04-14 DIAGNOSIS — E87.6 LOW SERUM POTASSIUM: Primary | ICD-10-CM

## 2022-04-14 LAB
BASOPHILS # BLD AUTO: 0 X10E3/UL (ref 0–0.2)
BASOPHILS NFR BLD AUTO: 1 %
EOSINOPHIL # BLD AUTO: 0.2 X10E3/UL (ref 0–0.4)
EOSINOPHIL NFR BLD AUTO: 2 %
ERYTHROCYTE [DISTWIDTH] IN BLOOD BY AUTOMATED COUNT: 14.8 % (ref 11.6–15.4)
FERRITIN SERPL-MCNC: 179 NG/ML (ref 30–400)
HCT VFR BLD AUTO: 50.4 % (ref 37.5–51)
HGB BLD-MCNC: 16.2 G/DL (ref 13–17.7)
IMM GRANULOCYTES # BLD AUTO: 0 X10E3/UL (ref 0–0.1)
IMM GRANULOCYTES NFR BLD AUTO: 0 %
IRON SATN MFR SERPL: 19 % (ref 15–55)
IRON SERPL-MCNC: 75 UG/DL (ref 38–169)
LYMPHOCYTES # BLD AUTO: 1.2 X10E3/UL (ref 0.7–3.1)
LYMPHOCYTES NFR BLD AUTO: 16 %
MCH RBC QN AUTO: 30.9 PG (ref 26.6–33)
MCHC RBC AUTO-ENTMCNC: 32.1 G/DL (ref 31.5–35.7)
MCV RBC AUTO: 96 FL (ref 79–97)
MONOCYTES # BLD AUTO: 0.7 X10E3/UL (ref 0.1–0.9)
MONOCYTES NFR BLD AUTO: 10 %
NEUTROPHILS # BLD AUTO: 5.1 X10E3/UL (ref 1.4–7)
NEUTROPHILS NFR BLD AUTO: 71 %
PLATELET # BLD AUTO: 226 X10E3/UL (ref 150–450)
RBC # BLD AUTO: 5.24 X10E6/UL (ref 4.14–5.8)
TIBC SERPL-MCNC: 386 UG/DL (ref 250–450)
UIBC SERPL-MCNC: 311 UG/DL (ref 111–343)
WBC # BLD AUTO: 7.2 X10E3/UL (ref 3.4–10.8)

## 2022-04-14 NOTE — TELEPHONE ENCOUNTER
----- Message from Corrine Stephens MD sent at 4/14/2022  9:48 AM EDT -----  Please let patient know that he can stop the iron supplement for now. We will continue to check labs periodically. Anemia is completely resolved at this time.

## 2022-04-14 NOTE — TELEPHONE ENCOUNTER
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 Low potassium-on potassium supplements. Needs BMP and follow-up for blood pressure check. Can you get him to check his BMP and see ANP for blood pressure check.     Thanks  ----- Message -----  From: Jose, [facility] Lab Results In  Sent: 4/2/2022   7:36 AM EDT  To: Mauricio Henry MD

## 2022-04-14 NOTE — PROGRESS NOTES
Please let patient know that he can stop the iron supplement for now. We will continue to check labs periodically. Anemia is completely resolved at this time.

## 2022-04-27 LAB
BUN SERPL-MCNC: 32 MG/DL (ref 6–24)
BUN/CREAT SERPL: 21 (ref 9–20)
CALCIUM SERPL-MCNC: 10.1 MG/DL (ref 8.7–10.2)
CHLORIDE SERPL-SCNC: 95 MMOL/L (ref 96–106)
CO2 SERPL-SCNC: 26 MMOL/L (ref 20–29)
CREAT SERPL-MCNC: 1.5 MG/DL (ref 0.76–1.27)
EGFR: 55 ML/MIN/1.73
GLUCOSE SERPL-MCNC: 97 MG/DL (ref 65–99)
INTERPRETATION: NORMAL
POTASSIUM SERPL-SCNC: 5.5 MMOL/L (ref 3.5–5.2)
SODIUM SERPL-SCNC: 139 MMOL/L (ref 134–144)

## 2022-04-28 ENCOUNTER — TELEPHONE (OUTPATIENT)
Dept: CARDIOLOGY CLINIC | Age: 52
End: 2022-04-28

## 2022-04-28 DIAGNOSIS — E87.5 SERUM POTASSIUM ELEVATED: Primary | ICD-10-CM

## 2022-04-28 NOTE — TELEPHONE ENCOUNTER
Call patient X 2 to notify of instructions to stop potassium, have labs drawn, and follow up with NP. VM full. Unable to leave message.

## 2022-04-28 NOTE — LETTER
4/28/2022 4:26 PM    Mr. Jb Yao [id number]    We have attempted to contact you. Your recent lab results demonstrated an elevated potassium level. Please stop potassium immediately. Have labs drawn 7-10 days after stopping medication. Follow up with one of our nurse practitioners the following day after testing. Contact our office for scheduling.           Sincerely,      James Camacho MD

## 2022-04-28 NOTE — PROGRESS NOTES
Potassium 5.5-trending high. Stop potassium supplements completely. Repeat BMP in 1 - 11/2 weeks and follow-up with ANP the next day so that we can go over his medications and readjust medications.

## 2022-04-29 ENCOUNTER — TELEPHONE (OUTPATIENT)
Dept: CARDIOLOGY CLINIC | Age: 52
End: 2022-04-29

## 2022-04-29 ENCOUNTER — DOCUMENTATION ONLY (OUTPATIENT)
Dept: CARDIOLOGY CLINIC | Age: 52
End: 2022-04-29

## 2022-04-29 NOTE — TELEPHONE ENCOUNTER
Spoke with patient. Notified of elevated potassium and to discontinue potassium. Will have labs drawn in 7-10 days and follow up with NP next day.

## 2022-04-29 NOTE — PROGRESS NOTES
Order for [facility] faxed to [facility] Wilson as requested by patient. Expected lab testing on 5/9.

## 2022-05-10 ENCOUNTER — OFFICE VISIT (OUTPATIENT)
Dept: CARDIOLOGY CLINIC | Age: 52
End: 2022-05-10
Payer: COMMERCIAL

## 2022-05-10 VITALS
WEIGHT: 256 LBS | OXYGEN SATURATION: 94 % | BODY MASS INDEX: 41.14 KG/M2 | SYSTOLIC BLOOD PRESSURE: 118 MMHG | DIASTOLIC BLOOD PRESSURE: 72 MMHG | HEART RATE: 64 BPM | HEIGHT: 66 IN

## 2022-05-10 DIAGNOSIS — E87.5 HYPERKALEMIA: ICD-10-CM

## 2022-05-10 DIAGNOSIS — I50.22 CHRONIC SYSTOLIC CONGESTIVE HEART FAILURE (HCC): Primary | ICD-10-CM

## 2022-05-10 LAB
BUN SERPL-MCNC: 31 MG/DL (ref 6–24)
BUN/CREAT SERPL: 20 (ref 9–20)
CALCIUM SERPL-MCNC: 9.8 MG/DL (ref 8.7–10.2)
CHLORIDE SERPL-SCNC: 95 MMOL/L (ref 96–106)
CO2 SERPL-SCNC: 23 MMOL/L (ref 20–29)
CREAT SERPL-MCNC: 1.54 MG/DL (ref 0.76–1.27)
EGFR: 54 ML/MIN/1.73
GLUCOSE SERPL-MCNC: 103 MG/DL (ref 65–99)
INTERPRETATION: NORMAL
POTASSIUM SERPL-SCNC: 3.6 MMOL/L (ref 3.5–5.2)
SODIUM SERPL-SCNC: 140 MMOL/L (ref 134–144)

## 2022-05-10 PROCEDURE — 99213 OFFICE O/P EST LOW 20 MIN: CPT | Performed by: NURSE PRACTITIONER

## 2022-05-10 NOTE — PROGRESS NOTES
Radha Alvarado. is a 47 y.o. male    Chief Complaint   Patient presents with    Follow-up     afib, high potassium    Cardiomyopathy    CHF       Chest pain No    SOB some slight SOB with activities     Dizziness at times     Swelling No    Refills metolazone    Visit Vitals  /72 (BP 1 Location: Left upper arm, BP Patient Position: Sitting)   Pulse 64   Ht 5' 6\" (1.676 m)   Wt 256 lb (116.1 kg)   SpO2 94%   BMI 41.32 kg/m²       1. Have you been to the ER, urgent care clinic since your last visit? Hospitalized since your last visit? No    2. Have you seen or consulted any other health care providers outside of the [address] [place] [address] since your last visit? Include any pap smears or colon screening.   No

## 2022-05-10 NOTE — PATIENT INSTRUCTIONS
Continue to use your Metolazone and Torsemide, at your usual pattern. ON METOLAZONE days - TAKE Potassium supplement 40mEq that morning. In addition, continue to supplement with your normal pattern of foods. Let's recheck again in 2 weeks. I will MyChart or call you back in after I review the labs.

## 2022-05-10 NOTE — PROGRESS NOTES
[id number] [facility], [address] [facility]    Office [phone]  Fax [phone]      Nessa Mendoza is a 47 y.o. male last seen by Dr. Ankit Bansal     Primary care physician:  Evette Delaney MD    Assessment:    1. Hx of hypokalemia with recent hyperkalemia - supplemental potassium discontinued. Repeat level was low normal at 3.6.   - Continue current regimen with Torsemide, Entresto, Metolazone and Aldactone  - ADD in KCL 40mEq on days that he is taking Metolazone  - Continue current dietary trends  - Repeat BMP again in 2 weeks. Phone/MyChart follow up to review results. Return to clinic to see Dr. Ankit Bansal again on 7/1/22, as previously scheduled. Sooner PRN. He was encouraged to call back prior to that time with any additional questions or concerns. Cardiac Testing/ Procedures: A. Cardiac Cath/PCI: 7/1/19 - LHC/RHC - No sig CAD; Mean PA pressure 21 mm Hg    B.ECHO/EVITA: 12/26/2019-severely dilated LV, LVEF 25 to 30%, severely dilated left atrium, dilated right atrium, mild TR, moderate MR  6/21/19 - EF 26-30%; Mod LAE; DIDIER: Mild to Mod MR; Mild to Mod TR; Mod Pulm HTN    6/22/2017 Left ventricle: Systolic function was mildly reduced. Ejection fraction was estimated to be 45 %. There were no regional wall motion abnormalities. Tricuspid valve: There was mild regurgitation. C.StressNuclear/Stress ECHO/Stress test: 6/26/19 - Abnormal ledy nuc; EF 24%; Fixed defect( inf/apical)    D. Vascular:    E. EP:Holter monitor-1/22/2021-1/28/2021 1/22/2021-A. fib with RVR (heart rate 120s),   1/28/2021-1 episode of NSVT-13 beat run (auto detected). PVCs  F. Miscellaneous:        Subjective:  Prior to this lab draw he had been taking 40 mEq TID of potassium and also eating orange juice, avocado, bananas and citrus melgar to supplement his potassium. He has discontinued potassium supplement, as directed by Dr. Ankit Bansal     Using Metolazone 5 mg every 5 days. Still using Torsemide 40 mg daily. Doing well with Aldactone. Likes the way it makes him feel. No bleeding concerns. No tachycardia, palpitations, lightheadedness or dizziness. No exertional chest pain. ROS:  (bold if positive, if negative)        Current Outpatient Medications   Medication Sig Dispense    allopurinoL (ZYLOPRIM) 100 mg tablet TAKE 1 TABLET BY MOUTH ONCE DAILY FOR 30 DAYS     spironolactone (ALDACTONE) 25 mg tablet Take 1 Tablet by mouth daily. 30 Tablet    warfarin (COUMADIN) 5 mg tablet 1 tablet M-F and 1.5 tablets on Sat & Sun 100 Tablet    dilTIAZem IR (CARDIZEM) 30 mg tablet TAKE 1 TABLET BY MOUTH TWICE DAILY. HOLD IF BLOOD PRESSURE IS LESS THAN 100/55 180 Tablet    sacubitriL-valsartan (Entresto) 24-26 mg tablet Take 1 Tablet by mouth two (2) times a day. 180 Tablet    torsemide (DEMADEX) 20 mg tablet TAKE 2 TABLETS BY MOUTH ONCE DAILY. MAY INCREASE TO TWO TABLETS IN THE MORNING AND ONE TABLET IN THE EVENING AS NEEDED BASED ON WEIGHT 180 Tablet    ferrous sulfate (Iron) 325 mg (65 mg iron) tablet Take  by mouth every other day. Takes 1/2 tab     metOLazone (ZAROXOLYN) 5 mg tablet Take 1 Tab by mouth daily. Take as needed for weight gain >5lbs. (Patient taking differently: Take 5 mg by mouth daily. Take as needed for weight gain >5lbs. Every 5 days) 30 Tab    psyllium (METAMUCIL) powd Take  by mouth as needed.  cpap machine kit by Does Not Apply route daily.  MULTIVITAMIN (MULTIPLE VITAMIN PO) Take 1 Tab by mouth daily. No current facility-administered medications for this visit.        Family History of CAD:    Yes    Social History:  Current  Smoker  No    Physical Exam:  Visit Vitals  /72 (BP 1 Location: Left upper arm, BP Patient Position: Sitting)   Pulse 64   Ht 5' 6\" (1.676 m)   Wt 256 lb (116.1 kg)   SpO2 94%   BMI 41.32 kg/m²     Wt Readings from Last 3 Encounters:   05/10/22 256 lb (116.1 kg)   04/13/22 261 lb (118.4 kg) 04/01/22 260 lb (117.9 kg)     Gen: Well-developed, well-nourished, morbid obesity  Neck: Supple,thick neck, unable to appreciate JVD  Resp: No accessory muscle use, Clear breath sounds, No rales or rhonchi  Card: Irregular Rate,Rythm,Normal S1, S2, No murmurs  Abd:  Soft,morbidly obese, Abd wall - indurated skin  MSK: No cyanosis  Neuro: moving all four extremities , follows commands appropriately  Psych:  Good insight, oriented to person, place , alert, Nml Affect  LE: no edema    LABS:  Lab Results   Component Value Date/Time    Sodium 140 05/09/2022 11:58 AM    Sodium 139 04/26/2022 11:20 AM    Sodium 139 04/01/2022 04:33 PM    Sodium 138 02/10/2022 12:00 AM    Sodium 140 12/13/2021 01:03 PM    Potassium 3.6 05/09/2022 11:58 AM    Chloride 95 (L) 05/09/2022 11:58 AM    Chloride 95 (L) 04/26/2022 11:20 AM    Chloride 94 (L) 04/01/2022 04:33 PM    Chloride 93 (L) 02/10/2022 12:00 AM    Chloride 97 12/13/2021 01:03 PM    CO2 23 05/09/2022 11:58 AM    CO2 26 04/26/2022 11:20 AM    CO2 28 04/01/2022 04:33 PM    CO2 25 02/10/2022 12:00 AM    CO2 29 12/13/2021 01:03 PM    Anion gap 6 07/15/2019 01:46 AM    Anion gap 4 (L) 07/14/2019 12:42 AM    Anion gap 6 07/13/2019 10:16 AM    Anion gap 8 07/12/2019 06:23 AM    Anion gap 8 07/11/2019 04:41 AM    Glucose 103 (H) 05/09/2022 11:58 AM    Glucose 97 04/26/2022 11:20 AM    Glucose 99 04/01/2022 04:33 PM    Glucose 100 (H) 02/10/2022 12:00 AM    Glucose 120 (H) 12/13/2021 01:03 PM    BUN 31 (H) 05/09/2022 11:58 AM    BUN 32 (H) 04/26/2022 11:20 AM    BUN 35 (H) 04/01/2022 04:33 PM    BUN 18 02/10/2022 12:00 AM    BUN 16 12/13/2021 01:03 PM    Creatinine 1.54 (H) 05/09/2022 11:58 AM    Creatinine 1.50 (H) 04/26/2022 11:20 AM    Creatinine 1.32 (H) 04/01/2022 04:33 PM    Creatinine 1.19 02/10/2022 12:00 AM    Creatinine 1.19 12/13/2021 01:03 PM    BUN/Creatinine ratio 20 05/09/2022 11:58 AM    BUN/Creatinine ratio 21 (H) 04/26/2022 11:20 AM    BUN/Creatinine ratio 27 (H) 04/01/2022 04:33 PM    BUN/Creatinine ratio 15 02/10/2022 12:00 AM    BUN/Creatinine ratio 13 12/13/2021 01:03 PM    GFR est AA 81 02/10/2022 12:00 AM    GFR est AA 81 12/13/2021 01:03 PM    GFR est AA 70 10/22/2021 12:00 AM    GFR est AA 75 09/14/2021 12:00 AM    GFR est AA 58 (L) 09/03/2021 12:00 AM    GFR est non-AA 70 02/10/2022 12:00 AM    GFR est non-AA 70 12/13/2021 01:03 PM    GFR est non-AA 60 10/22/2021 12:00 AM    GFR est non-AA 65 09/14/2021 12:00 AM    GFR est non-AA 50 (L) 09/03/2021 12:00 AM    Calcium 9.8 05/09/2022 11:58 AM    Calcium 10.1 04/26/2022 11:20 AM    Calcium 9.6 04/01/2022 04:33 PM    Calcium 9.6 02/10/2022 12:00 AM    Calcium 9.3 12/13/2021 01:03 PM         Ami Estrin, NP

## 2022-05-26 DIAGNOSIS — E87.6 HYPOKALEMIA: Primary | ICD-10-CM

## 2022-05-26 LAB
BUN SERPL-MCNC: 33 MG/DL (ref 6–24)
BUN/CREAT SERPL: 19 (ref 9–20)
CALCIUM SERPL-MCNC: 9.5 MG/DL (ref 8.7–10.2)
CHLORIDE SERPL-SCNC: 94 MMOL/L (ref 96–106)
CO2 SERPL-SCNC: 27 MMOL/L (ref 20–29)
CREAT SERPL-MCNC: 1.7 MG/DL (ref 0.76–1.27)
EGFR: 48 ML/MIN/1.73
GLUCOSE SERPL-MCNC: 87 MG/DL (ref 65–99)
INTERPRETATION: NORMAL
POTASSIUM SERPL-SCNC: 3.4 MMOL/L (ref 3.5–5.2)
SODIUM SERPL-SCNC: 137 MMOL/L (ref 134–144)

## 2022-05-26 RX ORDER — TORSEMIDE 20 MG/1
20 TABLET ORAL DAILY
Qty: 90 TABLET | Refills: 1 | Status: SHIPPED | OUTPATIENT
Start: 2022-05-26 | End: 2022-10-04 | Stop reason: SDUPTHER

## 2022-05-26 RX ORDER — POTASSIUM CHLORIDE 750 MG/1
10 TABLET, EXTENDED RELEASE ORAL 2 TIMES DAILY
Qty: 30 TABLET | Refills: 0 | Status: SHIPPED | OUTPATIENT
Start: 2022-05-26 | End: 2022-05-27 | Stop reason: SDUPTHER

## 2022-05-26 NOTE — PROGRESS NOTES
Note that patient appears volume depleted. On Entreso Stage 1, Aldactone, Torsemide 40 mg daily and Metolazone 5 mg every 5th day. Plan to reduce Torsemide to 20 mg daily. Continue the remainder of medications above, for now. K remains low at 3.4, despite MRA and resumption of KCL 40mEq on days that he takes Metolazone. Change KCL to 10mEq daily      Repeat labs again 1 week prior to return visit with Dr. Hoa Gifford, scheduled for 7/1/22.

## 2022-05-27 ENCOUNTER — TELEPHONE (OUTPATIENT)
Dept: CARDIOLOGY CLINIC | Age: 52
End: 2022-05-27

## 2022-05-27 RX ORDER — POTASSIUM CHLORIDE 750 MG/1
10 TABLET, EXTENDED RELEASE ORAL DAILY
Qty: 30 TABLET | Refills: 0 | Status: SHIPPED | OUTPATIENT
Start: 2022-05-27 | End: 2022-07-01 | Stop reason: SDUPTHER

## 2022-05-27 NOTE — TELEPHONE ENCOUNTER
----- Message from Serenity Frank NP sent at 5/27/2022 10:00 AM EDT -----  I've corrected the Rx. It was meant to be once daily    Thank you for catching that. Anisha Reddy   ----- Message -----  From: Cesia Chapin LPN  Sent: 0/33/5451   9:14 AM EDT  To: Serenity Frank NP    Did you want to change the KCL to 10meq daily or BID. Per the medication list it was changed to  BID. The note here says QD. Thanks.  ----- Message -----  From: Akira Smith NP  Sent: 5/26/2022   3:17 PM EDT  To: Lio Elder LPN    Please see lab result interpretation and recommendations. Thank you for relaying to patient for me. I will adjust medications in the system.     ----- Message -----  From: Jose, [facility] Lab Results In  Sent: 5/26/2022   5:37 AM EDT  To: Serenity Frank NP

## 2022-05-27 NOTE — TELEPHONE ENCOUNTER
Patient notified of recommendation to decrease torsemide from 40mg to 20mg and to take KCL 10meq daily instead of 40meq with scheduled metolazone days. Expressed some reluctance to changes as he states he is feeling well. Agreed to make changes and notify us of any concerns.

## 2022-06-01 RX ORDER — WARFARIN SODIUM 5 MG/1
TABLET ORAL
Qty: 90 TABLET | Refills: 0 | Status: SHIPPED | OUTPATIENT
Start: 2022-06-01 | End: 2022-06-16 | Stop reason: SDUPTHER

## 2022-06-16 ENCOUNTER — OFFICE VISIT (OUTPATIENT)
Dept: FAMILY MEDICINE CLINIC | Age: 52
End: 2022-06-16
Payer: COMMERCIAL

## 2022-06-16 VITALS
SYSTOLIC BLOOD PRESSURE: 120 MMHG | DIASTOLIC BLOOD PRESSURE: 66 MMHG | WEIGHT: 260 LBS | HEART RATE: 83 BPM | OXYGEN SATURATION: 98 % | HEIGHT: 66 IN | BODY MASS INDEX: 41.78 KG/M2 | RESPIRATION RATE: 20 BRPM | TEMPERATURE: 97.3 F

## 2022-06-16 DIAGNOSIS — I48.19 PERSISTENT ATRIAL FIBRILLATION (HCC): Primary | ICD-10-CM

## 2022-06-16 DIAGNOSIS — Z51.81 ENCOUNTER FOR THERAPEUTIC DRUG MONITORING: ICD-10-CM

## 2022-06-16 DIAGNOSIS — M62.838 MUSCLE SPASM: ICD-10-CM

## 2022-06-16 LAB
INR BLD: 1.9
PT POC: 22.9 SECONDS
VALID INTERNAL CONTROL?: YES

## 2022-06-16 PROCEDURE — 85610 PROTHROMBIN TIME: CPT | Performed by: INTERNAL MEDICINE

## 2022-06-16 PROCEDURE — 99213 OFFICE O/P EST LOW 20 MIN: CPT | Performed by: INTERNAL MEDICINE

## 2022-06-16 RX ORDER — TIZANIDINE 2 MG/1
TABLET ORAL
Qty: 30 TABLET | Refills: 1 | Status: SHIPPED | OUTPATIENT
Start: 2022-06-16 | End: 2022-10-04 | Stop reason: ALTCHOICE

## 2022-06-16 RX ORDER — WARFARIN SODIUM 5 MG/1
TABLET ORAL
Qty: 120 TABLET | Refills: 0 | Status: SHIPPED | OUTPATIENT
Start: 2022-06-16 | End: 2022-08-24 | Stop reason: SDUPTHER

## 2022-06-16 NOTE — PROGRESS NOTES
Chief Complaint   Patient presents with    Coagulation disorder     PT/INR        Assessment/ Plan:   Diagnoses and all orders for this visit:    1. Persistent atrial fibrillation (HCC)   Stable. NSR today. 2. Encounter for therapeutic drug monitoring  -     AMB POC PT/INR  -     warfarin (COUMADIN) 5 mg tablet; 1.5 tablets Mon-Fri (7.5 mgs), then 1 tablet (5 mgs) Sat-Sun    3. Muscle spasm  -     tiZANidine (ZANAFLEX) 2 mg tablet; 1-2 tablets, 1-2 hours prior to bedtime. I have discussed the diagnosis with the patient and the intended treatment plan as seen in the above orders. The patient has received an after-visit summary and questions were answered concerning future plans. Asked to return should symptoms worsen or not improve with treatment. Any pending labs and studies will be relayed to patient when they become available. Pt verbalizes understanding of plan of care and denies further questions or concerns at this time. Subjective:     Aravind Meadows is a 47 y.o. male who presents today for Anticoagulation monitoring. He reports that he has been taking his medication as directed, but ran out of medication because how the script was written. We reviewed his coumadin dose and updated the record accordingly. He is mildly subtherapuetic. Indication: Atrial Fibrillation  INR Goal: 2.5-3.5. Current dose:  Coumadin 7.5 mg Mon-Fri, 5 mgs daily. Missed Coumadin Doses:  None  Medication Changes:  no  Dietary Changes:  no    Symptoms: taking coumadin appropriately without any bleeding. Latest INRs:  Lab Results   Component Value Date/Time    INR POC 1.9 06/16/2022 01:29 PM    INR POC 2.4 04/13/2022 11:28 AM    INR POC 2.1 02/16/2022 09:50 AM     New Coumadin dose:.current treatment plan is effective, no change in therapy, orders as documented in EMR. Next check to be scheduled for 4 weeks. HISTORICAL  PMH, PSH, FHX, SOCHX, ALLERGIES and MES were reviewed and updated today. Review of Systems  Review of Systems   Musculoskeletal:        Muscle spasms back     All other systems reviewed and are negative. Objective:     Vitals:    06/16/22 1322   BP: 120/66   Pulse: 83   Resp: 20   Temp: 97.3 °F (36.3 °C)   TempSrc: Temporal   SpO2: 98%   Weight: 260 lb (117.9 kg)   Height: 5' 6\" (1.676 m)       Physical Exam  Constitutional:       Appearance: He is obese. Cardiovascular:      Rate and Rhythm: Normal rate and regular rhythm. Pulses: Normal pulses. Heart sounds: Normal heart sounds. Pulmonary:      Effort: Pulmonary effort is normal.      Breath sounds: Normal breath sounds. Musculoskeletal:      Cervical back: Normal range of motion and neck supple. Skin:     General: Skin is warm. Neurological:      General: No focal deficit present. Mental Status: He is alert. Mental status is at baseline. Psychiatric:         Mood and Affect: Mood normal.         Thought Content:  Thought content normal.       Maximus Guzman MD  [facility]   02/21/22 10:06 AM

## 2022-06-16 NOTE — PROGRESS NOTES
Identified pt with two pt identifiers(name and ). Chief Complaint   Patient presents with    Coagulation disorder     PT/INR         Health Maintenance Due   Topic    Pneumococcal 0-64 years (1 - PCV)    DTaP/Tdap/Td series (1 - Tdap)    Colorectal Cancer Screening Combo     Shingrix Vaccine Age 50> (1 of 2)       Wt Readings from Last 3 Encounters:   22 260 lb (117.9 kg)   05/10/22 256 lb (116.1 kg)   22 261 lb (118.4 kg)     Temp Readings from Last 3 Encounters:   22 97.3 °F (36.3 °C) (Temporal)   22 97.6 °F (36.4 °C) (Temporal)   22 97.8 °F (36.6 °C) (Temporal)     BP Readings from Last 3 Encounters:   22 120/66   05/10/22 118/72   22 118/62     Pulse Readings from Last 3 Encounters:   22 83   05/10/22 64   22 61         Learning Assessment:  :     Learning Assessment 8/3/2017   PRIMARY LEARNER Patient   PRIMARY LANGUAGE ENGLISH   LEARNER PREFERENCE PRIMARY LISTENING   ANSWERED BY patient   RELATIONSHIP SELF       Depression Screening:  :     3 most recent PHQ Screens 2022   Little interest or pleasure in doing things Not at all   Feeling down, depressed, irritable, or hopeless Not at all   Total Score PHQ 2 0   Trouble falling or staying asleep, or sleeping too much -   Feeling tired or having little energy -   Poor appetite, weight loss, or overeating -   Feeling bad about yourself - or that you are a failure or have let yourself or your family down -   Trouble concentrating on things such as school, work, reading, or watching TV -   Moving or speaking so slowly that other people could have noticed; or the opposite being so fidgety that others notice -   Thoughts of being better off dead, or hurting yourself in some way -   PHQ 9 Score -       Fall Risk Assessment:  :     Fall Risk Assessment, last 12 mths 2019   Able to walk? Yes   Fall in past 12 months?  No       Abuse Screening:  :     Abuse Screening Questionnaire 2022 2/16/2022 1/27/2022 11/30/2021 8/19/2021 7/18/2019   Do you ever feel afraid of your partner? N N N N N N N   Are you in a relationship with someone who physically or mentally threatens you? N N N N N N N   Is it safe for you to go home? Y Y Y Y Y Y Y       Coordination of Care Questionnaire:  :     1) Have you been to an emergency room, urgent care clinic since your last visit? no   Hospitalized since your last visit? no             2) Have you seen or consulted any other health care providers outside of [address] [place] [address] since your last visit? Yes Dr Mercy Bhakta 5/2022    3) Do you have an Advance Directive on file? no  Are you interested in receiving information about Advance Directives? no    4. For patients aged 39-70: Has the patient had a colonoscopy / FIT/ Cologuard? Yes 7/2021 @ [facility]      If the patient is female:    5. For patients aged 41-77: Has the patient had a mammogram within the past 2 years? NA - based on age or sex      10. For patients aged 21-65: Has the patient had a pap smear?  NA - based on age or sex

## 2022-07-01 ENCOUNTER — OFFICE VISIT (OUTPATIENT)
Dept: CARDIOLOGY CLINIC | Age: 52
End: 2022-07-01
Payer: COMMERCIAL

## 2022-07-01 VITALS
OXYGEN SATURATION: 97 % | DIASTOLIC BLOOD PRESSURE: 70 MMHG | HEART RATE: 96 BPM | SYSTOLIC BLOOD PRESSURE: 100 MMHG | WEIGHT: 264 LBS | HEIGHT: 66 IN | BODY MASS INDEX: 42.43 KG/M2

## 2022-07-01 DIAGNOSIS — Z79.01 CHRONIC ANTICOAGULATION: ICD-10-CM

## 2022-07-01 DIAGNOSIS — I50.22 CHRONIC SYSTOLIC CONGESTIVE HEART FAILURE (HCC): ICD-10-CM

## 2022-07-01 DIAGNOSIS — I50.23 ACUTE ON CHRONIC SYSTOLIC CHF (CONGESTIVE HEART FAILURE) (HCC): Primary | ICD-10-CM

## 2022-07-01 DIAGNOSIS — I48.19 PERSISTENT ATRIAL FIBRILLATION (HCC): ICD-10-CM

## 2022-07-01 PROCEDURE — 99214 OFFICE O/P EST MOD 30 MIN: CPT | Performed by: INTERNAL MEDICINE

## 2022-07-01 RX ORDER — POTASSIUM CHLORIDE 750 MG/1
10 TABLET, EXTENDED RELEASE ORAL DAILY
Qty: 90 TABLET | Refills: 3 | Status: SHIPPED | OUTPATIENT
Start: 2022-07-01

## 2022-07-01 NOTE — PROGRESS NOTES
Cedric Bentley. is a 47 y.o. male    Visit Vitals  /70 (BP 1 Location: Left upper arm, BP Patient Position: Sitting, BP Cuff Size: Adult)   Pulse 96   Ht 5' 6\" (1.676 m)   Wt 264 lb (119.7 kg)   SpO2 97%   BMI 42.61 kg/m²       Chief Complaint   Patient presents with    CHF    Cardiomyopathy    Irregular Heart Beat     AFIB    Other     ANTI COAG       Chest pain NO  SOB NO  Dizziness YES  Swelling NO  Recent hospital visit NO  Refills NO  COVID VACCINE STATUS YES  HAD COVID?  NO

## 2022-07-01 NOTE — PROGRESS NOTES
Laura Moran MD    [address] [facility] [place], [address] [facility]    Office [phone],DJT [phone]  /    Arthur Mcnamara. is a 47 y.o. male is here for f/u visit. Primary care physician:  Crystal Santos MD      Dear  Luis Nomran,    I had the pleasure of seeing Mr. Arthur Macdonald in the office today. Chief complaint:  As documented in EMR    Assessment:  Chronic SHF - EF 25-30%. Nonischemic cardiomyopathy. ([facility] admit 7/8/19 Acute on chronic SHF. Underwent RHC/LHC - no sig CAD)  Persistent atrial fibrillation  Chronic anticoagulation - INR followed by PCP  Morbid obesity   Hx of Smoker - quit smoking/now chewing tobacco  Hx of Excessive EtOH  -- occ drinks beer now  History of anemia  Hypokalemia-on KCl 10 mg daily. Plan:     CHF - volume compensated. Using Torsemide 20 mg /d in addition to metolazone 5mg every 5d. On Entresto 24/26 1 tablet twice daily. On spironolactone patient states that his weight fluctuates been 262 - 264 pounds. Has not wanted to be on metoprolol. Taking Cardizem IR 30 mg twice daily. Understands that Cardizem is not the right medication for his cardiomyopathy but he does not want to switch to beta-blocker. Could not tolerate Jardiance/afford it. CMP was ordered today. On anticoagulation on Coumadin/INR  -INR followed by PCP    Has seen EP ( Dr Velma Frederick) -recommended for persistent A. fib with RVR-AV node ablation/CRT-D. However, patient reluctant to proceed with this approach.     F/u with in12 weeks/earlier as needed       Lab Results   Component Value Date/Time    Cholesterol, total 129 08/25/2021 12:00 AM    HDL Cholesterol 32 (L) 08/25/2021 12:00 AM    LDL,Direct 98 01/03/2012 07:00 PM    LDL, calculated 64 08/25/2021 12:00 AM    LDL, calculated 72 12/26/2019 04:19 PM    VLDL, calculated 33 08/25/2021 12:00 AM    VLDL, calculated 50 (H) 12/26/2019 04:19 PM    Triglyceride 199 (H) 08/25/2021 12:00 AM    CHOL/HDL Ratio 5.2 (H) 06/23/2017 04:40 AM         Patient understands the plan. All questions were answered to the patient's satisfaction. Medication Side Effects and Warnings were discussed with patient: yes  Patient Labs were reviewed and or requested:  yes  Patient Past Records were reviewed and or requested: yes    I appreciate the opportunity to be involved in . See note below for details. Please do not hesitate to contact us with questions or concerns. Taz Amaya MD    Cardiac Testing/ Procedures: A. Cardiac Cath/PCI: 7/1/19 - LHC/RHC - No sig CAD; Mean PA pressure 21 mm Hg    B.ECHO/EVITA: 12/26/2019-severely dilated LV, LVEF 25 to 30%, severely dilated left atrium, dilated right atrium, mild TR, moderate MR  6/21/19 - EF 26-30%; Mod LAE; DIDIER: Mild to Mod MR; Mild to Mod TR; Mod Pulm HTN    6/22/2017 Left ventricle: Systolic function was mildly reduced. Ejection fraction was estimated to be 45 %. There were no regional wall motion abnormalities. Tricuspid valve: There was mild regurgitation. C.StressNuclear/Stress ECHO/Stress test: 6/26/19 - Abnormal ledy nuc; EF 24%; Fixed defect( inf/apical)    D. Vascular:    E. EP:Holter monitor-1/22/2021-1/28/2021 1/22/2021-A. fib with RVR (heart rate 120s),   1/28/2021-1 episode of NSVT-13 beat run (auto detected). PVCs  F. Miscellaneous:    Subjective:  Ceci Ellington is a 47 y.o. male who returns for follow up visit. No CP. Dyspnea - chronic     Currently on short acting Cardizem because a long-acting Cardizem was dropping his blood pressures. He states that his blood pressure doing well. He was started on beta-blockers (metoprolol/coreg) previously but was stopped becauase the medication did  not \"agree with him\"     Takes cardizem only if HR is high. Has seen EP ( Dr Ebony Patterson) -recommended for persistent A. fib with RVR-AV node ablation/CRT-D.     [facility] admit 7/8/19 Acute on chronic SHF.  Underwent RHC/LHC - no sig CAD    Tries to eat low-salt/heart healthy diet. ROS:  (bold if positive, if negative)             Medications before admission:    Current Outpatient Medications   Medication Sig Dispense    warfarin (COUMADIN) 5 mg tablet 1.5 tablets Mon-Fri (7.5 mgs), then 1 tablet (5 mgs) Sat-Sun 120 Tablet    tiZANidine (ZANAFLEX) 2 mg tablet 1-2 tablets, 1-2 hours prior to bedtime. 30 Tablet    potassium chloride (KLOR-CON M10) 10 mEq tablet Take 1 Tablet by mouth daily. 30 Tablet    torsemide (DEMADEX) 20 mg tablet Take 1 Tablet by mouth daily. 90 Tablet    metOLazone (ZAROXOLYN) 5 mg tablet Take 1 tablet every 5 days and as needed for weight gain >5lbs 30 Tablet    allopurinoL (ZYLOPRIM) 100 mg tablet TAKE 1 TABLET BY MOUTH ONCE DAILY FOR 30 DAYS     spironolactone (ALDACTONE) 25 mg tablet Take 1 Tablet by mouth daily. 30 Tablet    dilTIAZem IR (CARDIZEM) 30 mg tablet TAKE 1 TABLET BY MOUTH TWICE DAILY. HOLD IF BLOOD PRESSURE IS LESS THAN 100/55 180 Tablet    sacubitriL-valsartan (Entresto) 24-26 mg tablet Take 1 Tablet by mouth two (2) times a day. 180 Tablet    ferrous sulfate (Iron) 325 mg (65 mg iron) tablet Take  by mouth every other day. Takes 1/2 tab     psyllium (METAMUCIL) powd Take  by mouth as needed.  cpap machine kit by Does Not Apply route daily.  MULTIVITAMIN (MULTIPLE VITAMIN PO) Take 1 Tab by mouth daily. No current facility-administered medications for this visit.        Family History of CAD:    Yes    Social History:  Current  Smoker  No    Physical Exam:  Visit Vitals  /70 (BP 1 Location: Left upper arm, BP Patient Position: Sitting, BP Cuff Size: Adult)   Pulse 96   Ht 5' 6\" (1.676 m)   Wt 264 lb (119.7 kg)   SpO2 97%   BMI 42.61 kg/m²       Gen: Well-developed, well-nourished, morbid obesity  Neck: Supple,thick neck, unable to appreciate JVD  Resp: No accessory muscle use, Clear breath sounds, No rales or rhonchi  Card: Irregular Rate,Rythm,Normal S1, S2, No murmurs  Abd: Soft,morbidly obese, Abd wall - indurated skin  MSK: No cyanosis  Neuro: moving all four extremities , follows commands appropriately  Psych:  Good insight, oriented to person, place , alert, Nml Affect  LE:Edema - mild    EKG:       LABS:        Lab Results   Component Value Date/Time    WBC 7.2 04/13/2022 12:30 PM    HGB 16.2 04/13/2022 12:30 PM    HCT 50.4 04/13/2022 12:30 PM    PLATELET 008 82/96/5663 12:30 PM     Lab Results   Component Value Date/Time    Sodium 137 05/25/2022 11:48 AM    Potassium 3.4 (L) 05/25/2022 11:48 AM    Chloride 94 (L) 05/25/2022 11:48 AM    CO2 27 05/25/2022 11:48 AM    Anion gap 6 07/15/2019 01:46 AM    Glucose 87 05/25/2022 11:48 AM    BUN 33 (H) 05/25/2022 11:48 AM    Creatinine 1.70 (H) 05/25/2022 11:48 AM    BUN/Creatinine ratio 19 05/25/2022 11:48 AM    GFR est AA 81 02/10/2022 12:00 AM    GFR est non-AA 70 02/10/2022 12:00 AM    Calcium 9.5 05/25/2022 11:48 AM       Lab Results   Component Value Date/Time    aPTT 28.0 06/22/2017 12:54 PM               Vernestine Mortimer, MD

## 2022-07-01 NOTE — LETTER
7/3/2022    Patient: Tamela Potter. YOB: 1970   Date of Visit: 7/1/2022     Matilda Linares, Tania [facility]  [address] [facility]  Via In Basket    Dear Matilda Linares MD,      Thank you for referring Mr. Dottie Lynn to [address] for evaluation. My notes for this consultation are attached. If you have questions, please do not hesitate to call me. I look forward to following your patient along with you.       Sincerely,    Jamal Crystal MD

## 2022-07-01 NOTE — PATIENT INSTRUCTIONS

## 2022-07-16 LAB
BUN SERPL-MCNC: 28 MG/DL (ref 6–24)
BUN/CREAT SERPL: 20 (ref 9–20)
CALCIUM SERPL-MCNC: 9.4 MG/DL (ref 8.7–10.2)
CHLORIDE SERPL-SCNC: 89 MMOL/L (ref 96–106)
CO2 SERPL-SCNC: 26 MMOL/L (ref 20–29)
CREAT SERPL-MCNC: 1.38 MG/DL (ref 0.76–1.27)
EGFR: 61 ML/MIN/1.73
GLUCOSE SERPL-MCNC: 98 MG/DL (ref 65–99)
POTASSIUM SERPL-SCNC: 3.6 MMOL/L (ref 3.5–5.2)
SODIUM SERPL-SCNC: 134 MMOL/L (ref 134–144)

## 2022-08-08 RX ORDER — ALLOPURINOL 100 MG/1
TABLET ORAL
Qty: 90 TABLET | Refills: 1 | Status: SHIPPED | OUTPATIENT
Start: 2022-08-08

## 2022-08-16 DIAGNOSIS — Z51.81 ENCOUNTER FOR THERAPEUTIC DRUG MONITORING: ICD-10-CM

## 2022-08-16 RX ORDER — WARFARIN SODIUM 5 MG/1
TABLET ORAL
Qty: 90 TABLET | Refills: 0 | OUTPATIENT
Start: 2022-08-16

## 2022-08-16 NOTE — TELEPHONE ENCOUNTER
Called pt 8/16 at 11:46 am and pt stated that it is ridiculous that he has to have an appt for his med refill.  Pt stated going out of town and does not have time to come in for an appt and will call back later to setup something and whatever happens happens when he does not have his medication

## 2022-08-24 ENCOUNTER — OFFICE VISIT (OUTPATIENT)
Dept: FAMILY MEDICINE CLINIC | Age: 52
End: 2022-08-24
Payer: COMMERCIAL

## 2022-08-24 VITALS
SYSTOLIC BLOOD PRESSURE: 132 MMHG | HEIGHT: 66 IN | DIASTOLIC BLOOD PRESSURE: 82 MMHG | WEIGHT: 263 LBS | TEMPERATURE: 97.4 F | RESPIRATION RATE: 20 BRPM | OXYGEN SATURATION: 97 % | BODY MASS INDEX: 42.27 KG/M2 | HEART RATE: 67 BPM

## 2022-08-24 DIAGNOSIS — I48.19 PERSISTENT ATRIAL FIBRILLATION (HCC): Primary | ICD-10-CM

## 2022-08-24 DIAGNOSIS — Z51.81 ENCOUNTER FOR THERAPEUTIC DRUG MONITORING: ICD-10-CM

## 2022-08-24 LAB
INR BLD: 1.1
PT POC: 12.7 SECONDS
VALID INTERNAL CONTROL?: YES

## 2022-08-24 PROCEDURE — 85610 PROTHROMBIN TIME: CPT | Performed by: INTERNAL MEDICINE

## 2022-08-24 PROCEDURE — 3132F DOC REF UPPER GI ENDOSCOPY: CPT | Performed by: INTERNAL MEDICINE

## 2022-08-24 PROCEDURE — 99213 OFFICE O/P EST LOW 20 MIN: CPT | Performed by: INTERNAL MEDICINE

## 2022-08-24 RX ORDER — WARFARIN SODIUM 5 MG/1
TABLET ORAL
Qty: 120 TABLET | Refills: 0 | Status: SHIPPED | OUTPATIENT
Start: 2022-08-24 | End: 2022-10-25 | Stop reason: SDUPTHER

## 2022-08-24 NOTE — PATIENT INSTRUCTIONS
Instructions  Restart coumadin 3-days after tooth extraction. Restart the same dose that you were on. Mon-Fri 7.5 mgs (1.5 tablets) and Sat-Sun 5 mgs (1 tablet). Follow up in 2-weeks after restarting the medication.

## 2022-08-24 NOTE — PROGRESS NOTES
Identified pt with two pt identifiers(name and ). Chief Complaint   Patient presents with    Coagulation disorder     Took last dose Thusrday 22 do to having tooth pulled on Friday. Health Maintenance Due   Topic    Pneumococcal 0-64 years (1 - PCV)    DTaP/Tdap/Td series (1 - Tdap)    Colorectal Cancer Screening Combo     Shingrix Vaccine Age 50> (1 of 2)    COVID-19 Vaccine (4 - Booster for Moderna series)       Wt Readings from Last 3 Encounters:   22 263 lb (119.3 kg)   22 264 lb (119.7 kg)   22 260 lb (117.9 kg)     Temp Readings from Last 3 Encounters:   22 97.4 °F (36.3 °C) (Temporal)   22 97.3 °F (36.3 °C) (Temporal)   22 97.6 °F (36.4 °C) (Temporal)     BP Readings from Last 3 Encounters:   22 132/82   22 100/70   22 120/66     Pulse Readings from Last 3 Encounters:   22 67   22 96   22 83         Learning Assessment:  :     Learning Assessment 8/3/2017   PRIMARY LEARNER Patient   PRIMARY LANGUAGE ENGLISH   LEARNER PREFERENCE PRIMARY LISTENING   ANSWERED BY patient   RELATIONSHIP SELF       Depression Screening:  :     3 most recent PHQ Screens 2022   Little interest or pleasure in doing things Not at all   Feeling down, depressed, irritable, or hopeless Not at all   Total Score PHQ 2 0   Trouble falling or staying asleep, or sleeping too much -   Feeling tired or having little energy -   Poor appetite, weight loss, or overeating -   Feeling bad about yourself - or that you are a failure or have let yourself or your family down -   Trouble concentrating on things such as school, work, reading, or watching TV -   Moving or speaking so slowly that other people could have noticed; or the opposite being so fidgety that others notice -   Thoughts of being better off dead, or hurting yourself in some way -   PHQ 9 Score -       Fall Risk Assessment:  :     Fall Risk Assessment, last 12 mths 2019   Able to walk? Yes   Fall in past 12 months? No       Abuse Screening:  :     Abuse Screening Questionnaire 6/16/2022 4/13/2022 2/16/2022 1/27/2022 11/30/2021 8/19/2021 7/18/2019   Do you ever feel afraid of your partner? N N N N N N N   Are you in a relationship with someone who physically or mentally threatens you? N N N N N N N   Is it safe for you to go home? Y Y Y Y Y Y Y       Coordination of Care Questionnaire:  :     1) Have you been to an emergency room, urgent care clinic since your last visit? no   Hospitalized since your last visit? no             2) Have you seen or consulted any other health care providers outside of [address] [place] [address] since your last visit? yes  [facility]     3) Do you have an Advance Directive on file? no  Are you interested in receiving information about Advance Directives? no    4. For patients aged 39-70: Has the patient had a colonoscopy / FIT/ Cologuard? No      If the patient is female:    5. For patients aged 41-77: Has the patient had a mammogram within the past 2 years? NA - based on age or sex      10. For patients aged 21-65: Has the patient had a pap smear?  NA - based on age or sex

## 2022-08-24 NOTE — PROGRESS NOTES
Chief Complaint   Patient presents with    Coagulation disorder     Took last dose Thusrday 8/18/22 do to having tooth pulled on Friday. Assessment/ Plan:   Diagnoses and all orders for this visit:    1. Persistent atrial fibrillation (HCC)   Needs anticoagulation. Currently being held due to dental extraction planned for Friday. To restart 3-days after extraction. 2. Encounter for therapeutic drug monitoring  -     AMB POC PT/INR  -     MT REFERRAL FOR UPPER GI ENDOSCOPY DOCUMENTED  -     warfarin (COUMADIN) 5 mg tablet; 1.5 tablets Mon-Fri (7.5 mgs), then 1 tablet (5 mgs) Sat-Sun     I have discussed the diagnosis with the patient and the intended treatment plan as seen in the above orders. The patient has received an after-visit summary and questions were answered concerning future plans. Asked to return should symptoms worsen or not improve with treatment. Any pending labs and studies will be relayed to patient when they become available. Pt verbalizes understanding of plan of care and denies further questions or concerns at this time. Follow-up and Dispositions    Return in about 2 weeks (around 9/7/2022), or if symptoms worsen or fail to improve, for PT/INR. Subjective:   Tarun Leija. is a 47 y.o. male who presents today for Anticoagulation monitoring. He is subtherapuetic due to his dose of coumadin being held. Will be having tooth extraction on Friday. Typically takes:  warfarin (COUMADIN) 5 mg tablet; 1.5 tablets Mon-Fri (7.5 mgs), then 1 tablet (5 mgs) Sat-Sun     Indication: Atrial Fibrillation  INR Goal: 2.0-3.0. Current dose:  Coumadin 7.5 mg Mon-Fri, 5 mgs Sat & Sun  Missed Coumadin Doses: This week - due to extraction on Friday  Medication Changes:  no  Dietary Changes:  no    Symptoms: taking coumadin appropriately without any bleeding.     Latest INRs:  Lab Results   Component Value Date/Time    INR POC 1.1 08/24/2022 11:40 AM    INR POC 1.9 06/16/2022 01:29 PM    INR POC 2.4 04/13/2022 11:28 AM        New Coumadin dose:.current treatment plan is effective, no change in therapy. Next check to be scheduled for  2 weeks after dental extraction. HISTORICAL  PMH, PSH, FHX, SOCHX, ALLERGIES and MES were reviewed and updated today. Review of Systems  Review of Systems   Constitutional: Negative. HENT: Negative. Eyes: Negative. Respiratory: Negative. Cardiovascular: Negative. Gastrointestinal: Negative. Genitourinary: Negative. Musculoskeletal: Negative. Skin: Negative. Neurological: Negative. Endo/Heme/Allergies: Negative. Psychiatric/Behavioral: Negative. All other systems reviewed and are negative. Objective:     Vitals:    08/24/22 1134   BP: 132/82   Pulse: 67   Resp: 20   Temp: 97.4 °F (36.3 °C)   TempSrc: Temporal   SpO2: 97%   Weight: 263 lb (119.3 kg)   Height: 5' 6\" (1.676 m)       Physical Exam  Constitutional:       Appearance: He is obese. Cardiovascular:      Rate and Rhythm: Rhythm irregular. Pulses: Normal pulses. Heart sounds: Normal heart sounds. Pulmonary:      Effort: Pulmonary effort is normal.      Breath sounds: Normal breath sounds.        Irving Lacey MD  [facility]

## 2022-10-02 NOTE — PROGRESS NOTES
Sandra Browning MD    [id number] [facility], [address] [facility]    Office [phone],YEZ [phone]  /    Trisha Sorto. is a 47 y.o. male is here for f/u visit. Primary care physician:  Lisseth Peña MD      Dear  Indu Curiel,    I had the pleasure of seeing Mr. Trisha Sorto. in the office today. Chief complaint:  As documented in EMR    Assessment:  Chronic SHF - EF 25-30%. Nonischemic cardiomyopathy. ([facility] admit 7/8/19 Acute on chronic SHF. Underwent RHC/LHC - no sig CAD)  Persistent atrial fibrillation  Chronic anticoagulation - INR followed by PCP  Morbid obesity   Hx of Smoker - quit smoking/now chewing tobacco  Hx of Excessive EtOH  -- occ drinks beer now  History of anemia  Hypokalemia-on KCl 10 mg daily. Plan:     CHF - volume compensated. Using Torsemide 20 mg /d in addition to metolazone 5mg every 5d. On Entresto 24/26 1 tablet twice daily. On spironolactone patient states that his weight fluctuates been 262 - 264 pounds. Has not wanted to be on metoprolol. Taking Cardizem IR 30 mg twice daily. Understands that Cardizem is not the right medication for his cardiomyopathy but he does not want to switch to beta-blocker. Could not tolerate Jardiance/afford it. CMP was ordered today. On anticoagulation on Coumadin/INR  -INR followed by PCP    Has seen EP ( Dr Maribel Gatica) -recommended for persistent A. fib with RVR-AV node ablation/CRT-D. However, patient reluctant to proceed with this approach. F/u with in12 weeks/earlier as needed. Limited echocardiogram to evaluate EF prior to visit.        Lab Results   Component Value Date/Time    Cholesterol, total 129 08/25/2021 12:00 AM    HDL Cholesterol 32 (L) 08/25/2021 12:00 AM    LDL,Direct 98 01/03/2012 07:00 PM    LDL, calculated 64 08/25/2021 12:00 AM    LDL, calculated 72 12/26/2019 04:19 PM    VLDL, calculated 33 08/25/2021 12:00 AM    VLDL, calculated 50 (H) 12/26/2019 04:19 PM    Triglyceride 199 (H) 08/25/2021 12:00 AM    CHOL/HDL Ratio 5.2 (H) 06/23/2017 04:40 AM         Patient understands the plan. All questions were answered to the patient's satisfaction. Medication Side Effects and Warnings were discussed with patient: yes  Patient Labs were reviewed and or requested:  yes  Patient Past Records were reviewed and or requested: yes    I appreciate the opportunity to be involved in . See note below for details. Please do not hesitate to contact us with questions or concerns. Henny Ruvalcaba MD    Cardiac Testing/ Procedures: A. Cardiac Cath/PCI: 7/1/19 - LHC/RHC - No sig CAD; Mean PA pressure 21 mm Hg    B.ECHO/EVITA: 12/26/2019-severely dilated LV, LVEF 25 to 30%, severely dilated left atrium, dilated right atrium, mild TR, moderate MR  6/21/19 - EF 26-30%; Mod LAE; DIDIER: Mild to Mod MR; Mild to Mod TR; Mod Pulm HTN    6/22/2017 Left ventricle: Systolic function was mildly reduced. Ejection fraction was estimated to be 45 %. There were no regional wall motion abnormalities. Tricuspid valve: There was mild regurgitation. C.StressNuclear/Stress ECHO/Stress test: 6/26/19 - Abnormal ledy nuc; EF 24%; Fixed defect( inf/apical)    D. Vascular:    E. EP:Holter monitor-1/22/2021-1/28/2021 1/22/2021-A. fib with RVR (heart rate 120s),   1/28/2021-1 episode of NSVT-13 beat run (auto detected). PVCs  F. Miscellaneous:    Subjective:  Danyelle Salvador is a 47 y.o. male who returns for follow up visit. No CP. Dyspnea - chronic  Complains of muscle weakness especially in his lower extremities. Currently on short acting Cardizem because a long-acting Cardizem was dropping his blood pressures. He states that his blood pressure doing well. He was started on beta-blockers (metoprolol/coreg) previously but was stopped becauase the medication did  not \"agree with him\"     Takes cardizem only if HR is high.   Recently his blood pressures have been trending low and he has not had to take the Cardizem. Has seen EP ( Dr Kait Broussard) -recommended for persistent A. fib with RVR-AV node ablation/CRT-D.     [facility] admit 7/8/19 Acute on chronic SHF. Underwent RHC/LHC - no sig CAD    Tries to eat low-salt/heart healthy diet. ROS:  (bold if positive, if negative)           Medications before admission:    Current Outpatient Medications   Medication Sig Dispense    warfarin (COUMADIN) 5 mg tablet 1.5 tablets Mon-Fri (7.5 mgs), then 1 tablet (5 mgs) Sat-Sun 120 Tablet    allopurinoL (ZYLOPRIM) 100 mg tablet Take 1 tablet by mouth once daily for 30 days 90 Tablet    spironolactone (ALDACTONE) 25 mg tablet Take 1 tablet by mouth once daily 90 Tablet    potassium chloride (KLOR-CON M10) 10 mEq tablet Take 1 Tablet by mouth daily. 90 Tablet    torsemide (DEMADEX) 20 mg tablet Take 1 Tablet by mouth daily. 90 Tablet    metOLazone (ZAROXOLYN) 5 mg tablet Take 1 tablet every 5 days and as needed for weight gain >5lbs 30 Tablet    dilTIAZem IR (CARDIZEM) 30 mg tablet TAKE 1 TABLET BY MOUTH TWICE DAILY. HOLD IF BLOOD PRESSURE IS LESS THAN 100/55 180 Tablet    sacubitriL-valsartan (Entresto) 24-26 mg tablet Take 1 Tablet by mouth two (2) times a day. 180 Tablet    ferrous sulfate 325 mg (65 mg iron) tablet Take  by mouth every other day. Takes 1/2 tab     psyllium (METAMUCIL) powd Take  by mouth as needed. cpap machine kit by Does Not Apply route daily. MULTIVITAMIN (MULTIPLE VITAMIN PO) Take 1 Tab by mouth daily. No current facility-administered medications for this visit.        Family History of CAD:    Yes    Social History:  Current  Smoker  No    Physical Exam:  Visit Vitals  /82 (BP 1 Location: Left upper arm, BP Patient Position: Sitting)   Pulse 64   Ht 5' 6\" (1.676 m)   Wt 267 lb (121.1 kg)   SpO2 95%   BMI 43.09 kg/m²         Gen: Well-developed, well-nourished, morbid obesity  Neck: Supple,thick neck, unable to appreciate JVD  Resp: No accessory muscle use, Clear breath sounds, No rales or rhonchi  Card: Irregular Rate,Rythm,Normal S1, S2, No murmurs  Abd:  Soft,morbidly obese, Abd wall - indurated skin  MSK: No cyanosis  Neuro: moving all four extremities , follows commands appropriately  Psych:  Good insight, oriented to person, place , alert, Nml Affect  LE:Edema - mild    EKG:       LABS:        Lab Results   Component Value Date/Time    WBC 7.2 04/13/2022 12:30 PM    HGB 16.2 04/13/2022 12:30 PM    HCT 50.4 04/13/2022 12:30 PM    PLATELET 842 17/48/1998 12:30 PM     Lab Results   Component Value Date/Time    Sodium 134 07/15/2022 10:28 AM    Potassium 3.6 07/15/2022 10:28 AM    Chloride 89 (L) 07/15/2022 10:28 AM    CO2 26 07/15/2022 10:28 AM    Anion gap 6 07/15/2019 01:46 AM    Glucose 98 07/15/2022 10:28 AM    BUN 28 (H) 07/15/2022 10:28 AM    Creatinine 1.38 (H) 07/15/2022 10:28 AM    BUN/Creatinine ratio 20 07/15/2022 10:28 AM    GFR est AA 81 02/10/2022 12:00 AM    GFR est non-AA 70 02/10/2022 12:00 AM    Calcium 9.4 07/15/2022 10:28 AM       Lab Results   Component Value Date/Time    aPTT 28.0 06/22/2017 12:54 PM               Sari Adams MD

## 2022-10-04 ENCOUNTER — OFFICE VISIT (OUTPATIENT)
Dept: CARDIOLOGY CLINIC | Age: 52
End: 2022-10-04
Payer: COMMERCIAL

## 2022-10-04 VITALS
DIASTOLIC BLOOD PRESSURE: 82 MMHG | BODY MASS INDEX: 42.91 KG/M2 | HEIGHT: 66 IN | SYSTOLIC BLOOD PRESSURE: 130 MMHG | HEART RATE: 64 BPM | OXYGEN SATURATION: 95 % | WEIGHT: 267 LBS

## 2022-10-04 DIAGNOSIS — I48.19 PERSISTENT ATRIAL FIBRILLATION (HCC): ICD-10-CM

## 2022-10-04 DIAGNOSIS — E66.01 MORBID OBESITY (HCC): ICD-10-CM

## 2022-10-04 DIAGNOSIS — Z79.01 CHRONIC ANTICOAGULATION: ICD-10-CM

## 2022-10-04 DIAGNOSIS — I42.9 CARDIOMYOPATHY, UNSPECIFIED TYPE (HCC): ICD-10-CM

## 2022-10-04 DIAGNOSIS — I50.22 CHRONIC SYSTOLIC CONGESTIVE HEART FAILURE (HCC): Primary | ICD-10-CM

## 2022-10-04 PROCEDURE — 99214 OFFICE O/P EST MOD 30 MIN: CPT | Performed by: INTERNAL MEDICINE

## 2022-10-04 RX ORDER — TORSEMIDE 20 MG/1
TABLET ORAL
Qty: 180 TABLET | Refills: 3 | Status: SHIPPED | OUTPATIENT
Start: 2022-10-04

## 2022-10-04 NOTE — LETTER
10/8/2022    Patient: Mo Ceron. YOB: 1970   Date of Visit: 10/4/2022     Henryr Hodge MD  [facility] [address]  [address] [facility]    Dear Henrry Hodge MD,      Thank you for referring Mr. Kelvin Casillas to [address] for evaluation. My notes for this consultation are attached. If you have questions, please do not hesitate to call me. I look forward to following your patient along with you.       Sincerely,    Ellen Cazares MD

## 2022-10-04 NOTE — PROGRESS NOTES
Twyla Schmidt. is a 47 y.o. male    Chief Complaint   Patient presents with    Follow-up     3 month - Afalf, anticosunni. CHF       Chest pain no    SOB patient states some SOB     Dizziness no; muscle weakness in legs and arms - ? Entresto     Swelling no    Refills no ? About torsemide     Visit Vitals  /82 (BP 1 Location: Left upper arm, BP Patient Position: Sitting)   Pulse 64   Ht 5' 6\" (1.676 m)   Wt 267 lb (121.1 kg)   SpO2 95%   BMI 43.09 kg/m²       1. Have you been to the ER, urgent care clinic since your last visit? Hospitalized since your last visit? No    2. Have you seen or consulted any other health care providers outside of the [address] [place] [address] since your last visit? Include any pap smears or colon screening.  No

## 2022-10-25 ENCOUNTER — OFFICE VISIT (OUTPATIENT)
Dept: FAMILY MEDICINE CLINIC | Age: 52
End: 2022-10-25
Payer: MEDICARE

## 2022-10-25 VITALS
OXYGEN SATURATION: 96 % | WEIGHT: 267 LBS | TEMPERATURE: 97.8 F | HEIGHT: 66 IN | HEART RATE: 53 BPM | SYSTOLIC BLOOD PRESSURE: 112 MMHG | DIASTOLIC BLOOD PRESSURE: 68 MMHG | BODY MASS INDEX: 42.91 KG/M2

## 2022-10-25 DIAGNOSIS — Z51.81 ENCOUNTER FOR THERAPEUTIC DRUG MONITORING: ICD-10-CM

## 2022-10-25 DIAGNOSIS — Z51.81 ANTICOAGULATION GOAL OF INR 2.5 TO 3.5: ICD-10-CM

## 2022-10-25 DIAGNOSIS — I48.19 PERSISTENT ATRIAL FIBRILLATION (HCC): Primary | ICD-10-CM

## 2022-10-25 DIAGNOSIS — Z79.01 ANTICOAGULATION GOAL OF INR 2.5 TO 3.5: ICD-10-CM

## 2022-10-25 LAB
INR BLD: 3
PT POC: 36.5 SECONDS
VALID INTERNAL CONTROL?: YES

## 2022-10-25 PROCEDURE — 85610 PROTHROMBIN TIME: CPT | Performed by: INTERNAL MEDICINE

## 2022-10-25 PROCEDURE — 99213 OFFICE O/P EST LOW 20 MIN: CPT | Performed by: INTERNAL MEDICINE

## 2022-10-25 RX ORDER — WARFARIN SODIUM 5 MG/1
TABLET ORAL
Qty: 120 TABLET | Refills: 5 | Status: SHIPPED | OUTPATIENT
Start: 2022-10-25 | End: 2022-11-22

## 2022-10-25 NOTE — PROGRESS NOTES
Identified pt with two pt identifiers(name and ). Reviewed record in preparation for visit and have obtained necessary documentation. Chief Complaint   Patient presents with    Coagulation disorder        Vitals:    10/25/22 1516   BP: 112/68   Pulse: (!) 53   Temp: 97.8 °F (36.6 °C)   TempSrc: Temporal   SpO2: 96%   Weight: 267 lb (121.1 kg)   Height: 5' 6\" (1.676 m)   PainSc:   0 - No pain       Health Maintenance Due   Topic    Pneumococcal 0-64 years (1 - PCV)    DTaP/Tdap/Td series (1 - Tdap)    Colorectal Cancer Screening Combo     Shingrix Vaccine Age 50> (1 of 2)    COVID-19 Vaccine (4 - Booster for Moderna series)    Flu Vaccine (1)       Coordination of Care Questionnaire:  :   1) Have you been to an emergency room, urgent care, or hospitalized since your last visit? If yes, where when, and reason for visit? no      2. Have seen or consulted any other health care provider since your last visit?    If yes, where when, and reason for visit?  no

## 2022-10-26 NOTE — PROGRESS NOTES
Chief Complaint   Patient presents with    Coagulation disorder     Assessment/ Plan:   Diagnoses and all orders for this visit:    1. Persistent atrial fibrillation ([facility] [address]    2. Encounter for therapeutic drug monitoring  -     AMB POC PT/INR  -     warfarin (COUMADIN) 5 mg tablet; 1.5 tablets Mon-Fri (7.5 mgs), then 1 tablet (5 mgs) Sat-Sun    3. Anticoagulation goal of INR 2.5 to 3.5  -     AMB POC PT/INR     I have discussed the diagnosis with the patient and the intended treatment plan as seen in the above orders. The patient has received an after-visit summary and questions were answered concerning future plans. Asked to return should symptoms worsen or not improve with treatment. Any pending labs and studies will be relayed to patient when they become available. Pt verbalizes understanding of plan of care and denies further questions or concerns at this time. Follow-up and Dispositions    Return in about 4 weeks (around 11/22/2022), or if symptoms worsen or fail to improve, for PT/INR. Subjective:   Be La is a 47 y.o. male who presents today for Anticoagulation monitoring. Indication: Atrial Fibrillation  INR Goal: 2.0-3.0. Current dose:  (COUMADIN) 5 mg tablet; 1.5 tablets Mon-Fri (7.5 mgs), then 1 tablet (5 mgs) Sat-Sun. Missed Coumadin Doses:  None  Medication Changes:  no  Dietary Changes:  no    Symptoms: taking coumadin appropriately without any bleeding. Latest INRs:  Lab Results   Component Value Date/Time    INR POC 3.0 10/25/2022 03:33 PM    INR POC 1.1 08/24/2022 11:40 AM    INR POC 1.9 06/16/2022 01:29 PM     New Coumadin dose:.current treatment plan is effective, no change in therapy. Next check to be scheduled for  4 weeks. HISTORICAL  PMH, PSH, FHX, SOCHX, ALLERGIES and MES were reviewed and updated today. Review of Systems  Review of Systems   Cardiovascular:  Positive for chest pain (Occasional. Comes and goes, not present today).    Neurological: Positive for dizziness (20 minutes after taking Entresto and then improves). All other systems reviewed and are negative. Objective:     Vitals:    10/25/22 1516   BP: 112/68   Pulse: (!) 53   Temp: 97.8 °F (36.6 °C)   TempSrc: Temporal   SpO2: 96%   Weight: 267 lb (121.1 kg)   Height: 5' 6\" (1.676 m)       Physical Exam  Vitals and nursing note reviewed. Constitutional:       Appearance: He is obese. Cardiovascular:      Rate and Rhythm: Rhythm irregular. Pulmonary:      Effort: Pulmonary effort is normal.      Breath sounds: Normal breath sounds. Skin:     General: Skin is warm. Capillary Refill: Capillary refill takes less than 2 seconds. Neurological:      Mental Status: Mental status is at baseline.      Mildred Montiel MD  [facility]   10/25/2022

## 2022-11-22 DIAGNOSIS — Z51.81 ENCOUNTER FOR THERAPEUTIC DRUG MONITORING: ICD-10-CM

## 2022-11-22 RX ORDER — WARFARIN SODIUM 5 MG/1
TABLET ORAL
Qty: 120 TABLET | Refills: 0 | Status: SHIPPED | OUTPATIENT
Start: 2022-11-22

## 2022-12-07 ENCOUNTER — OFFICE VISIT (OUTPATIENT)
Dept: FAMILY MEDICINE CLINIC | Age: 52
End: 2022-12-07
Payer: COMMERCIAL

## 2022-12-07 VITALS
BODY MASS INDEX: 43.94 KG/M2 | HEIGHT: 66 IN | OXYGEN SATURATION: 98 % | SYSTOLIC BLOOD PRESSURE: 110 MMHG | DIASTOLIC BLOOD PRESSURE: 70 MMHG | WEIGHT: 273.4 LBS | RESPIRATION RATE: 16 BRPM | HEART RATE: 60 BPM | TEMPERATURE: 97 F

## 2022-12-07 DIAGNOSIS — E78.2 MIXED HYPERLIPIDEMIA: ICD-10-CM

## 2022-12-07 DIAGNOSIS — E55.9 VITAMIN D DEFICIENCY: ICD-10-CM

## 2022-12-07 DIAGNOSIS — Z51.81 ENCOUNTER FOR THERAPEUTIC DRUG MONITORING: ICD-10-CM

## 2022-12-07 DIAGNOSIS — Z00.00 MEDICARE ANNUAL WELLNESS VISIT, SUBSEQUENT: Primary | ICD-10-CM

## 2022-12-07 DIAGNOSIS — Z86.2 HISTORY OF ANEMIA: ICD-10-CM

## 2022-12-07 DIAGNOSIS — D22.9 ATYPICAL MOLE: ICD-10-CM

## 2022-12-07 DIAGNOSIS — Z23 ENCOUNTER FOR IMMUNIZATION: ICD-10-CM

## 2022-12-07 DIAGNOSIS — I48.19 PERSISTENT ATRIAL FIBRILLATION (HCC): ICD-10-CM

## 2022-12-07 LAB
INR BLD: 2.2
PT POC: 26.4 SECONDS
VALID INTERNAL CONTROL?: YES

## 2022-12-07 NOTE — PROGRESS NOTES
CC:  Chief Complaint   Patient presents with    Follow-up     INR follow up      This is the Subsequent Medicare Annual Wellness Exam, performed 12 months or more after the Initial AWV or the last Subsequent AWV    I have reviewed the patient's medical history in detail and updated the computerized patient record. 47 y.o. M who presents for this AWV. He is here for his INR as well. He is therapeutic. Concerns:  None  Needs PT/INR today as well. Assessment/Plan   Education and counseling provided:  Are appropriate based on today's review and evaluation  End-of-Life planning (with patient's consent)  Pneumococcal Vaccine  Influenza Vaccine  Screening for glaucoma  Diabetes screening test    1. Medicare annual wellness visit, subsequent  Anticipatory guidance discussed. Immunizations reviewed  HM updated. 2. Persistent atrial fibrillation (HCC)  -     AMB POC PT/INR  3. Encounter for therapeutic drug monitoring  -     AMB POC PT/INR  4. History of anemia  -     IRON PROFILE; Future  -     FERRITIN; Future  5. Vitamin D deficiency  -     VITAMIN D, 25 HYDROXY; Future  6. Mixed hyperlipidemia  -     METABOLIC PANEL, COMPREHENSIVE; Future  -     CBC WITH AUTOMATED DIFF; Future  -     LIPID PANEL; Future  7. Encounter for immunization  -     INFLUENZA, FLUARIX, FLULAVAL, FLUZONE (AGE 6 MO+), AFLURIA(AGE 3Y+) IM, PF, 0.5 ML  -     ADMIN INFLUENZA VIRUS VAC  8. Atypical mole  -     REFERRAL TO DERMATOLOGY     I have discussed the diagnosis with the patient and the intended treatment plan as seen in the above orders. The patient has received an after-visit summary and questions were answered concerning future plans. Asked to return should symptoms worsen or not improve with treatment. Any pending labs and studies will be relayed to patient when they become available. Pt verbalizes understanding of plan of care and denies further questions or concerns at this time.      Follow-up and Dispositions    Return in about 1 year (around 12/7/2023), or if symptoms worsen or fail to improve, for   PT/INR in 4-8 weeks. Depression Risk Factor Screening     3 most recent PHQ Screens 12/7/2022   Little interest or pleasure in doing things Not at all   Feeling down, depressed, irritable, or hopeless Not at all   Total Score PHQ 2 0   Trouble falling or staying asleep, or sleeping too much -   Feeling tired or having little energy -   Poor appetite, weight loss, or overeating -   Feeling bad about yourself - or that you are a failure or have let yourself or your family down -   Trouble concentrating on things such as school, work, reading, or watching TV -   Moving or speaking so slowly that other people could have noticed; or the opposite being so fidgety that others notice -   Thoughts of being better off dead, or hurting yourself in some way -   PHQ 9 Score -       Alcohol & Drug Abuse Risk Screen    Do you average more than 2 drinks per night or 14 drinks a week: No    On any one occasion in the past three months have you have had more than 4 drinks containing alcohol:  No          Functional Ability and Level of Safety    Hearing: Hearing is good. Activities of Daily Living: The home contains: no safety equipment. Patient does total self care      Ambulation: with mild difficulty     Fall Risk:  Fall Risk Assessment, last 12 mths 7/18/2019   Able to walk? Yes   Fall in past 12 months? No      Abuse Screen:  Patient is not abused       Cognitive Screening    Has your family/caregiver stated any concerns about your memory: no     Cognitive Screening: Normal - Clock Drawing Test, Mini Cog Test    Visit Vitals  /70 (BP 1 Location: Right upper arm, BP Patient Position: Sitting, BP Cuff Size: Adult)   Pulse 60   Temp 97 °F (36.1 °C) (Temporal)   Resp 16   Ht 5' 6\" (1.676 m)   Wt 273 lb 6.4 oz (124 kg)   SpO2 98%   BMI 44.13 kg/m²     General appearance: alert, well appearing, and in no distress.   Chest: clear to auscultation, no wheezes, rales or rhonchi, symmetric air entry. CVS exam: normal rate, regular rhythm, normal S1, S2, no murmurs, rubs, clicks or gallops. Abdominal exam: soft, nontender, nondistended, no masses or organomegaly. Exam of extremities: peripheral pulses normal, no pedal edema, no clubbing or cyanosis  Skin exam - normal coloration and turgor, no rashes, no suspicious skin lesions noted. Neurological exam reveals alert, oriented, normal speech, no focal findings or movement disorder noted.     Health Maintenance Due     Health Maintenance Due   Topic Date Due    Pneumococcal 0-64 years (1 - PCV) Reviewed    DTaP/Tdap/Td series (1 - Tdap) Reviewed    Shingrix Vaccine Age 49> (1 of 2) Reviewed       Patient Care Team   Patient Care Team:  Yosvany Coleman MD as PCP - General (Pediatric Medicine)  Yosvany Coleman MD as PCP - [facility]  Deann De Santiago MD as Physician (Cardiovascular Disease Physician)  Deann De Santiago MD as Consulting Provider (Cardiovascular Disease Physician)    History     Patient Active Problem List   Diagnosis Code    Renal insufficiency N28.9    PEPE (obstructive sleep apnea) G47.33    Tobacco use Z72.0    Chronic back pain M54.9, G89.29    Asthma J45.909    Elevated BP without diagnosis of hypertension R03.0    Exertional dyspnea R06.09    Persistent atrial fibrillation ([address] I48.19    Morbid obesity due to excess calories (HCC) E66.01    Chronic anticoagulation Z79.01    Chronic diastolic heart failure (HCC) I50.32    Chronic systolic congestive heart failure (HCC) I50.22    Acute on chronic systolic CHF (congestive heart failure) ([address] I50.23    Acute renal insufficiency N28.9    Hyperkalemia E87.5     Past Medical History:   Diagnosis Date    Arrhythmia     a fib    Arthritis     left ankle    Asthma     Back pain     Gout     left foot     Heart failure ([address]     Heart failure ([address] 2017    Hypertension     Nicotine vapor product user Non-nicotine vapor product user     Restless leg syndrome     Sleep apnea     wears CPAP    Spinal stenosis     Staph infection       Past Surgical History:   Procedure Laterality Date    HX TYMPANOSTOMY       Current Outpatient Medications   Medication Sig Dispense Refill    warfarin (COUMADIN) 5 mg tablet TAKE ONE AND ONE-HALF (1.5) TABLETS BY MOUTH DAILY FROM MONDAY TO FRIDAY, THEN TAKE ONE TABLET DAILY ON SATURDAY AND SUNDAY 120 Tablet 0    torsemide (DEMADEX) 20 mg tablet Take 1 tablet daily. Take an additional tablet if needed for shortness of breath or swelling. 180 Tablet 3    allopurinoL (ZYLOPRIM) 100 mg tablet Take 1 tablet by mouth once daily for 30 days 90 Tablet 1    spironolactone (ALDACTONE) 25 mg tablet Take 1 tablet by mouth once daily 90 Tablet 3    potassium chloride (KLOR-CON M10) 10 mEq tablet Take 1 Tablet by mouth daily. 90 Tablet 3    metOLazone (ZAROXOLYN) 5 mg tablet Take 1 tablet every 5 days and as needed for weight gain >5lbs 30 Tablet 3    dilTIAZem IR (CARDIZEM) 30 mg tablet TAKE 1 TABLET BY MOUTH TWICE DAILY. HOLD IF BLOOD PRESSURE IS LESS THAN 100/55 180 Tablet 3    sacubitriL-valsartan (Entresto) 24-26 mg tablet Take 1 Tablet by mouth two (2) times a day. 180 Tablet 3    psyllium (METAMUCIL) powd Take  by mouth as needed. cpap machine kit by Does Not Apply route daily. MULTIVITAMIN (MULTIPLE VITAMIN PO) Take 1 Tab by mouth daily. ferrous sulfate 325 mg (65 mg iron) tablet Take  by mouth every other day.  Takes 1/2 tab (Patient not taking: Reported on 12/7/2022)       Allergies   Allergen Reactions    Penicillins Hives    Metoprolol Shortness of Breath       Family History   Problem Relation Age of Onset    COPD Mother     Hypertension Mother     Diabetes Mother     Heart Disease Mother     Heart Disease Father     Hypertension Father     Obesity Paternal Aunt     Hypertension Paternal Aunt     Diabetes Paternal Aunt     Cancer Paternal Uncle         lung    Cancer Maternal Grandfather         lung    No Known Problems Paternal Grandmother      Social History     Tobacco Use    Smoking status: Former     Packs/day: 1.00     Types: Cigarettes     Quit date: 2017     Years since quittin.8    Smokeless tobacco: Current    Tobacco comments:     vapes   Substance Use Topics    Alcohol use: Yes     Comment: Hard seltzer 10 a week       Anticoagulation Monitoring:    Subjective:   Talita Villarreal is a 47 y.o. male who presents today for Anticoagulation monitoring. Indication: Atrial Fibrillation  INR Goal: 2.0-3.0. Current dose:  Coumadin 7.5 mg Mon-Fri, 5 mgs Sat & Sun  Missed Coumadin Doses:  None  Medication Changes:  no  Dietary Changes:  no    Symptoms: taking coumadin appropriately without any bleeding. Latest INRs:  Lab Results   Component Value Date/Time    INR POC 2.2 2022 02:01 PM    INR POC 3.0 10/25/2022 03:33 PM    INR POC 1.1 2022 11:40 AM     New Coumadin dose:.current treatment plan is effective, no change in therapy, orders as documented in EMR. Next check to be scheduled for  8 weeks. HISTORICAL  PMH, PSH, FHX, SOCHX, ALLERGIES and MES were reviewed and updated today. Review of Systems  Review of Systems   All other systems reviewed and are negative. Objective:     Vitals:    22 1345   BP: 110/70   Pulse: 60   Resp: 16   Temp: 97 °F (36.1 °C)   TempSrc: Temporal   SpO2: 98%   Weight: 273 lb 6.4 oz (124 kg)   Height: 5' 6\" (1.676 m)       Physical Exam  Vitals and nursing note reviewed. Constitutional:       Appearance: Normal appearance. HENT:      Head: Normocephalic and atraumatic. Mouth/Throat:      Mouth: Mucous membranes are moist.   Eyes:      Extraocular Movements: Extraocular movements intact. Conjunctiva/sclera: Conjunctivae normal.      Pupils: Pupils are equal, round, and reactive to light. Cardiovascular:      Rate and Rhythm: Normal rate and regular rhythm. Pulses: Normal pulses. Heart sounds: Normal heart sounds. Pulmonary:      Effort: Pulmonary effort is normal.      Breath sounds: Normal breath sounds. Musculoskeletal:      Cervical back: Normal range of motion and neck supple. Neurological:      General: No focal deficit present. Mental Status: He is alert. Mental status is at baseline. Cranial Nerves: No cranial nerve deficit. Sensory: No sensory deficit. Motor: No weakness. Coordination: Coordination normal.      Gait: Gait normal.      Deep Tendon Reflexes: Reflexes normal.   Psychiatric:         Mood and Affect: Mood normal.         Behavior: Behavior normal.         Thought Content:  Thought content normal.         Judgment: Judgment normal.       Cedric Osorio MD  [facility]   12/07/2022

## 2024-01-17 NOTE — TELEPHONE ENCOUNTER
Beto here for lab draw for upcoming doctor visit on 01/17/2024.     Port accessed and de-accessed per [facility] procedure.   See flowsheets and MAR for details.   Patient tolerated procedure well.    Beto will be notified of lab results at upcoming MD visit. Patient discharged in stable, ambulatory condition.         Patient states that his pharmacy has not received his prescription for torsemide 20 mg 2 tablets 2 times a day. Patient is out of medication. .     Phone:  [phone]

## (undated) DEVICE — TR BAND RADIAL ARTERY COMPRESSION DEVICE: Brand: TR BAND

## (undated) DEVICE — SPECIAL PROCEDURE DRAPE 32" X 34": Brand: SPECIAL PROCEDURE DRAPE

## (undated) DEVICE — SWAN-GANZ TRUE SIZE THERMODULTION CATHETER, 5F: Brand: SWAN-GANZ TRUE SIZE

## (undated) DEVICE — FIXED CORE WIRE GUIDE SAFE-T-J, CURVED: Brand: COOK

## (undated) DEVICE — DRESSING HEMOSTATIC SFT INTVENT W/O SLT DBL WRP QUIKCLOT LF

## (undated) DEVICE — PINNACLE INTRODUCER SHEATH: Brand: PINNACLE

## (undated) DEVICE — SYRINGE ANGIO 10ML RED FLAT GRP FIX M LUER CONN MEDALLION

## (undated) DEVICE — COVER US PRB W15XL120CM W/ GEL RUBBERBAND TAPE STRP FLD GEN

## (undated) DEVICE — INTRODUCER SHTH 5 FRX30 CM 7 CM W/ NIT WIRE REG MICRO-STICK

## (undated) DEVICE — PACK PROCEDURE SURG HRT CATH

## (undated) DEVICE — GUIDE CATH CONVEY 5FR JR4 -- 39228-686

## (undated) DEVICE — GLIDESHEATH SLENDER STAINLESS STEEL KIT: Brand: GLIDESHEATH SLENDER

## (undated) DEVICE — PROCEDURE KIT FLUID MGMT CUST MAINFOLD STRL

## (undated) DEVICE — GUIDE CATH CONVEY 5FR JL3.5 -- 39228-661